# Patient Record
Sex: FEMALE | Race: WHITE | NOT HISPANIC OR LATINO | Employment: OTHER | URBAN - METROPOLITAN AREA
[De-identification: names, ages, dates, MRNs, and addresses within clinical notes are randomized per-mention and may not be internally consistent; named-entity substitution may affect disease eponyms.]

---

## 2018-03-20 ENCOUNTER — HOSPITAL ENCOUNTER (INPATIENT)
Facility: HOSPITAL | Age: 53
LOS: 6 days | Discharge: NURSING, REHAB OR LONG-TERM CARE | End: 2018-03-27
Attending: FAMILY MEDICINE | Admitting: INTERNAL MEDICINE

## 2018-03-20 DIAGNOSIS — M25.469 KNEE SWELLING: ICD-10-CM

## 2018-03-20 DIAGNOSIS — Z74.09 IMPAIRED FUNCTIONAL MOBILITY, BALANCE, GAIT, AND ENDURANCE: ICD-10-CM

## 2018-03-20 DIAGNOSIS — M25.469 KNEE SWELLING: Primary | ICD-10-CM

## 2018-03-20 DIAGNOSIS — Z74.09 IMPAIRED MOBILITY AND ADLS: ICD-10-CM

## 2018-03-20 DIAGNOSIS — Z78.9 IMPAIRED MOBILITY AND ADLS: ICD-10-CM

## 2018-03-20 PROBLEM — N39.0 URINARY TRACT INFECTION: Status: ACTIVE | Noted: 2018-03-20

## 2018-03-20 PROBLEM — D64.9 ANEMIA: Status: ACTIVE | Noted: 2018-03-20

## 2018-03-20 PROBLEM — I10 HYPERTENSION: Status: ACTIVE | Noted: 2018-03-20

## 2018-03-20 PROBLEM — F32.A DEPRESSION: Status: ACTIVE | Noted: 2018-03-20

## 2018-03-20 PROBLEM — R78.81 BACTEREMIA: Status: ACTIVE | Noted: 2018-03-20

## 2018-03-20 PROBLEM — IMO0001 ALCOHOLISM /ALCOHOL ABUSE: Status: ACTIVE | Noted: 2018-03-20

## 2018-03-20 RX ORDER — VENLAFAXINE HYDROCHLORIDE 75 MG/1
150 CAPSULE, EXTENDED RELEASE ORAL DAILY
Status: DISCONTINUED | OUTPATIENT
Start: 2018-03-21 | End: 2018-03-27 | Stop reason: HOSPADM

## 2018-03-20 RX ORDER — TRAZODONE HYDROCHLORIDE 50 MG/1
50 TABLET ORAL NIGHTLY
Status: DISCONTINUED | OUTPATIENT
Start: 2018-03-21 | End: 2018-03-27 | Stop reason: HOSPADM

## 2018-03-20 RX ORDER — VENLAFAXINE HYDROCHLORIDE 150 MG/1
150 CAPSULE, EXTENDED RELEASE ORAL DAILY
COMMUNITY

## 2018-03-20 RX ORDER — CARVEDILOL 3.12 MG/1
3.12 TABLET ORAL 2 TIMES DAILY WITH MEALS
Status: DISCONTINUED | OUTPATIENT
Start: 2018-03-21 | End: 2018-03-27 | Stop reason: HOSPADM

## 2018-03-20 RX ORDER — TRAZODONE HYDROCHLORIDE 50 MG/1
50 TABLET ORAL NIGHTLY
COMMUNITY

## 2018-03-20 RX ORDER — CARVEDILOL 3.12 MG/1
3.12 TABLET ORAL 2 TIMES DAILY WITH MEALS
COMMUNITY

## 2018-03-21 ENCOUNTER — APPOINTMENT (OUTPATIENT)
Dept: CARDIOLOGY | Facility: HOSPITAL | Age: 53
End: 2018-03-21

## 2018-03-21 ENCOUNTER — APPOINTMENT (OUTPATIENT)
Dept: GENERAL RADIOLOGY | Facility: HOSPITAL | Age: 53
End: 2018-03-21

## 2018-03-21 PROBLEM — Z96.659 INFECTED PROSTHETIC KNEE JOINT: Status: ACTIVE | Noted: 2018-03-21

## 2018-03-21 PROBLEM — K59.00 CONSTIPATION: Status: ACTIVE | Noted: 2018-03-21

## 2018-03-21 PROBLEM — T84.59XA INFECTED PROSTHETIC KNEE JOINT: Status: ACTIVE | Noted: 2018-03-21

## 2018-03-21 PROBLEM — A41.01 MSSA (METHICILLIN SUSCEPTIBLE STAPHYLOCOCCUS AUREUS) SEPTICEMIA (HCC): Status: ACTIVE | Noted: 2018-03-20

## 2018-03-21 PROBLEM — E11.9 DIABETES MELLITUS: Status: ACTIVE | Noted: 2018-03-21

## 2018-03-21 LAB
ALBUMIN SERPL-MCNC: 2.9 G/DL (ref 3.2–4.8)
ALBUMIN/GLOB SERPL: 0.7 G/DL (ref 1.5–2.5)
ALP SERPL-CCNC: 135 U/L (ref 25–100)
ALT SERPL W P-5'-P-CCNC: 18 U/L (ref 7–40)
ANION GAP SERPL CALCULATED.3IONS-SCNC: 10 MMOL/L (ref 3–11)
ANION GAP SERPL CALCULATED.3IONS-SCNC: 8 MMOL/L (ref 3–11)
APPEARANCE FLD: ABNORMAL
APPEARANCE FLD: ABNORMAL
AST SERPL-CCNC: 22 U/L (ref 0–33)
BH CV ECHO MEAS - AO ROOT AREA (BSA CORRECTED): 1.7
BH CV ECHO MEAS - AO ROOT AREA: 6.8 CM^2
BH CV ECHO MEAS - AO ROOT DIAM: 2.9 CM
BH CV ECHO MEAS - BSA(HAYCOCK): 1.8 M^2
BH CV ECHO MEAS - BSA: 1.7 M^2
BH CV ECHO MEAS - BZI_BMI: 28.9 KILOGRAMS/M^2
BH CV ECHO MEAS - BZI_METRIC_HEIGHT: 157.5 CM
BH CV ECHO MEAS - BZI_METRIC_WEIGHT: 71.7 KG
BH CV ECHO MEAS - CONTRAST EF (2CH): 63.6 ML/M^2
BH CV ECHO MEAS - CONTRAST EF 4CH: 61.7 ML/M^2
BH CV ECHO MEAS - EDV(CUBED): 71.2 ML
BH CV ECHO MEAS - EDV(MOD-SP2): 162 ML
BH CV ECHO MEAS - EDV(MOD-SP4): 154 ML
BH CV ECHO MEAS - EDV(TEICH): 76.2 ML
BH CV ECHO MEAS - EF(CUBED): 69.6 %
BH CV ECHO MEAS - EF(MOD-SP2): 63.6 %
BH CV ECHO MEAS - EF(MOD-SP4): 61.7 %
BH CV ECHO MEAS - EF(TEICH): 61.7 %
BH CV ECHO MEAS - ESV(CUBED): 21.6 ML
BH CV ECHO MEAS - ESV(MOD-SP2): 59 ML
BH CV ECHO MEAS - ESV(MOD-SP4): 59 ML
BH CV ECHO MEAS - ESV(TEICH): 29.2 ML
BH CV ECHO MEAS - FS: 32.8 %
BH CV ECHO MEAS - IVS/LVPW: 0.94
BH CV ECHO MEAS - IVSD: 1 CM
BH CV ECHO MEAS - LA DIMENSION: 4.1 CM
BH CV ECHO MEAS - LA/AO: 1.4
BH CV ECHO MEAS - LAT PEAK E' VEL: 14.8 CM/SEC
BH CV ECHO MEAS - LV DIASTOLIC VOL/BSA (35-75): 89.1 ML/M^2
BH CV ECHO MEAS - LV IVRT: 0.08 SEC
BH CV ECHO MEAS - LV MASS(C)D: 141.2 GRAMS
BH CV ECHO MEAS - LV MASS(C)DI: 81.7 GRAMS/M^2
BH CV ECHO MEAS - LV SYSTOLIC VOL/BSA (12-30): 34.1 ML/M^2
BH CV ECHO MEAS - LVIDD: 4.1 CM
BH CV ECHO MEAS - LVIDS: 2.8 CM
BH CV ECHO MEAS - LVLD AP2: 8.6 CM
BH CV ECHO MEAS - LVLD AP4: 9.1 CM
BH CV ECHO MEAS - LVLS AP2: 7.2 CM
BH CV ECHO MEAS - LVLS AP4: 7.6 CM
BH CV ECHO MEAS - LVOT AREA (M): 3.1 CM^2
BH CV ECHO MEAS - LVOT AREA: 3.1 CM^2
BH CV ECHO MEAS - LVOT DIAM: 2 CM
BH CV ECHO MEAS - LVPWD: 1.1 CM
BH CV ECHO MEAS - MED PEAK E' VEL: 8.99 CM/SEC
BH CV ECHO MEAS - MV A MAX VEL: 86.4 CM/SEC
BH CV ECHO MEAS - MV DEC TIME: 0.15 SEC
BH CV ECHO MEAS - MV E MAX VEL: 110.1 CM/SEC
BH CV ECHO MEAS - MV E/A: 1.3
BH CV ECHO MEAS - PA ACC SLOPE: 655.9 CM/SEC^2
BH CV ECHO MEAS - PA ACC TIME: 0.16 SEC
BH CV ECHO MEAS - PA PR(ACCEL): 7.7 MMHG
BH CV ECHO MEAS - PI END-D VEL: 90.1 CM/SEC
BH CV ECHO MEAS - PULM A REVS VEL: 29.4 CM/SEC
BH CV ECHO MEAS - PULM DIAS VEL: 53.3 CM/SEC
BH CV ECHO MEAS - PULM S/D: 1.3
BH CV ECHO MEAS - PULM SYS VEL: 66.7 CM/SEC
BH CV ECHO MEAS - RAP SYSTOLE: 5 MMHG
BH CV ECHO MEAS - RVSP: 33 MMHG
BH CV ECHO MEAS - SI(CUBED): 28.7 ML/M^2
BH CV ECHO MEAS - SI(MOD-SP2): 59.6 ML/M^2
BH CV ECHO MEAS - SI(MOD-SP4): 54.9 ML/M^2
BH CV ECHO MEAS - SI(TEICH): 27.2 ML/M^2
BH CV ECHO MEAS - SV(CUBED): 49.6 ML
BH CV ECHO MEAS - SV(MOD-SP2): 103 ML
BH CV ECHO MEAS - SV(MOD-SP4): 95 ML
BH CV ECHO MEAS - SV(TEICH): 47 ML
BH CV ECHO MEAS - TAPSE (>1.6): 2.2 CM2
BH CV ECHO MEAS - TR MAX V: 28 MMHG
BH CV ECHO MEAS - TR MAX VEL: 263 CM/SEC
BH CV VAS BP LEFT ARM: NORMAL MMHG
BH CV XLRA - RV BASE: 4.6 CM
BH CV XLRA - RV LENGTH: 7.7 CM
BH CV XLRA - RV MID: 4.1 CM
BH CV XLRA - TDI S': 15.4 CM/SEC
BILIRUB SERPL-MCNC: 0.4 MG/DL (ref 0.3–1.2)
BILIRUB UR QL STRIP: NEGATIVE
BUN BLD-MCNC: <5 MG/DL (ref 9–23)
BUN BLD-MCNC: <5 MG/DL (ref 9–23)
BUN/CREAT SERPL: ABNORMAL (ref 7–25)
BUN/CREAT SERPL: ABNORMAL (ref 7–25)
CALCIUM SPEC-SCNC: 8.2 MG/DL (ref 8.7–10.4)
CALCIUM SPEC-SCNC: 8.2 MG/DL (ref 8.7–10.4)
CHLORIDE SERPL-SCNC: 102 MMOL/L (ref 99–109)
CHLORIDE SERPL-SCNC: 104 MMOL/L (ref 99–109)
CLARITY UR: CLEAR
CO2 SERPL-SCNC: 23 MMOL/L (ref 20–31)
CO2 SERPL-SCNC: 26 MMOL/L (ref 20–31)
COLOR FLD: ABNORMAL
COLOR FLD: YELLOW
COLOR UR: YELLOW
CREAT BLD-MCNC: 0.4 MG/DL (ref 0.6–1.3)
CREAT BLD-MCNC: 0.4 MG/DL (ref 0.6–1.3)
CRP SERPL-MCNC: 15.65 MG/DL (ref 0–1)
CRYSTALS FLD MICRO: NORMAL
CRYSTALS FLD MICRO: NORMAL
DEPRECATED RDW RBC AUTO: 45 FL (ref 37–54)
DEPRECATED RDW RBC AUTO: 45.8 FL (ref 37–54)
E/E' RATIO: 9
ERYTHROCYTE [DISTWIDTH] IN BLOOD BY AUTOMATED COUNT: 13.4 % (ref 11.3–14.5)
ERYTHROCYTE [DISTWIDTH] IN BLOOD BY AUTOMATED COUNT: 13.7 % (ref 11.3–14.5)
ERYTHROCYTE [SEDIMENTATION RATE] IN BLOOD: 60 MM/HR (ref 0–30)
GFR SERPL CREATININE-BSD FRML MDRD: >150 ML/MIN/1.73
GFR SERPL CREATININE-BSD FRML MDRD: >150 ML/MIN/1.73
GLOBULIN UR ELPH-MCNC: 3.9 GM/DL
GLUCOSE BLD-MCNC: 113 MG/DL (ref 70–100)
GLUCOSE BLD-MCNC: 127 MG/DL (ref 70–100)
GLUCOSE BLDC GLUCOMTR-MCNC: 104 MG/DL (ref 70–130)
GLUCOSE BLDC GLUCOMTR-MCNC: 115 MG/DL (ref 70–130)
GLUCOSE BLDC GLUCOMTR-MCNC: 77 MG/DL (ref 70–130)
GLUCOSE BLDC GLUCOMTR-MCNC: 81 MG/DL (ref 70–130)
GLUCOSE UR STRIP-MCNC: NEGATIVE MG/DL
HCT VFR BLD AUTO: 27 % (ref 34.5–44)
HCT VFR BLD AUTO: 29 % (ref 34.5–44)
HGB BLD-MCNC: 8.6 G/DL (ref 11.5–15.5)
HGB BLD-MCNC: 9.1 G/DL (ref 11.5–15.5)
HGB UR QL STRIP.AUTO: NEGATIVE
KETONES UR QL STRIP: NEGATIVE
LEFT ATRIUM VOLUME INDEX: 49.1 ML/M2
LEUKOCYTE ESTERASE UR QL STRIP.AUTO: NEGATIVE
LV EF 2D ECHO EST: 63 %
LYMPHOCYTES NFR FLD MANUAL: 1 %
LYMPHOCYTES NFR FLD MANUAL: 2 %
MACROPHAGE FLUID: 1 %
MCH RBC QN AUTO: 29.1 PG (ref 27–31)
MCH RBC QN AUTO: 29.4 PG (ref 27–31)
MCHC RBC AUTO-ENTMCNC: 31.4 G/DL (ref 32–36)
MCHC RBC AUTO-ENTMCNC: 31.9 G/DL (ref 32–36)
MCV RBC AUTO: 92.2 FL (ref 80–99)
MCV RBC AUTO: 92.7 FL (ref 80–99)
NEUTROPHILS NFR FLD MANUAL: 98 %
NEUTROPHILS NFR FLD MANUAL: 98 %
NITRITE UR QL STRIP: NEGATIVE
PH UR STRIP.AUTO: 5.5 [PH] (ref 5–8)
PLATELET # BLD AUTO: 560 10*3/MM3 (ref 150–450)
PLATELET # BLD AUTO: 609 10*3/MM3 (ref 150–450)
PMV BLD AUTO: 7.8 FL (ref 6–12)
PMV BLD AUTO: 8.2 FL (ref 6–12)
POTASSIUM BLD-SCNC: 3.5 MMOL/L (ref 3.5–5.5)
POTASSIUM BLD-SCNC: 3.8 MMOL/L (ref 3.5–5.5)
PROT SERPL-MCNC: 6.8 G/DL (ref 5.7–8.2)
PROT UR QL STRIP: NEGATIVE
RBC # BLD AUTO: 2.93 10*6/MM3 (ref 3.89–5.14)
RBC # BLD AUTO: 3.13 10*6/MM3 (ref 3.89–5.14)
RBC # FLD AUTO: 8000 /MM3
RBC # FLD AUTO: ABNORMAL /MM3
SODIUM BLD-SCNC: 135 MMOL/L (ref 132–146)
SODIUM BLD-SCNC: 138 MMOL/L (ref 132–146)
SP GR UR STRIP: 1.02 (ref 1–1.03)
UROBILINOGEN UR QL STRIP: NORMAL
WBC # FLD: ABNORMAL /MM3
WBC # FLD: ABNORMAL /MM3
WBC NRBC COR # BLD: 10.88 10*3/MM3 (ref 3.5–10.8)
WBC NRBC COR # BLD: 13.75 10*3/MM3 (ref 3.5–10.8)

## 2018-03-21 PROCEDURE — 81003 URINALYSIS AUTO W/O SCOPE: CPT | Performed by: PHYSICIAN ASSISTANT

## 2018-03-21 PROCEDURE — 93306 TTE W/DOPPLER COMPLETE: CPT

## 2018-03-21 PROCEDURE — 87077 CULTURE AEROBIC IDENTIFY: CPT | Performed by: ORTHOPAEDIC SURGERY

## 2018-03-21 PROCEDURE — 87040 BLOOD CULTURE FOR BACTERIA: CPT | Performed by: ORTHOPAEDIC SURGERY

## 2018-03-21 PROCEDURE — 20610 DRAIN/INJ JOINT/BURSA W/O US: CPT | Performed by: ORTHOPAEDIC SURGERY

## 2018-03-21 PROCEDURE — 80053 COMPREHEN METABOLIC PANEL: CPT | Performed by: PHYSICIAN ASSISTANT

## 2018-03-21 PROCEDURE — 85027 COMPLETE CBC AUTOMATED: CPT | Performed by: PHYSICIAN ASSISTANT

## 2018-03-21 PROCEDURE — 25010000003 CEFAZOLIN 1 GM/50ML SOLUTION: Performed by: PHYSICIAN ASSISTANT

## 2018-03-21 PROCEDURE — 87070 CULTURE OTHR SPECIMN AEROBIC: CPT | Performed by: ORTHOPAEDIC SURGERY

## 2018-03-21 PROCEDURE — 99221 1ST HOSP IP/OBS SF/LOW 40: CPT | Performed by: ORTHOPAEDIC SURGERY

## 2018-03-21 PROCEDURE — 89051 BODY FLUID CELL COUNT: CPT | Performed by: ORTHOPAEDIC SURGERY

## 2018-03-21 PROCEDURE — 87015 SPECIMEN INFECT AGNT CONCNTJ: CPT | Performed by: ORTHOPAEDIC SURGERY

## 2018-03-21 PROCEDURE — 89060 EXAM SYNOVIAL FLUID CRYSTALS: CPT | Performed by: ORTHOPAEDIC SURGERY

## 2018-03-21 PROCEDURE — 73560 X-RAY EXAM OF KNEE 1 OR 2: CPT

## 2018-03-21 PROCEDURE — 63710000001 INSULIN LISPRO (HUMAN) PER 5 UNITS: Performed by: PHYSICIAN ASSISTANT

## 2018-03-21 PROCEDURE — 63710000001 INSULIN DETEMIR PER 5 UNITS

## 2018-03-21 PROCEDURE — 87186 SC STD MICRODIL/AGAR DIL: CPT | Performed by: ORTHOPAEDIC SURGERY

## 2018-03-21 PROCEDURE — 93306 TTE W/DOPPLER COMPLETE: CPT | Performed by: INTERNAL MEDICINE

## 2018-03-21 PROCEDURE — 82962 GLUCOSE BLOOD TEST: CPT

## 2018-03-21 PROCEDURE — 87147 CULTURE TYPE IMMUNOLOGIC: CPT | Performed by: ORTHOPAEDIC SURGERY

## 2018-03-21 PROCEDURE — 86140 C-REACTIVE PROTEIN: CPT | Performed by: PHYSICIAN ASSISTANT

## 2018-03-21 PROCEDURE — 87205 SMEAR GRAM STAIN: CPT | Performed by: ORTHOPAEDIC SURGERY

## 2018-03-21 PROCEDURE — 99223 1ST HOSP IP/OBS HIGH 75: CPT | Performed by: FAMILY MEDICINE

## 2018-03-21 PROCEDURE — 85652 RBC SED RATE AUTOMATED: CPT | Performed by: PHYSICIAN ASSISTANT

## 2018-03-21 RX ORDER — LORAZEPAM 2 MG/ML
2 INJECTION INTRAMUSCULAR
Status: DISCONTINUED | OUTPATIENT
Start: 2018-03-21 | End: 2018-03-27 | Stop reason: HOSPADM

## 2018-03-21 RX ORDER — SODIUM CHLORIDE 0.9 % (FLUSH) 0.9 %
1-10 SYRINGE (ML) INJECTION AS NEEDED
Status: DISCONTINUED | OUTPATIENT
Start: 2018-03-20 | End: 2018-03-27 | Stop reason: HOSPADM

## 2018-03-21 RX ORDER — LORAZEPAM 1 MG/1
2 TABLET ORAL
Status: DISCONTINUED | OUTPATIENT
Start: 2018-03-21 | End: 2018-03-27 | Stop reason: HOSPADM

## 2018-03-21 RX ORDER — ACETAMINOPHEN 325 MG/1
650 TABLET ORAL EVERY 4 HOURS PRN
Status: DISCONTINUED | OUTPATIENT
Start: 2018-03-21 | End: 2018-03-23 | Stop reason: SDUPTHER

## 2018-03-21 RX ORDER — ONDANSETRON 4 MG/1
4 TABLET, FILM COATED ORAL EVERY 6 HOURS PRN
Status: DISCONTINUED | OUTPATIENT
Start: 2018-03-21 | End: 2018-03-27 | Stop reason: HOSPADM

## 2018-03-21 RX ORDER — LORAZEPAM 1 MG/1
1 TABLET ORAL
Status: DISCONTINUED | OUTPATIENT
Start: 2018-03-21 | End: 2018-03-27 | Stop reason: HOSPADM

## 2018-03-21 RX ORDER — ONDANSETRON 2 MG/ML
4 INJECTION INTRAMUSCULAR; INTRAVENOUS EVERY 6 HOURS PRN
Status: DISCONTINUED | OUTPATIENT
Start: 2018-03-21 | End: 2018-03-27 | Stop reason: HOSPADM

## 2018-03-21 RX ORDER — OXYCODONE AND ACETAMINOPHEN 10; 325 MG/1; MG/1
1 TABLET ORAL EVERY 6 HOURS PRN
Status: DISCONTINUED | OUTPATIENT
Start: 2018-03-21 | End: 2018-03-27

## 2018-03-21 RX ORDER — LORAZEPAM 2 MG/ML
1 INJECTION INTRAMUSCULAR
Status: DISCONTINUED | OUTPATIENT
Start: 2018-03-21 | End: 2018-03-27 | Stop reason: HOSPADM

## 2018-03-21 RX ORDER — SENNA AND DOCUSATE SODIUM 50; 8.6 MG/1; MG/1
2 TABLET, FILM COATED ORAL 2 TIMES DAILY
Status: DISCONTINUED | OUTPATIENT
Start: 2018-03-21 | End: 2018-03-27 | Stop reason: HOSPADM

## 2018-03-21 RX ORDER — DEXTROSE MONOHYDRATE 25 G/50ML
25 INJECTION, SOLUTION INTRAVENOUS
Status: DISCONTINUED | OUTPATIENT
Start: 2018-03-20 | End: 2018-03-27 | Stop reason: HOSPADM

## 2018-03-21 RX ORDER — NICOTINE POLACRILEX 4 MG
15 LOZENGE BUCCAL
Status: DISCONTINUED | OUTPATIENT
Start: 2018-03-20 | End: 2018-03-27 | Stop reason: HOSPADM

## 2018-03-21 RX ORDER — BISACODYL 10 MG
10 SUPPOSITORY, RECTAL RECTAL 2 TIMES DAILY PRN
Status: DISCONTINUED | OUTPATIENT
Start: 2018-03-21 | End: 2018-03-27 | Stop reason: HOSPADM

## 2018-03-21 RX ORDER — SODIUM CHLORIDE 9 MG/ML
50 INJECTION, SOLUTION INTRAVENOUS CONTINUOUS
Status: DISCONTINUED | OUTPATIENT
Start: 2018-03-21 | End: 2018-03-23 | Stop reason: SDUPTHER

## 2018-03-21 RX ORDER — IPRATROPIUM BROMIDE AND ALBUTEROL SULFATE 2.5; .5 MG/3ML; MG/3ML
3 SOLUTION RESPIRATORY (INHALATION) EVERY 4 HOURS PRN
Status: DISCONTINUED | OUTPATIENT
Start: 2018-03-21 | End: 2018-03-27 | Stop reason: HOSPADM

## 2018-03-21 RX ADMIN — TRAZODONE HYDROCHLORIDE 50 MG: 50 TABLET ORAL at 00:18

## 2018-03-21 RX ADMIN — LORAZEPAM 2 MG: 1 TABLET ORAL at 08:32

## 2018-03-21 RX ADMIN — VENLAFAXINE HYDROCHLORIDE 150 MG: 75 CAPSULE, EXTENDED RELEASE ORAL at 08:25

## 2018-03-21 RX ADMIN — POLYETHYLENE GLYCOL (3350) 17 G: 17 POWDER, FOR SOLUTION ORAL at 21:03

## 2018-03-21 RX ADMIN — OXYCODONE HYDROCHLORIDE AND ACETAMINOPHEN 1 TABLET: 10; 325 TABLET ORAL at 11:07

## 2018-03-21 RX ADMIN — CEFAZOLIN SODIUM 1 G: 1 INJECTION, SOLUTION INTRAVENOUS at 01:02

## 2018-03-21 RX ADMIN — OXYCODONE HYDROCHLORIDE AND ACETAMINOPHEN 1 TABLET: 10; 325 TABLET ORAL at 05:14

## 2018-03-21 RX ADMIN — NAFCILLIN SODIUM 6 G: 2 INJECTION, POWDER, LYOPHILIZED, FOR SOLUTION INTRAMUSCULAR; INTRAVENOUS at 16:52

## 2018-03-21 RX ADMIN — LORAZEPAM 2 MG: 1 TABLET ORAL at 00:18

## 2018-03-21 RX ADMIN — INSULIN DETEMIR 15 UNITS: 100 INJECTION, SOLUTION SUBCUTANEOUS at 08:24

## 2018-03-21 RX ADMIN — CEFAZOLIN SODIUM 1 G: 1 INJECTION, SOLUTION INTRAVENOUS at 08:25

## 2018-03-21 RX ADMIN — SODIUM CHLORIDE 100 ML/HR: 9 INJECTION, SOLUTION INTRAVENOUS at 00:19

## 2018-03-21 RX ADMIN — LORAZEPAM 2 MG: 1 TABLET ORAL at 16:53

## 2018-03-21 RX ADMIN — OXYCODONE HYDROCHLORIDE AND ACETAMINOPHEN 1 TABLET: 10; 325 TABLET ORAL at 21:04

## 2018-03-21 RX ADMIN — ACETAMINOPHEN 650 MG: 325 TABLET ORAL at 00:18

## 2018-03-21 RX ADMIN — Medication 2 TABLET: at 21:04

## 2018-03-21 RX ADMIN — TRAZODONE HYDROCHLORIDE 50 MG: 50 TABLET ORAL at 21:04

## 2018-03-21 RX ADMIN — CARVEDILOL 3.12 MG: 3.12 TABLET, FILM COATED ORAL at 08:25

## 2018-03-21 RX ADMIN — LORAZEPAM 2 MG: 1 TABLET ORAL at 11:07

## 2018-03-21 RX ADMIN — CARVEDILOL 3.12 MG: 3.12 TABLET, FILM COATED ORAL at 16:51

## 2018-03-22 ENCOUNTER — APPOINTMENT (OUTPATIENT)
Dept: CARDIOLOGY | Facility: HOSPITAL | Age: 53
End: 2018-03-22
Attending: INTERNAL MEDICINE

## 2018-03-22 ENCOUNTER — ANESTHESIA (OUTPATIENT)
Dept: PERIOP | Facility: HOSPITAL | Age: 53
End: 2018-03-22

## 2018-03-22 ENCOUNTER — ANESTHESIA EVENT (OUTPATIENT)
Dept: PERIOP | Facility: HOSPITAL | Age: 53
End: 2018-03-22

## 2018-03-22 PROBLEM — D50.9 IRON DEFICIENCY ANEMIA: Status: ACTIVE | Noted: 2018-03-20

## 2018-03-22 LAB
ANION GAP SERPL CALCULATED.3IONS-SCNC: 10 MMOL/L (ref 3–11)
BH CV VAS BP LEFT ARM: NORMAL MMHG
BUN BLD-MCNC: 5 MG/DL (ref 9–23)
BUN/CREAT SERPL: 12.5 (ref 7–25)
CALCIUM SPEC-SCNC: 7.6 MG/DL (ref 8.7–10.4)
CHLORIDE SERPL-SCNC: 105 MMOL/L (ref 99–109)
CO2 SERPL-SCNC: 23 MMOL/L (ref 20–31)
CREAT BLD-MCNC: 0.4 MG/DL (ref 0.6–1.3)
DEPRECATED RDW RBC AUTO: 45.4 FL (ref 37–54)
ERYTHROCYTE [DISTWIDTH] IN BLOOD BY AUTOMATED COUNT: 13.4 % (ref 11.3–14.5)
FERRITIN SERPL-MCNC: 184 NG/ML (ref 10–291)
GFR SERPL CREATININE-BSD FRML MDRD: >150 ML/MIN/1.73
GLUCOSE BLD-MCNC: 96 MG/DL (ref 70–100)
GLUCOSE BLDC GLUCOMTR-MCNC: 104 MG/DL (ref 70–130)
GLUCOSE BLDC GLUCOMTR-MCNC: 139 MG/DL (ref 70–130)
GLUCOSE BLDC GLUCOMTR-MCNC: 86 MG/DL (ref 70–130)
GLUCOSE BLDC GLUCOMTR-MCNC: 91 MG/DL (ref 70–130)
GLUCOSE BLDC GLUCOMTR-MCNC: 97 MG/DL (ref 70–130)
HCT VFR BLD AUTO: 28 % (ref 34.5–44)
HGB BLD-MCNC: 8.9 G/DL (ref 11.5–15.5)
IRON 24H UR-MRATE: 17 MCG/DL (ref 50–175)
IRON SATN MFR SERPL: 7 % (ref 15–50)
LV EF 2D ECHO EST: 65 %
MAGNESIUM SERPL-MCNC: 1.8 MG/DL (ref 1.3–2.7)
MCH RBC QN AUTO: 29.4 PG (ref 27–31)
MCHC RBC AUTO-ENTMCNC: 31.8 G/DL (ref 32–36)
MCV RBC AUTO: 92.4 FL (ref 80–99)
PLATELET # BLD AUTO: 590 10*3/MM3 (ref 150–450)
PMV BLD AUTO: 7.7 FL (ref 6–12)
POTASSIUM BLD-SCNC: 4.1 MMOL/L (ref 3.5–5.5)
RBC # BLD AUTO: 3.03 10*6/MM3 (ref 3.89–5.14)
SODIUM BLD-SCNC: 138 MMOL/L (ref 132–146)
TIBC SERPL-MCNC: 251 MCG/DL (ref 250–450)
VIT B12 BLD-MCNC: 1932 PG/ML (ref 211–911)
WBC NRBC COR # BLD: 9.6 10*3/MM3 (ref 3.5–10.8)

## 2018-03-22 PROCEDURE — 94760 N-INVAS EAR/PLS OXIMETRY 1: CPT

## 2018-03-22 PROCEDURE — 87040 BLOOD CULTURE FOR BACTERIA: CPT | Performed by: PHYSICIAN ASSISTANT

## 2018-03-22 PROCEDURE — 93320 DOPPLER ECHO COMPLETE: CPT

## 2018-03-22 PROCEDURE — 93312 ECHO TRANSESOPHAGEAL: CPT

## 2018-03-22 PROCEDURE — 82607 VITAMIN B-12: CPT | Performed by: INTERNAL MEDICINE

## 2018-03-22 PROCEDURE — 83735 ASSAY OF MAGNESIUM: CPT | Performed by: INTERNAL MEDICINE

## 2018-03-22 PROCEDURE — 93320 DOPPLER ECHO COMPLETE: CPT | Performed by: INTERNAL MEDICINE

## 2018-03-22 PROCEDURE — 99232 SBSQ HOSP IP/OBS MODERATE 35: CPT | Performed by: INTERNAL MEDICINE

## 2018-03-22 PROCEDURE — 85027 COMPLETE CBC AUTOMATED: CPT | Performed by: INTERNAL MEDICINE

## 2018-03-22 PROCEDURE — 83540 ASSAY OF IRON: CPT | Performed by: INTERNAL MEDICINE

## 2018-03-22 PROCEDURE — 93321 DOPPLER ECHO F-UP/LMTD STD: CPT

## 2018-03-22 PROCEDURE — 94640 AIRWAY INHALATION TREATMENT: CPT

## 2018-03-22 PROCEDURE — 99232 SBSQ HOSP IP/OBS MODERATE 35: CPT | Performed by: ORTHOPAEDIC SURGERY

## 2018-03-22 PROCEDURE — 25010000002 NA FERRIC GLUC CPLX PER 12.5 MG: Performed by: INTERNAL MEDICINE

## 2018-03-22 PROCEDURE — 82962 GLUCOSE BLOOD TEST: CPT

## 2018-03-22 PROCEDURE — 80048 BASIC METABOLIC PNL TOTAL CA: CPT | Performed by: INTERNAL MEDICINE

## 2018-03-22 PROCEDURE — 93325 DOPPLER ECHO COLOR FLOW MAPG: CPT

## 2018-03-22 PROCEDURE — 93312 ECHO TRANSESOPHAGEAL: CPT | Performed by: INTERNAL MEDICINE

## 2018-03-22 PROCEDURE — 83550 IRON BINDING TEST: CPT | Performed by: INTERNAL MEDICINE

## 2018-03-22 PROCEDURE — 63710000001 INSULIN DETEMIR PER 5 UNITS: Performed by: INTERNAL MEDICINE

## 2018-03-22 PROCEDURE — 93325 DOPPLER ECHO COLOR FLOW MAPG: CPT | Performed by: INTERNAL MEDICINE

## 2018-03-22 PROCEDURE — 82728 ASSAY OF FERRITIN: CPT | Performed by: INTERNAL MEDICINE

## 2018-03-22 RX ORDER — ACETAMINOPHEN 650 MG
TABLET, EXTENDED RELEASE ORAL DAILY
Status: DISCONTINUED | OUTPATIENT
Start: 2018-03-22 | End: 2018-03-27 | Stop reason: HOSPADM

## 2018-03-22 RX ORDER — TRIAMCINOLONE ACETONIDE 1 MG/ML
LOTION TOPICAL EVERY 12 HOURS SCHEDULED
Status: DISCONTINUED | OUTPATIENT
Start: 2018-03-22 | End: 2018-03-27 | Stop reason: HOSPADM

## 2018-03-22 RX ORDER — LIDOCAINE HYDROCHLORIDE 10 MG/ML
0.5 INJECTION, SOLUTION EPIDURAL; INFILTRATION; INTRACAUDAL; PERINEURAL ONCE AS NEEDED
Status: CANCELLED | OUTPATIENT
Start: 2018-03-22

## 2018-03-22 RX ADMIN — SODIUM CHLORIDE 125 MG: 9 INJECTION, SOLUTION INTRAVENOUS at 19:52

## 2018-03-22 RX ADMIN — OXYCODONE HYDROCHLORIDE AND ACETAMINOPHEN 1 TABLET: 10; 325 TABLET ORAL at 12:08

## 2018-03-22 RX ADMIN — LORAZEPAM 2 MG: 1 TABLET ORAL at 23:01

## 2018-03-22 RX ADMIN — NAFCILLIN SODIUM 6 G: 2 INJECTION, POWDER, LYOPHILIZED, FOR SOLUTION INTRAMUSCULAR; INTRAVENOUS at 04:29

## 2018-03-22 RX ADMIN — CARVEDILOL 3.12 MG: 3.12 TABLET, FILM COATED ORAL at 18:40

## 2018-03-22 RX ADMIN — VENLAFAXINE HYDROCHLORIDE 150 MG: 75 CAPSULE, EXTENDED RELEASE ORAL at 08:59

## 2018-03-22 RX ADMIN — OXYCODONE HYDROCHLORIDE AND ACETAMINOPHEN 1 TABLET: 10; 325 TABLET ORAL at 18:44

## 2018-03-22 RX ADMIN — SODIUM CHLORIDE 50 ML/HR: 9 INJECTION, SOLUTION INTRAVENOUS at 00:48

## 2018-03-22 RX ADMIN — POLYETHYLENE GLYCOL (3350) 17 G: 17 POWDER, FOR SOLUTION ORAL at 08:59

## 2018-03-22 RX ADMIN — Medication 2 TABLET: at 19:52

## 2018-03-22 RX ADMIN — Medication 2 TABLET: at 08:58

## 2018-03-22 RX ADMIN — TRIAMCINOLONE ACETONIDE: 1 LOTION TOPICAL at 19:53

## 2018-03-22 RX ADMIN — ACETAMINOPHEN 650 MG: 325 TABLET ORAL at 09:53

## 2018-03-22 RX ADMIN — CARVEDILOL 3.12 MG: 3.12 TABLET, FILM COATED ORAL at 08:59

## 2018-03-22 RX ADMIN — TRAZODONE HYDROCHLORIDE 50 MG: 50 TABLET ORAL at 19:52

## 2018-03-22 RX ADMIN — IPRATROPIUM BROMIDE AND ALBUTEROL SULFATE 3 ML: .5; 3 SOLUTION RESPIRATORY (INHALATION) at 07:03

## 2018-03-22 RX ADMIN — NAFCILLIN SODIUM 6 G: 2 INJECTION, POWDER, LYOPHILIZED, FOR SOLUTION INTRAMUSCULAR; INTRAVENOUS at 16:36

## 2018-03-22 RX ADMIN — INSULIN DETEMIR 10 UNITS: 100 INJECTION, SOLUTION SUBCUTANEOUS at 08:59

## 2018-03-22 RX ADMIN — OXYCODONE HYDROCHLORIDE AND ACETAMINOPHEN 1 TABLET: 10; 325 TABLET ORAL at 06:36

## 2018-03-23 LAB
ANION GAP SERPL CALCULATED.3IONS-SCNC: 7 MMOL/L (ref 3–11)
B-HCG UR QL: NEGATIVE
BUN BLD-MCNC: <5 MG/DL (ref 9–23)
BUN/CREAT SERPL: ABNORMAL (ref 7–25)
CALCIUM SPEC-SCNC: 8 MG/DL (ref 8.7–10.4)
CHLORIDE SERPL-SCNC: 105 MMOL/L (ref 99–109)
CO2 SERPL-SCNC: 27 MMOL/L (ref 20–31)
CREAT BLD-MCNC: 0.4 MG/DL (ref 0.6–1.3)
DEPRECATED RDW RBC AUTO: 44.6 FL (ref 37–54)
ERYTHROCYTE [DISTWIDTH] IN BLOOD BY AUTOMATED COUNT: 13.4 % (ref 11.3–14.5)
GFR SERPL CREATININE-BSD FRML MDRD: >150 ML/MIN/1.73
GLUCOSE BLD-MCNC: 63 MG/DL (ref 70–100)
GLUCOSE BLDC GLUCOMTR-MCNC: 144 MG/DL (ref 70–130)
GLUCOSE BLDC GLUCOMTR-MCNC: 78 MG/DL (ref 70–130)
GLUCOSE BLDC GLUCOMTR-MCNC: 79 MG/DL (ref 70–130)
HCT VFR BLD AUTO: 29.6 % (ref 34.5–44)
HGB BLD-MCNC: 9.5 G/DL (ref 11.5–15.5)
INTERNAL NEGATIVE CONTROL: NORMAL
INTERNAL POSITIVE CONTROL: REACTIVE
Lab: NORMAL
MCH RBC QN AUTO: 29.3 PG (ref 27–31)
MCHC RBC AUTO-ENTMCNC: 32.1 G/DL (ref 32–36)
MCV RBC AUTO: 91.4 FL (ref 80–99)
PLATELET # BLD AUTO: 733 10*3/MM3 (ref 150–450)
PMV BLD AUTO: 7.9 FL (ref 6–12)
POTASSIUM BLD-SCNC: 3.2 MMOL/L (ref 3.5–5.5)
RBC # BLD AUTO: 3.24 10*6/MM3 (ref 3.89–5.14)
SODIUM BLD-SCNC: 139 MMOL/L (ref 132–146)
WBC NRBC COR # BLD: 10.47 10*3/MM3 (ref 3.5–10.8)

## 2018-03-23 PROCEDURE — 87176 TISSUE HOMOGENIZATION CULTR: CPT | Performed by: ORTHOPAEDIC SURGERY

## 2018-03-23 PROCEDURE — 25010000002 ROPIVACAINE PER 1 MG: Performed by: NURSE ANESTHETIST, CERTIFIED REGISTERED

## 2018-03-23 PROCEDURE — 0SHC08Z INSERTION OF SPACER INTO RIGHT KNEE JOINT, OPEN APPROACH: ICD-10-PCS | Performed by: ORTHOPAEDIC SURGERY

## 2018-03-23 PROCEDURE — 87186 SC STD MICRODIL/AGAR DIL: CPT | Performed by: INTERNAL MEDICINE

## 2018-03-23 PROCEDURE — 0SPD0JZ REMOVAL OF SYNTHETIC SUBSTITUTE FROM LEFT KNEE JOINT, OPEN APPROACH: ICD-10-PCS | Performed by: ORTHOPAEDIC SURGERY

## 2018-03-23 PROCEDURE — 25010000002 PROPOFOL 10 MG/ML EMULSION: Performed by: NURSE ANESTHETIST, CERTIFIED REGISTERED

## 2018-03-23 PROCEDURE — 25010000002 NEOSTIGMINE 10 MG/10ML SOLUTION: Performed by: NURSE ANESTHETIST, CERTIFIED REGISTERED

## 2018-03-23 PROCEDURE — 25010000002 NA FERRIC GLUC CPLX PER 12.5 MG: Performed by: INTERNAL MEDICINE

## 2018-03-23 PROCEDURE — 87147 CULTURE TYPE IMMUNOLOGIC: CPT | Performed by: ORTHOPAEDIC SURGERY

## 2018-03-23 PROCEDURE — 87116 MYCOBACTERIA CULTURE: CPT | Performed by: ORTHOPAEDIC SURGERY

## 2018-03-23 PROCEDURE — 87077 CULTURE AEROBIC IDENTIFY: CPT | Performed by: INTERNAL MEDICINE

## 2018-03-23 PROCEDURE — 82962 GLUCOSE BLOOD TEST: CPT

## 2018-03-23 PROCEDURE — 87186 SC STD MICRODIL/AGAR DIL: CPT | Performed by: ORTHOPAEDIC SURGERY

## 2018-03-23 PROCEDURE — 99233 SBSQ HOSP IP/OBS HIGH 50: CPT | Performed by: INTERNAL MEDICINE

## 2018-03-23 PROCEDURE — 25010000002 DEXAMETHASONE PER 1 MG: Performed by: NURSE ANESTHETIST, CERTIFIED REGISTERED

## 2018-03-23 PROCEDURE — 87102 FUNGUS ISOLATION CULTURE: CPT | Performed by: ORTHOPAEDIC SURGERY

## 2018-03-23 PROCEDURE — 25010000002 ONDANSETRON PER 1 MG: Performed by: FAMILY MEDICINE

## 2018-03-23 PROCEDURE — 85027 COMPLETE CBC AUTOMATED: CPT | Performed by: INTERNAL MEDICINE

## 2018-03-23 PROCEDURE — C1776 JOINT DEVICE (IMPLANTABLE): HCPCS | Performed by: ORTHOPAEDIC SURGERY

## 2018-03-23 PROCEDURE — 87070 CULTURE OTHR SPECIMN AEROBIC: CPT | Performed by: INTERNAL MEDICINE

## 2018-03-23 PROCEDURE — 25010000003 CEFAZOLIN PER 500 MG: Performed by: ORTHOPAEDIC SURGERY

## 2018-03-23 PROCEDURE — 25010000002 HYDROMORPHONE PER 4 MG: Performed by: NURSE PRACTITIONER

## 2018-03-23 PROCEDURE — 27486 REVISE/REPLACE KNEE JOINT: CPT | Performed by: ORTHOPAEDIC SURGERY

## 2018-03-23 PROCEDURE — C1713 ANCHOR/SCREW BN/BN,TIS/BN: HCPCS | Performed by: ORTHOPAEDIC SURGERY

## 2018-03-23 PROCEDURE — 25010000002 FENTANYL CITRATE (PF) 100 MCG/2ML SOLUTION: Performed by: ANESTHESIOLOGY

## 2018-03-23 PROCEDURE — 87147 CULTURE TYPE IMMUNOLOGIC: CPT | Performed by: INTERNAL MEDICINE

## 2018-03-23 PROCEDURE — 0SHD08Z INSERTION OF SPACER INTO LEFT KNEE JOINT, OPEN APPROACH: ICD-10-PCS | Performed by: ORTHOPAEDIC SURGERY

## 2018-03-23 PROCEDURE — 87070 CULTURE OTHR SPECIMN AEROBIC: CPT | Performed by: ORTHOPAEDIC SURGERY

## 2018-03-23 PROCEDURE — 87205 SMEAR GRAM STAIN: CPT | Performed by: INTERNAL MEDICINE

## 2018-03-23 PROCEDURE — 0SPC0JZ REMOVAL OF SYNTHETIC SUBSTITUTE FROM RIGHT KNEE JOINT, OPEN APPROACH: ICD-10-PCS | Performed by: ORTHOPAEDIC SURGERY

## 2018-03-23 PROCEDURE — 87205 SMEAR GRAM STAIN: CPT | Performed by: ORTHOPAEDIC SURGERY

## 2018-03-23 PROCEDURE — 87206 SMEAR FLUORESCENT/ACID STAI: CPT | Performed by: ORTHOPAEDIC SURGERY

## 2018-03-23 PROCEDURE — 93005 ELECTROCARDIOGRAM TRACING: CPT | Performed by: ANESTHESIOLOGY

## 2018-03-23 PROCEDURE — 25010000002 FENTANYL CITRATE (PF) 100 MCG/2ML SOLUTION: Performed by: NURSE ANESTHETIST, CERTIFIED REGISTERED

## 2018-03-23 PROCEDURE — 87077 CULTURE AEROBIC IDENTIFY: CPT | Performed by: ORTHOPAEDIC SURGERY

## 2018-03-23 PROCEDURE — 80048 BASIC METABOLIC PNL TOTAL CA: CPT | Performed by: INTERNAL MEDICINE

## 2018-03-23 PROCEDURE — 87075 CULTR BACTERIA EXCEPT BLOOD: CPT | Performed by: ORTHOPAEDIC SURGERY

## 2018-03-23 DEVICE — BAR TIB ASCENT/MAXIM PRI INTERLOK: Type: IMPLANTABLE DEVICE | Site: KNEE | Status: FUNCTIONAL

## 2018-03-23 DEVICE — IMPLANTABLE DEVICE: Type: IMPLANTABLE DEVICE | Site: KNEE | Status: FUNCTIONAL

## 2018-03-23 RX ORDER — DEXAMETHASONE SODIUM PHOSPHATE 4 MG/ML
INJECTION, SOLUTION INTRA-ARTICULAR; INTRALESIONAL; INTRAMUSCULAR; INTRAVENOUS; SOFT TISSUE AS NEEDED
Status: DISCONTINUED | OUTPATIENT
Start: 2018-03-23 | End: 2018-03-23 | Stop reason: SURG

## 2018-03-23 RX ORDER — GLYCOPYRROLATE 0.2 MG/ML
INJECTION INTRAMUSCULAR; INTRAVENOUS AS NEEDED
Status: DISCONTINUED | OUTPATIENT
Start: 2018-03-23 | End: 2018-03-23 | Stop reason: SURG

## 2018-03-23 RX ORDER — FAMOTIDINE 20 MG/1
20 TABLET, FILM COATED ORAL ONCE
Status: DISCONTINUED | OUTPATIENT
Start: 2018-03-23 | End: 2018-03-23 | Stop reason: HOSPADM

## 2018-03-23 RX ORDER — NEOSTIGMINE METHYLSULFATE 1 MG/ML
INJECTION, SOLUTION INTRAVENOUS AS NEEDED
Status: DISCONTINUED | OUTPATIENT
Start: 2018-03-23 | End: 2018-03-23 | Stop reason: SURG

## 2018-03-23 RX ORDER — SODIUM CHLORIDE 0.9 % (FLUSH) 0.9 %
1-10 SYRINGE (ML) INJECTION AS NEEDED
Status: DISCONTINUED | OUTPATIENT
Start: 2018-03-23 | End: 2018-03-23 | Stop reason: SDUPTHER

## 2018-03-23 RX ORDER — FENTANYL CITRATE 50 UG/ML
INJECTION, SOLUTION INTRAMUSCULAR; INTRAVENOUS AS NEEDED
Status: DISCONTINUED | OUTPATIENT
Start: 2018-03-23 | End: 2018-03-23 | Stop reason: SURG

## 2018-03-23 RX ORDER — ACETAMINOPHEN 325 MG/1
650 TABLET ORAL EVERY 6 HOURS PRN
Status: DISCONTINUED | OUTPATIENT
Start: 2018-03-23 | End: 2018-03-23 | Stop reason: SDUPTHER

## 2018-03-23 RX ORDER — DOCUSATE SODIUM 100 MG/1
100 CAPSULE, LIQUID FILLED ORAL 2 TIMES DAILY PRN
Status: DISCONTINUED | OUTPATIENT
Start: 2018-03-23 | End: 2018-03-27 | Stop reason: HOSPADM

## 2018-03-23 RX ORDER — LIDOCAINE HYDROCHLORIDE 10 MG/ML
0.5 INJECTION, SOLUTION EPIDURAL; INFILTRATION; INTRACAUDAL; PERINEURAL ONCE AS NEEDED
Status: DISCONTINUED | OUTPATIENT
Start: 2018-03-23 | End: 2018-03-23 | Stop reason: HOSPADM

## 2018-03-23 RX ORDER — SODIUM CHLORIDE AND POTASSIUM CHLORIDE 150; 900 MG/100ML; MG/100ML
50 INJECTION, SOLUTION INTRAVENOUS CONTINUOUS
Status: DISCONTINUED | OUTPATIENT
Start: 2018-03-23 | End: 2018-03-27 | Stop reason: HOSPADM

## 2018-03-23 RX ORDER — ACETAMINOPHEN 325 MG/1
650 TABLET ORAL EVERY 6 HOURS PRN
Status: DISCONTINUED | OUTPATIENT
Start: 2018-03-23 | End: 2018-03-27 | Stop reason: HOSPADM

## 2018-03-23 RX ORDER — PROPOFOL 10 MG/ML
VIAL (ML) INTRAVENOUS AS NEEDED
Status: DISCONTINUED | OUTPATIENT
Start: 2018-03-23 | End: 2018-03-23 | Stop reason: SURG

## 2018-03-23 RX ORDER — SODIUM CHLORIDE, SODIUM LACTATE, POTASSIUM CHLORIDE, CALCIUM CHLORIDE 600; 310; 30; 20 MG/100ML; MG/100ML; MG/100ML; MG/100ML
9 INJECTION, SOLUTION INTRAVENOUS CONTINUOUS
Status: DISCONTINUED | OUTPATIENT
Start: 2018-03-23 | End: 2018-03-23 | Stop reason: SDUPTHER

## 2018-03-23 RX ORDER — ACETAMINOPHEN 325 MG/1
650 TABLET ORAL 4 TIMES DAILY
Status: CANCELLED | OUTPATIENT
Start: 2018-03-23

## 2018-03-23 RX ORDER — MAGNESIUM HYDROXIDE 1200 MG/15ML
LIQUID ORAL AS NEEDED
Status: DISCONTINUED | OUTPATIENT
Start: 2018-03-23 | End: 2018-03-23 | Stop reason: HOSPADM

## 2018-03-23 RX ORDER — POTASSIUM CHLORIDE 1.5 G/1.77G
40 POWDER, FOR SOLUTION ORAL AS NEEDED
Status: DISCONTINUED | OUTPATIENT
Start: 2018-03-23 | End: 2018-03-27 | Stop reason: HOSPADM

## 2018-03-23 RX ORDER — ROPIVACAINE HYDROCHLORIDE 2 MG/ML
6 INJECTION, SOLUTION EPIDURAL; INFILTRATION CONTINUOUS
Status: DISCONTINUED | OUTPATIENT
Start: 2018-03-23 | End: 2018-03-23 | Stop reason: SDUPTHER

## 2018-03-23 RX ORDER — ACETAMINOPHEN 500 MG
1000 TABLET ORAL ONCE
Status: COMPLETED | OUTPATIENT
Start: 2018-03-23 | End: 2018-03-23

## 2018-03-23 RX ORDER — FENTANYL CITRATE 50 UG/ML
50 INJECTION, SOLUTION INTRAMUSCULAR; INTRAVENOUS
Status: DISCONTINUED | OUTPATIENT
Start: 2018-03-23 | End: 2018-03-23 | Stop reason: HOSPADM

## 2018-03-23 RX ORDER — ASPIRIN 325 MG
325 TABLET, DELAYED RELEASE (ENTERIC COATED) ORAL EVERY 12 HOURS SCHEDULED
Status: DISCONTINUED | OUTPATIENT
Start: 2018-03-24 | End: 2018-03-27 | Stop reason: HOSPADM

## 2018-03-23 RX ORDER — HYDROMORPHONE HYDROCHLORIDE 1 MG/ML
1 INJECTION, SOLUTION INTRAMUSCULAR; INTRAVENOUS; SUBCUTANEOUS ONCE
Status: COMPLETED | OUTPATIENT
Start: 2018-03-23 | End: 2018-03-23

## 2018-03-23 RX ORDER — SODIUM CHLORIDE 0.9 % (FLUSH) 0.9 %
1-10 SYRINGE (ML) INJECTION AS NEEDED
Status: DISCONTINUED | OUTPATIENT
Start: 2018-03-23 | End: 2018-03-23 | Stop reason: HOSPADM

## 2018-03-23 RX ORDER — ATRACURIUM BESYLATE 10 MG/ML
INJECTION, SOLUTION INTRAVENOUS AS NEEDED
Status: DISCONTINUED | OUTPATIENT
Start: 2018-03-23 | End: 2018-03-23 | Stop reason: SURG

## 2018-03-23 RX ORDER — BUPIVACAINE HYDROCHLORIDE 2.5 MG/ML
INJECTION, SOLUTION EPIDURAL; INFILTRATION; INTRACAUDAL AS NEEDED
Status: DISCONTINUED | OUTPATIENT
Start: 2018-03-23 | End: 2018-03-23 | Stop reason: SURG

## 2018-03-23 RX ORDER — ROPIVACAINE HYDROCHLORIDE 2 MG/ML
6 INJECTION, SOLUTION EPIDURAL; INFILTRATION CONTINUOUS
Status: DISCONTINUED | OUTPATIENT
Start: 2018-03-23 | End: 2018-03-27 | Stop reason: HOSPADM

## 2018-03-23 RX ORDER — POTASSIUM CHLORIDE 750 MG/1
40 CAPSULE, EXTENDED RELEASE ORAL AS NEEDED
Status: DISCONTINUED | OUTPATIENT
Start: 2018-03-23 | End: 2018-03-27 | Stop reason: HOSPADM

## 2018-03-23 RX ORDER — FAMOTIDINE 20 MG/1
20 TABLET, FILM COATED ORAL ONCE
Status: COMPLETED | OUTPATIENT
Start: 2018-03-23 | End: 2018-03-23

## 2018-03-23 RX ADMIN — FENTANYL CITRATE 50 MCG: 50 INJECTION, SOLUTION INTRAMUSCULAR; INTRAVENOUS at 13:29

## 2018-03-23 RX ADMIN — PROPOFOL 150 MG: 10 INJECTION, EMULSION INTRAVENOUS at 12:28

## 2018-03-23 RX ADMIN — TRIAMCINOLONE ACETONIDE: 1 LOTION TOPICAL at 21:31

## 2018-03-23 RX ADMIN — OXYCODONE HYDROCHLORIDE AND ACETAMINOPHEN 1 TABLET: 10; 325 TABLET ORAL at 00:03

## 2018-03-23 RX ADMIN — TRIAMCINOLONE ACETONIDE 1 APPLICATION: 1 LOTION TOPICAL at 08:35

## 2018-03-23 RX ADMIN — FENTANYL CITRATE 50 MCG: 50 INJECTION, SOLUTION INTRAMUSCULAR; INTRAVENOUS at 14:50

## 2018-03-23 RX ADMIN — HYDROMORPHONE HYDROCHLORIDE 1 MG: 10 INJECTION INTRAMUSCULAR; INTRAVENOUS; SUBCUTANEOUS at 20:16

## 2018-03-23 RX ADMIN — NAFCILLIN SODIUM 6 G: 2 INJECTION, POWDER, LYOPHILIZED, FOR SOLUTION INTRAMUSCULAR; INTRAVENOUS at 04:36

## 2018-03-23 RX ADMIN — FENTANYL CITRATE 50 MCG: 50 INJECTION, SOLUTION INTRAMUSCULAR; INTRAVENOUS at 15:35

## 2018-03-23 RX ADMIN — POTASSIUM CHLORIDE 40 MEQ: 750 CAPSULE, EXTENDED RELEASE ORAL at 09:19

## 2018-03-23 RX ADMIN — ONDANSETRON 4 MG: 2 INJECTION INTRAMUSCULAR; INTRAVENOUS at 14:00

## 2018-03-23 RX ADMIN — OXYCODONE HYDROCHLORIDE AND ACETAMINOPHEN 1 TABLET: 10; 325 TABLET ORAL at 23:54

## 2018-03-23 RX ADMIN — SODIUM CHLORIDE 125 MG: 9 INJECTION, SOLUTION INTRAVENOUS at 09:19

## 2018-03-23 RX ADMIN — CARVEDILOL 3.12 MG: 3.12 TABLET, FILM COATED ORAL at 09:18

## 2018-03-23 RX ADMIN — GLYCOPYRROLATE 0.4 MG: 0.2 INJECTION, SOLUTION INTRAMUSCULAR; INTRAVENOUS at 14:33

## 2018-03-23 RX ADMIN — SODIUM CHLORIDE, POTASSIUM CHLORIDE, SODIUM LACTATE AND CALCIUM CHLORIDE 9 ML/HR: 600; 310; 30; 20 INJECTION, SOLUTION INTRAVENOUS at 11:22

## 2018-03-23 RX ADMIN — BUPIVACAINE HYDROCHLORIDE 15 ML: 2.5 INJECTION, SOLUTION EPIDURAL; INFILTRATION; INTRACAUDAL; PERINEURAL at 14:35

## 2018-03-23 RX ADMIN — ROPIVACAINE HYDROCHLORIDE 6 ML/HR: 2 INJECTION, SOLUTION EPIDURAL; INFILTRATION at 15:31

## 2018-03-23 RX ADMIN — CEFAZOLIN SODIUM 2 G: 1 INJECTION, POWDER, FOR SOLUTION INTRAMUSCULAR; INTRAVENOUS at 20:16

## 2018-03-23 RX ADMIN — SODIUM CHLORIDE, POTASSIUM CHLORIDE, SODIUM LACTATE AND CALCIUM CHLORIDE: 600; 310; 30; 20 INJECTION, SOLUTION INTRAVENOUS at 14:35

## 2018-03-23 RX ADMIN — ACETAMINOPHEN 1000 MG: 500 TABLET ORAL at 12:13

## 2018-03-23 RX ADMIN — Medication 10 ML: at 11:22

## 2018-03-23 RX ADMIN — LORAZEPAM 1 MG: 1 TABLET ORAL at 21:30

## 2018-03-23 RX ADMIN — FENTANYL CITRATE 50 MCG: 50 INJECTION, SOLUTION INTRAMUSCULAR; INTRAVENOUS at 15:15

## 2018-03-23 RX ADMIN — BUPIVACAINE HYDROCHLORIDE 15 ML: 2.5 INJECTION, SOLUTION EPIDURAL; INFILTRATION; INTRACAUDAL; PERINEURAL at 14:29

## 2018-03-23 RX ADMIN — FENTANYL CITRATE 100 MCG: 50 INJECTION, SOLUTION INTRAMUSCULAR; INTRAVENOUS at 12:28

## 2018-03-23 RX ADMIN — NEOSTIGMINE METHYLSULFATE 3 MG: 1 INJECTION, SOLUTION INTRAVENOUS at 14:33

## 2018-03-23 RX ADMIN — TRAZODONE HYDROCHLORIDE 50 MG: 50 TABLET ORAL at 21:18

## 2018-03-23 RX ADMIN — OXYCODONE HYDROCHLORIDE AND ACETAMINOPHEN 1 TABLET: 10; 325 TABLET ORAL at 16:34

## 2018-03-23 RX ADMIN — ATRACURIUM BESYLATE 50 MG: 10 INJECTION, SOLUTION INTRAVENOUS at 12:28

## 2018-03-23 RX ADMIN — PROPOFOL 50 MG: 10 INJECTION, EMULSION INTRAVENOUS at 12:45

## 2018-03-23 RX ADMIN — DEXAMETHASONE SODIUM PHOSPHATE 4 MG: 4 INJECTION, SOLUTION INTRAMUSCULAR; INTRAVENOUS at 14:00

## 2018-03-23 RX ADMIN — SODIUM CHLORIDE: 9 INJECTION, SOLUTION INTRAVENOUS at 12:25

## 2018-03-23 RX ADMIN — FAMOTIDINE 20 MG: 20 TABLET, FILM COATED ORAL at 11:21

## 2018-03-23 RX ADMIN — POVIDONE-IODINE 1 APPLICATION: 10 SOLUTION TOPICAL at 10:29

## 2018-03-23 RX ADMIN — VENLAFAXINE HYDROCHLORIDE 150 MG: 75 CAPSULE, EXTENDED RELEASE ORAL at 09:19

## 2018-03-23 RX ADMIN — FENTANYL CITRATE 50 MCG: 50 INJECTION, SOLUTION INTRAMUSCULAR; INTRAVENOUS at 13:34

## 2018-03-23 RX ADMIN — ROPIVACAINE HYDROCHLORIDE 6 ML/HR: 2 INJECTION, SOLUTION EPIDURAL; INFILTRATION at 15:32

## 2018-03-23 NOTE — ANESTHESIA PREPROCEDURE EVALUATION
Anesthesia Evaluation     Patient summary reviewed and Nursing notes reviewed   NPO Solid Status: > 6 hours  NPO Liquid Status: > 8 hours           Airway   Mallampati: II  TM distance: >3 FB  Neck ROM: full  No difficulty expected  Dental      Pulmonary     breath sounds clear to auscultation  (+) asthma,   Cardiovascular     Rhythm: regular  Rate: normal    (+) hypertension,       Neuro/Psych  GI/Hepatic/Renal/Endo    (+)   diabetes mellitus,     Musculoskeletal     Abdominal    Substance History   (+) alcohol use,      OB/GYN          Other        ROS/Med Hx Other: TTE: EF 65 %, mild TR  Admitted with sepsis; blood cultures negative to date. + MSSA in bilat knees                Anesthesia Plan    ASA 3     general   (Refuses SAB  + add canal blocks)  intravenous induction   Anesthetic plan and risks discussed with patient.    Plan discussed with CRNA.

## 2018-03-23 NOTE — ANESTHESIA PROCEDURE NOTES
Airway  Urgency: elective    Airway not difficult    General Information and Staff    Patient location during procedure: OR  Anesthesiologist: LINO THOMPSON  CRNA: CHERRY SIMMONS    Indications and Patient Condition  Indications for airway management: airway protection    Preoxygenated: yes  MILS not maintained throughout  Mask difficulty assessment: 1 - vent by mask    Final Airway Details  Final airway type: endotracheal airway      Successful airway: ETT  Cuffed: yes   Successful intubation technique: direct laryngoscopy  Endotracheal tube insertion site: oral  Blade: Richard  Blade size: #3  ETT size: 7.0 mm  Cormack-Lehane Classification: grade I - full view of glottis  Placement verified by: chest auscultation and capnometry   Measured from: lips  ETT to lips (cm): 20  Number of attempts at approach: 1    Additional Comments  Negative epigastric sounds, Breath sound equal bilaterally with symmetric chest rise and fall

## 2018-03-23 NOTE — ANESTHESIA POSTPROCEDURE EVALUATION
Patient: Jyoti Luna    Procedure Summary     Date:  03/23/18 Room / Location:   [facility] [address] [place] [address] [facility]    Anesthesia Start:  1225 Anesthesia Stop:      Procedure:  KNEE POLY INSERT EXCHANGE, with irrigation and debridement (Bilateral Knee) Diagnosis:       Knee swelling      (Knee swelling [M25.469])    Surgeon:  Ti Moffett MD Provider:  Deejay Mckeon MD    Anesthesia Type:  general ASA Status:  3          Anesthesia Type: general  Last vitals  BP   108/73   Temp   97.7   Pulse   96   Resp   18    SpO2   94     Post Anesthesia Care and Evaluation    Patient location during evaluation: PACU  Patient participation: complete - patient participated  Level of consciousness: awake and alert  Pain score: 2  Pain management: adequate  Airway patency: patent  Anesthetic complications: No anesthetic complications  PONV Status: none  Cardiovascular status: hemodynamically stable and acceptable  Respiratory status: nonlabored ventilation, acceptable and nasal cannula  Hydration status: acceptable

## 2018-03-23 NOTE — ANESTHESIA PROCEDURE NOTES
Peripheral Block    Patient location during procedure: post-op  Reason for block: at surgeon's request and post-op pain management  Performed by  Anesthesiologist: LINO THOMPSON  CRNA: CHERRY SIMMONS  Preanesthetic Checklist  Completed: patient identified, site marked, surgical consent, pre-op evaluation, timeout performed, IV checked, risks and benefits discussed and monitors and equipment checked  Prep:  Sterile barriers:cap, gloves, mask and sterile barriers  Prep: ChloraPrep  Patient monitoring: blood pressure monitoring, continuous pulse oximetry and EKG  Procedure    Guidance:ultrasound guided  Images:still images obtained    Laterality:Bilateral  Block Type:adductor canal block  Injection Technique:catheter  Needle Type:Tuohy and echogenic  Needle Gauge:18 G    Catheter Size:20 G (20g)  Medications  Local Injected:bupivacaine 0.25% Local Amount Injected:20 (ml)mL  Post Assessment  Injection Assessment: negative aspiration for heme, incremental injection and no paresthesia on injection  Patient Tolerance:comfortable throughout block  Complications:no  Additional Notes  Procedure:             The pt was placed in the Supine position.  The Insertion site was  prepped and Draped in sterile fashion.  The pt was anesthetized with  IV Sedation( see meds).  Skin and cutaneous tissue was infiltrated and anesthetized with 1% Lidocaine 3 mls via a 25g needle.  A BBraun 4 inch 18g echogenic needle was then  inserted approximately midline, mid-thigh and advanced In-plane with Ultrasound guidance.  Normal Saline PSF was utilized for hydrodissection of tissue.  The Vastus medialis and Sartorius muscle where visualized and the needle tip was placed in the adductor canal,  lateral to the femoral artery.  LA injection spread was visualized, injection was incremental 1-5ml, injection pressure was normal or little, no intraneural injection, no vascular injection.  LA dose was injected thru the needle(see dose above).  A BBraun  20g wire stylet catheter was placed via the needle with ultrasound visualization and confirmation with NS fluid bolus. The labeled Catheter was then secured to skin at insertion site with skin afix and steristrips to curled catheter and CHG transparent dressing.  Thank you.

## 2018-03-24 LAB
ANION GAP SERPL CALCULATED.3IONS-SCNC: 8 MMOL/L (ref 3–11)
BASOPHILS # BLD AUTO: 0.03 10*3/MM3 (ref 0–0.2)
BASOPHILS NFR BLD AUTO: 0.3 % (ref 0–1)
BUN BLD-MCNC: <5 MG/DL (ref 9–23)
BUN/CREAT SERPL: ABNORMAL (ref 7–25)
CALCIUM SPEC-SCNC: 7.7 MG/DL (ref 8.7–10.4)
CHLORIDE SERPL-SCNC: 102 MMOL/L (ref 99–109)
CO2 SERPL-SCNC: 26 MMOL/L (ref 20–31)
CREAT BLD-MCNC: 0.4 MG/DL (ref 0.6–1.3)
DEPRECATED RDW RBC AUTO: 46.5 FL (ref 37–54)
EOSINOPHIL # BLD AUTO: 0.14 10*3/MM3 (ref 0–0.3)
EOSINOPHIL NFR BLD AUTO: 1.3 % (ref 0–3)
ERYTHROCYTE [DISTWIDTH] IN BLOOD BY AUTOMATED COUNT: 13.7 % (ref 11.3–14.5)
GFR SERPL CREATININE-BSD FRML MDRD: >150 ML/MIN/1.73
GLUCOSE BLD-MCNC: 125 MG/DL (ref 70–100)
GLUCOSE BLDC GLUCOMTR-MCNC: 120 MG/DL (ref 70–130)
GLUCOSE BLDC GLUCOMTR-MCNC: 133 MG/DL (ref 70–130)
GLUCOSE BLDC GLUCOMTR-MCNC: 140 MG/DL (ref 70–130)
GLUCOSE BLDC GLUCOMTR-MCNC: 90 MG/DL (ref 70–130)
HCT VFR BLD AUTO: 28.3 % (ref 34.5–44)
HGB BLD-MCNC: 8.9 G/DL (ref 11.5–15.5)
IMM GRANULOCYTES # BLD: 0.06 10*3/MM3 (ref 0–0.03)
IMM GRANULOCYTES NFR BLD: 0.6 % (ref 0–0.6)
LYMPHOCYTES # BLD AUTO: 1.88 10*3/MM3 (ref 0.6–4.8)
LYMPHOCYTES NFR BLD AUTO: 17.5 % (ref 24–44)
MCH RBC QN AUTO: 29.3 PG (ref 27–31)
MCHC RBC AUTO-ENTMCNC: 31.4 G/DL (ref 32–36)
MCV RBC AUTO: 93.1 FL (ref 80–99)
MONOCYTES # BLD AUTO: 1 10*3/MM3 (ref 0–1)
MONOCYTES NFR BLD AUTO: 9.3 % (ref 0–12)
NEUTROPHILS # BLD AUTO: 7.64 10*3/MM3 (ref 1.5–8.3)
NEUTROPHILS NFR BLD AUTO: 71 % (ref 41–71)
PLATELET # BLD AUTO: 717 10*3/MM3 (ref 150–450)
PMV BLD AUTO: 7.5 FL (ref 6–12)
POTASSIUM BLD-SCNC: 3.9 MMOL/L (ref 3.5–5.5)
RBC # BLD AUTO: 3.04 10*6/MM3 (ref 3.89–5.14)
SODIUM BLD-SCNC: 136 MMOL/L (ref 132–146)
WBC NRBC COR # BLD: 10.75 10*3/MM3 (ref 3.5–10.8)

## 2018-03-24 PROCEDURE — 97116 GAIT TRAINING THERAPY: CPT

## 2018-03-24 PROCEDURE — 99233 SBSQ HOSP IP/OBS HIGH 50: CPT | Performed by: INTERNAL MEDICINE

## 2018-03-24 PROCEDURE — 25010000002 NA FERRIC GLUC CPLX PER 12.5 MG: Performed by: INTERNAL MEDICINE

## 2018-03-24 PROCEDURE — 85025 COMPLETE CBC W/AUTO DIFF WBC: CPT | Performed by: ORTHOPAEDIC SURGERY

## 2018-03-24 PROCEDURE — 97530 THERAPEUTIC ACTIVITIES: CPT

## 2018-03-24 PROCEDURE — 99024 POSTOP FOLLOW-UP VISIT: CPT | Performed by: ORTHOPAEDIC SURGERY

## 2018-03-24 PROCEDURE — 97162 PT EVAL MOD COMPLEX 30 MIN: CPT

## 2018-03-24 PROCEDURE — 97166 OT EVAL MOD COMPLEX 45 MIN: CPT

## 2018-03-24 PROCEDURE — 82962 GLUCOSE BLOOD TEST: CPT

## 2018-03-24 PROCEDURE — 25010000002 ONDANSETRON PER 1 MG: Performed by: FAMILY MEDICINE

## 2018-03-24 PROCEDURE — 80048 BASIC METABOLIC PNL TOTAL CA: CPT | Performed by: INTERNAL MEDICINE

## 2018-03-24 PROCEDURE — 25010000003 CEFAZOLIN PER 500 MG: Performed by: ORTHOPAEDIC SURGERY

## 2018-03-24 PROCEDURE — 25010000002 ROPIVACAINE PER 1 MG: Performed by: NURSE ANESTHETIST, CERTIFIED REGISTERED

## 2018-03-24 PROCEDURE — 63710000001 INSULIN DETEMIR PER 5 UNITS: Performed by: INTERNAL MEDICINE

## 2018-03-24 PROCEDURE — 25010000002 HYDROMORPHONE PER 4 MG: Performed by: NURSE PRACTITIONER

## 2018-03-24 PROCEDURE — 25010000002 HEPARIN (PORCINE) PER 1000 UNITS: Performed by: INTERNAL MEDICINE

## 2018-03-24 RX ORDER — HEPARIN SODIUM 5000 [USP'U]/ML
5000 INJECTION, SOLUTION INTRAVENOUS; SUBCUTANEOUS EVERY 8 HOURS SCHEDULED
Status: DISCONTINUED | OUTPATIENT
Start: 2018-03-24 | End: 2018-03-27 | Stop reason: HOSPADM

## 2018-03-24 RX ORDER — HYDROMORPHONE HYDROCHLORIDE 1 MG/ML
0.5 INJECTION, SOLUTION INTRAMUSCULAR; INTRAVENOUS; SUBCUTANEOUS EVERY 4 HOURS PRN
Status: DISCONTINUED | OUTPATIENT
Start: 2018-03-24 | End: 2018-03-27

## 2018-03-24 RX ADMIN — SODIUM CHLORIDE 125 MG: 9 INJECTION, SOLUTION INTRAVENOUS at 08:54

## 2018-03-24 RX ADMIN — CEFAZOLIN SODIUM 2 G: 1 INJECTION, POWDER, FOR SOLUTION INTRAMUSCULAR; INTRAVENOUS at 03:05

## 2018-03-24 RX ADMIN — HYDROMORPHONE HYDROCHLORIDE 0.5 MG: 10 INJECTION INTRAMUSCULAR; INTRAVENOUS; SUBCUTANEOUS at 03:05

## 2018-03-24 RX ADMIN — HYDROMORPHONE HYDROCHLORIDE 0.5 MG: 10 INJECTION INTRAMUSCULAR; INTRAVENOUS; SUBCUTANEOUS at 15:11

## 2018-03-24 RX ADMIN — TRIAMCINOLONE ACETONIDE: 1 LOTION TOPICAL at 12:06

## 2018-03-24 RX ADMIN — VENLAFAXINE HYDROCHLORIDE 150 MG: 75 CAPSULE, EXTENDED RELEASE ORAL at 08:49

## 2018-03-24 RX ADMIN — TRIAMCINOLONE ACETONIDE: 1 LOTION TOPICAL at 21:01

## 2018-03-24 RX ADMIN — NAFCILLIN SODIUM 6 G: 2 INJECTION, POWDER, LYOPHILIZED, FOR SOLUTION INTRAMUSCULAR; INTRAVENOUS at 03:06

## 2018-03-24 RX ADMIN — INSULIN DETEMIR 10 UNITS: 100 INJECTION, SOLUTION SUBCUTANEOUS at 08:50

## 2018-03-24 RX ADMIN — HEPARIN SODIUM 5000 UNITS: 5000 INJECTION, SOLUTION INTRAVENOUS; SUBCUTANEOUS at 20:51

## 2018-03-24 RX ADMIN — HYDROMORPHONE HYDROCHLORIDE 0.5 MG: 10 INJECTION INTRAMUSCULAR; INTRAVENOUS; SUBCUTANEOUS at 08:57

## 2018-03-24 RX ADMIN — TRAZODONE HYDROCHLORIDE 50 MG: 50 TABLET ORAL at 20:51

## 2018-03-24 RX ADMIN — ASPIRIN 325 MG: 325 TABLET, DELAYED RELEASE ORAL at 20:51

## 2018-03-24 RX ADMIN — OXYCODONE HYDROCHLORIDE AND ACETAMINOPHEN 1 TABLET: 10; 325 TABLET ORAL at 06:01

## 2018-03-24 RX ADMIN — OXYCODONE HYDROCHLORIDE AND ACETAMINOPHEN 1 TABLET: 10; 325 TABLET ORAL at 18:37

## 2018-03-24 RX ADMIN — POVIDONE-IODINE: 10 SOLUTION TOPICAL at 10:22

## 2018-03-24 RX ADMIN — OXYCODONE HYDROCHLORIDE AND ACETAMINOPHEN 1 TABLET: 10; 325 TABLET ORAL at 12:54

## 2018-03-24 RX ADMIN — CARVEDILOL 3.12 MG: 3.12 TABLET, FILM COATED ORAL at 08:48

## 2018-03-24 RX ADMIN — Medication 10 ML: at 03:06

## 2018-03-24 RX ADMIN — HYDROMORPHONE HYDROCHLORIDE 0.5 MG: 10 INJECTION INTRAMUSCULAR; INTRAVENOUS; SUBCUTANEOUS at 21:02

## 2018-03-24 RX ADMIN — Medication 2 TABLET: at 08:48

## 2018-03-24 RX ADMIN — ONDANSETRON 4 MG: 2 INJECTION INTRAMUSCULAR; INTRAVENOUS at 12:54

## 2018-03-24 RX ADMIN — HEPARIN SODIUM 5000 UNITS: 5000 INJECTION, SOLUTION INTRAVENOUS; SUBCUTANEOUS at 13:37

## 2018-03-24 RX ADMIN — ASPIRIN 325 MG: 325 TABLET, DELAYED RELEASE ORAL at 08:48

## 2018-03-24 RX ADMIN — ROPIVACAINE HYDROCHLORIDE 6 ML/HR: 2 INJECTION, SOLUTION EPIDURAL; INFILTRATION at 18:37

## 2018-03-24 RX ADMIN — NAFCILLIN SODIUM 6 G: 2 INJECTION, POWDER, LYOPHILIZED, FOR SOLUTION INTRAMUSCULAR; INTRAVENOUS at 15:11

## 2018-03-24 RX ADMIN — CARVEDILOL 3.12 MG: 3.12 TABLET, FILM COATED ORAL at 17:23

## 2018-03-25 LAB
ANION GAP SERPL CALCULATED.3IONS-SCNC: 7 MMOL/L (ref 3–11)
BUN BLD-MCNC: <5 MG/DL (ref 9–23)
BUN/CREAT SERPL: ABNORMAL (ref 7–25)
CALCIUM SPEC-SCNC: 7.8 MG/DL (ref 8.7–10.4)
CHLORIDE SERPL-SCNC: 104 MMOL/L (ref 99–109)
CO2 SERPL-SCNC: 30 MMOL/L (ref 20–31)
CREAT BLD-MCNC: 0.4 MG/DL (ref 0.6–1.3)
DEPRECATED RDW RBC AUTO: 46.2 FL (ref 37–54)
ERYTHROCYTE [DISTWIDTH] IN BLOOD BY AUTOMATED COUNT: 14 % (ref 11.3–14.5)
GFR SERPL CREATININE-BSD FRML MDRD: >150 ML/MIN/1.73
GLUCOSE BLD-MCNC: 87 MG/DL (ref 70–100)
GLUCOSE BLDC GLUCOMTR-MCNC: 107 MG/DL (ref 70–130)
GLUCOSE BLDC GLUCOMTR-MCNC: 116 MG/DL (ref 70–130)
GLUCOSE BLDC GLUCOMTR-MCNC: 90 MG/DL (ref 70–130)
GLUCOSE BLDC GLUCOMTR-MCNC: 93 MG/DL (ref 70–130)
HCT VFR BLD AUTO: 26.4 % (ref 34.5–44)
HGB BLD-MCNC: 8.1 G/DL (ref 11.5–15.5)
MCH RBC QN AUTO: 28.4 PG (ref 27–31)
MCHC RBC AUTO-ENTMCNC: 30.7 G/DL (ref 32–36)
MCV RBC AUTO: 92.6 FL (ref 80–99)
PLATELET # BLD AUTO: 645 10*3/MM3 (ref 150–450)
PMV BLD AUTO: 7.5 FL (ref 6–12)
POTASSIUM BLD-SCNC: 3.7 MMOL/L (ref 3.5–5.5)
RBC # BLD AUTO: 2.85 10*6/MM3 (ref 3.89–5.14)
SODIUM BLD-SCNC: 141 MMOL/L (ref 132–146)
WBC NRBC COR # BLD: 7.8 10*3/MM3 (ref 3.5–10.8)

## 2018-03-25 PROCEDURE — 85027 COMPLETE CBC AUTOMATED: CPT | Performed by: INTERNAL MEDICINE

## 2018-03-25 PROCEDURE — 97110 THERAPEUTIC EXERCISES: CPT

## 2018-03-25 PROCEDURE — 99024 POSTOP FOLLOW-UP VISIT: CPT | Performed by: ORTHOPAEDIC SURGERY

## 2018-03-25 PROCEDURE — 97116 GAIT TRAINING THERAPY: CPT

## 2018-03-25 PROCEDURE — 82962 GLUCOSE BLOOD TEST: CPT

## 2018-03-25 PROCEDURE — 63710000001 INSULIN DETEMIR PER 5 UNITS: Performed by: INTERNAL MEDICINE

## 2018-03-25 PROCEDURE — 25010000002 HYDROMORPHONE PER 4 MG: Performed by: NURSE PRACTITIONER

## 2018-03-25 PROCEDURE — 99233 SBSQ HOSP IP/OBS HIGH 50: CPT | Performed by: INTERNAL MEDICINE

## 2018-03-25 PROCEDURE — 25010000002 ROPIVACAINE PER 1 MG: Performed by: NURSE ANESTHETIST, CERTIFIED REGISTERED

## 2018-03-25 PROCEDURE — 25010000002 HEPARIN (PORCINE) PER 1000 UNITS: Performed by: INTERNAL MEDICINE

## 2018-03-25 PROCEDURE — 80048 BASIC METABOLIC PNL TOTAL CA: CPT | Performed by: INTERNAL MEDICINE

## 2018-03-25 RX ADMIN — HYDROMORPHONE HYDROCHLORIDE 0.5 MG: 10 INJECTION INTRAMUSCULAR; INTRAVENOUS; SUBCUTANEOUS at 08:18

## 2018-03-25 RX ADMIN — CARVEDILOL 3.12 MG: 3.12 TABLET, FILM COATED ORAL at 17:48

## 2018-03-25 RX ADMIN — INSULIN DETEMIR 10 UNITS: 100 INJECTION, SOLUTION SUBCUTANEOUS at 08:19

## 2018-03-25 RX ADMIN — NAFCILLIN SODIUM 6 G: 2 INJECTION, POWDER, LYOPHILIZED, FOR SOLUTION INTRAMUSCULAR; INTRAVENOUS at 02:47

## 2018-03-25 RX ADMIN — ROPIVACAINE HYDROCHLORIDE 8 ML/HR: 2 INJECTION, SOLUTION EPIDURAL; INFILTRATION at 17:48

## 2018-03-25 RX ADMIN — POLYETHYLENE GLYCOL (3350) 17 G: 17 POWDER, FOR SOLUTION ORAL at 08:18

## 2018-03-25 RX ADMIN — ASPIRIN 325 MG: 325 TABLET, DELAYED RELEASE ORAL at 08:17

## 2018-03-25 RX ADMIN — ASPIRIN 325 MG: 325 TABLET, DELAYED RELEASE ORAL at 20:14

## 2018-03-25 RX ADMIN — HEPARIN SODIUM 5000 UNITS: 5000 INJECTION, SOLUTION INTRAVENOUS; SUBCUTANEOUS at 05:20

## 2018-03-25 RX ADMIN — ROPIVACAINE HYDROCHLORIDE 8 ML/HR: 2 INJECTION, SOLUTION EPIDURAL; INFILTRATION at 17:49

## 2018-03-25 RX ADMIN — HYDROMORPHONE HYDROCHLORIDE 0.5 MG: 10 INJECTION INTRAMUSCULAR; INTRAVENOUS; SUBCUTANEOUS at 16:42

## 2018-03-25 RX ADMIN — HEPARIN SODIUM 5000 UNITS: 5000 INJECTION, SOLUTION INTRAVENOUS; SUBCUTANEOUS at 20:14

## 2018-03-25 RX ADMIN — VENLAFAXINE HYDROCHLORIDE 150 MG: 75 CAPSULE, EXTENDED RELEASE ORAL at 08:17

## 2018-03-25 RX ADMIN — OXYCODONE HYDROCHLORIDE AND ACETAMINOPHEN 1 TABLET: 10; 325 TABLET ORAL at 14:45

## 2018-03-25 RX ADMIN — TRAZODONE HYDROCHLORIDE 50 MG: 50 TABLET ORAL at 20:14

## 2018-03-25 RX ADMIN — HYDROMORPHONE HYDROCHLORIDE 0.5 MG: 10 INJECTION INTRAMUSCULAR; INTRAVENOUS; SUBCUTANEOUS at 20:13

## 2018-03-25 RX ADMIN — POVIDONE-IODINE: 10 SOLUTION TOPICAL at 08:19

## 2018-03-25 RX ADMIN — CARVEDILOL 3.12 MG: 3.12 TABLET, FILM COATED ORAL at 08:18

## 2018-03-25 RX ADMIN — HYDROMORPHONE HYDROCHLORIDE 0.5 MG: 10 INJECTION INTRAMUSCULAR; INTRAVENOUS; SUBCUTANEOUS at 11:25

## 2018-03-25 RX ADMIN — OXYCODONE HYDROCHLORIDE AND ACETAMINOPHEN 1 TABLET: 10; 325 TABLET ORAL at 02:34

## 2018-03-25 RX ADMIN — TRIAMCINOLONE ACETONIDE: 1 LOTION TOPICAL at 08:19

## 2018-03-25 RX ADMIN — HEPARIN SODIUM 5000 UNITS: 5000 INJECTION, SOLUTION INTRAVENOUS; SUBCUTANEOUS at 14:45

## 2018-03-25 RX ADMIN — Medication 2 TABLET: at 08:17

## 2018-03-25 RX ADMIN — OXYCODONE HYDROCHLORIDE AND ACETAMINOPHEN 1 TABLET: 10; 325 TABLET ORAL at 20:14

## 2018-03-25 RX ADMIN — OXYCODONE HYDROCHLORIDE AND ACETAMINOPHEN 1 TABLET: 10; 325 TABLET ORAL at 08:17

## 2018-03-25 RX ADMIN — NAFCILLIN SODIUM 6 G: 2 INJECTION, POWDER, LYOPHILIZED, FOR SOLUTION INTRAMUSCULAR; INTRAVENOUS at 17:47

## 2018-03-25 RX ADMIN — HYDROMORPHONE HYDROCHLORIDE 0.5 MG: 10 INJECTION INTRAMUSCULAR; INTRAVENOUS; SUBCUTANEOUS at 18:46

## 2018-03-26 LAB
ANION GAP SERPL CALCULATED.3IONS-SCNC: 9 MMOL/L (ref 3–11)
BACTERIA SPEC AEROBE CULT: ABNORMAL
BACTERIA SPEC AEROBE CULT: NORMAL
BASOPHILS # BLD AUTO: 0.06 10*3/MM3 (ref 0–0.2)
BASOPHILS NFR BLD AUTO: 0.9 % (ref 0–1)
BUN BLD-MCNC: <5 MG/DL (ref 9–23)
BUN/CREAT SERPL: ABNORMAL (ref 7–25)
CALCIUM SPEC-SCNC: 8.3 MG/DL (ref 8.7–10.4)
CHLORIDE SERPL-SCNC: 101 MMOL/L (ref 99–109)
CO2 SERPL-SCNC: 29 MMOL/L (ref 20–31)
CREAT BLD-MCNC: 0.4 MG/DL (ref 0.6–1.3)
DEPRECATED RDW RBC AUTO: 47.2 FL (ref 37–54)
EOSINOPHIL # BLD AUTO: 0.22 10*3/MM3 (ref 0–0.3)
EOSINOPHIL NFR BLD AUTO: 3.2 % (ref 0–3)
ERYTHROCYTE [DISTWIDTH] IN BLOOD BY AUTOMATED COUNT: 13.9 % (ref 11.3–14.5)
GFR SERPL CREATININE-BSD FRML MDRD: >150 ML/MIN/1.73
GLUCOSE BLD-MCNC: 80 MG/DL (ref 70–100)
GLUCOSE BLDC GLUCOMTR-MCNC: 101 MG/DL (ref 70–130)
GLUCOSE BLDC GLUCOMTR-MCNC: 138 MG/DL (ref 70–130)
GLUCOSE BLDC GLUCOMTR-MCNC: 83 MG/DL (ref 70–130)
GLUCOSE BLDC GLUCOMTR-MCNC: 84 MG/DL (ref 70–130)
GRAM STN SPEC: ABNORMAL
HCT VFR BLD AUTO: 26.2 % (ref 34.5–44)
HGB BLD-MCNC: 8.1 G/DL (ref 11.5–15.5)
IMM GRANULOCYTES # BLD: 0.03 10*3/MM3 (ref 0–0.03)
IMM GRANULOCYTES NFR BLD: 0.4 % (ref 0–0.6)
LYMPHOCYTES # BLD AUTO: 2 10*3/MM3 (ref 0.6–4.8)
LYMPHOCYTES NFR BLD AUTO: 28.9 % (ref 24–44)
MCH RBC QN AUTO: 28.8 PG (ref 27–31)
MCHC RBC AUTO-ENTMCNC: 30.9 G/DL (ref 32–36)
MCV RBC AUTO: 93.2 FL (ref 80–99)
MONOCYTES # BLD AUTO: 0.5 10*3/MM3 (ref 0–1)
MONOCYTES NFR BLD AUTO: 7.2 % (ref 0–12)
NEUTROPHILS # BLD AUTO: 4.11 10*3/MM3 (ref 1.5–8.3)
NEUTROPHILS NFR BLD AUTO: 59.4 % (ref 41–71)
PLATELET # BLD AUTO: 562 10*3/MM3 (ref 150–450)
PMV BLD AUTO: 7.2 FL (ref 6–12)
POTASSIUM BLD-SCNC: 2.8 MMOL/L (ref 3.5–5.5)
RBC # BLD AUTO: 2.81 10*6/MM3 (ref 3.89–5.14)
SODIUM BLD-SCNC: 139 MMOL/L (ref 132–146)
WBC NRBC COR # BLD: 6.92 10*3/MM3 (ref 3.5–10.8)

## 2018-03-26 PROCEDURE — 63710000001 INSULIN DETEMIR PER 5 UNITS: Performed by: INTERNAL MEDICINE

## 2018-03-26 PROCEDURE — 85025 COMPLETE CBC W/AUTO DIFF WBC: CPT | Performed by: INTERNAL MEDICINE

## 2018-03-26 PROCEDURE — 99024 POSTOP FOLLOW-UP VISIT: CPT | Performed by: ORTHOPAEDIC SURGERY

## 2018-03-26 PROCEDURE — 25010000002 HEPARIN (PORCINE) PER 1000 UNITS: Performed by: INTERNAL MEDICINE

## 2018-03-26 PROCEDURE — 99233 SBSQ HOSP IP/OBS HIGH 50: CPT | Performed by: INTERNAL MEDICINE

## 2018-03-26 PROCEDURE — 97530 THERAPEUTIC ACTIVITIES: CPT | Performed by: OCCUPATIONAL THERAPIST

## 2018-03-26 PROCEDURE — 25010000002 HYDROMORPHONE PER 4 MG: Performed by: NURSE PRACTITIONER

## 2018-03-26 PROCEDURE — 97116 GAIT TRAINING THERAPY: CPT

## 2018-03-26 PROCEDURE — 82962 GLUCOSE BLOOD TEST: CPT

## 2018-03-26 PROCEDURE — 80048 BASIC METABOLIC PNL TOTAL CA: CPT | Performed by: INTERNAL MEDICINE

## 2018-03-26 RX ADMIN — POTASSIUM CHLORIDE 40 MEQ: 750 CAPSULE, EXTENDED RELEASE ORAL at 14:05

## 2018-03-26 RX ADMIN — CARVEDILOL 3.12 MG: 3.12 TABLET, FILM COATED ORAL at 09:06

## 2018-03-26 RX ADMIN — VENLAFAXINE HYDROCHLORIDE 150 MG: 75 CAPSULE, EXTENDED RELEASE ORAL at 09:05

## 2018-03-26 RX ADMIN — LORAZEPAM 1 MG: 1 TABLET ORAL at 20:20

## 2018-03-26 RX ADMIN — OXYCODONE HYDROCHLORIDE AND ACETAMINOPHEN 1 TABLET: 10; 325 TABLET ORAL at 22:21

## 2018-03-26 RX ADMIN — TRAZODONE HYDROCHLORIDE 50 MG: 50 TABLET ORAL at 20:20

## 2018-03-26 RX ADMIN — OXYCODONE HYDROCHLORIDE AND ACETAMINOPHEN 1 TABLET: 10; 325 TABLET ORAL at 04:09

## 2018-03-26 RX ADMIN — OXYCODONE HYDROCHLORIDE AND ACETAMINOPHEN 1 TABLET: 10; 325 TABLET ORAL at 14:14

## 2018-03-26 RX ADMIN — LORAZEPAM 1 MG: 1 TABLET ORAL at 13:04

## 2018-03-26 RX ADMIN — ASPIRIN 325 MG: 325 TABLET, DELAYED RELEASE ORAL at 09:06

## 2018-03-26 RX ADMIN — LORAZEPAM 1 MG: 1 TABLET ORAL at 00:08

## 2018-03-26 RX ADMIN — ASPIRIN 325 MG: 325 TABLET, DELAYED RELEASE ORAL at 20:22

## 2018-03-26 RX ADMIN — NAFCILLIN SODIUM 6 G: 2 INJECTION, POWDER, LYOPHILIZED, FOR SOLUTION INTRAMUSCULAR; INTRAVENOUS at 04:09

## 2018-03-26 RX ADMIN — HEPARIN SODIUM 5000 UNITS: 5000 INJECTION, SOLUTION INTRAVENOUS; SUBCUTANEOUS at 13:11

## 2018-03-26 RX ADMIN — HEPARIN SODIUM 5000 UNITS: 5000 INJECTION, SOLUTION INTRAVENOUS; SUBCUTANEOUS at 04:10

## 2018-03-26 RX ADMIN — HYDROMORPHONE HYDROCHLORIDE 0.5 MG: 10 INJECTION INTRAMUSCULAR; INTRAVENOUS; SUBCUTANEOUS at 20:20

## 2018-03-26 RX ADMIN — POTASSIUM CHLORIDE 40 MEQ: 750 CAPSULE, EXTENDED RELEASE ORAL at 17:13

## 2018-03-26 RX ADMIN — NAFCILLIN SODIUM 6 G: 2 INJECTION, POWDER, LYOPHILIZED, FOR SOLUTION INTRAMUSCULAR; INTRAVENOUS at 16:45

## 2018-03-26 RX ADMIN — TRIAMCINOLONE ACETONIDE: 1 LOTION TOPICAL at 09:10

## 2018-03-26 RX ADMIN — HYDROMORPHONE HYDROCHLORIDE 0.5 MG: 10 INJECTION INTRAMUSCULAR; INTRAVENOUS; SUBCUTANEOUS at 16:34

## 2018-03-26 RX ADMIN — HYDROMORPHONE HYDROCHLORIDE 0.5 MG: 10 INJECTION INTRAMUSCULAR; INTRAVENOUS; SUBCUTANEOUS at 00:07

## 2018-03-26 RX ADMIN — HEPARIN SODIUM 5000 UNITS: 5000 INJECTION, SOLUTION INTRAVENOUS; SUBCUTANEOUS at 20:21

## 2018-03-26 RX ADMIN — INSULIN DETEMIR 10 UNITS: 100 INJECTION, SOLUTION SUBCUTANEOUS at 09:04

## 2018-03-26 RX ADMIN — OXYCODONE HYDROCHLORIDE AND ACETAMINOPHEN 1 TABLET: 10; 325 TABLET ORAL at 09:05

## 2018-03-26 RX ADMIN — POVIDONE-IODINE: 10 SOLUTION TOPICAL at 09:15

## 2018-03-26 RX ADMIN — CARVEDILOL 3.12 MG: 3.12 TABLET, FILM COATED ORAL at 17:14

## 2018-03-27 ENCOUNTER — TELEPHONE (OUTPATIENT)
Dept: ORTHOPEDIC SURGERY | Facility: CLINIC | Age: 53
End: 2018-03-27

## 2018-03-27 VITALS
RESPIRATION RATE: 16 BRPM | WEIGHT: 144 LBS | HEIGHT: 62 IN | DIASTOLIC BLOOD PRESSURE: 92 MMHG | TEMPERATURE: 97.8 F | HEART RATE: 80 BPM | SYSTOLIC BLOOD PRESSURE: 150 MMHG | BODY MASS INDEX: 26.5 KG/M2 | OXYGEN SATURATION: 96 %

## 2018-03-27 LAB
ANION GAP SERPL CALCULATED.3IONS-SCNC: 8 MMOL/L (ref 3–11)
BACTERIA SPEC AEROBE CULT: ABNORMAL
BACTERIA SPEC AEROBE CULT: ABNORMAL
BACTERIA SPEC AEROBE CULT: NORMAL
BACTERIA SPEC AEROBE CULT: NORMAL
BUN BLD-MCNC: <5 MG/DL (ref 9–23)
BUN/CREAT SERPL: ABNORMAL (ref 7–25)
CALCIUM SPEC-SCNC: 8.4 MG/DL (ref 8.7–10.4)
CHLORIDE SERPL-SCNC: 103 MMOL/L (ref 99–109)
CO2 SERPL-SCNC: 28 MMOL/L (ref 20–31)
CREAT BLD-MCNC: 0.4 MG/DL (ref 0.6–1.3)
DEPRECATED RDW RBC AUTO: 48.2 FL (ref 37–54)
ERYTHROCYTE [DISTWIDTH] IN BLOOD BY AUTOMATED COUNT: 14.2 % (ref 11.3–14.5)
GFR SERPL CREATININE-BSD FRML MDRD: >150 ML/MIN/1.73
GLUCOSE BLD-MCNC: 74 MG/DL (ref 70–100)
GLUCOSE BLDC GLUCOMTR-MCNC: 113 MG/DL (ref 70–130)
GLUCOSE BLDC GLUCOMTR-MCNC: 79 MG/DL (ref 70–130)
GRAM STN SPEC: ABNORMAL
GRAM STN SPEC: ABNORMAL
HCT VFR BLD AUTO: 25.7 % (ref 34.5–44)
HGB BLD-MCNC: 8 G/DL (ref 11.5–15.5)
MCH RBC QN AUTO: 29.3 PG (ref 27–31)
MCHC RBC AUTO-ENTMCNC: 31.1 G/DL (ref 32–36)
MCV RBC AUTO: 94.1 FL (ref 80–99)
PLATELET # BLD AUTO: 534 10*3/MM3 (ref 150–450)
PMV BLD AUTO: 7.1 FL (ref 6–12)
POTASSIUM BLD-SCNC: 3.3 MMOL/L (ref 3.5–5.5)
RBC # BLD AUTO: 2.73 10*6/MM3 (ref 3.89–5.14)
SODIUM BLD-SCNC: 139 MMOL/L (ref 132–146)
WBC NRBC COR # BLD: 6.62 10*3/MM3 (ref 3.5–10.8)

## 2018-03-27 PROCEDURE — 85027 COMPLETE CBC AUTOMATED: CPT | Performed by: INTERNAL MEDICINE

## 2018-03-27 PROCEDURE — 80048 BASIC METABOLIC PNL TOTAL CA: CPT | Performed by: INTERNAL MEDICINE

## 2018-03-27 PROCEDURE — 99239 HOSP IP/OBS DSCHRG MGMT >30: CPT | Performed by: INTERNAL MEDICINE

## 2018-03-27 PROCEDURE — 97116 GAIT TRAINING THERAPY: CPT

## 2018-03-27 PROCEDURE — 63710000001 INSULIN DETEMIR PER 5 UNITS: Performed by: INTERNAL MEDICINE

## 2018-03-27 PROCEDURE — 82962 GLUCOSE BLOOD TEST: CPT

## 2018-03-27 PROCEDURE — 99024 POSTOP FOLLOW-UP VISIT: CPT | Performed by: ORTHOPAEDIC SURGERY

## 2018-03-27 PROCEDURE — 25010000002 HYDROMORPHONE PER 4 MG: Performed by: NURSE PRACTITIONER

## 2018-03-27 PROCEDURE — 25010000002 HEPARIN (PORCINE) PER 1000 UNITS: Performed by: INTERNAL MEDICINE

## 2018-03-27 RX ORDER — OXYCODONE AND ACETAMINOPHEN 10; 325 MG/1; MG/1
1 TABLET ORAL EVERY 4 HOURS PRN
Status: DISCONTINUED | OUTPATIENT
Start: 2018-03-27 | End: 2018-03-27 | Stop reason: HOSPADM

## 2018-03-27 RX ORDER — HEPARIN SODIUM 5000 [USP'U]/ML
5000 INJECTION, SOLUTION INTRAVENOUS; SUBCUTANEOUS EVERY 8 HOURS SCHEDULED
Start: 2018-03-27 | End: 2019-02-20 | Stop reason: HOSPADM

## 2018-03-27 RX ORDER — DIPHENHYDRAMINE HCL 25 MG
25 CAPSULE ORAL ONCE
Status: DISCONTINUED | OUTPATIENT
Start: 2018-03-27 | End: 2018-03-27

## 2018-03-27 RX ORDER — PSEUDOEPHEDRINE HCL 30 MG
100 TABLET ORAL 2 TIMES DAILY PRN
Start: 2018-03-27 | End: 2019-02-20 | Stop reason: HOSPADM

## 2018-03-27 RX ORDER — TRIAMCINOLONE ACETONIDE 1 MG/ML
LOTION TOPICAL EVERY 12 HOURS SCHEDULED
Start: 2018-03-27 | End: 2019-02-20 | Stop reason: HOSPADM

## 2018-03-27 RX ORDER — DIPHENHYDRAMINE HCL 12.5MG/5ML
12.5 LIQUID (ML) ORAL ONCE
Status: COMPLETED | OUTPATIENT
Start: 2018-03-27 | End: 2018-03-27

## 2018-03-27 RX ORDER — OXYCODONE AND ACETAMINOPHEN 10; 325 MG/1; MG/1
1 TABLET ORAL EVERY 4 HOURS PRN
Start: 2018-03-27 | End: 2018-08-06

## 2018-03-27 RX ORDER — ROPIVACAINE HYDROCHLORIDE 2 MG/ML
6 INJECTION, SOLUTION EPIDURAL; INFILTRATION CONTINUOUS
Start: 2018-03-27 | End: 2018-03-28

## 2018-03-27 RX ADMIN — POVIDONE-IODINE: 10 SOLUTION TOPICAL at 08:23

## 2018-03-27 RX ADMIN — HEPARIN SODIUM 5000 UNITS: 5000 INJECTION, SOLUTION INTRAVENOUS; SUBCUTANEOUS at 05:42

## 2018-03-27 RX ADMIN — OXYCODONE HYDROCHLORIDE AND ACETAMINOPHEN 1 TABLET: 10; 325 TABLET ORAL at 08:16

## 2018-03-27 RX ADMIN — OXYCODONE HYDROCHLORIDE AND ACETAMINOPHEN 1 TABLET: 10; 325 TABLET ORAL at 02:36

## 2018-03-27 RX ADMIN — HYDROMORPHONE HYDROCHLORIDE 0.5 MG: 10 INJECTION INTRAMUSCULAR; INTRAVENOUS; SUBCUTANEOUS at 04:52

## 2018-03-27 RX ADMIN — CARVEDILOL 3.12 MG: 3.12 TABLET, FILM COATED ORAL at 08:16

## 2018-03-27 RX ADMIN — NAFCILLIN SODIUM 6 G: 2 INJECTION, POWDER, LYOPHILIZED, FOR SOLUTION INTRAMUSCULAR; INTRAVENOUS at 04:32

## 2018-03-27 RX ADMIN — HYDROMORPHONE HYDROCHLORIDE 0.5 MG: 10 INJECTION INTRAMUSCULAR; INTRAVENOUS; SUBCUTANEOUS at 10:03

## 2018-03-27 RX ADMIN — OXYCODONE HYDROCHLORIDE AND ACETAMINOPHEN 1 TABLET: 10; 325 TABLET ORAL at 12:10

## 2018-03-27 RX ADMIN — POTASSIUM CHLORIDE 40 MEQ: 750 CAPSULE, EXTENDED RELEASE ORAL at 12:10

## 2018-03-27 RX ADMIN — TRIAMCINOLONE ACETONIDE: 1 LOTION TOPICAL at 08:18

## 2018-03-27 RX ADMIN — DIPHENHYDRAMINE HYDROCHLORIDE 12.5 MG: 25 SOLUTION ORAL at 03:06

## 2018-03-27 RX ADMIN — HYDROMORPHONE HYDROCHLORIDE 0.5 MG: 10 INJECTION INTRAMUSCULAR; INTRAVENOUS; SUBCUTANEOUS at 00:30

## 2018-03-27 RX ADMIN — INSULIN DETEMIR 10 UNITS: 100 INJECTION, SOLUTION SUBCUTANEOUS at 08:20

## 2018-03-27 RX ADMIN — ASPIRIN 325 MG: 325 TABLET, DELAYED RELEASE ORAL at 08:16

## 2018-03-27 RX ADMIN — VENLAFAXINE HYDROCHLORIDE 150 MG: 75 CAPSULE, EXTENDED RELEASE ORAL at 08:16

## 2018-03-27 NOTE — TELEPHONE ENCOUNTER
I called patients sister in law Vandana Holder and let her know that we could not release any patient information until we have a signed released which she stated was done at the hospital so Vandana is calling medical records to have it scanned into Epic.

## 2018-03-27 NOTE — TELEPHONE ENCOUNTER
PATIENTS SISTER IN LAW WOULD LIKE TO HAVE A CALL BACK IN REGARDS TO PATIENTS SURGERY. SHE HAS SOME QUESTIONS SHE WOULD LIKE TO ASK DR CERRATO. SHE CAN BE REACHED [phone].

## 2018-03-29 LAB
BACTERIA SPEC AEROBE CULT: ABNORMAL
BACTERIA SPEC AEROBE CULT: ABNORMAL
GRAM STN SPEC: ABNORMAL
GRAM STN SPEC: ABNORMAL

## 2018-03-30 NOTE — TELEPHONE ENCOUNTER
We got the release and Dr. Moffett spoke with the patient's sister-in-law on Wednesday 3/28/18. --aco

## 2018-04-04 LAB
BACTERIA FLD CULT: NORMAL
GRAM STN SPEC: NORMAL

## 2018-04-06 LAB
BACTERIA SPEC ANAEROBE CULT: NORMAL

## 2018-04-08 LAB
BACTERIA FLD CULT: ABNORMAL
BACTERIA FLD CULT: ABNORMAL
GRAM STN SPEC: ABNORMAL

## 2018-04-11 ENCOUNTER — TELEPHONE (OUTPATIENT)
Dept: ORTHOPEDIC SURGERY | Facility: CLINIC | Age: 53
End: 2018-04-11

## 2018-04-30 ENCOUNTER — TRANSCRIBE ORDERS (OUTPATIENT)
Dept: LAB | Facility: HOSPITAL | Age: 53
End: 2018-04-30

## 2018-04-30 ENCOUNTER — OFFICE VISIT (OUTPATIENT)
Dept: ORTHOPEDIC SURGERY | Facility: CLINIC | Age: 53
End: 2018-04-30

## 2018-04-30 ENCOUNTER — LAB (OUTPATIENT)
Dept: LAB | Facility: HOSPITAL | Age: 53
End: 2018-04-30

## 2018-04-30 DIAGNOSIS — D47.3 THROMBOCYTHEMIA, ESSENTIAL (HCC): ICD-10-CM

## 2018-04-30 DIAGNOSIS — D72.823 NEUTROPHILIC LEUKEMOID REACTION: ICD-10-CM

## 2018-04-30 DIAGNOSIS — T84.59XD INFECTION OF PROSTHETIC KNEE JOINT, SUBSEQUENT ENCOUNTER: ICD-10-CM

## 2018-04-30 DIAGNOSIS — D47.3 THROMBOCYTHEMIA, ESSENTIAL (HCC): Primary | ICD-10-CM

## 2018-04-30 DIAGNOSIS — Z09 STATUS POST ORTHOPEDIC SURGERY, FOLLOW-UP EXAM: Primary | ICD-10-CM

## 2018-04-30 DIAGNOSIS — A41.01 METHICILLIN SUSCEPTIBLE STAPHYLOCOCCUS AUREUS SEPTICEMIA (HCC): ICD-10-CM

## 2018-04-30 DIAGNOSIS — Z96.659 INFECTION OF PROSTHETIC KNEE JOINT, SUBSEQUENT ENCOUNTER: ICD-10-CM

## 2018-04-30 DIAGNOSIS — M00.061 STAPHYLOCOCCAL ARTHRITIS OF RIGHT KNEE (HCC): ICD-10-CM

## 2018-04-30 LAB
BASOPHILS # BLD AUTO: 0.03 10*3/MM3 (ref 0–0.2)
BASOPHILS NFR BLD AUTO: 0.5 % (ref 0–1)
CRP SERPL-MCNC: 8.23 MG/DL (ref 0–1)
DEPRECATED RDW RBC AUTO: 47.7 FL (ref 37–54)
EOSINOPHIL # BLD AUTO: 0.7 10*3/MM3 (ref 0–0.3)
EOSINOPHIL NFR BLD AUTO: 10.8 % (ref 0–3)
ERYTHROCYTE [DISTWIDTH] IN BLOOD BY AUTOMATED COUNT: 14.8 % (ref 11.3–14.5)
ERYTHROCYTE [SEDIMENTATION RATE] IN BLOOD: 82 MM/HR (ref 0–30)
HCT VFR BLD AUTO: 30.3 % (ref 34.5–44)
HGB BLD-MCNC: 9.8 G/DL (ref 11.5–15.5)
IMM GRANULOCYTES # BLD: 0.02 10*3/MM3 (ref 0–0.03)
IMM GRANULOCYTES NFR BLD: 0.3 % (ref 0–0.6)
LYMPHOCYTES # BLD AUTO: 1.91 10*3/MM3 (ref 0.6–4.8)
LYMPHOCYTES NFR BLD AUTO: 29.4 % (ref 24–44)
MCH RBC QN AUTO: 28.4 PG (ref 27–31)
MCHC RBC AUTO-ENTMCNC: 32.3 G/DL (ref 32–36)
MCV RBC AUTO: 87.8 FL (ref 80–99)
MONOCYTES # BLD AUTO: 0.58 10*3/MM3 (ref 0–1)
MONOCYTES NFR BLD AUTO: 8.9 % (ref 0–12)
NEUTROPHILS # BLD AUTO: 3.26 10*3/MM3 (ref 1.5–8.3)
NEUTROPHILS NFR BLD AUTO: 50.1 % (ref 41–71)
PLATELET # BLD AUTO: 295 10*3/MM3 (ref 150–450)
PMV BLD AUTO: 7 FL (ref 6–12)
RBC # BLD AUTO: 3.45 10*6/MM3 (ref 3.89–5.14)
WBC NRBC COR # BLD: 6.5 10*3/MM3 (ref 3.5–10.8)

## 2018-04-30 PROCEDURE — 85652 RBC SED RATE AUTOMATED: CPT

## 2018-04-30 PROCEDURE — 85025 COMPLETE CBC W/AUTO DIFF WBC: CPT

## 2018-04-30 PROCEDURE — 99024 POSTOP FOLLOW-UP VISIT: CPT | Performed by: ORTHOPAEDIC SURGERY

## 2018-04-30 PROCEDURE — 86140 C-REACTIVE PROTEIN: CPT

## 2018-04-30 PROCEDURE — 36415 COLL VENOUS BLD VENIPUNCTURE: CPT

## 2018-04-30 NOTE — PROGRESS NOTES
Chief Complaint   Patient presents with   • Post-op     5 weeks status post: KNEE POLY INSERT EXCHANGE WITH IRRIGATION AND DEBRIDMENT 3/23/18           HPI    She is status post bilateral poly-changes for infection.  She complains of knee pain right worse than left but she really exercise bike 30 minutes yesterday she takes Percocet tens.  She has no new complaints    There were no vitals filed for this visit.      Physical Exam:  She has a 2+ effusion in both knees right worse than left.  Her incisions look perfect.  She has no significant edema distally.  Both knees are warm but no erythema    Her sedimentation rate is 82 and with 60 on March 21  Her C-reactive protein is 8.23 and it was 15.6 on March 21      ICD-10-CM ICD-9-CM   1. Status post orthopedic surgery, follow-up exam Z09 V67.09       She will continue the IV antibiotics per infectious disease recommendations.  She had MSSA.  I will see her back in 6 weeks.  She is doing great considering.

## 2018-05-04 LAB
FUNGUS WND CULT: NORMAL
MYCOBACTERIUM SPEC CULT: NORMAL
NIGHT BLUE STAIN TISS: NORMAL

## 2018-05-18 ENCOUNTER — APPOINTMENT (OUTPATIENT)
Dept: GENERAL RADIOLOGY | Facility: HOSPITAL | Age: 53
End: 2018-05-18

## 2018-05-18 ENCOUNTER — HOSPITAL ENCOUNTER (EMERGENCY)
Facility: HOSPITAL | Age: 53
Discharge: HOME | End: 2018-05-18
Admitting: ORTHOPAEDIC SURGERY

## 2018-05-18 ENCOUNTER — APPOINTMENT (OUTPATIENT)
Dept: CT IMAGING | Facility: HOSPITAL | Age: 53
End: 2018-05-18

## 2018-05-18 VITALS
TEMPERATURE: 98.1 F | DIASTOLIC BLOOD PRESSURE: 80 MMHG | BODY MASS INDEX: 25.4 KG/M2 | HEIGHT: 62 IN | HEART RATE: 83 BPM | SYSTOLIC BLOOD PRESSURE: 140 MMHG | RESPIRATION RATE: 16 BRPM | OXYGEN SATURATION: 100 % | WEIGHT: 138 LBS

## 2018-05-18 PROCEDURE — 73706 CT ANGIO LWR EXTR W/O&W/DYE: CPT

## 2018-05-18 PROCEDURE — 99283 EMERGENCY DEPT VISIT LOW MDM: CPT | Performed by: ORTHOPAEDIC SURGERY

## 2018-05-18 PROCEDURE — 27552 TREAT KNEE DISLOCATION: CPT | Performed by: ORTHOPAEDIC SURGERY

## 2018-05-18 PROCEDURE — 25010000002 PROPOFOL 10 MG/ML EMULSION: Performed by: EMERGENCY MEDICINE

## 2018-05-18 PROCEDURE — 73560 X-RAY EXAM OF KNEE 1 OR 2: CPT

## 2018-05-18 PROCEDURE — 99284 EMERGENCY DEPT VISIT MOD MDM: CPT

## 2018-05-18 PROCEDURE — 0 IOPAMIDOL PER 1 ML: Performed by: ORTHOPAEDIC SURGERY

## 2018-05-18 PROCEDURE — 99156 MOD SED OTH PHYS/QHP 5/>YRS: CPT

## 2018-05-18 RX ORDER — PROPOFOL 10 MG/ML
40 VIAL (ML) INTRAVENOUS ONCE
Status: COMPLETED | OUTPATIENT
Start: 2018-05-18 | End: 2018-05-18

## 2018-05-18 RX ADMIN — IOPAMIDOL 95 ML: 755 INJECTION, SOLUTION INTRAVENOUS at 10:44

## 2018-05-18 RX ADMIN — PROPOFOL 100 MG: 10 INJECTION, EMULSION INTRAVENOUS at 09:56

## 2018-05-18 NOTE — CONSULTS
[facility] Note    Subjective     Chief Complaint   Patient presents with   • Post-op Problem   • Not For Ed Md To See        HPI      Jyoti Luna is a 53 y.o. female.  She presents with dislocated right total knee. Happened at 0130am rolling over in bed. No prior dislocations.Transferred from [place].        Past Medical History:   Diagnosis Date   • Acid reflux    • Alcoholism    • Anemia 3/20/2018   • Arthritis    • Asthma    • Depression 3/20/2018   • Diabetes mellitus     POSS R/T INFECTION PER PT   • Eczema    • History of transfusion    • Hypertension 3/20/2018      Past Surgical History:   Procedure Laterality Date   • JOINT REPLACEMENT      bilateral knees   • KNEE POLY INSERT EXCHANGE Bilateral 3/23/2018    Procedure: KNEE POLY INSERT EXCHANGE WITH IRRIGATION AND DEBRIDMENT;  Surgeon: Ti Moffett MD;  Location: [facility];  Service: Orthopedics   • LAPAROSCOPIC CHOLECYSTECTOMY        History reviewed. No pertinent family history.  Social History     Social History   • Marital status: Single     Spouse name: N/A   • Number of children: N/A   • Years of education: N/A     Occupational History   • Not on file.     Social History Main Topics   • Smoking status: Never Smoker   • Smokeless tobacco: Never Used   • Alcohol use Yes      Comment: PT STATES SHE HAS NOT DRANK IN APPROX 3 MONTHS   • Drug use: No   • Sexual activity: Defer     Other Topics Concern   • Not on file     Social History Narrative   • No narrative on file      No current facility-administered medications on file prior to encounter.      Current Outpatient Prescriptions on File Prior to Encounter   Medication Sig Dispense Refill   • carvedilol (COREG) 3.125 MG tablet Take 3.125 mg by mouth 2 (Two) Times a Day With Meals.     • docusate sodium 100 MG capsule Take 100 mg by mouth 2 (Two) Times a Day As Needed for Constipation.     • Heparin Sodium, Porcine, (HEPARIN, PORCINE,) 5000 UNIT/ML injection Inject 1 mL  "under the skin Every 8 (Eight) Hours.     • insulin detemir (LEVEMIR) 100 UNIT/ML injection Inject 30 Units under the skin 2 (Two) Times a Day.     • insulin regular (humuLIN R,novoLIN R) 100 UNIT/ML injection Inject  under the skin 3 (Three) Times a Day Before Meals.     • oxyCODONE-acetaminophen (PERCOCET)  MG per tablet Take 1 tablet by mouth Every 4 (Four) Hours As Needed for Moderate Pain .     • polyethylene glycol (MIRALAX) pack packet Take 17 g by mouth Daily.     • traZODone (DESYREL) 50 MG tablet Take 50 mg by mouth Every Night.     • triamcinolone (KENALOG) 0.1 % lotion Apply  topically Every 12 (Twelve) Hours.     • venlafaxine XR (EFFEXOR-XR) 150 MG 24 hr capsule Take 150 mg by mouth Daily.        No Known Allergies     The following portions of the patient's history were reviewed and updated as appropriate: allergies, current medications, past family history, past medical history, past social history, past surgical history and problem list.    Review of Systems   Constitutional: Negative.    HENT: Negative.    Eyes: Negative.    Respiratory: Negative.    Cardiovascular: Negative.    Gastrointestinal: Negative.    Endocrine: Negative.    Genitourinary: Negative.    Musculoskeletal: Positive for arthralgias, gait problem, joint swelling and myalgias.   Skin: Negative.    Allergic/Immunologic: Negative.    Hematological: Negative.    Psychiatric/Behavioral: Negative.         Objective      Physical Exam  /93 (BP Location: Left arm, Patient Position: Lying)   Pulse 80   Temp 98 °F (36.7 °C) (Oral)   Resp 16   Ht 157.5 cm (62\")   Wt 62.6 kg (138 lb)   SpO2 99%   BMI 25.24 kg/m²     Body mass index is 25.24 kg/m².        GENERAL APPEARANCE: awake, alert & oriented x 3, in no acute distress and well developed, well nourished  PSYCH: normal mood and affect  LUNGS:  breathing nonlabored, no wheezing  EYES: sclera anicteric, pupils equal  CARDIOVASCULAR: palpable pulses dorsalis pedis, " palpable posterior tibial bilaterally. Capillary refill less than 2 seconds  INTEGUMENTARY: skin intact, no clubbing, cyanosis  NEUROLOGIC:  Normal Sensation and reflexes             Ortho Exam  Right knee with deformity  Pulses intact  Sensation and motor in toes intact  Skin intact    Imaging/Studies  Imaging Results (last 7 days)     ** No results found for the last 168 hours. **      posterior dislocated TKA    Assessment/Plan      Dislocated right TKA    Orders Placed This Encounter   Procedures   • Verify informed consent   • Cardiac Monitoring   • Pulse Oximetry, Continuous   • Obtain Informed Consent        Plan reduction and immobilizer. Dr Escobedo to do sedation.Treatment options and alternatives were discussed with patient, nursing staff and consulting provider.  Surgical versus non surgical treatment options were discussed as well.    We reviewed the nature of the planned procedure including the risks, benefits and potential complications.  Understanding was expressed and the decision was made to proceed with surgery.      Medical Decision Making  Management Options : prescription/IM medicine, close treatment of fracture or dislocation and major surgery with risk factors  Data/Risk: radiology tests and independent visualization of imaging, lab tests, or EMG/NCV    Ti Moffett MD  05/18/18  9:43 AM         EMR Dragon/Transcription disclaimer:  Much of this encounter note is an electronic transcription of spoken language to printed text. Electronic transcription of spoken language may permit erroneous, or at times, nonsensical words or phrases to be inadvertently transcribed. Although I have reviewed the note for such errors, some may still exist.

## 2018-05-18 NOTE — DISCHARGE SUMMARY
"  Date of Discharge:  5/18/2018    Discharge Diagnosis: Right total knee prosthesis dislocation    Presenting Problem/History of Present Illness  Ms. Luna dislocated her right total knee prosthesis at about 1 AM.  She was transferred to [facility].  I was asked to see the patient in consultation.  She has a history of a right knee replacement placed several years ago.  She had an infection treated several months ago.  She's had no prior instability.       Hospital Course  Patient is a 53 y.o. female presented with right total knee arthroplasty posterior dislocation.  The patient was seen and evaluated in the emergency room.  She had an obvious posterior dislocation of the right knee.  Her foot is neurovascular intact.  Dr. Escobedo performed sedation and I reduce the total knee.  Post reduction radiographs and CT angiogram were normal.  She did have a large effusion.  And there were subtle chronic changes about the total knee on x-ray but no acute pathology.  She remained neurovascularly intact postreduction based upon exam and CT angiogram     Procedures Performed  Closed reduction of the right total knee         Pertinent Test Results: CT angiogram was normal    Condition on Discharge:  Good    Vital Signs  /80   Pulse 83   Temp 98.1 °F (36.7 °C) (Oral)   Resp 16   Ht 157.5 cm (62\")   Wt 62.6 kg (138 lb)   SpO2 100%   BMI 25.24 kg/m²     Physical Exam:   The right lower extremity had a 2+ knee effusion and was neurovascularly intact distally before and after the knee dislocation reduction    Discharge Disposition  Home or Self Care    Discharge Medications   Jyoti Luna   Home Medication Instructions NINI:[id number]    Printed on:05/18/18 2343   Medication Information                      carvedilol (COREG) 3.125 MG tablet  Take 3.125 mg by mouth 2 (Two) Times a Day With Meals.             docusate sodium 100 MG capsule  Take 100 mg by mouth 2 (Two) Times a Day As Needed for Constipation.         "     Heparin Sodium, Porcine, (HEPARIN, PORCINE,) 5000 UNIT/ML injection  Inject 1 mL under the skin Every 8 (Eight) Hours.             insulin detemir (LEVEMIR) 100 UNIT/ML injection  Inject 30 Units under the skin 2 (Two) Times a Day.             insulin regular (humuLIN R,novoLIN R) 100 UNIT/ML injection  Inject  under the skin 3 (Three) Times a Day Before Meals.             oxyCODONE-acetaminophen (PERCOCET)  MG per tablet  Take 1 tablet by mouth Every 4 (Four) Hours As Needed for Moderate Pain .             polyethylene glycol (MIRALAX) pack packet  Take 17 g by mouth Daily.             traZODone (DESYREL) 50 MG tablet  Take 50 mg by mouth Every Night.             triamcinolone (KENALOG) 0.1 % lotion  Apply  topically Every 12 (Twelve) Hours.             venlafaxine XR (EFFEXOR-XR) 150 MG 24 hr capsule  Take 150 mg by mouth Daily.                 Discharge Diet:  regular    Activity at Discharge:  ad tristen. with knee immobilizer on the right knee until follow-up    Follow-up Appointments  Future Appointments  Date Time Provider Department Center   6/11/2018 1:20 PM Ti Moffett MD [facility] None         Test Results Pending at Discharge: none       Ti Moffett MD  05/18/18  11:55 AM

## 2018-05-18 NOTE — OP NOTE
OPERATIVE REPORT     DATE OF PROCEDURE:  May 18, 2017    SURGEON: Ti Moffett M.D., M.MALLORY.     PREOPERATIVE DIAGNOSIS: Right total knee prosthesis dislocation  POSTOPERATIVE DIAGNOSIS: Right total knee prosthesis dislocation       Procedure:  Closed reduction of right total knee prosthesis under sedation    SURGICAL DETAILS:     APPROACH: Closed reduction    ANESTHESIA: Sedation per Dr. Escobedo in the emergency room      ESTIMATED BLOOD LOSS: None    TOURNIQUET TIME: None    COMPLICATIONS:None       INDICATIONS FOR PROCEDURE:  The risks, benefits, alternatives, and potential complications of the this procedure were discussed with the patient in detail to include but not limited to infection, bleeding, anesthesia risks, nerve injury, vessel injury, pain, blood clots, possible need for blood transfusion, possible need for further procedures, myocardial infarction, stroke, and death. . Specific details of the procedure, hospitalization, recovery, rehabilitation, and long-term precautions were also provided. Pre-operative teaching was provided.  Understanding of all topics was conveyed to me by the patient, and consent was given to proceed with closed reduction of right knee dislocation.     INTRAOPERATIVE FINDINGS: Posterior dislocation of the right total knee prosthesis    PROCEDURE: The patient was identified in the preoperative holding area. A huddle was performed with the patient and all vital surgical team members to confirm the correct operative site, procedure, anesthesia type, and operative plan with the patient. After anesthesia was performed, the patient was positioned in the supine position.     After the propofol was injected and adequate sedation was obtained, the knee was hyperflexed and an anterior drawer was performed.  This reduced the dislocation.  The knee felt stable afterwards.  The patient remained neurovascularly intact and had immediate pain relief.  She was placed in a knee  immobilizer.  Post reduction radiographs verified reduction.      No apparent complications occurred during the procedure     POST OPERATIVE PLAN:   Weight Bearing: As tolerated in knee immobilizer   A CT angiogram will be performed to verify no vascular damage  Follow up: 1 week

## 2018-05-18 NOTE — ED PROVIDER NOTES
Subjective   Patient was not directly seen by the ED physician. She was seen for a right knee dislocation and ED physician was asked by the attending orthopedic physician to perform a procedural sedation.             Review of Systems    Past Medical History:   Diagnosis Date   • Acid reflux    • Alcoholism    • Anemia 3/20/2018   • Arthritis    • Asthma    • Depression 3/20/2018   • Diabetes mellitus     POSS R/T INFECTION PER PT   • Eczema    • History of transfusion    • Hypertension 3/20/2018       No Known Allergies    Past Surgical History:   Procedure Laterality Date   • JOINT REPLACEMENT      bilateral knees   • KNEE POLY INSERT EXCHANGE Bilateral 3/23/2018    Procedure: KNEE POLY INSERT EXCHANGE WITH IRRIGATION AND DEBRIDMENT;  Surgeon: Ti Moffett MD;  Location: [facility];  Service: Orthopedics   • LAPAROSCOPIC CHOLECYSTECTOMY         History reviewed. No pertinent family history.    Social History     Social History   • Marital status: Single     Social History Main Topics   • Smoking status: Never Smoker   • Smokeless tobacco: Never Used   • Alcohol use Yes      Comment: PT STATES SHE HAS NOT DRANK IN APPROX 3 MONTHS   • Drug use: No   • Sexual activity: Defer     Other Topics Concern   • Not on file         Objective   Physical Exam    Procedural Sedation  Date/Time: 5/18/2018 10:14 AM  Performed by: TAM GONZALEZ  Authorized by: TAM GONZALEZ     Consent:     Consent obtained:  Written and verbal    Consent given by:  Patient    Risks discussed:  Prolonged hypoxia resulting in organ damage, prolonged sedation necessitating reversal, respiratory compromise necessitating ventilatory assistance and intubation and inadequate sedation  Indications:     Procedure performed:  Dislocation reduction    Procedure necessitating sedation performed by:  Different physician    Intended level of sedation:  Moderate (conscious sedation)  Pre-sedation assessment:     Time since last food or drink:  12 hours     ASA classification: class 2 - patient with mild systemic disease      Neck mobility: normal      Mallampati score:  I - soft palate, uvula, fauces, pillars visible    Pre-sedation assessments completed and reviewed: airway patency, cardiovascular function, hydration status, mental status, nausea/vomiting, pain level and respiratory function      Pre-sedation assessment completed:  5/18/2018 9:54 AM  Immediate pre-procedure details:     Reassessment: Patient reassessed immediately prior to procedure      Reviewed: vital signs and NPO status      Verified: bag valve mask available, emergency equipment available, intubation equipment available, IV patency confirmed, oxygen available and reversal medications available    Procedure details (see MAR for exact dosages):     Sedation start time:  5/18/2018 9:55 AM    Preoxygenation:  Nasal cannula    Sedation:  Propofol    Intra-procedure monitoring:  Blood pressure monitoring, continuous capnometry, frequent LOC assessments, frequent vital sign checks, continuous pulse oximetry and cardiac monitor    Intra-procedure events: none      Sedation end time:  5/18/2018 10:05 AM    Total sedation time (minutes):  10  Post-procedure details:     Post-sedation assessment completed:  5/18/2018 10:05 AM    Attendance: Constant attendance by certified staff until patient recovered      Recovery: Patient returned to pre-procedure baseline      Complications:  None    Post-sedation assessments completed and reviewed: airway patency, cardiovascular function, hydration status, mental status, nausea/vomiting, pain level and respiratory function      Specimens recovered:  None    Patient is stable for discharge or admission: yes      Patient tolerance:  Tolerated well, no immediate complications             ED Course  ED Course as of May 18 1202   Fri May 18, 2018   0937 Dr. Moffett and I reviewed Mrs. Luna's x-rays.  He has asked that I perform sedation so that he may perform closed  reduction.  I have examined Mrs. Luna and spoke with her about the risks and benefits of procedural sedation and she wishes to proceed, her last oral intake was 12 hours ago  [DT]      ED Course User Index  [DT] Jimmy Escobedo MD                     [facility]    Final diagnoses:   None       Documentation assistance provided by karine Cruz.  Information recorded by the scribe was done at my direction and has been verified and validated by me.     Jeanette Cruz  05/18/18 1016       Jimmy Escobedo MD  05/18/18 1204

## 2018-05-18 NOTE — ED NOTES
P  Sedation Plan    ASA 2     Mallampati class: I.    Risks, benefits, and alternatives discussed with patient.    Patient was sedated at 0955 with a total of 10ml (100mg) of Propofol. Vitals were monitored during the entire sedation and physician was at bedside until the patient returned to baseline mentation. Sedation ended at 1005. Patient tolerated the procedure well with no complications.     Jeanette Cruz  05/18/18 1012       Jimmy Escobedo MD  05/18/18 1201

## 2018-06-04 ENCOUNTER — TRANSCRIBE ORDERS (OUTPATIENT)
Dept: LAB | Facility: HOSPITAL | Age: 53
End: 2018-06-04

## 2018-06-04 ENCOUNTER — LAB (OUTPATIENT)
Dept: LAB | Facility: HOSPITAL | Age: 53
End: 2018-06-04

## 2018-06-04 DIAGNOSIS — L73.9 STAPHYLOCOCCUS AUREUS SUPERFICIAL FOLLICULITIS: ICD-10-CM

## 2018-06-04 DIAGNOSIS — B95.61 STAPHYLOCOCCUS AUREUS SUPERFICIAL FOLLICULITIS: ICD-10-CM

## 2018-06-04 DIAGNOSIS — A41.01 METHICILLIN SUSCEPTIBLE STAPHYLOCOCCUS AUREUS SEPTICEMIA (HCC): ICD-10-CM

## 2018-06-04 DIAGNOSIS — T84.53XD INFECTION OF TOTAL RIGHT KNEE REPLACEMENT, SUBSEQUENT ENCOUNTER: ICD-10-CM

## 2018-06-04 DIAGNOSIS — A41.01 METHICILLIN SUSCEPTIBLE STAPHYLOCOCCUS AUREUS SEPTICEMIA (HCC): Primary | ICD-10-CM

## 2018-06-04 LAB
ALBUMIN SERPL-MCNC: 4 G/DL (ref 3.2–4.8)
ALBUMIN/GLOB SERPL: 1 G/DL (ref 1.5–2.5)
ALP SERPL-CCNC: 103 U/L (ref 25–100)
ALT SERPL W P-5'-P-CCNC: 14 U/L (ref 7–40)
ANION GAP SERPL CALCULATED.3IONS-SCNC: 8 MMOL/L (ref 3–11)
AST SERPL-CCNC: 16 U/L (ref 0–33)
BASOPHILS # BLD AUTO: 0.04 10*3/MM3 (ref 0–0.2)
BASOPHILS NFR BLD AUTO: 0.5 % (ref 0–1)
BILIRUB SERPL-MCNC: 0.2 MG/DL (ref 0.3–1.2)
BUN BLD-MCNC: 15 MG/DL (ref 9–23)
BUN/CREAT SERPL: 25 (ref 7–25)
CALCIUM SPEC-SCNC: 8.7 MG/DL (ref 8.7–10.4)
CHLORIDE SERPL-SCNC: 106 MMOL/L (ref 99–109)
CO2 SERPL-SCNC: 28 MMOL/L (ref 20–31)
CREAT BLD-MCNC: 0.6 MG/DL (ref 0.6–1.3)
CRP SERPL-MCNC: 1.89 MG/DL (ref 0–1)
DEPRECATED RDW RBC AUTO: 44.9 FL (ref 37–54)
EOSINOPHIL # BLD AUTO: 0.79 10*3/MM3 (ref 0–0.3)
EOSINOPHIL NFR BLD AUTO: 10.7 % (ref 0–3)
ERYTHROCYTE [DISTWIDTH] IN BLOOD BY AUTOMATED COUNT: 14.4 % (ref 11.3–14.5)
ERYTHROCYTE [SEDIMENTATION RATE] IN BLOOD: 62 MM/HR (ref 0–30)
GFR SERPL CREATININE-BSD FRML MDRD: 104 ML/MIN/1.73
GLOBULIN UR ELPH-MCNC: 4.1 GM/DL
GLUCOSE BLD-MCNC: 122 MG/DL (ref 70–100)
HCT VFR BLD AUTO: 32.4 % (ref 34.5–44)
HGB BLD-MCNC: 10.3 G/DL (ref 11.5–15.5)
IMM GRANULOCYTES # BLD: 0.01 10*3/MM3 (ref 0–0.03)
IMM GRANULOCYTES NFR BLD: 0.1 % (ref 0–0.6)
LYMPHOCYTES # BLD AUTO: 2.41 10*3/MM3 (ref 0.6–4.8)
LYMPHOCYTES NFR BLD AUTO: 32.7 % (ref 24–44)
MCH RBC QN AUTO: 27.5 PG (ref 27–31)
MCHC RBC AUTO-ENTMCNC: 31.8 G/DL (ref 32–36)
MCV RBC AUTO: 86.4 FL (ref 80–99)
MONOCYTES # BLD AUTO: 0.46 10*3/MM3 (ref 0–1)
MONOCYTES NFR BLD AUTO: 6.3 % (ref 0–12)
NEUTROPHILS # BLD AUTO: 3.66 10*3/MM3 (ref 1.5–8.3)
NEUTROPHILS NFR BLD AUTO: 49.8 % (ref 41–71)
PLATELET # BLD AUTO: 321 10*3/MM3 (ref 150–450)
PMV BLD AUTO: 7.6 FL (ref 6–12)
POTASSIUM BLD-SCNC: 4 MMOL/L (ref 3.5–5.5)
PROT SERPL-MCNC: 8.1 G/DL (ref 5.7–8.2)
RBC # BLD AUTO: 3.75 10*6/MM3 (ref 3.89–5.14)
SODIUM BLD-SCNC: 142 MMOL/L (ref 132–146)
WBC NRBC COR # BLD: 7.36 10*3/MM3 (ref 3.5–10.8)

## 2018-06-04 PROCEDURE — 36415 COLL VENOUS BLD VENIPUNCTURE: CPT

## 2018-06-04 PROCEDURE — 86140 C-REACTIVE PROTEIN: CPT

## 2018-06-04 PROCEDURE — 85025 COMPLETE CBC W/AUTO DIFF WBC: CPT

## 2018-06-04 PROCEDURE — 80053 COMPREHEN METABOLIC PANEL: CPT

## 2018-06-04 PROCEDURE — 85652 RBC SED RATE AUTOMATED: CPT

## 2018-06-11 ENCOUNTER — OFFICE VISIT (OUTPATIENT)
Dept: ORTHOPEDIC SURGERY | Facility: CLINIC | Age: 53
End: 2018-06-11

## 2018-06-11 DIAGNOSIS — Z09 STATUS POST ORTHOPEDIC SURGERY, FOLLOW-UP EXAM: Primary | ICD-10-CM

## 2018-06-11 DIAGNOSIS — T84.59XD INFECTION OF PROSTHETIC KNEE JOINT, SUBSEQUENT ENCOUNTER: ICD-10-CM

## 2018-06-11 DIAGNOSIS — T84.029D: ICD-10-CM

## 2018-06-11 DIAGNOSIS — Z96.659 INFECTION OF PROSTHETIC KNEE JOINT, SUBSEQUENT ENCOUNTER: ICD-10-CM

## 2018-06-11 PROCEDURE — 99024 POSTOP FOLLOW-UP VISIT: CPT | Performed by: ORTHOPAEDIC SURGERY

## 2018-06-11 NOTE — PROGRESS NOTES
Chief Complaint   Patient presents with   • Post-op Follow-up     6 week f/u;  11 weeks s/p: Bilateral KNEE POLY INSERT EXCHANGE WITH IRRIGATION AND DEBRIDMENT 3/23/18           HPI  She is follow-up bilateral polyethylene exchanges and irrigation and debridement for bilateral septic total knee arthroplasties March 23, 2018.  About 3 weeks ago she had a dislocation of her right total knee prosthesis reduced in the emergency room.  She's been treated with a knee immobilizer.  She follows up with infectious disease.      There were no vitals filed for this visit.      Physical Exam:  Right knee range of motion 0-100° with minimal swelling and no erythema.  Left knee is 0-120° with minimal swelling            ICD-10-CM ICD-9-CM   1. Status post orthopedic surgery, follow-up exam Z09 V67.09   2. Infection of prosthetic knee joint, subsequent encounter T84.59XD V58.89    Z96.659    3. Dislocation of internal joint prosthesis, subsequent encounter T84.029D V54.89     996.42     She'll continue follow-up with infectious disease.  She is on antibiotics per the recommendations.  She had MSSA.  She is doing very well at this point

## 2018-06-22 ENCOUNTER — TELEPHONE (OUTPATIENT)
Dept: ORTHOPEDIC SURGERY | Facility: CLINIC | Age: 53
End: 2018-06-22

## 2018-06-22 NOTE — TELEPHONE ENCOUNTER
I called the patient back in regards to her right knee having some issues. She states that her knee cap has been dislocated for the second time. She told me that she has taken off the brace that she has been wearing and that is when her knee cap had become dislocated. She told me that she had gone to the ER yesterday in regards to it because she was in so much pain. They told her at [facility] that the only way to fix the problem is via surgery again. She states that she only wanted to let Dr. Moffett know everything that is going on since she already has an appointment on 07/02/18. I advised her that I would send a message to Dr. Moffett to see what he would like her to do and I may or may not hear back from him until later today or Monday. Patient understood.

## 2018-06-22 NOTE — TELEPHONE ENCOUNTER
I called the patient back. No answer and unable to leave a voicemail with the mailbox is full. Calling the patient back in a few minutes to see to get an answer.

## 2018-06-25 NOTE — TELEPHONE ENCOUNTER
I called the patient to let her know that Dr. Moffett is willing to see her before her July 2 appointment, but the patient stated that she cannot drive and already has someone bringing her in for that appointment so she will just keep her original appointment. She asked if she is to wear the brace at all times. I told her that since her knee is becoming dislocated every time she takes off the brace, she definitely needs to keep the brace on at all times. I let her know that if she takes it off to shower and that's the only time it is off, then that is okay but she has to keep the brace on. Patient understood.

## 2018-06-25 NOTE — TELEPHONE ENCOUNTER
I assume they put her kneecap back in place.  She needs to come see me.  I can see her before July 2

## 2018-07-02 ENCOUNTER — LAB (OUTPATIENT)
Dept: LAB | Facility: HOSPITAL | Age: 53
End: 2018-07-02

## 2018-07-02 ENCOUNTER — TRANSCRIBE ORDERS (OUTPATIENT)
Dept: LAB | Facility: HOSPITAL | Age: 53
End: 2018-07-02

## 2018-07-02 ENCOUNTER — OFFICE VISIT (OUTPATIENT)
Dept: ORTHOPEDIC SURGERY | Facility: CLINIC | Age: 53
End: 2018-07-02

## 2018-07-02 ENCOUNTER — TELEPHONE (OUTPATIENT)
Dept: ORTHOPEDIC SURGERY | Facility: CLINIC | Age: 53
End: 2018-07-02

## 2018-07-02 VITALS — WEIGHT: 148 LBS | OXYGEN SATURATION: 99 % | BODY MASS INDEX: 27.23 KG/M2 | HEART RATE: 113 BPM | HEIGHT: 62 IN

## 2018-07-02 DIAGNOSIS — F10.20 ACUTE ALCOHOLISM (HCC): ICD-10-CM

## 2018-07-02 DIAGNOSIS — T84.029D: ICD-10-CM

## 2018-07-02 DIAGNOSIS — F10.20 ACUTE ALCOHOLISM (HCC): Primary | ICD-10-CM

## 2018-07-02 DIAGNOSIS — Z96.659 INFECTION OF PROSTHETIC KNEE JOINT, SUBSEQUENT ENCOUNTER: ICD-10-CM

## 2018-07-02 DIAGNOSIS — A41.01 METHICILLIN SUSCEPTIBLE STAPHYLOCOCCUS AUREUS SEPTICEMIA (HCC): ICD-10-CM

## 2018-07-02 DIAGNOSIS — I10 ESSENTIAL HYPERTENSION, MALIGNANT: ICD-10-CM

## 2018-07-02 DIAGNOSIS — Z09 STATUS POST ORTHOPEDIC SURGERY, FOLLOW-UP EXAM: Primary | ICD-10-CM

## 2018-07-02 DIAGNOSIS — B95.61 STAPHYLOCOCCUS AUREUS SUPERFICIAL FOLLICULITIS: ICD-10-CM

## 2018-07-02 DIAGNOSIS — M00.061 STAPHYLOCOCCAL ARTHRITIS OF RIGHT KNEE (HCC): ICD-10-CM

## 2018-07-02 DIAGNOSIS — L73.9 STAPHYLOCOCCUS AUREUS SUPERFICIAL FOLLICULITIS: ICD-10-CM

## 2018-07-02 DIAGNOSIS — D72.823 NEUTROPHILIC LEUKEMOID REACTION: ICD-10-CM

## 2018-07-02 DIAGNOSIS — T84.59XD INFECTION OF PROSTHETIC KNEE JOINT, SUBSEQUENT ENCOUNTER: ICD-10-CM

## 2018-07-02 LAB
ANION GAP SERPL CALCULATED.3IONS-SCNC: 9 MMOL/L (ref 3–11)
BASOPHILS # BLD AUTO: 0.03 10*3/MM3 (ref 0–0.2)
BASOPHILS NFR BLD AUTO: 0.4 % (ref 0–1)
BUN BLD-MCNC: 16 MG/DL (ref 9–23)
BUN/CREAT SERPL: 25.8 (ref 7–25)
CALCIUM SPEC-SCNC: 8.9 MG/DL (ref 8.7–10.4)
CHLORIDE SERPL-SCNC: 105 MMOL/L (ref 99–109)
CO2 SERPL-SCNC: 27 MMOL/L (ref 20–31)
CREAT BLD-MCNC: 0.62 MG/DL (ref 0.6–1.3)
CRP SERPL-MCNC: 9.2 MG/DL (ref 0–1)
DEPRECATED RDW RBC AUTO: 44 FL (ref 37–54)
EOSINOPHIL # BLD AUTO: 0.58 10*3/MM3 (ref 0–0.3)
EOSINOPHIL NFR BLD AUTO: 8.6 % (ref 0–3)
ERYTHROCYTE [DISTWIDTH] IN BLOOD BY AUTOMATED COUNT: 14 % (ref 11.3–14.5)
ERYTHROCYTE [SEDIMENTATION RATE] IN BLOOD: 82 MM/HR (ref 0–30)
GFR SERPL CREATININE-BSD FRML MDRD: 100 ML/MIN/1.73
GLUCOSE BLD-MCNC: 137 MG/DL (ref 70–100)
HCT VFR BLD AUTO: 35.9 % (ref 34.5–44)
HGB BLD-MCNC: 11.2 G/DL (ref 11.5–15.5)
IMM GRANULOCYTES # BLD: 0.01 10*3/MM3 (ref 0–0.03)
IMM GRANULOCYTES NFR BLD: 0.1 % (ref 0–0.6)
LYMPHOCYTES # BLD AUTO: 1.9 10*3/MM3 (ref 0.6–4.8)
LYMPHOCYTES NFR BLD AUTO: 28.2 % (ref 24–44)
MCH RBC QN AUTO: 26.9 PG (ref 27–31)
MCHC RBC AUTO-ENTMCNC: 31.2 G/DL (ref 32–36)
MCV RBC AUTO: 86.3 FL (ref 80–99)
MONOCYTES # BLD AUTO: 0.65 10*3/MM3 (ref 0–1)
MONOCYTES NFR BLD AUTO: 9.6 % (ref 0–12)
NEUTROPHILS # BLD AUTO: 3.58 10*3/MM3 (ref 1.5–8.3)
NEUTROPHILS NFR BLD AUTO: 53.2 % (ref 41–71)
PLATELET # BLD AUTO: 248 10*3/MM3 (ref 150–450)
PMV BLD AUTO: 7.7 FL (ref 6–12)
POTASSIUM BLD-SCNC: 4.2 MMOL/L (ref 3.5–5.5)
RBC # BLD AUTO: 4.16 10*6/MM3 (ref 3.89–5.14)
SODIUM BLD-SCNC: 141 MMOL/L (ref 132–146)
WBC NRBC COR # BLD: 6.74 10*3/MM3 (ref 3.5–10.8)

## 2018-07-02 PROCEDURE — 80048 BASIC METABOLIC PNL TOTAL CA: CPT

## 2018-07-02 PROCEDURE — 86140 C-REACTIVE PROTEIN: CPT

## 2018-07-02 PROCEDURE — 36415 COLL VENOUS BLD VENIPUNCTURE: CPT

## 2018-07-02 PROCEDURE — 99024 POSTOP FOLLOW-UP VISIT: CPT | Performed by: ORTHOPAEDIC SURGERY

## 2018-07-02 PROCEDURE — 85025 COMPLETE CBC W/AUTO DIFF WBC: CPT

## 2018-07-02 PROCEDURE — 85652 RBC SED RATE AUTOMATED: CPT

## 2018-07-02 NOTE — TELEPHONE ENCOUNTER
Called the patient back to advise her that Dr. Moffett does not want her doing PT at all. He stated that he only wants her to stay in the knee immobilizer and do not move it. Patient understood.

## 2018-07-02 NOTE — TELEPHONE ENCOUNTER
PT WANTS TO KNOW IF SHE IS SUPPOSE TO CONTINUE PHYSICAL THERAPY. SHE IS ON HOLD BECAUSE HER KNEE IS POPPING OUT. HER NUMBER [phone].

## 2018-07-02 NOTE — PROGRESS NOTES
[facility] [place] [facility] Note    Subjective     Chief Complaint   Patient presents with   • Follow-up      week f/u;  14 weeks s/p: Bilateral KNEE POLY INSERT EXCHANGE WITH IRRIGATION AND DEBRIDMENT 3/23/18        HPI      Jyoti Luna is a 53 y.o. female.  She is follow-up bilateral total knees with MSSA infections.  Since I last saw her June 11, she has had 2 right patellar dislocations.  These were reduced to the [place] ER.  She was not wearing her knee brace at the time.  She goes to [facility] and is on doxycycline.  For some reason she is now off rifampin.  She plans to ask them if she is supposed to continue that.        Past Medical History:   Diagnosis Date   • Acid reflux    • Alcoholism    • Anemia 3/20/2018   • Arthritis    • Asthma    • Depression 3/20/2018   • Diabetes mellitus     POSS R/T INFECTION PER PT   • Eczema    • History of transfusion    • Hypertension 3/20/2018      Past Surgical History:   Procedure Laterality Date   • JOINT REPLACEMENT      bilateral knees   • KNEE POLY INSERT EXCHANGE Bilateral 3/23/2018    Procedure: KNEE POLY INSERT EXCHANGE WITH IRRIGATION AND DEBRIDMENT;  Surgeon: Ti Moffett MD;  Location: [facility];  Service: Orthopedics   • LAPAROSCOPIC CHOLECYSTECTOMY        History reviewed. No pertinent family history.  Social History     Social History   • Marital status: Single     Spouse name: N/A   • Number of children: N/A   • Years of education: N/A     Occupational History   • Not on file.     Social History Main Topics   • Smoking status: Never Smoker   • Smokeless tobacco: Never Used   • Alcohol use Yes      Comment: PT STATES SHE HAS NOT DRANK IN APPROX 3 MONTHS   • Drug use: No   • Sexual activity: Defer     Other Topics Concern   • Not on file     Social History Narrative   • No narrative on file      Current Outpatient Prescriptions on File Prior to Visit   Medication Sig Dispense Refill   • carvedilol (COREG) 3.125 MG tablet  "Take 3.125 mg by mouth 2 (Two) Times a Day With Meals.     • docusate sodium 100 MG capsule Take 100 mg by mouth 2 (Two) Times a Day As Needed for Constipation.     • Heparin Sodium, Porcine, (HEPARIN, PORCINE,) 5000 UNIT/ML injection Inject 1 mL under the skin Every 8 (Eight) Hours.     • insulin detemir (LEVEMIR) 100 UNIT/ML injection Inject 30 Units under the skin 2 (Two) Times a Day.     • insulin regular (humuLIN R,novoLIN R) 100 UNIT/ML injection Inject  under the skin 3 (Three) Times a Day Before Meals.     • oxyCODONE-acetaminophen (PERCOCET)  MG per tablet Take 1 tablet by mouth Every 4 (Four) Hours As Needed for Moderate Pain .     • polyethylene glycol (MIRALAX) pack packet Take 17 g by mouth Daily.     • traZODone (DESYREL) 50 MG tablet Take 50 mg by mouth Every Night.     • triamcinolone (KENALOG) 0.1 % lotion Apply  topically Every 12 (Twelve) Hours.     • venlafaxine XR (EFFEXOR-XR) 150 MG 24 hr capsule Take 150 mg by mouth Daily.       No current facility-administered medications on file prior to visit.       No Known Allergies     The following portions of the patient's history were reviewed and updated as appropriate: allergies, current medications, past family history, past medical history, past social history, past surgical history and problem list.    Review of Systems   Constitutional: Negative.    HENT: Negative.    Eyes: Negative.    Respiratory: Negative.    Cardiovascular: Negative.    Gastrointestinal: Negative.    Endocrine: Negative.    Genitourinary: Negative.    Musculoskeletal: Positive for arthralgias.   Skin: Negative.    Allergic/Immunologic: Negative.    Neurological: Negative.    Hematological: Negative.    Psychiatric/Behavioral: Negative.         Objective      Physical Exam  Pulse 113   Ht 157.5 cm (62.01\")   Wt 67.1 kg (148 lb)   SpO2 99%   BMI 27.06 kg/m²     Body mass index is 27.06 kg/m².        GENERAL APPEARANCE: awake, alert & oriented x 3, in no acute " distress and well developed, well nourished  PSYCH: normal mood and affect      Ortho Exam  Both knees have well-healed surgical incisions effusions and slight warmth.  There is no erythema.  Peripheral Vascular:    Upper Extremity:   Inspection:  Left--no cyanotic nail beds Right--no cyanotic nail beds   Bilateral:  Pink nail beds with brisk capillary refill   Palpation:  Bilateral radial pulse normal    Musculoskeletal:  Global Assessment:  Overall assessment of Lower Extremity Muscle Strength and Tone:  Left quadriceps--5/5   Left hamstrings--5/5       Left tibialis anterior--5/5  Left gastroc-soleus--5/5  Left EHL--5/5    Right quadriceps--5/5  Right hamstrings--5/5  Right tibialis anterior--5/5  Right gastroc soleus--5/5  Right EHL--5/5    Lower Extremity:  Knee/Patella:  No digital clubbing or cyanosis.    Examination of left and right knees reveals:  Normal deep tendon reflexes, coordination, strength, tone, sensation.  No known fractures or deformities.    Inspection and Palpation:    Left knee:  Tenderness:  none  Effusion:  1+  Crepitus:  none  Pulses:  2+  Ecchymosis:  None  Warmth:  None     Right knee:  Tenderness:  none  Effusion: 2+  Crepitus:  none  Pulses:  2+  Ecchymosis:  None  Warmth:  None     ROM:  Right:  Extension:0    Flexion:135  Left:  Extension:0     Flexion:135    Instability:    Left:  Lachman Test:  Negative, Varus stress test negative, Valgus stress test negative   Anterior Drawer Test:  Negative, Posterior Drawer Test:  Negative    Right:  Lachman Test:  Negative, Varus stress test negative, Valgus stress test negative   Anterior Drawer Test:  Negative, Posterior Drawer Test:  Negative    Deformities/Malalignments/Discrepancies:    Left:  none  Right:  none    Functional Testing:  Right:  Ke's test:  Negative  Patella grind test:  Negative  Q-angle:  Normal  Apprehension Sign:  Negative      Left:  Ke's test:  Negative  Patella grind test:  Negative  Q-angle:   Normal  Apprehension Sign:  Negative    Imaging/Studies  Imaging Results (last 7 days)     Procedure Component Value Units Date/Time    XR Knee 3 View Bilateral [[id number]] Resulted:  07/02/18 0825     Updated:  07/02/18 0826    Narrative:       TKA X-Ray  Indication: status-post TKA     AP, Lateral, and Sunrise views of Bilateral knee     Findings: Bilateral knee effusions  No signs of fracture  There is medial compartment tibial plateau lucency  No change compared to prior study  The right patella is slightly subluxed                  Assessment/Plan        ICD-10-CM ICD-9-CM   1. Status post orthopedic surgery, follow-up exam Z09 V67.09   2. Infection of prosthetic knee joint, subsequent encounter T84.59XD V58.89    Z96.659    3. Dislocation of internal joint prosthesis, subsequent encounter T84.029D V54.89     996.42       Orders Placed This Encounter   Procedures   • XR Knee 3 View Bilateral      She will continue the knee immobilizer on the right for additional 6 weeks.  Her motion is excellent.  She needs to allow her medial retinaculum to heal.  I attributed her increased pain to that specific injury.  She will continue the doxycycline permanently.  She had staph infections and her labs recently had trended much better.  She follows up with [facility].  We are trying to avoid any revision surgery.  The plan is long-term prophylaxis on the doxycycline.  She will follow-up in one month.  She will wear the knee immobilizer for 6 more weeks.  Medical Decision Making  Management Options : over-the-counter medicine and prescription/IM medicine  Data/Risk: radiology tests and independent visualization of imaging, lab tests, or EMG/NCV    Ti Moffett MD  07/02/18  8:33 AM         EMR Dragon/Transcription disclaimer:  Much of this encounter note is an electronic transcription of spoken language to printed text. Electronic transcription of spoken language may permit erroneous, or at times,  nonsensical words or phrases to be inadvertently transcribed. Although I have reviewed the note for such errors, some may still exist.

## 2018-07-23 ENCOUNTER — TRANSCRIBE ORDERS (OUTPATIENT)
Dept: LAB | Facility: HOSPITAL | Age: 53
End: 2018-07-23

## 2018-07-23 ENCOUNTER — LAB (OUTPATIENT)
Dept: LAB | Facility: HOSPITAL | Age: 53
End: 2018-07-23

## 2018-07-23 DIAGNOSIS — D72.823 NEUTROPHILIC LEUKEMOID REACTION: ICD-10-CM

## 2018-07-23 DIAGNOSIS — D47.3 THROMBOCYTHEMIA, ESSENTIAL (HCC): ICD-10-CM

## 2018-07-23 DIAGNOSIS — A41.01 METHICILLIN SUSCEPTIBLE STAPHYLOCOCCUS AUREUS SEPTICEMIA (HCC): ICD-10-CM

## 2018-07-23 DIAGNOSIS — B95.61 STAPHYLOCOCCUS AUREUS SUPERFICIAL FOLLICULITIS: ICD-10-CM

## 2018-07-23 DIAGNOSIS — M00.061 STAPHYLOCOCCAL ARTHRITIS OF RIGHT KNEE (HCC): ICD-10-CM

## 2018-07-23 DIAGNOSIS — F10.20 ACUTE ALCOHOLISM (HCC): ICD-10-CM

## 2018-07-23 DIAGNOSIS — T84.54XD INFECTION OF TOTAL LEFT KNEE REPLACEMENT, SUBSEQUENT ENCOUNTER: ICD-10-CM

## 2018-07-23 DIAGNOSIS — M00.062 STAPHYLOCOCCAL ARTHRITIS OF LEFT KNEE (HCC): ICD-10-CM

## 2018-07-23 DIAGNOSIS — T84.54XD INFECTION OF TOTAL LEFT KNEE REPLACEMENT, SUBSEQUENT ENCOUNTER: Primary | ICD-10-CM

## 2018-07-23 DIAGNOSIS — L73.9 STAPHYLOCOCCUS AUREUS SUPERFICIAL FOLLICULITIS: ICD-10-CM

## 2018-07-23 DIAGNOSIS — I10 ESSENTIAL HYPERTENSION, MALIGNANT: ICD-10-CM

## 2018-07-23 DIAGNOSIS — T84.53XD INFECTION OF TOTAL RIGHT KNEE REPLACEMENT, SUBSEQUENT ENCOUNTER: ICD-10-CM

## 2018-07-23 LAB
ALBUMIN SERPL-MCNC: 4.39 G/DL (ref 3.2–4.8)
ALBUMIN/GLOB SERPL: 1.2 G/DL (ref 1.5–2.5)
ALP SERPL-CCNC: 180 U/L (ref 25–100)
ALT SERPL W P-5'-P-CCNC: 57 U/L (ref 7–40)
ANION GAP SERPL CALCULATED.3IONS-SCNC: 9 MMOL/L (ref 3–11)
AST SERPL-CCNC: 42 U/L (ref 0–33)
BASOPHILS # BLD AUTO: 0.04 10*3/MM3 (ref 0–0.2)
BASOPHILS NFR BLD AUTO: 0.6 % (ref 0–1)
BILIRUB SERPL-MCNC: 0.2 MG/DL (ref 0.3–1.2)
BUN BLD-MCNC: 13 MG/DL (ref 9–23)
BUN/CREAT SERPL: 19.1 (ref 7–25)
CALCIUM SPEC-SCNC: 9.3 MG/DL (ref 8.7–10.4)
CHLORIDE SERPL-SCNC: 103 MMOL/L (ref 99–109)
CO2 SERPL-SCNC: 29 MMOL/L (ref 20–31)
CREAT BLD-MCNC: 0.68 MG/DL (ref 0.6–1.3)
CRP SERPL-MCNC: 11.58 MG/DL (ref 0–1)
DEPRECATED RDW RBC AUTO: 42.1 FL (ref 37–54)
EOSINOPHIL # BLD AUTO: 0.53 10*3/MM3 (ref 0–0.3)
EOSINOPHIL NFR BLD AUTO: 7.5 % (ref 0–3)
ERYTHROCYTE [DISTWIDTH] IN BLOOD BY AUTOMATED COUNT: 13.6 % (ref 11.3–14.5)
ERYTHROCYTE [SEDIMENTATION RATE] IN BLOOD: 56 MM/HR (ref 0–30)
GFR SERPL CREATININE-BSD FRML MDRD: 90 ML/MIN/1.73
GLOBULIN UR ELPH-MCNC: 3.7 GM/DL
GLUCOSE BLD-MCNC: 180 MG/DL (ref 70–100)
HCT VFR BLD AUTO: 37.9 % (ref 34.5–44)
HGB BLD-MCNC: 12 G/DL (ref 11.5–15.5)
IMM GRANULOCYTES # BLD: 0.01 10*3/MM3 (ref 0–0.03)
IMM GRANULOCYTES NFR BLD: 0.1 % (ref 0–0.6)
LYMPHOCYTES # BLD AUTO: 1.96 10*3/MM3 (ref 0.6–4.8)
LYMPHOCYTES NFR BLD AUTO: 27.6 % (ref 24–44)
MCH RBC QN AUTO: 26.9 PG (ref 27–31)
MCHC RBC AUTO-ENTMCNC: 31.7 G/DL (ref 32–36)
MCV RBC AUTO: 85 FL (ref 80–99)
MONOCYTES # BLD AUTO: 0.45 10*3/MM3 (ref 0–1)
MONOCYTES NFR BLD AUTO: 6.3 % (ref 0–12)
NEUTROPHILS # BLD AUTO: 4.12 10*3/MM3 (ref 1.5–8.3)
NEUTROPHILS NFR BLD AUTO: 57.9 % (ref 41–71)
PLATELET # BLD AUTO: 328 10*3/MM3 (ref 150–450)
PMV BLD AUTO: 7.3 FL (ref 6–12)
POTASSIUM BLD-SCNC: 4.1 MMOL/L (ref 3.5–5.5)
PROT SERPL-MCNC: 8.1 G/DL (ref 5.7–8.2)
RBC # BLD AUTO: 4.46 10*6/MM3 (ref 3.89–5.14)
SODIUM BLD-SCNC: 141 MMOL/L (ref 132–146)
WBC NRBC COR # BLD: 7.11 10*3/MM3 (ref 3.5–10.8)

## 2018-07-23 PROCEDURE — 85652 RBC SED RATE AUTOMATED: CPT

## 2018-07-23 PROCEDURE — 80053 COMPREHEN METABOLIC PANEL: CPT

## 2018-07-23 PROCEDURE — 36415 COLL VENOUS BLD VENIPUNCTURE: CPT

## 2018-07-23 PROCEDURE — 85025 COMPLETE CBC W/AUTO DIFF WBC: CPT

## 2018-07-23 PROCEDURE — 86140 C-REACTIVE PROTEIN: CPT

## 2018-08-06 ENCOUNTER — OFFICE VISIT (OUTPATIENT)
Dept: ORTHOPEDIC SURGERY | Facility: CLINIC | Age: 53
End: 2018-08-06

## 2018-08-06 VITALS — HEIGHT: 62 IN | WEIGHT: 156 LBS | HEART RATE: 108 BPM | BODY MASS INDEX: 28.71 KG/M2 | OXYGEN SATURATION: 99 %

## 2018-08-06 DIAGNOSIS — T84.029D: ICD-10-CM

## 2018-08-06 DIAGNOSIS — Z96.659 INFECTION OF PROSTHETIC KNEE JOINT, SUBSEQUENT ENCOUNTER: ICD-10-CM

## 2018-08-06 DIAGNOSIS — Z09 STATUS POST ORTHOPEDIC SURGERY, FOLLOW-UP EXAM: Primary | ICD-10-CM

## 2018-08-06 DIAGNOSIS — T84.59XD INFECTION OF PROSTHETIC KNEE JOINT, SUBSEQUENT ENCOUNTER: ICD-10-CM

## 2018-08-06 PROCEDURE — 99024 POSTOP FOLLOW-UP VISIT: CPT | Performed by: ORTHOPAEDIC SURGERY

## 2018-08-06 NOTE — PROGRESS NOTES
[facility] [place] [facility]    Subjective     Chief Complaint   Patient presents with   • Post-op Follow-up     5 week f/u;  4 months s/p: Bilateral KNEE POLY INSERT EXCHANGE WITH IRRIGATION AND DEBRIDMENT 3/23/18        HPI      Jyoti Luna is a 53 y.o. female.  She is follow-up right knee patella dislocation.  She has a history of bilateral total knees with MSSA infections.  She underwent I&D's with polyethylene exchange on June 23.  She is followed by infectious disease.  She is on Keflex for long-term prophylaxis.        Past Medical History:   Diagnosis Date   • Acid reflux    • Alcoholism (CMS/HCC)    • Anemia 3/20/2018   • Arthritis    • Asthma    • Depression 3/20/2018   • Diabetes mellitus (CMS/[facility])     POSS R/T INFECTION PER PT   • Eczema    • History of transfusion    • Hypertension 3/20/2018      Past Surgical History:   Procedure Laterality Date   • JOINT REPLACEMENT      bilateral knees   • KNEE POLY INSERT EXCHANGE Bilateral 3/23/2018    Procedure: KNEE POLY INSERT EXCHANGE WITH IRRIGATION AND DEBRIDMENT;  Surgeon: Ti Moffett MD;  Location: [facility];  Service: Orthopedics   • LAPAROSCOPIC CHOLECYSTECTOMY        History reviewed. No pertinent family history.  Social History     Social History   • Marital status: Single     Spouse name: N/A   • Number of children: N/A   • Years of education: N/A     Occupational History   • Not on file.     Social History Main Topics   • Smoking status: Never Smoker   • Smokeless tobacco: Never Used   • Alcohol use Yes      Comment: PT STATES SHE HAS NOT DRANK IN APPROX 3 MONTHS   • Drug use: No   • Sexual activity: Defer     Other Topics Concern   • Not on file     Social History Narrative   • No narrative on file      Current Outpatient Prescriptions on File Prior to Visit   Medication Sig Dispense Refill   • carvedilol (COREG) 3.125 MG tablet Take 3.125 mg by mouth 2 (Two) Times a Day With Meals.     • docusate sodium 100 MG capsule Take 100 mg  "by mouth 2 (Two) Times a Day As Needed for Constipation.     • Heparin Sodium, Porcine, (HEPARIN, PORCINE,) 5000 UNIT/ML injection Inject 1 mL under the skin Every 8 (Eight) Hours.     • insulin detemir (LEVEMIR) 100 UNIT/ML injection Inject 30 Units under the skin 2 (Two) Times a Day.     • insulin regular (humuLIN R,novoLIN R) 100 UNIT/ML injection Inject  under the skin 3 (Three) Times a Day Before Meals.     • polyethylene glycol (MIRALAX) pack packet Take 17 g by mouth Daily.     • traZODone (DESYREL) 50 MG tablet Take 50 mg by mouth Every Night.     • triamcinolone (KENALOG) 0.1 % lotion Apply  topically Every 12 (Twelve) Hours.     • venlafaxine XR (EFFEXOR-XR) 150 MG 24 hr capsule Take 150 mg by mouth Daily.     • [DISCONTINUED] oxyCODONE-acetaminophen (PERCOCET)  MG per tablet Take 1 tablet by mouth Every 4 (Four) Hours As Needed for Moderate Pain .       No current facility-administered medications on file prior to visit.       No Known Allergies     The following portions of the patient's history were reviewed and updated as appropriate: allergies, current medications, past family history, past medical history, past social history, past surgical history and problem list.    Review of Systems   Constitutional: Negative.    HENT: Negative.    Eyes: Negative.    Respiratory: Negative.    Cardiovascular: Negative.    Gastrointestinal: Negative.    Endocrine: Negative.    Genitourinary: Negative.    Musculoskeletal: Positive for arthralgias (knee pain).   Skin: Negative.    Allergic/Immunologic: Negative.    Neurological: Negative.    Hematological: Negative.    Psychiatric/Behavioral: Negative.         Objective      Physical Exam  Pulse 108   Ht 157.5 cm (62.01\")   Wt 70.8 kg (156 lb)   SpO2 99%   BMI 28.53 kg/m²     Body mass index is 28.53 kg/m².        GENERAL APPEARANCE: awake, alert & oriented x 3, in no acute distress and well developed, well nourished  PSYCH: normal mood and " affect      Ortho Exam  She has a sunburn rash on both legs.  She was also not wearing her brace.  Her incision looks good.  She has a trace effusion.  Range of motion 0 120°.  She walks with a very minimal limp.  She is neurovascular intact.  She has a negative Homans sign.  There is no tenderness.    Imaging/Studies  Imaging Results (last 7 days)     ** No results found for the last 168 hours. **          Assessment/Plan        ICD-10-CM ICD-9-CM   1. Status post orthopedic surgery, follow-up exam Z09 V67.09   2. Infection of prosthetic knee joint, subsequent encounter T84.59XD V58.89    Z96.659    3. Dislocation of internal joint prosthesis, subsequent encounter T84.029D V54.89     996.42   She has obviously been noncompliant with her brace wear.  I encouraged her to wear her knee immobilizer to allow her knee soft tissue to heal.  I anticipate her noncompliance and therefore want a more restrictive brace.  She's not a good surgical candidate based upon her past medical history and previous infections.  The goal is for long-term antibiotic suppression and prophylaxis.  She is followed by [facility].  She will follow-up with me in 6 weeks or as needed  Medical Decision Making  Management Options : over-the-counter medicine      Ti Moffett MD  08/06/18  10:06 AM         EMR Dragon/Transcription disclaimer:  Much of this encounter note is an electronic transcription of spoken language to printed text. Electronic transcription of spoken language may permit erroneous, or at times, nonsensical words or phrases to be inadvertently transcribed. Although I have reviewed the note for such errors, some may still exist.

## 2018-08-22 ENCOUNTER — TRANSCRIBE ORDERS (OUTPATIENT)
Dept: LAB | Facility: HOSPITAL | Age: 53
End: 2018-08-22

## 2018-08-22 ENCOUNTER — LAB (OUTPATIENT)
Dept: LAB | Facility: HOSPITAL | Age: 53
End: 2018-08-22

## 2018-08-22 DIAGNOSIS — B95.61 STAPHYLOCOCCUS AUREUS SUPERFICIAL FOLLICULITIS: ICD-10-CM

## 2018-08-22 DIAGNOSIS — M00.062 STAPHYLOCOCCAL ARTHRITIS OF LEFT KNEE (HCC): ICD-10-CM

## 2018-08-22 DIAGNOSIS — L20.9 ATOPIC DERMATITIS, UNSPECIFIED TYPE: ICD-10-CM

## 2018-08-22 DIAGNOSIS — M00.061 STAPHYLOCOCCAL ARTHRITIS OF RIGHT KNEE (HCC): ICD-10-CM

## 2018-08-22 DIAGNOSIS — T84.53XD INFLAMMATORY REACTION DUE TO INTERNAL PROSTHESIS OF RIGHT KNEE, SUBSEQUENT ENCOUNTER: ICD-10-CM

## 2018-08-22 DIAGNOSIS — D72.823 NEUTROPHILIC LEUKEMOID REACTION: ICD-10-CM

## 2018-08-22 DIAGNOSIS — L20.9 ATOPIC DERMATITIS, UNSPECIFIED TYPE: Primary | ICD-10-CM

## 2018-08-22 DIAGNOSIS — L73.9 STAPHYLOCOCCUS AUREUS SUPERFICIAL FOLLICULITIS: ICD-10-CM

## 2018-08-22 DIAGNOSIS — T84.54XD INFECTION OF TOTAL LEFT KNEE REPLACEMENT, SUBSEQUENT ENCOUNTER: ICD-10-CM

## 2018-08-22 LAB
ALBUMIN SERPL-MCNC: 4.23 G/DL (ref 3.2–4.8)
ALBUMIN/GLOB SERPL: 1.1 G/DL (ref 1.5–2.5)
ALP SERPL-CCNC: 162 U/L (ref 25–100)
ALT SERPL W P-5'-P-CCNC: 16 U/L (ref 7–40)
ANION GAP SERPL CALCULATED.3IONS-SCNC: 8 MMOL/L (ref 3–11)
AST SERPL-CCNC: 18 U/L (ref 0–33)
BASOPHILS # BLD AUTO: 0.06 10*3/MM3 (ref 0–0.2)
BASOPHILS NFR BLD AUTO: 0.6 % (ref 0–1)
BILIRUB SERPL-MCNC: 0.3 MG/DL (ref 0.3–1.2)
BUN BLD-MCNC: 13 MG/DL (ref 9–23)
BUN/CREAT SERPL: 21.7 (ref 7–25)
CALCIUM SPEC-SCNC: 9.4 MG/DL (ref 8.7–10.4)
CHLORIDE SERPL-SCNC: 102 MMOL/L (ref 99–109)
CO2 SERPL-SCNC: 27 MMOL/L (ref 20–31)
CREAT BLD-MCNC: 0.6 MG/DL (ref 0.6–1.3)
CRP SERPL-MCNC: 6.04 MG/DL (ref 0–1)
DEPRECATED RDW RBC AUTO: 42.3 FL (ref 37–54)
EOSINOPHIL # BLD AUTO: 1.07 10*3/MM3 (ref 0–0.3)
EOSINOPHIL NFR BLD AUTO: 11.3 % (ref 0–3)
ERYTHROCYTE [DISTWIDTH] IN BLOOD BY AUTOMATED COUNT: 14.1 % (ref 11.3–14.5)
ERYTHROCYTE [SEDIMENTATION RATE] IN BLOOD: 84 MM/HR (ref 0–30)
GFR SERPL CREATININE-BSD FRML MDRD: 104 ML/MIN/1.73
GLOBULIN UR ELPH-MCNC: 3.8 GM/DL
GLUCOSE BLD-MCNC: 108 MG/DL (ref 70–100)
HCT VFR BLD AUTO: 36.8 % (ref 34.5–44)
HGB BLD-MCNC: 11.5 G/DL (ref 11.5–15.5)
IMM GRANULOCYTES # BLD: 0.04 10*3/MM3 (ref 0–0.03)
IMM GRANULOCYTES NFR BLD: 0.4 % (ref 0–0.6)
LYMPHOCYTES # BLD AUTO: 3.14 10*3/MM3 (ref 0.6–4.8)
LYMPHOCYTES NFR BLD AUTO: 33.3 % (ref 24–44)
MCH RBC QN AUTO: 25.5 PG (ref 27–31)
MCHC RBC AUTO-ENTMCNC: 31.3 G/DL (ref 32–36)
MCV RBC AUTO: 81.6 FL (ref 80–99)
MONOCYTES # BLD AUTO: 0.89 10*3/MM3 (ref 0–1)
MONOCYTES NFR BLD AUTO: 9.4 % (ref 0–12)
NEUTROPHILS # BLD AUTO: 4.24 10*3/MM3 (ref 1.5–8.3)
NEUTROPHILS NFR BLD AUTO: 45 % (ref 41–71)
PLATELET # BLD AUTO: 326 10*3/MM3 (ref 150–450)
PMV BLD AUTO: 7.2 FL (ref 6–12)
POTASSIUM BLD-SCNC: 4.4 MMOL/L (ref 3.5–5.5)
PROT SERPL-MCNC: 8 G/DL (ref 5.7–8.2)
RBC # BLD AUTO: 4.51 10*6/MM3 (ref 3.89–5.14)
SODIUM BLD-SCNC: 137 MMOL/L (ref 132–146)
WBC NRBC COR # BLD: 9.44 10*3/MM3 (ref 3.5–10.8)

## 2018-08-22 PROCEDURE — 36415 COLL VENOUS BLD VENIPUNCTURE: CPT

## 2018-08-22 PROCEDURE — 80053 COMPREHEN METABOLIC PANEL: CPT

## 2018-08-22 PROCEDURE — 85652 RBC SED RATE AUTOMATED: CPT

## 2018-08-22 PROCEDURE — 86140 C-REACTIVE PROTEIN: CPT

## 2018-08-22 PROCEDURE — 85025 COMPLETE CBC W/AUTO DIFF WBC: CPT

## 2018-09-05 ENCOUNTER — LAB (OUTPATIENT)
Dept: LAB | Facility: HOSPITAL | Age: 53
End: 2018-09-05

## 2018-09-05 ENCOUNTER — TRANSCRIBE ORDERS (OUTPATIENT)
Dept: LAB | Facility: HOSPITAL | Age: 53
End: 2018-09-05

## 2018-09-05 DIAGNOSIS — M00.061 STAPHYLOCOCCAL ARTHRITIS OF RIGHT KNEE (HCC): ICD-10-CM

## 2018-09-05 DIAGNOSIS — M00.062 STAPHYLOCOCCAL ARTHRITIS OF LEFT KNEE (HCC): ICD-10-CM

## 2018-09-05 DIAGNOSIS — B95.61 STAPHYLOCOCCUS AUREUS SUPERFICIAL FOLLICULITIS: ICD-10-CM

## 2018-09-05 DIAGNOSIS — L20.9 ATOPIC DERMATITIS, UNSPECIFIED TYPE: Primary | ICD-10-CM

## 2018-09-05 DIAGNOSIS — L20.9 ATOPIC DERMATITIS, UNSPECIFIED TYPE: ICD-10-CM

## 2018-09-05 DIAGNOSIS — L73.9 STAPHYLOCOCCUS AUREUS SUPERFICIAL FOLLICULITIS: ICD-10-CM

## 2018-09-05 DIAGNOSIS — D72.823 NEUTROPHILIC LEUKEMOID REACTION: ICD-10-CM

## 2018-09-05 DIAGNOSIS — T84.53XD INFECTION OF TOTAL RIGHT KNEE REPLACEMENT, SUBSEQUENT ENCOUNTER: ICD-10-CM

## 2018-09-05 DIAGNOSIS — T84.54XD INFECTION OF TOTAL LEFT KNEE REPLACEMENT, SUBSEQUENT ENCOUNTER: ICD-10-CM

## 2018-09-05 DIAGNOSIS — A41.01 METHICILLIN SUSCEPTIBLE STAPHYLOCOCCUS AUREUS SEPTICEMIA (HCC): ICD-10-CM

## 2018-09-05 LAB
ALBUMIN SERPL-MCNC: 4.12 G/DL (ref 3.2–4.8)
ALBUMIN/GLOB SERPL: 1.2 G/DL (ref 1.5–2.5)
ALP SERPL-CCNC: 146 U/L (ref 25–100)
ALT SERPL W P-5'-P-CCNC: 16 U/L (ref 7–40)
ANION GAP SERPL CALCULATED.3IONS-SCNC: 7 MMOL/L (ref 3–11)
AST SERPL-CCNC: 18 U/L (ref 0–33)
BASOPHILS # BLD AUTO: 0.04 10*3/MM3 (ref 0–0.2)
BASOPHILS NFR BLD AUTO: 0.5 % (ref 0–1)
BILIRUB SERPL-MCNC: 0.2 MG/DL (ref 0.3–1.2)
BUN BLD-MCNC: 13 MG/DL (ref 9–23)
BUN/CREAT SERPL: 16.7 (ref 7–25)
CALCIUM SPEC-SCNC: 9 MG/DL (ref 8.7–10.4)
CHLORIDE SERPL-SCNC: 105 MMOL/L (ref 99–109)
CO2 SERPL-SCNC: 27 MMOL/L (ref 20–31)
CREAT BLD-MCNC: 0.78 MG/DL (ref 0.6–1.3)
CRP SERPL-MCNC: 1.57 MG/DL (ref 0–1)
DEPRECATED RDW RBC AUTO: 45.3 FL (ref 37–54)
EOSINOPHIL # BLD AUTO: 0.52 10*3/MM3 (ref 0–0.3)
EOSINOPHIL NFR BLD AUTO: 6.6 % (ref 0–3)
ERYTHROCYTE [DISTWIDTH] IN BLOOD BY AUTOMATED COUNT: 15.4 % (ref 11.3–14.5)
ERYTHROCYTE [SEDIMENTATION RATE] IN BLOOD: 56 MM/HR (ref 0–30)
GFR SERPL CREATININE-BSD FRML MDRD: 77 ML/MIN/1.73
GLOBULIN UR ELPH-MCNC: 3.4 GM/DL
GLUCOSE BLD-MCNC: 126 MG/DL (ref 70–100)
HCT VFR BLD AUTO: 36.8 % (ref 34.5–44)
HGB BLD-MCNC: 11.7 G/DL (ref 11.5–15.5)
IMM GRANULOCYTES # BLD: 0.04 10*3/MM3 (ref 0–0.03)
IMM GRANULOCYTES NFR BLD: 0.5 % (ref 0–0.6)
LYMPHOCYTES # BLD AUTO: 3.14 10*3/MM3 (ref 0.6–4.8)
LYMPHOCYTES NFR BLD AUTO: 39.8 % (ref 24–44)
MCH RBC QN AUTO: 25.5 PG (ref 27–31)
MCHC RBC AUTO-ENTMCNC: 31.8 G/DL (ref 32–36)
MCV RBC AUTO: 80.2 FL (ref 80–99)
MONOCYTES # BLD AUTO: 0.49 10*3/MM3 (ref 0–1)
MONOCYTES NFR BLD AUTO: 6.2 % (ref 0–12)
NEUTROPHILS # BLD AUTO: 3.7 10*3/MM3 (ref 1.5–8.3)
NEUTROPHILS NFR BLD AUTO: 46.9 % (ref 41–71)
PLATELET # BLD AUTO: 327 10*3/MM3 (ref 150–450)
PMV BLD AUTO: 7.5 FL (ref 6–12)
POTASSIUM BLD-SCNC: 4.2 MMOL/L (ref 3.5–5.5)
PROT SERPL-MCNC: 7.5 G/DL (ref 5.7–8.2)
RBC # BLD AUTO: 4.59 10*6/MM3 (ref 3.89–5.14)
SODIUM BLD-SCNC: 139 MMOL/L (ref 132–146)
WBC NRBC COR # BLD: 7.89 10*3/MM3 (ref 3.5–10.8)

## 2018-09-05 PROCEDURE — 85025 COMPLETE CBC W/AUTO DIFF WBC: CPT

## 2018-09-05 PROCEDURE — 36415 COLL VENOUS BLD VENIPUNCTURE: CPT

## 2018-09-05 PROCEDURE — 86140 C-REACTIVE PROTEIN: CPT

## 2018-09-05 PROCEDURE — 80053 COMPREHEN METABOLIC PANEL: CPT

## 2018-09-05 PROCEDURE — 85652 RBC SED RATE AUTOMATED: CPT

## 2018-10-01 ENCOUNTER — OFFICE VISIT (OUTPATIENT)
Dept: ORTHOPEDIC SURGERY | Facility: CLINIC | Age: 53
End: 2018-10-01

## 2018-10-01 VITALS — BODY MASS INDEX: 28.72 KG/M2 | HEART RATE: 90 BPM | OXYGEN SATURATION: 97 % | HEIGHT: 62 IN | WEIGHT: 156.09 LBS

## 2018-10-01 DIAGNOSIS — Z09 STATUS POST ORTHOPEDIC SURGERY, FOLLOW-UP EXAM: Primary | ICD-10-CM

## 2018-10-01 DIAGNOSIS — T84.029D: ICD-10-CM

## 2018-10-01 DIAGNOSIS — Z96.659 INFECTION OF PROSTHETIC KNEE JOINT, SUBSEQUENT ENCOUNTER: ICD-10-CM

## 2018-10-01 DIAGNOSIS — T84.59XD INFECTION OF PROSTHETIC KNEE JOINT, SUBSEQUENT ENCOUNTER: ICD-10-CM

## 2018-10-01 PROCEDURE — 99212 OFFICE O/P EST SF 10 MIN: CPT | Performed by: ORTHOPAEDIC SURGERY

## 2018-10-01 RX ORDER — OXYCODONE HYDROCHLORIDE AND ACETAMINOPHEN 5; 325 MG/1; MG/1
1 TABLET ORAL EVERY 6 HOURS PRN
COMMUNITY
End: 2019-02-20 | Stop reason: HOSPADM

## 2018-10-01 NOTE — PROGRESS NOTES
[facility] [place] [facility] Clinic Note    Subjective     Chief Complaint   Patient presents with   • Follow-up     8 week f/u;  6 months s/p: Bilateral KNEE POLY INSERT EXCHANGE WITH IRRIGATION AND DEBRIDMENT 3/23/18        HPI      Jyoti Luna is a 53 y.o. female.  Follow-up right knee patellar dislocation with a history of bilateral total knees with MSSA infections.  She was recently put on doxycycline instead of Keflex.  She is on long-term prophylaxis.  Her knee is doing much better with no swelling and no pain.  She is having skin reactions most likely related to the antibiotic and she is gaining weight from being on steroids for her skin reactions.        Past Medical History:   Diagnosis Date   • Acid reflux    • Alcoholism (CMS/[facility])    • Anemia 3/20/2018   • Arthritis    • Asthma    • Depression 3/20/2018   • Diabetes mellitus (CMS/[facility])     POSS R/T INFECTION PER PT   • Eczema    • History of transfusion    • Hypertension 3/20/2018      Past Surgical History:   Procedure Laterality Date   • JOINT REPLACEMENT      bilateral knees   • KNEE POLY INSERT EXCHANGE Bilateral 3/23/2018    Procedure: KNEE POLY INSERT EXCHANGE WITH IRRIGATION AND DEBRIDMENT;  Surgeon: Ti Moffett MD;  Location: [facility];  Service: Orthopedics   • LAPAROSCOPIC CHOLECYSTECTOMY        History reviewed. No pertinent family history.  Social History     Social History   • Marital status: Single     Spouse name: N/A   • Number of children: N/A   • Years of education: N/A     Occupational History   • Not on file.     Social History Main Topics   • Smoking status: Never Smoker   • Smokeless tobacco: Never Used   • Alcohol use Yes      Comment: PT STATES SHE HAS NOT DRANK IN APPROX 3 MONTHS   • Drug use: No   • Sexual activity: Defer     Other Topics Concern   • Not on file     Social History Narrative   • No narrative on file      Current Outpatient Prescriptions on File Prior to Visit   Medication Sig Dispense Refill   • carvedilol  "(COREG) 3.125 MG tablet Take 3.125 mg by mouth 2 (Two) Times a Day With Meals.     • docusate sodium 100 MG capsule Take 100 mg by mouth 2 (Two) Times a Day As Needed for Constipation.     • Heparin Sodium, Porcine, (HEPARIN, PORCINE,) 5000 UNIT/ML injection Inject 1 mL under the skin Every 8 (Eight) Hours.     • insulin detemir (LEVEMIR) 100 UNIT/ML injection Inject 30 Units under the skin 2 (Two) Times a Day.     • insulin regular (humuLIN R,novoLIN R) 100 UNIT/ML injection Inject  under the skin 3 (Three) Times a Day Before Meals.     • polyethylene glycol (MIRALAX) pack packet Take 17 g by mouth Daily.     • traZODone (DESYREL) 50 MG tablet Take 50 mg by mouth Every Night.     • triamcinolone (KENALOG) 0.1 % lotion Apply  topically Every 12 (Twelve) Hours.     • venlafaxine XR (EFFEXOR-XR) 150 MG 24 hr capsule Take 150 mg by mouth Daily.       No current facility-administered medications on file prior to visit.       No Known Allergies     The following portions of the patient's history were reviewed and updated as appropriate: allergies, current medications, past family history, past medical history, past social history, past surgical history and problem list.    Review of Systems   Constitutional: Negative.    HENT: Negative.    Eyes: Negative.    Respiratory: Negative.    Cardiovascular: Negative.    Gastrointestinal: Negative.    Endocrine: Negative.    Genitourinary: Negative.    Musculoskeletal: Positive for arthralgias.   Skin: Negative.    Allergic/Immunologic: Negative.    Neurological: Negative.    Hematological: Negative.    Psychiatric/Behavioral: Negative.         Objective      Physical Exam  Pulse 90   Ht 157.5 cm (62.01\")   Wt 70.8 kg (156 lb 1.4 oz)   SpO2 97%   BMI 28.54 kg/m²     Body mass index is 28.54 kg/m².        GENERAL APPEARANCE: awake, alert & oriented x 3, in no acute distress and well developed, well nourished  PSYCH: normal mood and affect      Ortho Exam  Peripheral Vascular:  "   Upper Extremity: Well-healed anterior knee incision  Inspection:  Left--no cyanotic nail beds Right--no cyanotic nail beds   Bilateral:  Pink nail beds with brisk capillary refill   Palpation:  Bilateral radial pulse normal  Musculoskeletal:  Global Assessment:  Overall assessment of Lower Extremity Muscle Strength and Tone:  Right quadriceps--5/5  Right hamstrings--5/5  Right tibialis anterior--5/5  Right gastroc soleus--5/5  Right EHL--5/5  Lower Extremity:  Knee/Patella:  No digital clubbing or cyanosis.    Examination of right knee reveals:  Normal deep tendon reflexes, coordination, strength, tone, sensation.  No known fractures or deformities.  Inspection and Palpation:    Right knee:  Tenderness:  none  Effusion:  none  Crepitus:  none  Pulses:  2+  Ecchymosis:  None  Warmth:  None   ROM:  Right:  Extension:0    Flexion:135  Left:  Extension:0     Flexion:135  Instability:  Right:  Lachman Test:  Negative, Varus stress test negative,   Valgus stress test negative, Posterior Drawer Test:  Negative  Deformities/Malalignments/Discrepancies:    Left:  none  Right:  none  Functional Testing:  Right:  Ke's test:  Negative  Patella grind test:  Negative  Q-angle:  Normal  Apprehension Sign:  Negative        Imaging/Studies  Imaging Results (last 7 days)     ** No results found for the last 168 hours. **          Assessment/Plan        ICD-10-CM ICD-9-CM   1. Status post orthopedic surgery, follow-up exam Z09 V67.09   2. Infection of prosthetic knee joint, subsequent encounter T84.59XD V58.89    Z96.659    3. Dislocation of internal joint prosthesis, subsequent encounter T84.029D V54.89     996.42     She may discontinue the knee immobilizer.  She has not been using it anyway.  She will follow-up annually for her knee checkup.  She is doing great considering.  Medical Decision Making  Management Options : over-the-counter medicine      Ti Moffett MD  10/01/18  9:07 AM         EMR Dragon/Transcription  disclaimer:  Much of this encounter note is an electronic transcription of spoken language to printed text. Electronic transcription of spoken language may permit erroneous, or at times, nonsensical words or phrases to be inadvertently transcribed. Although I have reviewed the note for such errors, some may still exist.

## 2018-10-29 ENCOUNTER — LAB (OUTPATIENT)
Dept: LAB | Facility: HOSPITAL | Age: 53
End: 2018-10-29

## 2018-10-29 ENCOUNTER — TRANSCRIBE ORDERS (OUTPATIENT)
Dept: LAB | Facility: HOSPITAL | Age: 53
End: 2018-10-29

## 2018-10-29 DIAGNOSIS — T84.53XD INFECTION OF TOTAL RIGHT KNEE REPLACEMENT, SUBSEQUENT ENCOUNTER: ICD-10-CM

## 2018-10-29 DIAGNOSIS — L73.9 STAPHYLOCOCCUS AUREUS SUPERFICIAL FOLLICULITIS: ICD-10-CM

## 2018-10-29 DIAGNOSIS — T84.54XD INFECTION OF TOTAL LEFT KNEE REPLACEMENT, SUBSEQUENT ENCOUNTER: ICD-10-CM

## 2018-10-29 DIAGNOSIS — M00.061 STAPHYLOCOCCAL ARTHRITIS OF RIGHT KNEE (HCC): ICD-10-CM

## 2018-10-29 DIAGNOSIS — L20.9 ATOPIC DERMATITIS, UNSPECIFIED TYPE: ICD-10-CM

## 2018-10-29 DIAGNOSIS — B95.61 STAPHYLOCOCCUS AUREUS SUPERFICIAL FOLLICULITIS: ICD-10-CM

## 2018-10-29 DIAGNOSIS — M00.062 STAPHYLOCOCCAL ARTHRITIS OF LEFT KNEE (HCC): ICD-10-CM

## 2018-10-29 DIAGNOSIS — L20.9 ATOPIC DERMATITIS, UNSPECIFIED TYPE: Primary | ICD-10-CM

## 2018-10-29 LAB
ALBUMIN SERPL-MCNC: 4.39 G/DL (ref 3.2–4.8)
ALBUMIN/GLOB SERPL: 1.4 G/DL (ref 1.5–2.5)
ALP SERPL-CCNC: 141 U/L (ref 25–100)
ALT SERPL W P-5'-P-CCNC: 22 U/L (ref 7–40)
ANION GAP SERPL CALCULATED.3IONS-SCNC: 6 MMOL/L (ref 3–11)
AST SERPL-CCNC: 24 U/L (ref 0–33)
BASOPHILS # BLD AUTO: 0.03 10*3/MM3 (ref 0–0.2)
BASOPHILS NFR BLD AUTO: 0.4 % (ref 0–1)
BILIRUB SERPL-MCNC: 0.2 MG/DL (ref 0.3–1.2)
BUN BLD-MCNC: 13 MG/DL (ref 9–23)
BUN/CREAT SERPL: 18.1 (ref 7–25)
CALCIUM SPEC-SCNC: 9.4 MG/DL (ref 8.7–10.4)
CHLORIDE SERPL-SCNC: 101 MMOL/L (ref 99–109)
CO2 SERPL-SCNC: 28 MMOL/L (ref 20–31)
CREAT BLD-MCNC: 0.72 MG/DL (ref 0.6–1.3)
CRP SERPL-MCNC: 2.46 MG/DL (ref 0–1)
DEPRECATED RDW RBC AUTO: 51.8 FL (ref 37–54)
EOSINOPHIL # BLD AUTO: 0.86 10*3/MM3 (ref 0–0.3)
EOSINOPHIL NFR BLD AUTO: 10.6 % (ref 0–3)
ERYTHROCYTE [DISTWIDTH] IN BLOOD BY AUTOMATED COUNT: 16.3 % (ref 11.3–14.5)
ERYTHROCYTE [SEDIMENTATION RATE] IN BLOOD: 51 MM/HR (ref 0–30)
GFR SERPL CREATININE-BSD FRML MDRD: 84 ML/MIN/1.73
GLOBULIN UR ELPH-MCNC: 3.1 GM/DL
GLUCOSE BLD-MCNC: 129 MG/DL (ref 70–100)
HCT VFR BLD AUTO: 40.3 % (ref 34.5–44)
HGB BLD-MCNC: 12.6 G/DL (ref 11.5–15.5)
IMM GRANULOCYTES # BLD: 0.02 10*3/MM3 (ref 0–0.03)
IMM GRANULOCYTES NFR BLD: 0.2 % (ref 0–0.6)
LYMPHOCYTES # BLD AUTO: 2.29 10*3/MM3 (ref 0.6–4.8)
LYMPHOCYTES NFR BLD AUTO: 28.2 % (ref 24–44)
MCH RBC QN AUTO: 26.8 PG (ref 27–31)
MCHC RBC AUTO-ENTMCNC: 31.3 G/DL (ref 32–36)
MCV RBC AUTO: 85.7 FL (ref 80–99)
MONOCYTES # BLD AUTO: 0.68 10*3/MM3 (ref 0–1)
MONOCYTES NFR BLD AUTO: 8.4 % (ref 0–12)
NEUTROPHILS # BLD AUTO: 4.25 10*3/MM3 (ref 1.5–8.3)
NEUTROPHILS NFR BLD AUTO: 52.2 % (ref 41–71)
PLATELET # BLD AUTO: 267 10*3/MM3 (ref 150–450)
PMV BLD AUTO: 7.6 FL (ref 6–12)
POTASSIUM BLD-SCNC: 4.4 MMOL/L (ref 3.5–5.5)
PROT SERPL-MCNC: 7.5 G/DL (ref 5.7–8.2)
RBC # BLD AUTO: 4.7 10*6/MM3 (ref 3.89–5.14)
SODIUM BLD-SCNC: 135 MMOL/L (ref 132–146)
WBC NRBC COR # BLD: 8.13 10*3/MM3 (ref 3.5–10.8)

## 2018-10-29 PROCEDURE — 36415 COLL VENOUS BLD VENIPUNCTURE: CPT

## 2018-10-29 PROCEDURE — 85025 COMPLETE CBC W/AUTO DIFF WBC: CPT

## 2018-10-29 PROCEDURE — 85652 RBC SED RATE AUTOMATED: CPT

## 2018-10-29 PROCEDURE — 86140 C-REACTIVE PROTEIN: CPT

## 2018-10-29 PROCEDURE — 80053 COMPREHEN METABOLIC PANEL: CPT

## 2018-11-23 ENCOUNTER — LAB (OUTPATIENT)
Dept: LAB | Facility: HOSPITAL | Age: 53
End: 2018-11-23

## 2018-11-23 ENCOUNTER — TRANSCRIBE ORDERS (OUTPATIENT)
Dept: LAB | Facility: HOSPITAL | Age: 53
End: 2018-11-23

## 2018-11-23 DIAGNOSIS — M00.062 STAPHYLOCOCCAL ARTHRITIS OF LEFT KNEE (HCC): ICD-10-CM

## 2018-11-23 DIAGNOSIS — M00.061 STAPHYLOCOCCAL ARTHRITIS OF RIGHT KNEE (HCC): ICD-10-CM

## 2018-11-23 DIAGNOSIS — T84.54XD INFECTION OF TOTAL LEFT KNEE REPLACEMENT, SUBSEQUENT ENCOUNTER: ICD-10-CM

## 2018-11-23 DIAGNOSIS — L20.9 ATOPIC DERMATITIS, UNSPECIFIED TYPE: ICD-10-CM

## 2018-11-23 DIAGNOSIS — L20.9 ATOPIC DERMATITIS, UNSPECIFIED TYPE: Primary | ICD-10-CM

## 2018-11-23 LAB
ALBUMIN SERPL-MCNC: 4.7 G/DL (ref 3.2–4.8)
ALBUMIN/GLOB SERPL: 1.3 G/DL (ref 1.5–2.5)
ALP SERPL-CCNC: 181 U/L (ref 25–100)
ALT SERPL W P-5'-P-CCNC: 27 U/L (ref 7–40)
ANION GAP SERPL CALCULATED.3IONS-SCNC: 10 MMOL/L (ref 3–11)
AST SERPL-CCNC: 20 U/L (ref 0–33)
BASOPHILS # BLD AUTO: 0.04 10*3/MM3 (ref 0–0.2)
BASOPHILS NFR BLD AUTO: 0.5 % (ref 0–1)
BILIRUB SERPL-MCNC: 0.3 MG/DL (ref 0.3–1.2)
BUN BLD-MCNC: 12 MG/DL (ref 9–23)
BUN/CREAT SERPL: 16.4 (ref 7–25)
CALCIUM SPEC-SCNC: 9.5 MG/DL (ref 8.7–10.4)
CHLORIDE SERPL-SCNC: 101 MMOL/L (ref 99–109)
CO2 SERPL-SCNC: 25 MMOL/L (ref 20–31)
CREAT BLD-MCNC: 0.73 MG/DL (ref 0.6–1.3)
CRP SERPL-MCNC: 2.16 MG/DL (ref 0–1)
DEPRECATED RDW RBC AUTO: 43 FL (ref 37–54)
EOSINOPHIL # BLD AUTO: 0.55 10*3/MM3 (ref 0–0.3)
EOSINOPHIL NFR BLD AUTO: 6.8 % (ref 0–3)
ERYTHROCYTE [DISTWIDTH] IN BLOOD BY AUTOMATED COUNT: 14.1 % (ref 11.3–14.5)
ERYTHROCYTE [SEDIMENTATION RATE] IN BLOOD: 82 MM/HR (ref 0–30)
GFR SERPL CREATININE-BSD FRML MDRD: 83 ML/MIN/1.73
GLOBULIN UR ELPH-MCNC: 3.6 GM/DL
GLUCOSE BLD-MCNC: 157 MG/DL (ref 70–100)
HCT VFR BLD AUTO: 40.4 % (ref 34.5–44)
HGB BLD-MCNC: 13.1 G/DL (ref 11.5–15.5)
IMM GRANULOCYTES # BLD: 0.02 10*3/MM3 (ref 0–0.03)
IMM GRANULOCYTES NFR BLD: 0.2 % (ref 0–0.6)
LYMPHOCYTES # BLD AUTO: 2.52 10*3/MM3 (ref 0.6–4.8)
LYMPHOCYTES NFR BLD AUTO: 31 % (ref 24–44)
MCH RBC QN AUTO: 27.1 PG (ref 27–31)
MCHC RBC AUTO-ENTMCNC: 32.4 G/DL (ref 32–36)
MCV RBC AUTO: 83.6 FL (ref 80–99)
MONOCYTES # BLD AUTO: 0.53 10*3/MM3 (ref 0–1)
MONOCYTES NFR BLD AUTO: 6.5 % (ref 0–12)
NEUTROPHILS # BLD AUTO: 4.5 10*3/MM3 (ref 1.5–8.3)
NEUTROPHILS NFR BLD AUTO: 55.2 % (ref 41–71)
PLATELET # BLD AUTO: 342 10*3/MM3 (ref 150–450)
PMV BLD AUTO: 7.5 FL (ref 6–12)
POTASSIUM BLD-SCNC: 4.3 MMOL/L (ref 3.5–5.5)
PROT SERPL-MCNC: 8.3 G/DL (ref 5.7–8.2)
RBC # BLD AUTO: 4.83 10*6/MM3 (ref 3.89–5.14)
SODIUM BLD-SCNC: 136 MMOL/L (ref 132–146)
WBC NRBC COR # BLD: 8.14 10*3/MM3 (ref 3.5–10.8)

## 2018-11-23 PROCEDURE — 85652 RBC SED RATE AUTOMATED: CPT

## 2018-11-23 PROCEDURE — 86140 C-REACTIVE PROTEIN: CPT

## 2018-11-23 PROCEDURE — 80053 COMPREHEN METABOLIC PANEL: CPT

## 2018-11-23 PROCEDURE — 85025 COMPLETE CBC W/AUTO DIFF WBC: CPT

## 2018-11-23 PROCEDURE — 36415 COLL VENOUS BLD VENIPUNCTURE: CPT

## 2019-01-07 ENCOUNTER — TRANSCRIBE ORDERS (OUTPATIENT)
Dept: LAB | Facility: HOSPITAL | Age: 54
End: 2019-01-07

## 2019-01-07 ENCOUNTER — LAB (OUTPATIENT)
Dept: LAB | Facility: HOSPITAL | Age: 54
End: 2019-01-07

## 2019-01-07 DIAGNOSIS — M00.061 STAPHYLOCOCCAL ARTHRITIS OF RIGHT KNEE (HCC): ICD-10-CM

## 2019-01-07 DIAGNOSIS — L20.9 ATOPIC DERMATITIS, UNSPECIFIED TYPE: Primary | ICD-10-CM

## 2019-01-07 DIAGNOSIS — T84.54XD INFECTION OF TOTAL LEFT KNEE REPLACEMENT, SUBSEQUENT ENCOUNTER: ICD-10-CM

## 2019-01-07 DIAGNOSIS — I10 ESSENTIAL HYPERTENSION, MALIGNANT: ICD-10-CM

## 2019-01-07 DIAGNOSIS — B95.61 STAPHYLOCOCCUS AUREUS SUPERFICIAL FOLLICULITIS: ICD-10-CM

## 2019-01-07 DIAGNOSIS — D72.823 NEUTROPHILIC LEUKEMOID REACTION: ICD-10-CM

## 2019-01-07 DIAGNOSIS — M00.062 STAPHYLOCOCCAL ARTHRITIS OF LEFT KNEE (HCC): ICD-10-CM

## 2019-01-07 DIAGNOSIS — A41.01 METHICILLIN SUSCEPTIBLE STAPHYLOCOCCUS AUREUS SEPTICEMIA (HCC): ICD-10-CM

## 2019-01-07 DIAGNOSIS — F10.20 ACUTE ALCOHOLISM (HCC): ICD-10-CM

## 2019-01-07 DIAGNOSIS — Z76.89 REFERRAL OF PATIENT WITHOUT EXAMINATION OR TREATMENT: ICD-10-CM

## 2019-01-07 DIAGNOSIS — L20.9 ATOPIC DERMATITIS, UNSPECIFIED TYPE: ICD-10-CM

## 2019-01-07 DIAGNOSIS — L73.9 STAPHYLOCOCCUS AUREUS SUPERFICIAL FOLLICULITIS: ICD-10-CM

## 2019-01-07 DIAGNOSIS — D47.3 THROMBOCYTHEMIA, ESSENTIAL (HCC): ICD-10-CM

## 2019-01-07 LAB
ALBUMIN SERPL-MCNC: 4.58 G/DL (ref 3.2–4.8)
ALBUMIN/GLOB SERPL: 1.4 G/DL (ref 1.5–2.5)
ALP SERPL-CCNC: 139 U/L (ref 25–100)
ALT SERPL W P-5'-P-CCNC: 41 U/L (ref 7–40)
ANION GAP SERPL CALCULATED.3IONS-SCNC: 7 MMOL/L (ref 3–11)
AST SERPL-CCNC: 29 U/L (ref 0–33)
BASOPHILS # BLD AUTO: 0.03 10*3/MM3 (ref 0–0.2)
BASOPHILS NFR BLD AUTO: 0.4 % (ref 0–1)
BILIRUB SERPL-MCNC: 0.6 MG/DL (ref 0.3–1.2)
BUN BLD-MCNC: 13 MG/DL (ref 9–23)
BUN/CREAT SERPL: 17.3 (ref 7–25)
CALCIUM SPEC-SCNC: 9.4 MG/DL (ref 8.7–10.4)
CHLORIDE SERPL-SCNC: 102 MMOL/L (ref 99–109)
CO2 SERPL-SCNC: 26 MMOL/L (ref 20–31)
CREAT BLD-MCNC: 0.75 MG/DL (ref 0.6–1.3)
CRP SERPL-MCNC: 15 MG/DL (ref 0–1)
DEPRECATED RDW RBC AUTO: 47.7 FL (ref 37–54)
EOSINOPHIL # BLD AUTO: 0.25 10*3/MM3 (ref 0–0.3)
EOSINOPHIL NFR BLD AUTO: 3.6 % (ref 0–3)
ERYTHROCYTE [DISTWIDTH] IN BLOOD BY AUTOMATED COUNT: 15.2 % (ref 11.3–14.5)
ERYTHROCYTE [SEDIMENTATION RATE] IN BLOOD: 38 MM/HR (ref 0–30)
GFR SERPL CREATININE-BSD FRML MDRD: 81 ML/MIN/1.73
GLOBULIN UR ELPH-MCNC: 3.2 GM/DL
GLUCOSE BLD-MCNC: 178 MG/DL (ref 70–100)
HCT VFR BLD AUTO: 38.6 % (ref 34.5–44)
HGB BLD-MCNC: 12.1 G/DL (ref 11.5–15.5)
IMM GRANULOCYTES # BLD AUTO: 0.01 10*3/MM3 (ref 0–0.03)
IMM GRANULOCYTES NFR BLD AUTO: 0.1 % (ref 0–0.6)
LYMPHOCYTES # BLD AUTO: 1.46 10*3/MM3 (ref 0.6–4.8)
LYMPHOCYTES NFR BLD AUTO: 20.9 % (ref 24–44)
MCH RBC QN AUTO: 27 PG (ref 27–31)
MCHC RBC AUTO-ENTMCNC: 31.3 G/DL (ref 32–36)
MCV RBC AUTO: 86.2 FL (ref 80–99)
MONOCYTES # BLD AUTO: 0.74 10*3/MM3 (ref 0–1)
MONOCYTES NFR BLD AUTO: 10.6 % (ref 0–12)
NEUTROPHILS # BLD AUTO: 4.51 10*3/MM3 (ref 1.5–8.3)
NEUTROPHILS NFR BLD AUTO: 64.4 % (ref 41–71)
PLATELET # BLD AUTO: 262 10*3/MM3 (ref 150–450)
PMV BLD AUTO: 7.9 FL (ref 6–12)
POTASSIUM BLD-SCNC: 4.2 MMOL/L (ref 3.5–5.5)
PROT SERPL-MCNC: 7.8 G/DL (ref 5.7–8.2)
RBC # BLD AUTO: 4.48 10*6/MM3 (ref 3.89–5.14)
SODIUM BLD-SCNC: 135 MMOL/L (ref 132–146)
WBC NRBC COR # BLD: 7 10*3/MM3 (ref 3.5–10.8)

## 2019-01-07 PROCEDURE — 80053 COMPREHEN METABOLIC PANEL: CPT

## 2019-01-07 PROCEDURE — 85652 RBC SED RATE AUTOMATED: CPT

## 2019-01-07 PROCEDURE — 85025 COMPLETE CBC W/AUTO DIFF WBC: CPT

## 2019-01-07 PROCEDURE — 36415 COLL VENOUS BLD VENIPUNCTURE: CPT

## 2019-01-07 PROCEDURE — 86140 C-REACTIVE PROTEIN: CPT

## 2019-02-10 ENCOUNTER — HOSPITAL ENCOUNTER (INPATIENT)
Facility: HOSPITAL | Age: 54
LOS: 10 days | Discharge: NURSING, REHAB OR LONG-TERM CARE | End: 2019-02-20
Attending: INTERNAL MEDICINE | Admitting: INTERNAL MEDICINE

## 2019-02-10 ENCOUNTER — APPOINTMENT (OUTPATIENT)
Dept: GENERAL RADIOLOGY | Facility: HOSPITAL | Age: 54
End: 2019-02-10

## 2019-02-10 DIAGNOSIS — Z96.659 INFECTION OF PROSTHETIC KNEE JOINT, SUBSEQUENT ENCOUNTER: Primary | ICD-10-CM

## 2019-02-10 DIAGNOSIS — Z74.09 IMPAIRED MOBILITY AND ADLS: ICD-10-CM

## 2019-02-10 DIAGNOSIS — Z78.9 IMPAIRED MOBILITY AND ADLS: ICD-10-CM

## 2019-02-10 DIAGNOSIS — Z74.09 IMPAIRED FUNCTIONAL MOBILITY, BALANCE, GAIT, AND ENDURANCE: ICD-10-CM

## 2019-02-10 DIAGNOSIS — T84.59XD INFECTION OF PROSTHETIC KNEE JOINT, SUBSEQUENT ENCOUNTER: Primary | ICD-10-CM

## 2019-02-10 PROBLEM — R19.7 DIARRHEA: Status: ACTIVE | Noted: 2019-02-10

## 2019-02-10 LAB
ALBUMIN SERPL-MCNC: 4.25 G/DL (ref 3.2–4.8)
ALBUMIN/GLOB SERPL: 1.2 G/DL (ref 1.5–2.5)
ALP SERPL-CCNC: 162 U/L (ref 25–100)
ALT SERPL W P-5'-P-CCNC: 40 U/L (ref 7–40)
ANION GAP SERPL CALCULATED.3IONS-SCNC: 10 MMOL/L (ref 3–11)
APPEARANCE FLD: ABNORMAL
AST SERPL-CCNC: 29 U/L (ref 0–33)
BILIRUB SERPL-MCNC: 1 MG/DL (ref 0.3–1.2)
BUN BLD-MCNC: 9 MG/DL (ref 9–23)
BUN/CREAT SERPL: 12.3 (ref 7–25)
CALCIUM SPEC-SCNC: 9.1 MG/DL (ref 8.7–10.4)
CHLORIDE SERPL-SCNC: 95 MMOL/L (ref 99–109)
CO2 SERPL-SCNC: 31 MMOL/L (ref 20–31)
COLOR FLD: YELLOW
CREAT BLD-MCNC: 0.73 MG/DL (ref 0.6–1.3)
CRYSTALS FLD MICRO: NORMAL
CRYSTALS FLD MICRO: NORMAL
D-LACTATE SERPL-SCNC: 1 MMOL/L (ref 0.5–2)
DEPRECATED RDW RBC AUTO: 43.5 FL (ref 37–54)
ERYTHROCYTE [DISTWIDTH] IN BLOOD BY AUTOMATED COUNT: 14.8 % (ref 11.3–14.5)
GFR SERPL CREATININE-BSD FRML MDRD: 83 ML/MIN/1.73
GLOBULIN UR ELPH-MCNC: 3.5 GM/DL
GLUCOSE BLD-MCNC: 132 MG/DL (ref 70–100)
GLUCOSE BLDC GLUCOMTR-MCNC: 173 MG/DL (ref 70–130)
HBA1C MFR BLD: 6.5 % (ref 4.8–5.6)
HCT VFR BLD AUTO: 35.7 % (ref 34.5–44)
HGB BLD-MCNC: 11.5 G/DL (ref 11.5–15.5)
LYMPHOCYTES NFR FLD MANUAL: 3 %
MCH RBC QN AUTO: 25.8 PG (ref 27–31)
MCHC RBC AUTO-ENTMCNC: 32.2 G/DL (ref 32–36)
MCV RBC AUTO: 80 FL (ref 80–99)
MONOCYTES NFR FLD: 3 %
NEUTROPHILS NFR FLD MANUAL: 94 %
PLATELET # BLD AUTO: 278 10*3/MM3 (ref 150–450)
PMV BLD AUTO: 7.6 FL (ref 6–12)
POTASSIUM BLD-SCNC: 3.7 MMOL/L (ref 3.5–5.5)
PROCALCITONIN SERPL-MCNC: 0.16 NG/ML
PROT SERPL-MCNC: 7.7 G/DL (ref 5.7–8.2)
RBC # BLD AUTO: 4.46 10*6/MM3 (ref 3.89–5.14)
RBC # FLD AUTO: ABNORMAL /MM3
SODIUM BLD-SCNC: 136 MMOL/L (ref 132–146)
WBC # FLD AUTO: ABNORMAL /MM3
WBC NRBC COR # BLD: 10.76 10*3/MM3 (ref 3.5–10.8)

## 2019-02-10 PROCEDURE — 87075 CULTR BACTERIA EXCEPT BLOOD: CPT | Performed by: ORTHOPAEDIC SURGERY

## 2019-02-10 PROCEDURE — 82962 GLUCOSE BLOOD TEST: CPT

## 2019-02-10 PROCEDURE — 87147 CULTURE TYPE IMMUNOLOGIC: CPT | Performed by: ORTHOPAEDIC SURGERY

## 2019-02-10 PROCEDURE — 63710000001 CARVEDILOL 3.125 MG TABLET: Performed by: NURSE PRACTITIONER

## 2019-02-10 PROCEDURE — 63710000001 INSULIN LISPRO (HUMAN) PER 5 UNITS: Performed by: NURSE PRACTITIONER

## 2019-02-10 PROCEDURE — 0S9C3ZZ DRAINAGE OF RIGHT KNEE JOINT, PERCUTANEOUS APPROACH: ICD-10-PCS | Performed by: ORTHOPAEDIC SURGERY

## 2019-02-10 PROCEDURE — 89051 BODY FLUID CELL COUNT: CPT | Performed by: INTERNAL MEDICINE

## 2019-02-10 PROCEDURE — A9270 NON-COVERED ITEM OR SERVICE: HCPCS | Performed by: NURSE PRACTITIONER

## 2019-02-10 PROCEDURE — 87015 SPECIMEN INFECT AGNT CONCNTJ: CPT | Performed by: ORTHOPAEDIC SURGERY

## 2019-02-10 PROCEDURE — 87070 CULTURE OTHR SPECIMN AEROBIC: CPT | Performed by: ORTHOPAEDIC SURGERY

## 2019-02-10 PROCEDURE — 20610 DRAIN/INJ JOINT/BURSA W/O US: CPT | Performed by: ORTHOPAEDIC SURGERY

## 2019-02-10 PROCEDURE — 87205 SMEAR GRAM STAIN: CPT | Performed by: ORTHOPAEDIC SURGERY

## 2019-02-10 PROCEDURE — 0S9D3ZZ DRAINAGE OF LEFT KNEE JOINT, PERCUTANEOUS APPROACH: ICD-10-PCS | Performed by: ORTHOPAEDIC SURGERY

## 2019-02-10 PROCEDURE — 84145 PROCALCITONIN (PCT): CPT | Performed by: NURSE PRACTITIONER

## 2019-02-10 PROCEDURE — 89060 EXAM SYNOVIAL FLUID CRYSTALS: CPT | Performed by: ORTHOPAEDIC SURGERY

## 2019-02-10 PROCEDURE — 25010000002 MORPHINE SULFATE (PF) 2 MG/ML SOLUTION: Performed by: ORTHOPAEDIC SURGERY

## 2019-02-10 PROCEDURE — 87077 CULTURE AEROBIC IDENTIFY: CPT | Performed by: ORTHOPAEDIC SURGERY

## 2019-02-10 PROCEDURE — 99222 1ST HOSP IP/OBS MODERATE 55: CPT | Performed by: ORTHOPAEDIC SURGERY

## 2019-02-10 PROCEDURE — 85027 COMPLETE CBC AUTOMATED: CPT | Performed by: NURSE PRACTITIONER

## 2019-02-10 PROCEDURE — 87040 BLOOD CULTURE FOR BACTERIA: CPT | Performed by: NURSE PRACTITIONER

## 2019-02-10 PROCEDURE — 73560 X-RAY EXAM OF KNEE 1 OR 2: CPT

## 2019-02-10 PROCEDURE — 80053 COMPREHEN METABOLIC PANEL: CPT | Performed by: NURSE PRACTITIONER

## 2019-02-10 PROCEDURE — 87186 SC STD MICRODIL/AGAR DIL: CPT | Performed by: ORTHOPAEDIC SURGERY

## 2019-02-10 PROCEDURE — 83036 HEMOGLOBIN GLYCOSYLATED A1C: CPT | Performed by: NURSE PRACTITIONER

## 2019-02-10 PROCEDURE — 94799 UNLISTED PULMONARY SVC/PX: CPT

## 2019-02-10 PROCEDURE — 89051 BODY FLUID CELL COUNT: CPT | Performed by: ORTHOPAEDIC SURGERY

## 2019-02-10 PROCEDURE — 83605 ASSAY OF LACTIC ACID: CPT | Performed by: NURSE PRACTITIONER

## 2019-02-10 RX ORDER — VENLAFAXINE 75 MG/1
300 TABLET ORAL
Status: DISCONTINUED | OUTPATIENT
Start: 2019-02-11 | End: 2019-02-10 | Stop reason: DRUGHIGH

## 2019-02-10 RX ORDER — NICOTINE POLACRILEX 4 MG
15 LOZENGE BUCCAL
Status: DISCONTINUED | OUTPATIENT
Start: 2019-02-10 | End: 2019-02-20 | Stop reason: HOSPADM

## 2019-02-10 RX ORDER — LABETALOL HYDROCHLORIDE 5 MG/ML
10 INJECTION, SOLUTION INTRAVENOUS EVERY 4 HOURS PRN
Status: DISCONTINUED | OUTPATIENT
Start: 2019-02-10 | End: 2019-02-20 | Stop reason: HOSPADM

## 2019-02-10 RX ORDER — LORAZEPAM 2 MG/ML
1 INJECTION INTRAMUSCULAR
Status: DISCONTINUED | OUTPATIENT
Start: 2019-02-10 | End: 2019-02-20 | Stop reason: HOSPADM

## 2019-02-10 RX ORDER — DEXTROSE MONOHYDRATE 25 G/50ML
25 INJECTION, SOLUTION INTRAVENOUS
Status: DISCONTINUED | OUTPATIENT
Start: 2019-02-10 | End: 2019-02-20 | Stop reason: HOSPADM

## 2019-02-10 RX ORDER — L.ACID,PARA/B.BIFIDUM/S.THERM 8B CELL
1 CAPSULE ORAL DAILY
Status: DISCONTINUED | OUTPATIENT
Start: 2019-02-10 | End: 2019-02-20 | Stop reason: HOSPADM

## 2019-02-10 RX ORDER — DIPHENHYDRAMINE HCL 50 MG
50 CAPSULE ORAL EVERY 6 HOURS PRN
Status: DISCONTINUED | OUTPATIENT
Start: 2019-02-10 | End: 2019-02-17 | Stop reason: SDUPTHER

## 2019-02-10 RX ORDER — FOLIC ACID 1 MG/1
1 TABLET ORAL DAILY
Status: DISCONTINUED | OUTPATIENT
Start: 2019-02-11 | End: 2019-02-20 | Stop reason: HOSPADM

## 2019-02-10 RX ORDER — TRAZODONE HYDROCHLORIDE 50 MG/1
50 TABLET ORAL NIGHTLY
Status: DISCONTINUED | OUTPATIENT
Start: 2019-02-10 | End: 2019-02-20 | Stop reason: HOSPADM

## 2019-02-10 RX ORDER — VENLAFAXINE HYDROCHLORIDE 75 MG/1
300 CAPSULE, EXTENDED RELEASE ORAL
Status: DISCONTINUED | OUTPATIENT
Start: 2019-02-11 | End: 2019-02-20 | Stop reason: HOSPADM

## 2019-02-10 RX ORDER — MORPHINE SULFATE 2 MG/ML
2 INJECTION, SOLUTION INTRAMUSCULAR; INTRAVENOUS ONCE
Status: COMPLETED | OUTPATIENT
Start: 2019-02-10 | End: 2019-02-10

## 2019-02-10 RX ORDER — SODIUM CHLORIDE 0.9 % (FLUSH) 0.9 %
3 SYRINGE (ML) INJECTION EVERY 12 HOURS SCHEDULED
Status: DISCONTINUED | OUTPATIENT
Start: 2019-02-10 | End: 2019-02-20 | Stop reason: HOSPADM

## 2019-02-10 RX ORDER — VENLAFAXINE HYDROCHLORIDE 75 MG/1
225 CAPSULE, EXTENDED RELEASE ORAL DAILY
Status: DISCONTINUED | OUTPATIENT
Start: 2019-02-11 | End: 2019-02-10 | Stop reason: DRUGHIGH

## 2019-02-10 RX ORDER — ONDANSETRON 2 MG/ML
4 INJECTION INTRAMUSCULAR; INTRAVENOUS EVERY 6 HOURS PRN
Status: DISCONTINUED | OUTPATIENT
Start: 2019-02-10 | End: 2019-02-12 | Stop reason: SDUPTHER

## 2019-02-10 RX ORDER — CARVEDILOL 3.12 MG/1
3.12 TABLET ORAL 2 TIMES DAILY WITH MEALS
Status: DISCONTINUED | OUTPATIENT
Start: 2019-02-10 | End: 2019-02-20 | Stop reason: HOSPADM

## 2019-02-10 RX ORDER — ONDANSETRON 2 MG/ML
4 INJECTION INTRAMUSCULAR; INTRAVENOUS EVERY 6 HOURS PRN
Status: DISCONTINUED | OUTPATIENT
Start: 2019-02-10 | End: 2019-02-20 | Stop reason: HOSPADM

## 2019-02-10 RX ORDER — ACETAMINOPHEN 325 MG/1
650 TABLET ORAL EVERY 4 HOURS PRN
Status: DISCONTINUED | OUTPATIENT
Start: 2019-02-10 | End: 2019-02-12 | Stop reason: SDUPTHER

## 2019-02-10 RX ORDER — CEPHALEXIN 250 MG/1
500 CAPSULE ORAL EVERY 8 HOURS SCHEDULED
Status: DISCONTINUED | OUTPATIENT
Start: 2019-02-10 | End: 2019-02-11

## 2019-02-10 RX ORDER — LORAZEPAM 2 MG/ML
2 INJECTION INTRAMUSCULAR
Status: DISCONTINUED | OUTPATIENT
Start: 2019-02-10 | End: 2019-02-20 | Stop reason: HOSPADM

## 2019-02-10 RX ORDER — SODIUM CHLORIDE 0.9 % (FLUSH) 0.9 %
3-10 SYRINGE (ML) INJECTION AS NEEDED
Status: DISCONTINUED | OUTPATIENT
Start: 2019-02-10 | End: 2019-02-12 | Stop reason: SDUPTHER

## 2019-02-10 RX ORDER — LORAZEPAM 1 MG/1
1 TABLET ORAL
Status: DISCONTINUED | OUTPATIENT
Start: 2019-02-10 | End: 2019-02-20 | Stop reason: HOSPADM

## 2019-02-10 RX ORDER — PANTOPRAZOLE SODIUM 40 MG/1
40 TABLET, DELAYED RELEASE ORAL
Status: DISCONTINUED | OUTPATIENT
Start: 2019-02-11 | End: 2019-02-12

## 2019-02-10 RX ORDER — DIPHENOXYLATE HYDROCHLORIDE AND ATROPINE SULFATE 2.5; .025 MG/1; MG/1
1 TABLET ORAL DAILY
Status: DISCONTINUED | OUTPATIENT
Start: 2019-02-11 | End: 2019-02-20 | Stop reason: HOSPADM

## 2019-02-10 RX ORDER — OXYCODONE HYDROCHLORIDE AND ACETAMINOPHEN 5; 325 MG/1; MG/1
1 TABLET ORAL EVERY 4 HOURS PRN
Status: DISCONTINUED | OUTPATIENT
Start: 2019-02-10 | End: 2019-02-20 | Stop reason: HOSPADM

## 2019-02-10 RX ORDER — LORAZEPAM 1 MG/1
2 TABLET ORAL
Status: DISCONTINUED | OUTPATIENT
Start: 2019-02-10 | End: 2019-02-20 | Stop reason: HOSPADM

## 2019-02-10 RX ORDER — THIAMINE MONONITRATE (VIT B1) 100 MG
100 TABLET ORAL DAILY
Status: DISCONTINUED | OUTPATIENT
Start: 2019-02-11 | End: 2019-02-20 | Stop reason: HOSPADM

## 2019-02-10 RX ORDER — ONDANSETRON 4 MG/1
4 TABLET, FILM COATED ORAL EVERY 6 HOURS PRN
Status: DISCONTINUED | OUTPATIENT
Start: 2019-02-10 | End: 2019-02-20 | Stop reason: HOSPADM

## 2019-02-10 RX ORDER — VENLAFAXINE HYDROCHLORIDE 75 MG/1
150 CAPSULE, EXTENDED RELEASE ORAL DAILY
Status: DISCONTINUED | OUTPATIENT
Start: 2019-02-10 | End: 2019-02-10

## 2019-02-10 RX ADMIN — CEPHALEXIN 500 MG: 250 CAPSULE ORAL at 22:06

## 2019-02-10 RX ADMIN — INSULIN LISPRO 2 UNITS: 100 INJECTION, SOLUTION INTRAVENOUS; SUBCUTANEOUS at 22:06

## 2019-02-10 RX ADMIN — OXYCODONE AND ACETAMINOPHEN 1 TABLET: 5; 325 TABLET ORAL at 22:34

## 2019-02-10 RX ADMIN — TRAZODONE HYDROCHLORIDE 50 MG: 50 TABLET ORAL at 22:05

## 2019-02-10 RX ADMIN — CARVEDILOL 3.12 MG: 3.12 TABLET, FILM COATED ORAL at 20:25

## 2019-02-10 RX ADMIN — MORPHINE SULFATE 2 MG: 2 INJECTION, SOLUTION INTRAMUSCULAR; INTRAVENOUS at 20:06

## 2019-02-10 RX ADMIN — Medication 1 CAPSULE: at 22:04

## 2019-02-10 NOTE — PLAN OF CARE
Problem: Pain, Acute (Adult)  Goal: Identify Related Risk Factors and Signs and Symptoms  Outcome: Ongoing (interventions implemented as appropriate)   02/10/19 7262   Pain, Acute (Adult)   Related Risk Factors (Acute Pain) surgery   Signs and Symptoms (Acute Pain) alteration in muscle tone

## 2019-02-10 NOTE — PLAN OF CARE
Problem: Patient Care Overview  Goal: Plan of Care Review  Outcome: Ongoing (interventions implemented as appropriate)   02/10/19 2700   Coping/Psychosocial   Plan of Care Reviewed With patient   Coping/Psychosocial   Patient Agreement with Plan of Care agrees   OTHER   Outcome Summary pt arrived on floor at 1800 has pain swelling at both knees came from [place] er via ambulance lives home alone hx of bilat PICC pulled june 2018 post multiple abx for johanne knee surgeries hx pain not controlled but able to watch tv waiting to be able to eat new iv right fa SL skin CDI

## 2019-02-10 NOTE — PROGRESS NOTES
NEW PATIENT    Patient: Jyoti Luna  : 1965    Primary Care Provider: Provider, No Known    Requesting Provider: As above    No chief complaint on file.      History    Chief Complaint: bilateral knee pain, inability to walk    History of Present Illness: this is a 53 year old woman with bilateral TKR.  She is Dr Moffett's patient.   She is on chronic antibiotic suppression.  In 2018 she was admitted with sepsis (MSSA) and underwent bilateral knee I&D with poly exchange 3/23/18.  Dr Brenden Francis from ID has followed her.  The original surgical procedures were 3 years (right knee) and 2 years (left knee) prior to infection.  She reports a 2 day history of increasing bilateral knee pain and inability to walk.  She reports increased swelling in both knees, left more than right.  She reports she could not get up off the couch and called an ambulance to take her to the ER. She was seen at [facility] and the ER doctor called me to request transfer.  No recent injury, no fever, no chills.  She reports she has been taking the Keflex 500mg Q 8 hours.  She reports skin color changes: on the right leg just inferior and lateral to the knee incision and on the left leg over the inferior incision.  No drainage.  Temp was 37 in [place] ER, WBC 11.7, ESR 74, CRP 19.9    She reports she has a history of diabetes, but is off meds now.  Reports her most recent HGA1c was 6.9.  No smoking.  She has a history of alcohol abuse. She reports she is drinking and has symptoms when stops.     No current facility-administered medications on file prior to encounter.      Current Outpatient Medications on File Prior to Encounter   Medication Sig Dispense Refill   • carvedilol (COREG) 3.125 MG tablet Take 3.125 mg by mouth 2 (Two) Times a Day With Meals.     • oxyCODONE-acetaminophen (PERCOCET) 5-325 MG per tablet Take 1 tablet by mouth Every 6 (Six) Hours As Needed.     • traZODone (DESYREL) 50 MG tablet Take 50  mg by mouth Every Night.     • triamcinolone (KENALOG) 0.1 % lotion Apply  topically Every 12 (Twelve) Hours.     • venlafaxine XR (EFFEXOR-XR) 150 MG 24 hr capsule Take 150 mg by mouth Daily.     • docusate sodium 100 MG capsule Take 100 mg by mouth 2 (Two) Times a Day As Needed for Constipation.     •       •       •       • polyethylene glycol (MIRALAX) pack packet Take 17 g by mouth Daily.        No Known Allergies   Past Medical History:   Diagnosis Date   • Acid reflux    • Alcoholism (CMS/HCC)    • Anemia 3/20/2018   • Arthritis    • Asthma    • Depression 3/20/2018   • Diabetes mellitus (CMS/HCC)     POSS R/T INFECTION PER PT   • Eczema    • History of transfusion    • Hypertension 3/20/2018     Past Surgical History:   Procedure Laterality Date   • JOINT REPLACEMENT      bilateral knees   • KNEE POLY INSERT EXCHANGE Bilateral 3/23/2018    Procedure: KNEE POLY INSERT EXCHANGE WITH IRRIGATION AND DEBRIDMENT;  Surgeon: Ti Moffett MD;  Location: [facility];  Service: Orthopedics   • LAPAROSCOPIC CHOLECYSTECTOMY       No family history on file.   Social History     Socioeconomic History   • Marital status: Single     Spouse name: Not on file   • Number of children: Not on file   • Years of education: Not on file   • Highest education level: Not on file   Social Needs   • Financial resource strain: Not on file   • Food insecurity - worry: Not on file   • Food insecurity - inability: Not on file   • Transportation needs - medical: Not on file   • Transportation needs - non-medical: Not on file   Occupational History   • Not on file   Tobacco Use   • Smoking status: Never Smoker   • Smokeless tobacco: Never Used   Substance and Sexual Activity   • Alcohol use: Yes        • Drug use: No   • Sexual activity: Defer   Other Topics Concern   • Not on file   Social History Narrative   • Not on file        Review of Systems   Constitutional: Positive for activity change.   HENT: Negative.    Eyes: Negative.     Respiratory: Negative.    Cardiovascular: Positive for leg swelling.   Gastrointestinal: Positive for diarrhea.   Endocrine: Negative.    Genitourinary: Negative.    Musculoskeletal: Positive for arthralgias, gait problem and joint swelling.   Skin: Positive for rash.   Allergic/Immunologic: Negative.    Hematological: Negative.    Psychiatric/Behavioral: Negative.        The following portions of the patient's history were reviewed and updated as appropriate: allergies, current medications, past family history, past medical history, past social history, past surgical history and problem list.    Physical Exam:   /92 (BP Location: Left arm, Patient Position: Lying)   Pulse 95   Temp 98.2 °F (36.8 °C) (Oral)   Resp 18   SpO2 96%   GENERAL: Body habitus: trunkal obesity    Lower extremity edema: Right: 1+ pitting; Leftt: 1+ pitting    Varicose veins:  Right: none; Left: none    Gait: in hospital bed, reports too much pain to walk     Mental Status:  awake and alert; oriented to person, place, and time    Voice:  clear  SKIN:  Normal and skin dry over all.  on right leg there is a pinkish scaly rash distal and lateral to the knee incision (over lateral proximal tibia) on left there is erythema over distal incision    Hair Growth:  Right:diminished; Left:  diminished  NAILS: Toenails: thick  HEENT: Head: Normocephalic, atraumatic,  without obvious abnormality.  eye: normal external eye, no icterus  ears: normal external ears  nose: normal external nose  few teeth  PULM:  Repiratory effort normal  CV:  Dorsalis Pedis:  Right: 2+; Left:2+    Posterior Tibial: Right:2+; Left:2+    Capillary Refill:  Brisk  MSK:        KNEE:  Right:  tender diffusely but no significant swelling, the extensor mechanism is intact, no effusion that i can detect.  the incision is not red, there is a pinkish rash distal and lateral; Left:  2+ effusion and pre-patellar swelling, distal incision moderatly red but no drainage, no open  areas.  she is able to do a straight leg raise with pain        NEURO:     Lower extremity sensation: intact           Calf Atrophy:none    Motor Function: bilateral ankle and foot motors intact, the quads fire bilat and extensor mech intact but painful on left         Medical Decision Making    Data Review:   ordered and reviewed x-rays today, reviewed prior lab results, reviewed outside records and phone conversation with physician    Assessment and Plan/ Diagnosis/Treatment options:   1. Probable infection left total knee, less likely right.  Will obtain X-rays, blood cultures, repeat labs.  Patient is stable and I do not think she needs urgent I&D.  She will likely need hardware removal and 2 stage procedure which requires planning.  Dr Moffett will take over care tomorrow.  Dr Francis will see tomorrow.      2. Diabetes- reports good control, check HGA1c    3. History of alcohol abuse, still drinking, reports symptoms when not drinking, Ms Callejas ordering CIWA, vitamins

## 2019-02-10 NOTE — NURSING NOTE
ACC REVIEW REPORT: [facility]        PATIENT NAME: Jyoti Luna    PATIENT ID: [id number]    BED:  3G/H  s385    BED TYPE:  TELEMETRY    BED GIVEN TO:   DEWEY GUERIN RN    TIME BED GIVEN:   15:45    YOB: 1965    AGE: 53    GENDER: FEMALE    PREVIOUS ADMIT TO [facility]: YES    PREVIOUS ADMISSION DATE: 03/23/2018    PATIENT CLASS:  OUTPATIENT    TODAY'S DATE: 2/10/2019    TRANSFER DATE: 02/10/2019    ETA:   17:00 - 17:30    TRANSFERRING FACILITY:  [facility]    TRANSFERRING FACILITY PHONE # :  [phone]    TRANSFERRING MD: DR WIN DEMARCO    DATE/TIME REQUEST RECEIVED:  02/10/2019 @ 14:10    [facility] RN:  FRANKI BERNABE RN    REPORT FROM: DEWEY GUERIN RN    TIME REPORT TAKEN:  15:00    DIAGNOSIS:  KNEE INFECTION    REASON FOR TRANSFER TO [facility]:  THE PATIENT HAS HAD HER KNEE SURGERIES AT [facility]    TRANSPORTATION:  LOCAL EMS     CLINICAL REASON FOR TRANSFER TO [facility]:   THE PATIENT HAS HAD ALL HER ORTHOPEDIC WORK AT [facility]. TODAY SHE PRESENTS TO [facility] WITH C/O UNBEARABLE PAIN IN BOTH KNEES.  ABOUT 18 MONTHS AGO SHE UNDERWENT BILATERAL KNEE REPLACEMENTS AND HAS BEEN TREATED FOR WOUND INFECTIONS OFF AND ON SINCE SURGERY.  SHE HAS A HISTORY OF BEING  MSSA POSITIVE.       CLINICAL INFORMATION    HEIGHT:  1.57 meters    WEIGHT:   68.30 kgs    ALLERGIES:  EGGS AND SHELLFISH    OLIVERA:  NO    INFECTIOUS DISEASE:  HISTORY OF MSSA IN KNEES    ISOLATION:   NONE    1ST VITAL SIGNS:   TIME:  13:00  TEMP:  98.6  PULSE:  102  B/P:  135/78  RESP:  20 with O2 sat of 97% on room air.    LAB INFORMATION:  WBC 11.7,  H/H 11.5 / 35.5,  C REACTIVE PROTEIN 19.9,  GLUCOSE 203    CULTURE INFORMATION:  NONE DRAWN    MEDS/IV FLUIDS:  IV ACCESS PER A # 20 GA ANGIO IN THE RIGHT A/C TO SALINE LOCK.      CARDIAC SYSTEM:    RHYTHM:   SINUS TACHYCARDIA    Is patient taking or has patient been given any drugs that could increase bleeding?    UNKNOWN  (Plavix, Brilinta, Effient, Eliquis,  Xarelto, Warfarin, Integrilin, Angiomax)    CARDIAC NOTES:   MS MATSON HAS A HISTORY OF HTN, DIABETES, IRON DEFICIENCY ANEMIA, GERD, AND DEPRESSION.       RESPIRATORY SYSTEM:    LUNG SOUNDS:    CLEAR:  YES    OXYGEN:  NO    O2 SAT:  97%    ADMINISTRATION ROUTE:  ROOM AIR:      RESPIRATORY STATUS:  MS MATSON HAS HAD NO RESPIRATORY DIFFICULTY SINCE ADMISSION TO THE ED.  SHE HAS MAINTAINED HER O2 SATS ON ROOM AIR.       CNS/MUSCULOSKELETAL    ALERT AND ORIENTED:    PERSON:  YES  PLACE:  YES  TIME:  YES    INJURY:    NO  WHERE:   MS MATSON'S DIFFICULTY COMES FROM HER BILATERAL KNEE SURGERIES THAT HAVE BEEN INFECTED INTERMITTENTLY SINCE OR.     ОЛЬГА COMA SCALE:    E:  YES  M:  YES  V:  YES      EXTREMITY WEAKNESS:    LEFT ARM:  NO  RIGHT ARM: NO   LEFT LEG:  YES  RIGHT LEG:  YES    CNS/MUSCULOSKELETAL NOTES:  MS MATSON ADMITS SHE LIVES ALONE AND HAS TO GO UP AT LEAST ONE FLIGHT OF STAIRS IN HER HOME.  SHE ALSO HAS A DOG SHE TAKES CARE OF AND IS CURRENTLY WORRIED ABOUT WHO WILL CARE FOR THE DOG.  SHE STATES SHE DOES HAVE A NEIGHBOR THAT CAN CARE FOR THE DOG.  SHE ALSO STATES SHE IS SO TIRED OF THE HOSPITALIZATIONS AND THE PAIN.       GI//GY      ABDOMINAL PAIN:  NO    VOMITING:  NO    DIARRHEA:   YES AND SHE IS ON ANTIBIOTICS.  THE TRANSFERRING NURSE DID NOT KNOW IF OR WHEN MS MATSON WAS TESTED FOR C-DIFF.     NAUSEA:  NO    BOWEL SOUNDS:  ACTIVE    OCCULT STOOL:  N/A    VAGINAL BLEEDING:  NO    HEMATURIA:   NO    GI//GY NOTES:  MS MATSON IS A DIABETIC, SHE IS ON A DIABETIC DIET, SPECIFICS ARE UNKNOWN.  SHE HAS BEEN NPO EXCEPT FOR SIPS FOR MEDICATIONS.     PAST MEDICAL HISTORY:  BI LATERAL KNEE REPLACEMENTS, HTN, HIGH CHOLESTEROL, GERD, DEPRESSION, IRON DEFICIENCY ANEMIA, DIABETES, AND CONSTIPATION.             Zeny Palacios RN  2/10/2019  3:32 PM

## 2019-02-10 NOTE — H&P
Patient Name: Jyoti Luna  MRN: [id number]  : 1965  DOS: 2/10/2019    Attending: Lorri Siddiqui MD    Primary Care Provider: Dr. Betty Block      Chief complaint:  Prosthetic knee infection    Subjective   Patient is a 53 y.o. female presented for direct admission from [place] [facility] with complaints of bilateral knee pain, swelling and redness. She reports she was unable to stand or walk today due to pain and called an ambulance for help. She has been using a walker recently for ambulation, but has 2 flights of stairs to get out of her house and was unable to navigate stairs. She denies injury. She thinks she may have had a fever last night. She denies chills or night sweats.    She underwent bilateral knee I&D with poly exchange 3/23/18.    Per Dr. Cody's note: (( this is a 53 year old woman with bilateral TKR.  She is Dr Moffett's patient.   She is on chronic antibiotic suppression.  In 2018 she was admitted with sepsis (MSSA) and underwent bilateral knee I&D with poly exchange 3/23/18.  Dr Brenden Francis from ID has followed her.  The original surgical procedures were 3 years (right knee) and 2 years (left knee) prior to infection.  She reports a 2 day history of increasing bilateral knee pain and inability to walk.  She reports increased swelling in both knees, left more than right.  She reports she could not get up off the couch and called an ambulance to take her to the ER. She was seen at [facility] ER and the ER doctor called me to request transfer.  No recent injury, no fever, no chills.  She reports she has been taking the Keflex 500mg Q 8 hours.  She reports skin color changes: on the right leg just inferior and lateral to the knee incision and on the left leg over the inferior incision.  No drainage.  Temp was 37 in [place] ER, WBC 11.7, ESR 74, CRP 19.9))    When seen she complains of moderate bilateral knee pain. No fever today. She does reports diarrhea for 1  month. She is concerned and anxious about treatment course, her inability to ambulate, and extended course of illness. She complaints of pain, weakness, malaise and subjective fevers.    She is DM, she does not check BG. She reports in rehab following a previous hospitalization that all DM medications were discontinued. Her most recent A1C with PCP about 3 weeks ago was 6.9.      Allergies:  No Known Allergies    Meds:  Medications Prior to Admission   Medication Sig Dispense Refill Last Dose   • carvedilol (COREG) 3.125 MG tablet Take 3.125 mg by mouth 2 (Two) Times a Day With Meals.   2/10/2019 at Unknown time   • oxyCODONE-acetaminophen (PERCOCET) 5-325 MG per tablet Take 1 tablet by mouth Every 6 (Six) Hours As Needed.   2/10/2019 at Unknown time   • traZODone (DESYREL) 50 MG tablet Take 50 mg by mouth Every Night.   2/9/2019 at Unknown time   • triamcinolone (KENALOG) 0.1 % lotion Apply  topically Every 12 (Twelve) Hours.   2/10/2019 at Unknown time   • venlafaxine XR (EFFEXOR-XR) 150 MG 24 hr capsule Take 300 mg by mouth Daily.   2/10/2019 at Unknown time   Keflex 500mg po TID  Protonix 40mg po daily    History:   Past Medical History:   Diagnosis Date   • Acid reflux    • Alcoholism (CMS/HCC)    • Anemia 3/20/2018   • Arthritis    • Asthma    • Depression 3/20/2018   • Diabetes mellitus (CMS/HCC)     POSS R/T INFECTION PER PT   • Eczema    • History of transfusion    • Hypertension 3/20/2018     Past Surgical History:   Procedure Laterality Date   • JOINT REPLACEMENT      bilateral knees   • KNEE POLY INSERT EXCHANGE Bilateral 3/23/2018    Procedure: KNEE POLY INSERT EXCHANGE WITH IRRIGATION AND DEBRIDMENT;  Surgeon: Ti Moffett MD;  Location: [facility];  Service: Orthopedics   • LAPAROSCOPIC CHOLECYSTECTOMY       No family history on file.  Social History     Tobacco Use   • Smoking status: Never Smoker   • Smokeless tobacco: Never Used   Substance Use Topics   • Alcohol use: Yes     Comment: PT STATES  SHE HAS NOT DRANK IN APPROX 3 MONTHS   • Drug use: No   She lives alone. She has 2 children. She is disabled.     Review of Systems  All systems were reviewed and negative except for:  Respiratory: positive for  shortness of air  Cardiovascular: positive for  chest pressure / pain, at rest  Gastrointestinal: positive for  diarrhea    Vital Signs  Visit Vitals  /98   Pulse 100   Temp 97.9 °F (36.6 °C) (Oral)   Resp 16   SpO2 96%       Physical Exam:    General Appearance:    Alert, cooperative, in no acute distress   Head:    Normocephalic, without obvious abnormality, atraumatic   Eyes:            Lids and lashes normal, conjunctivae and sclerae normal, no   icterus, no pallor, corneas clear   Ears:    Ears appear intact with no abnormalities noted   Neck:   No adenopathy, supple, trachea midline, no thyromegaly   Lungs:     Clear to auscultation,respirations regular, even and                   unlabored    Heart:    Regular rhythm and normal rate, normal S1 and S2, no            murmur, no gallop   Abdomen:     Normal bowel sounds, no masses, no organomegaly, soft        non-tender, non-distended, no guarding, no rebound                 tenderness   Genitalia:    Deferred   Extremities:   Left knee with swelling and erythema. Distal right knee with mild erythema (appears more like dry irritated skin)   Pulses:   Pulses palpable and equal bilaterally   Skin:   No bleeding, bruising or rash   Neurologic:   Cranial nerves 2 - 12 grossly intact, sensation intact      I reviewed the patient's new clinical results.       Results from last 7 days   Lab Units 02/10/19  1928   WBC 10*3/mm3 10.76   HEMOGLOBIN g/dL 11.5   HEMATOCRIT % 35.7   PLATELETS 10*3/mm3 278     Results from last 7 days   Lab Units 02/10/19  1928   SODIUM mmol/L 136   POTASSIUM mmol/L 3.7   CHLORIDE mmol/L 95*   CO2 mmol/L 31.0   BUN mg/dL 9   CREATININE mg/dL 0.73   CALCIUM mg/dL 9.1   BILIRUBIN mg/dL 1.0   ALK PHOS U/L 162*   ALT (SGPT) U/L 40    AST (SGOT) U/L 29   GLUCOSE mg/dL 132*     Lab Results   Component Value Date    HGBA1C 6.50 (H) 02/10/2019       Assessment and Plan:     Infected prosthetic knee joint (CMS/[facility])    HO MSSA (methicillin susceptible Staphylococcus aureus) septicemia (CMS/[facility])    Alcoholism /alcohol abuse (CMS/[facility])    Diabetes mellitus (CMS/[facility])    Diarrhea      Plan  Admit for further management of possible prosthetic infection. Increased inflammatory markers. Consult for [facility] pending.    Likely to require surgical intervention of repeat washout vs 2 stage revision due to lengthy duration of infectious issues.    Ortho rec per Dr. Cody: ((Probable infection left total knee, less likely right.  Will obtain X-rays, blood cultures, repeat labs.  Patient is stable and I do not think she needs urgent I&D.  She will likely need hardware removal and 2 stage procedure which requires planning.  Dr Moffett will take over care tomorrow.  Dr Francis will see tomorrow.))    1. PT consulted  2. Pain control-prns   3. IS-encourage  [facility]  5. Cdiff test, BC  6. Resume home medications as appropriate  7. Monitor labs    Continue oral abx for now. If fever, IV abx orders pending. [facility] tomorrow.    DM  - hgb A1c on 2/10/19 6.5  - Accuchecks ACHS with low dose SSI    ETOH  - CIWA protocol  - vitamin replacement    Diarrhea  - check for Cdiff      Kimberlee Callejas, BASILIO  02/10/19  9:30 PM

## 2019-02-11 ENCOUNTER — ANESTHESIA EVENT (OUTPATIENT)
Dept: PERIOP | Facility: HOSPITAL | Age: 54
End: 2019-02-11

## 2019-02-11 LAB
ANION GAP SERPL CALCULATED.3IONS-SCNC: 9 MMOL/L (ref 3–11)
APPEARANCE FLD: ABNORMAL
BUN BLD-MCNC: 10 MG/DL (ref 9–23)
BUN/CREAT SERPL: 13.3 (ref 7–25)
C DIFF TOX GENS STL QL NAA+PROBE: DETECTED
CALCIUM SPEC-SCNC: 9.5 MG/DL (ref 8.7–10.4)
CHLORIDE SERPL-SCNC: 94 MMOL/L (ref 99–109)
CK SERPL-CCNC: 17 U/L (ref 26–174)
CO2 SERPL-SCNC: 31 MMOL/L (ref 20–31)
COLOR FLD: ABNORMAL
CREAT BLD-MCNC: 0.75 MG/DL (ref 0.6–1.3)
CRP SERPL-MCNC: 27.72 MG/DL (ref 0–1)
DEPRECATED RDW RBC AUTO: 45.4 FL (ref 37–54)
ERYTHROCYTE [DISTWIDTH] IN BLOOD BY AUTOMATED COUNT: 15 % (ref 11.3–14.5)
ERYTHROCYTE [SEDIMENTATION RATE] IN BLOOD: 100 MM/HR (ref 0–30)
GFR SERPL CREATININE-BSD FRML MDRD: 81 ML/MIN/1.73
GLUCOSE BLD-MCNC: 165 MG/DL (ref 70–100)
GLUCOSE BLDC GLUCOMTR-MCNC: 188 MG/DL (ref 70–130)
GLUCOSE BLDC GLUCOMTR-MCNC: 198 MG/DL (ref 70–130)
GLUCOSE BLDC GLUCOMTR-MCNC: 217 MG/DL (ref 70–130)
GLUCOSE BLDC GLUCOMTR-MCNC: 228 MG/DL (ref 70–130)
HCT VFR BLD AUTO: 36.2 % (ref 34.5–44)
HGB BLD-MCNC: 11.3 G/DL (ref 11.5–15.5)
LYMPHOCYTES NFR FLD MANUAL: 5 %
MCH RBC QN AUTO: 25.7 PG (ref 27–31)
MCHC RBC AUTO-ENTMCNC: 31.2 G/DL (ref 32–36)
MCV RBC AUTO: 82.3 FL (ref 80–99)
MONOCYTES NFR FLD: 1 %
NEUTROPHILS NFR FLD MANUAL: 94 %
PLATELET # BLD AUTO: 262 10*3/MM3 (ref 150–450)
PMV BLD AUTO: 8.1 FL (ref 6–12)
POTASSIUM BLD-SCNC: 3.9 MMOL/L (ref 3.5–5.5)
RBC # BLD AUTO: 4.4 10*6/MM3 (ref 3.89–5.14)
RBC # FLD AUTO: ABNORMAL /MM3
SODIUM BLD-SCNC: 134 MMOL/L (ref 132–146)
WBC # FLD AUTO: ABNORMAL /MM3
WBC NRBC COR # BLD: 12.03 10*3/MM3 (ref 3.5–10.8)

## 2019-02-11 PROCEDURE — 93005 ELECTROCARDIOGRAM TRACING: CPT | Performed by: ANESTHESIOLOGY

## 2019-02-11 PROCEDURE — 82550 ASSAY OF CK (CPK): CPT | Performed by: INTERNAL MEDICINE

## 2019-02-11 PROCEDURE — 80048 BASIC METABOLIC PNL TOTAL CA: CPT | Performed by: NURSE PRACTITIONER

## 2019-02-11 PROCEDURE — 25010000003 CEFAZOLIN IN DEXTROSE 2-4 GM/100ML-% SOLUTION: Performed by: INTERNAL MEDICINE

## 2019-02-11 PROCEDURE — 86140 C-REACTIVE PROTEIN: CPT | Performed by: NURSE PRACTITIONER

## 2019-02-11 PROCEDURE — 63710000001 INSULIN LISPRO (HUMAN) PER 5 UNITS: Performed by: NURSE PRACTITIONER

## 2019-02-11 PROCEDURE — 99232 SBSQ HOSP IP/OBS MODERATE 35: CPT | Performed by: ORTHOPAEDIC SURGERY

## 2019-02-11 PROCEDURE — 85652 RBC SED RATE AUTOMATED: CPT | Performed by: ORTHOPAEDIC SURGERY

## 2019-02-11 PROCEDURE — 82962 GLUCOSE BLOOD TEST: CPT

## 2019-02-11 PROCEDURE — 85027 COMPLETE CBC AUTOMATED: CPT | Performed by: NURSE PRACTITIONER

## 2019-02-11 PROCEDURE — 25010000002 DAPTOMYCIN PER 1 MG: Performed by: NURSE PRACTITIONER

## 2019-02-11 PROCEDURE — 87493 C DIFF AMPLIFIED PROBE: CPT | Performed by: NURSE PRACTITIONER

## 2019-02-11 RX ORDER — FLUCONAZOLE 200 MG/1
200 TABLET ORAL EVERY 24 HOURS
Status: COMPLETED | OUTPATIENT
Start: 2019-02-11 | End: 2019-02-11

## 2019-02-11 RX ORDER — SODIUM CHLORIDE 0.9 % (FLUSH) 0.9 %
3-10 SYRINGE (ML) INJECTION AS NEEDED
Status: CANCELLED | OUTPATIENT
Start: 2019-02-11

## 2019-02-11 RX ORDER — CEFAZOLIN SODIUM 2 G/100ML
2 INJECTION, SOLUTION INTRAVENOUS EVERY 8 HOURS
Status: DISCONTINUED | OUTPATIENT
Start: 2019-02-11 | End: 2019-02-20 | Stop reason: HOSPADM

## 2019-02-11 RX ORDER — SODIUM CHLORIDE 0.9 % (FLUSH) 0.9 %
3 SYRINGE (ML) INJECTION EVERY 12 HOURS SCHEDULED
Status: CANCELLED | OUTPATIENT
Start: 2019-02-11

## 2019-02-11 RX ADMIN — CARVEDILOL 3.12 MG: 3.12 TABLET, FILM COATED ORAL at 08:41

## 2019-02-11 RX ADMIN — VANCOMYCIN HYDROCHLORIDE 250 MG: KIT at 17:44

## 2019-02-11 RX ADMIN — FOLIC ACID 1 MG: 1 TABLET ORAL at 08:41

## 2019-02-11 RX ADMIN — CEPHALEXIN 500 MG: 250 CAPSULE ORAL at 05:36

## 2019-02-11 RX ADMIN — CEFAZOLIN SODIUM 2 G: 2 INJECTION, SOLUTION INTRAVENOUS at 17:38

## 2019-02-11 RX ADMIN — SODIUM CHLORIDE, PRESERVATIVE FREE 3 ML: 5 INJECTION INTRAVENOUS at 08:44

## 2019-02-11 RX ADMIN — FLUCONAZOLE 200 MG: 200 TABLET ORAL at 15:19

## 2019-02-11 RX ADMIN — CARVEDILOL 3.12 MG: 3.12 TABLET, FILM COATED ORAL at 17:38

## 2019-02-11 RX ADMIN — PANTOPRAZOLE SODIUM 40 MG: 40 TABLET, DELAYED RELEASE ORAL at 05:35

## 2019-02-11 RX ADMIN — VENLAFAXINE HYDROCHLORIDE 300 MG: 75 CAPSULE, EXTENDED RELEASE ORAL at 08:41

## 2019-02-11 RX ADMIN — CEFAZOLIN SODIUM 2 G: 2 INJECTION, SOLUTION INTRAVENOUS at 12:05

## 2019-02-11 RX ADMIN — Medication 100 MG: at 08:41

## 2019-02-11 RX ADMIN — LORAZEPAM 1 MG: 1 TABLET ORAL at 16:39

## 2019-02-11 RX ADMIN — Medication 1 TABLET: at 08:41

## 2019-02-11 RX ADMIN — INSULIN LISPRO 2 UNITS: 100 INJECTION, SOLUTION INTRAVENOUS; SUBCUTANEOUS at 12:17

## 2019-02-11 RX ADMIN — Medication 1 CAPSULE: at 08:41

## 2019-02-11 RX ADMIN — INSULIN LISPRO 3 UNITS: 100 INJECTION, SOLUTION INTRAVENOUS; SUBCUTANEOUS at 21:42

## 2019-02-11 RX ADMIN — OXYCODONE AND ACETAMINOPHEN 1 TABLET: 5; 325 TABLET ORAL at 05:41

## 2019-02-11 RX ADMIN — DAPTOMYCIN 400 MG: 500 INJECTION, POWDER, LYOPHILIZED, FOR SOLUTION INTRAVENOUS at 12:24

## 2019-02-11 RX ADMIN — ACETAMINOPHEN 650 MG: 325 TABLET ORAL at 16:39

## 2019-02-11 RX ADMIN — INSULIN LISPRO 2 UNITS: 100 INJECTION, SOLUTION INTRAVENOUS; SUBCUTANEOUS at 08:41

## 2019-02-11 RX ADMIN — TRAZODONE HYDROCHLORIDE 50 MG: 50 TABLET ORAL at 21:04

## 2019-02-11 RX ADMIN — LORAZEPAM 1 MG: 1 TABLET ORAL at 12:06

## 2019-02-11 RX ADMIN — INSULIN LISPRO 3 UNITS: 100 INJECTION, SOLUTION INTRAVENOUS; SUBCUTANEOUS at 16:38

## 2019-02-11 NOTE — PROGRESS NOTES
Continued Stay Note  [facility]     Patient Name: Jyoti Luna  MRN: [id number]  Today's Date: 2/11/2019    Admit Date: 2/10/2019    Discharge Plan     Row Name 02/11/19 1544       Plan    Plan Comments  SW spoke with pt at bedside as pt as charted history of alcoholism. Pt reports that she has been able to cut down on her alcohol and not abuse it anymore. Pt reports that she is agreeable to accept the alcohol cessation resources although she does not feel as if she needs any at this time. SW provided alcohol cessation resources for pt. Pt had no further ss questions or concerns at this time.         Discharge Codes    No documentation.       Expected Discharge Date and Time     Expected Discharge Date Expected Discharge Time    Feb 14, 2019             JEF Arriaga

## 2019-02-11 NOTE — PLAN OF CARE
Problem: Skin Injury Risk (Adult)  Goal: Identify Related Risk Factors and Signs and Symptoms  Outcome: Ongoing (interventions implemented as appropriate)    Goal: Skin Health and Integrity  Outcome: Ongoing (interventions implemented as appropriate)      Problem: Fall Risk (Adult)  Goal: Identify Related Risk Factors and Signs and Symptoms  Outcome: Ongoing (interventions implemented as appropriate)    Goal: Absence of Fall  Outcome: Ongoing (interventions implemented as appropriate)      Problem: Pain, Acute (Adult)  Goal: Identify Related Risk Factors and Signs and Symptoms  Outcome: Ongoing (interventions implemented as appropriate)    Goal: Acceptable Pain Control/Comfort Level  Outcome: Ongoing (interventions implemented as appropriate)      Problem: Knee Arthroplasty (Total, Partial) (Adult)  Goal: Signs and Symptoms of Listed Potential Problems Will be Absent, Minimized or Managed (Knee Arthroplasty)  Outcome: Ongoing (interventions implemented as appropriate)      Problem: Patient Care Overview  Goal: Plan of Care Review  Outcome: Ongoing (interventions implemented as appropriate)      Problem: Patient Care Overview  Goal: Plan of Care Review  Outcome: Ongoing (interventions implemented as appropriate)   02/11/19 0604   Coping/Psychosocial   Plan of Care Reviewed With patient   OTHER   Outcome Summary Patient was able to get up with assistance to the bedside commode. Patient last CIWA score was a 4. Patient consistently complained of pain throughout the night. Pain was managed with ice and medication.   Plan of Care Review   Progress improving      02/11/19 0604   Coping/Psychosocial   Plan of Care Reviewed With patient   OTHER   Outcome Summary Patient was able to get up with assistance to the bedside commode. Patient last CIWA score was a 4. Patient consistently complained of pain throughout the night. Pain was managed with ice and PO pain medication.   Plan of Care Review   Progress improving     Goal:  Individualization and Mutuality  Outcome: Ongoing (interventions implemented as appropriate)

## 2019-02-11 NOTE — PROGRESS NOTES
" progress note      Jyoti Luna  [id number]  1965     LOS: 1 day     Attending: Lorri Siddiqui MD    Primary Care Provider: Provider, No Known      Chief Complaint/Reason for visit:  No chief complaint on file.      Subjective   Feels a bit better this am.   Still with pain bilateral knees. Left more than right. Had bilateral arthrocentesis last night.     Review of Systems  General: No Fever/chills  Respiratory: :Patient denies chest pain, or dyspnea . Mild cough.  Cardiovascular: Patient denies chest pain, palpitations, dyspnea, orthopnea or swelling   Skin:No Rash or pruritis,   GI: No Nausea,vomiting. + diarrhea.  : No Dysuria, hematuria    Objective     Vital Signs  Visit Vitals  /73 (BP Location: Left arm, Patient Position: Lying)   Pulse 97   Temp 97.9 °F (36.6 °C) (Oral)   Resp 16   Ht 157.5 cm (62\")   SpO2 94%   BMI 28.55 kg/m²     Temp (24hrs), Av.3 °F (36.8 °C), Min:97.9 °F (36.6 °C), Max:99.5 °F (37.5 °C)      Intake/Output:    Intake/Output Summary (Last 24 hours) at 2019 0928  Last data filed at 2019 0505  Gross per 24 hour   Intake --   Output 200 ml   Net -200 ml          Physical Exam:     General Appearance:    Alert, cooperative, in no acute distress   Head:    Normocephalic, without obvious abnormality, atraumatic    Lungs:     Normal effort, symmetric chest rise, no crepitus, clear to      auscultation bilaterally               Heart:    Regular rhythm and normal rate, normal S1 and S2    Abdomen:     Normal bowel sounds, no masses, no organomegaly, soft        non-tender, non-distended, no guarding, no rebound                tenderness   Extremities:   Swelling and tenderness bilateral knees. Mild erythema left. Limited ROM due to pain.   Pulses:   Pulses palpable and equal bilaterally   Skin:   No bleeding, bruising or rash          Results Review:     I reviewed the patient's new clinical results.   Results from last 7 days   Lab Units 19  0504 " 02/10/19  1928   WBC 10*3/mm3 12.03* 10.76   HEMOGLOBIN g/dL 11.3* 11.5   HEMATOCRIT % 36.2 35.7   PLATELETS 10*3/mm3 262 278     Results from last 7 days   Lab Units 02/11/19  0504 02/10/19  1928   SODIUM mmol/L 134 136   POTASSIUM mmol/L 3.9 3.7   CHLORIDE mmol/L 94* 95*   CO2 mmol/L 31.0 31.0   BUN mg/dL 10 9   CREATININE mg/dL 0.75 0.73   CALCIUM mg/dL 9.5 9.1   BILIRUBIN mg/dL  --  1.0   ALK PHOS U/L  --  162*   ALT (SGPT) U/L  --  40   AST (SGOT) U/L  --  29   GLUCOSE mg/dL 165* 132*     Results for NABIL MATSON (MRN [id number]) as of 2/11/2019 09:31   Ref. Range 2/11/2019 05:05   C-Reactive Protein Latest Ref Range: 0.00 - 1.00 mg/dL 27.72 (H)       Right :  Results for NABIL MATSON (MRN [id number]) as of 2/11/2019 09:31   Ref. Range 2/10/2019 20:27   Color, Fluid Unknown Red   Appearance, Fluid Latest Ref Range: Clear  Bloody (A)   WBC, Fluid Latest Units: /mm3 52,860   RBC, Fluid Latest Units: /mm3 575,000   Crystals, Fluid Unknown No crystals seen       Left:   Results for NABIL MATSON (MRN [id number]) as of 2/11/2019 09:31   Ref. Range 2/10/2019 20:27   Color, Fluid Unknown Yellow   Appearance, Fluid Latest Ref Range: Clear  Turbid (A)   WBC, Fluid Latest Units: /mm3 232,950   RBC, Fluid Latest Units: /mm3 21,000   Neutrophils, Fluid Latest Units: % 94   Lymphocytes, Fluid Latest Units: % 3   Monocytes, Fluid Latest Units: % 3   Crystals, Fluid Unknown No crystals seen       Results for NABIL MATSON (MRN [id number]) as of 2/11/2019 09:31   Ref. Range 2/11/2019 05:04   Sed Rate Latest Ref Range: 0 - 30 mm/hr 100 (H)     I reviewed the patient's new imaging including images and reports.    All medications reviewed.     carvedilol 3.125 mg Oral BID With Meals   cephalexin 500 mg Oral Q8H   folic acid 1 mg Oral Daily   insulin lispro 0-7 Units Subcutaneous 4x Daily With Meals & Nightly   lactobacillus acidophilus 1 capsule Oral Daily   multivitamin 1 tablet Oral Daily   pantoprazole 40 mg Oral Q AM    sodium chloride 3 mL Intravenous Q12H   thiamine 100 mg Oral Daily   traZODone 50 mg Oral Nightly   venlafaxine  mg Oral Daily With Breakfast       acetaminophen 650 mg Q4H PRN   dextrose 25 g Q15 Min PRN   dextrose 15 g Q15 Min PRN   diphenhydrAMINE 50 mg Q6H PRN   glucagon (human recombinant) 1 mg PRN   labetalol 10 mg Q4H PRN   LORazepam 1 mg Q2H PRN   Or     LORazepam 1 mg Q2H PRN   Or     LORazepam 2 mg Q1H PRN   Or     LORazepam 2 mg Q1H PRN   Or     LORazepam 2 mg Q15 Min PRN   Or     LORazepam 2 mg Q15 Min PRN   ondansetron 4 mg Q6H PRN   Or     ondansetron 4 mg Q6H PRN   ondansetron 4 mg Q6H PRN   oxyCODONE-acetaminophen 1 tablet Q4H PRN   sodium chloride 500 mL TID PRN   sodium chloride 3-10 mL PRN       Assessment/Plan       Infected prosthetic knee joint (CMS/HCC)    HO MSSA (methicillin susceptible Staphylococcus aureus) septicemia (CMS/[facility])    Alcoholism /alcohol abuse (CMS/HCC)    Diabetes mellitus (CMS/HCC)    Diarrhea      Plan  Admitted.   Follow fluid studies, cultures from knee arthrocentesis.  Follow blood cultures.     ID to see.   Start antibiotics.   Decision about surgery and type after fluid studies and ID eval.   Watch for ETOH WD.   IS/ defer DVT proph till after surgery decision, except mech.    Discussed with pt.     Lorri Siddiqui MD  02/11/19  9:28 AM

## 2019-02-11 NOTE — CONSULTS
Jyoti Luna  1965  [id number]    Date of Consult: 2/11/2019    Date of Admission: 2/10/2019      Requesting Provider: Lorri Siddiqui MD  Evaluating Physician: Brenden Francis MD    CC: Bilateral knee pain    Reason for Consultation: History of bilateral knee methicillin sensitive staph aureus prosthetic joint infections    History of present illness:  Patient is a 53 y.o.  Yr old female with history of alcoholism and depression chronic eczema who had severe infection over a year ago with methicillin sensitive staph aureus bacteremia with bilateral prosthetic knees involve underwent poly-exchange in hopes of keeping patient mobile.  We did long-term IV antibiotics for several months with some improvement then long-term treatment oral dose for the past year.  We have been able to control the knee pain and swelling but does seem to be worsening over the past several visits ongoing effusion of both knees with concern for chronic prostate joint infection which is now acutely worsened requiring admission with arthrocentesis consistent with acute on chronic infection.  No fevers chills but increased bilateral knee pain plans for two-stage revision of both knees.  Infectious these consultation obtained for further evaluation.    Past Medical History:   Diagnosis Date   • Acid reflux    • Alcoholism (CMS/HCC)    • Anemia 3/20/2018   • Arthritis    • Asthma    • Depression 3/20/2018   • Diabetes mellitus (CMS/HCC)     POSS R/T INFECTION PER PT   • Eczema    • History of transfusion    • Hypertension 3/20/2018       Past Surgical History:   Procedure Laterality Date   • JOINT REPLACEMENT      bilateral knees   • KNEE POLY INSERT EXCHANGE Bilateral 3/23/2018    Procedure: KNEE POLY INSERT EXCHANGE WITH IRRIGATION AND DEBRIDMENT;  Surgeon: Ti Moffett MD;  Location: [facility];  Service: Orthopedics   • LAPAROSCOPIC CHOLECYSTECTOMY         Pediatric History   Patient Guardian Status   • Not on file     Other Topics  Concern   • Not on file   Social History Narrative   • Not on file   No tobacco use chronic alcohol use no illicit drug use    Family history: Hypertension and coronary disease in parents  Allergies:    No Known Allergies    Medication:  Current Facility-Administered Medications   Medication Dose Route Frequency Provider Last Rate Last Dose   • acetaminophen (TYLENOL) tablet 650 mg  650 mg Oral Q4H PRN Kimberlee Callejas APRN       • carvedilol (COREG) tablet 3.125 mg  3.125 mg Oral BID With Meals Kimberlee Callejas APRN   3.125 mg at 02/11/19 0841   • ceFAZolin in dextrose (ANCEF) IVPB solution 2 g  2 g Intravenous Q8H Brenden Francis MD   2 g at 02/11/19 1205   • DAPTOmycin (CUBICIN) 400 mg in sodium chloride 0.9 % 50 mL IVPB  6 mg/kg (Adjusted) Intravenous Q24H Kimberlee Callejas APRN 100 mL/hr at 02/11/19 1224 400 mg at 02/11/19 1224   • dextrose (D50W) 25 g/ 50mL Intravenous Solution 25 g  25 g Intravenous Q15 Min PRN Kimberlee Callejas APRN       • dextrose (GLUTOSE) oral gel 15 g  15 g Oral Q15 Min PRN Kimberlee Callejas APRN       • diphenhydrAMINE (BENADRYL) capsule 50 mg  50 mg Oral Q6H PRN Kimberlee Callejas APRN       • fluconazole (DIFLUCAN) tablet 200 mg  200 mg Oral Q24H Kimberlee Callejas APRN       • folic acid (FOLVITE) tablet 1 mg  1 mg Oral Daily Kimberlee Callejas APRN   1 mg at 02/11/19 0841   • glucagon (human recombinant) (GLUCAGEN DIAGNOSTIC) injection 1 mg  1 mg Subcutaneous PRN Kimberlee Callejas APRN       • insulin lispro (humaLOG) injection 0-7 Units  0-7 Units Subcutaneous 4x Daily With Meals & Nightly Kimberlee Callejas APRN   2 Units at 02/11/19 1217   • labetalol (NORMODYNE,TRANDATE) injection 10 mg  10 mg Intravenous Q4H PRN Kimberlee Callejas, BASILIO       • lactobacillus acidophilus (RISAQUAD) capsule 1 capsule  1 capsule Oral Daily Kimberlee Callejas APRN   1 capsule at 02/11/19 0841   • LORazepam (ATIVAN) tablet 1 mg  1 mg Oral Q2H PRN Kimberlee Callejas APRN   1 mg at 02/11/19 1206    Or   •  LORazepam (ATIVAN) injection 1 mg  1 mg Intravenous Q2H PRN Kimberlee Callejas APRN        Or   • LORazepam (ATIVAN) tablet 2 mg  2 mg Oral Q1H PRN Kimberlee Callejas APRN        Or   • LORazepam (ATIVAN) injection 2 mg  2 mg Intravenous Q1H PRN Kimberlee Callejas APRN        Or   • LORazepam (ATIVAN) injection 2 mg  2 mg Intravenous Q15 Min PRN Kimberlee Callejas APRN        Or   • LORazepam (ATIVAN) injection 2 mg  2 mg Intramuscular Q15 Min PRN Kimberlee Callejas APRN       • multivitamin (THERAGRAN) tablet 1 tablet  1 tablet Oral Daily Kimberlee Callejas APRN   1 tablet at 02/11/19 0841   • ondansetron (ZOFRAN) tablet 4 mg  4 mg Oral Q6H PRN Kimberlee Callejas APRN        Or   • ondansetron (ZOFRAN) injection 4 mg  4 mg Intravenous Q6H PRN Kimberlee Callejas APRN       • ondansetron (ZOFRAN) injection 4 mg  4 mg Intravenous Q6H PRN Kimberlee Callejas APRN       • oxyCODONE-acetaminophen (PERCOCET) 5-325 MG per tablet 1 tablet  1 tablet Oral Q4H PRN Lorri Siddiqui MD   1 tablet at 02/11/19 0541   • pantoprazole (PROTONIX) EC tablet 40 mg  40 mg Oral Q AM Kimberlee Callejas APRN   40 mg at 02/11/19 0535   • sodium chloride 0.9 % bolus 500 mL  500 mL Intravenous TID PRN Kimberlee Callejas APRN       • sodium chloride 0.9 % flush 3 mL  3 mL Intravenous Q12H Kimberlee Callejas APRN   3 mL at 02/11/19 0844   • sodium chloride 0.9 % flush 3-10 mL  3-10 mL Intravenous PRN Kimberlee Callejas APRN       • thiamine (VITAMIN B-1) tablet 100 mg  100 mg Oral Daily Kimberlee Callejas APRN   100 mg at 02/11/19 0841   • traZODone (DESYREL) tablet 50 mg  50 mg Oral Nightly Kimberlee Callejas APRN   50 mg at 02/10/19 2205   • venlafaxine XR (EFFEXOR-XR) 24 hr capsule 300 mg  300 mg Oral Daily With Breakfast Kimberlee Callejas APRN   300 mg at 02/11/19 0829       Antibiotics:  Keflex at home now cefazolin      Review of Systems  Full 12 point review of systems reviewed and negative except for fatigue malaise chronic rash and dry skin,  "bilateral knee pain swelling erythema with effusions low-grade fevers decreased appetite    Physical Exam:   Vital Signs   /80 (BP Location: Left arm, Patient Position: Lying)   Pulse 97   Temp 99.7 °F (37.6 °C) (Oral)   Resp 16   Ht 157.5 cm (62\")   Wt 82.6 kg (182 lb)   SpO2 94%   BMI 33.29 kg/m²   Temp (24hrs), Av.5 °F (36.9 °C), Min:97.9 °F (36.6 °C), Max:99.7 °F (37.6 °C)      GENERAL: Awake and alert, in no acute distress.   HEENT: Normocephalic, atraumatic.  PERRL. EOMI. No conjunctival injection. No icterus. Oropharynx clear without evidence of thrush or exudate. No evidence of peridontal disease.    NECK: Supple without nuchal rigidity  LYMPH: No cervical, axillary or inguinal lymphadenopathy. No neck masses  HEART: RRR; No murmur, rubs, gallops.   LUNGS: Clear to auscultation bilaterally without wheezing, rales, rhonchi. Normal respiratory effort.  ABDOMEN: Soft, nontender, nondistended. Positive bowel sounds. No rebound or guarding.   EXT:  Trace lower extremity edema : Normal appearing genitalia without Taylor catheter.  MSK: Bilateral knee warmth with effusions decreased range of motion  SKIN: Warm and dry without cutaneous eruptions.    NEURO: Oriented to PPT. No focal deficits.   PSYCHIATRIC: Anxious mood. Cooperative with PE    Laboratory Data    Results from last 7 days   Lab Units 19  0504 02/10/19  1928   WBC 10*3/mm3 12.03* 10.76   HEMOGLOBIN g/dL 11.3* 11.5   HEMATOCRIT % 36.2 35.7   PLATELETS 10*3/mm3 262 278     Results from last 7 days   Lab Units 19  0504   SODIUM mmol/L 134   POTASSIUM mmol/L 3.9   CHLORIDE mmol/L 94*   CO2 mmol/L 31.0   BUN mg/dL 10   CREATININE mg/dL 0.75   GLUCOSE mg/dL 165*   CALCIUM mg/dL 9.5     Results from last 7 days   Lab Units 02/10/19  1928   ALK PHOS U/L 162*   BILIRUBIN mg/dL 1.0   ALT (SGPT) U/L 40   AST (SGOT) U/L 29     Results from last 7 days   Lab Units 19  0504   SED RATE mm/hr 100*     Results from last 7 days   Lab " Units 02/11/19  0505   CRP mg/dL 27.72*       Estimated Creatinine Clearance: 85.4 mL/min (by C-G formula based on SCr of 0.75 mg/dL).      Microbiology:  Fluid and blood cultures pending      Radiology:  Imaging Results (last 24 hours)     Procedure Component Value Units Date/Time    XR Knee 1 or 2 View Bilateral [[id number]] Collected:  02/11/19 0953     Updated:  02/11/19 0953    Narrative:       EXAMINATION: XR KNEE 1 OR 2 VW BILATERAL-      INDICATION: Infected knee replacements.      COMPARISON: None.     FINDINGS: Two views of the right knee and two views of the left knee  reveal extensive soft tissue swelling seen of the knees bilaterally with  large bilateral joint effusions. The alignment of the prosthesis is  satisfactory. Findings are concerning for infection in the joints and  hardware. There is no regularity identified of the patella on the left.  Osteomyelitis of the patella on the left cannot be excluded. The right  patella remains intact.       Impression:       Soft tissue swelling with large joint effusions bilaterally  demonstrating infection of the joint space with irregularity seen of the  patella on the left concerning for osteomyelitis. No significant lucency  seen surrounding the hardware.     D:  02/11/2019  E:  02/11/2019               PROBLEM LIST:   Bilateral prosthetic joint infections  Prior history of methicillin sensitive staph aureus bilateral knee infections and bacteremia  leukocytosis  Chronically elevated sedimentation rate CRP  Alcoholism  Severe eczema  Bilateral knee pain and difficulty with ambulation  Low-grade fevers  C diff colitis with acute diarrhea      ASSESSMENT:  53-year-old female with severe eczema, history of alcoholism, with previous MSSA bacteremia with bilateral knee prostate joint infection attempted to be treated with single exchange long-term IV followed by oral antibiotics.  Unfortunately even with long-term treatment oral antibiotics she's had worsening of  her bilateral knee pain swelling erythema and effusions appears to be consistent with acute on chronic infection and suspect ongoing MSSA infection agree with aggressive treatment with two-stage revision with bilateral knee X plans with spacers in place long-term PICC line and IV antibiotics to be guided by cultures for now we'll use cefazolin 2 g IV every 8 hrs.  C diff + today with active diarrhea.    PLAN:  Cefazolin 2 g IV every 8 hrs  Follow-up blood cultures and knee fluid culture  Follow-up surgical plans  Will need PICC line and long-term IV antibiotics and will need placement as mobility will be impaired and needs long-term antibiotics  Po rishi Francis MD  2/11/2019

## 2019-02-11 NOTE — PROGRESS NOTES
"      [facility] Orthopaedic Surgery Progress Note    Subjective      LOS: 1 day   Patient Care Team:  Provider, No Known as PCP - General    Chief complaint is bilateral knee pain       Interval History:   Both knees hurt.  Left worse than right.  Both knees aspirated last night.    Objective      Vital Signs  Temp (24hrs), Av.5 °F (36.9 °C), Min:97.9 °F (36.6 °C), Max:99.7 °F (37.6 °C)      /80 (BP Location: Left arm, Patient Position: Lying)   Pulse 97   Temp 99.7 °F (37.6 °C) (Oral)   Resp 16   Ht 157.5 cm (62\")   Wt 82.6 kg (182 lb)   SpO2 94%   BMI 33.29 kg/m²     Examination:   Knee exams have unchanged.  Left knee with erythema and increased swelling compared to the right.  Both knees are tender with range of motion.    Labs:  Results from last 7 days   Lab Units 19  0504   WBC 10*3/mm3 12.03*   RBC 10*6/mm3 4.40   HEMOGLOBIN g/dL 11.3*   HEMATOCRIT % 36.2   PLATELETS 10*3/mm3 262       Radiology:  Imaging Results (last 24 hours)     Procedure Component Value Units Date/Time    XR Knee 1 or 2 View Bilateral [[id number]] Collected:  19     Updated:  19    Narrative:       EXAMINATION: XR KNEE 1 OR 2 VW BILATERAL-      INDICATION: Infected knee replacements.      COMPARISON: None.     FINDINGS: Two views of the right knee and two views of the left knee  reveal extensive soft tissue swelling seen of the knees bilaterally with  large bilateral joint effusions. The alignment of the prosthesis is  satisfactory. Findings are concerning for infection in the joints and  hardware. There is no regularity identified of the patella on the left.  Osteomyelitis of the patella on the left cannot be excluded. The right  patella remains intact.       Impression:       Soft tissue swelling with large joint effusions bilaterally  demonstrating infection of the joint space with irregularity seen of the  patella on the left concerning for osteomyelitis. No significant lucency  seen " surrounding the hardware.     D:  02/11/2019  E:  02/11/2019             The left knee aspiration had white blood cells of 232,000.    The right knee aspiration had white blood cell count of 52,000       Results Review:     I reviewed the patient's new clinical results.    Assessment and Plan     Assessment      Infected prosthetic knee joint (CMS/HCC)    HO MSSA (methicillin susceptible Staphylococcus aureus) septicemia (CMS/[facility])    Alcoholism /alcohol abuse (CMS/[facility])    Diabetes mellitus (CMS/[facility])    Diarrhea      Plan for disposition: Based upon knee aspiration knees appear to be infected.  The plan will be prosthesis removal of bilateral knee components tomorrow with placement of antibiotic cement and articulating spacers.Treatment options and alternatives were discussed with patient.  Surgical versus non surgical treatment options were discussed as well.    We reviewed the nature of the planned procedure including the risks, benefits and potential complications.  Understanding was expressed and the decision was made to proceed with surgery.      Ti Moffett MD  02/11/19  2:18 PM

## 2019-02-11 NOTE — CONSULTS
Orthopaedic Consult Note    Patient Care Team:  Provider, No Known as PCP - General    Chief complaint  Bilateral knee pain    Subjective .     History of present illness:   53 year old woman with bilateral TKR.  She is well known to me.   She is on chronic antibiotic suppression.  In March of 2018 she was admitted with sepsis (MSSA) and underwent bilateral knee I&D with poly exchange 3/23/18.  Dr Brenden Francis from ID has followed her.  The original surgical procedures were 3 years (right knee) and 2 years (left knee) prior to infection.  She reports a 2 day history of increasing bilateral knee pain and inability to walk.  She reports increased swelling in both knees, left more than right.  She reports she could not get up off the couch and called an ambulance to take her to the ER. She was seen at [facility]. No recent injury, no fever, no chills.  She reports she has been taking the Keflex 500mg Q 8 hours.   she was recently changed to Keflex from doxycycline about a month ago.  She reports skin color changes: on the right leg just inferior and lateral to the knee incision and on the left leg over the inferior incision.  No drainage.  Temp was 37 in [place] ER, WBC 11.7, ESR 74, CRP 19.9   She claims her right knee has been hurting for 2 months with no significant increase in pain.  The left knee had acute two-day history of severe pain causing the inability to walk.  She reports she has a history of diabetes, but is off meds now.  Reports her most recent HGA1c was 6.9.  No smoking.  She has a history of alcohol abuse. She reports she is drinking and has symptoms when stops.         Review of Systems  Pertinent items are noted in HPI all other systems are reviewed and are negative    History  No family history on file.  Social History     Socioeconomic History   • Marital status: Single     Spouse name: Not on file   • Number of children: Not on file   • Years of education: Not on file   • Highest  education level: Not on file   Social Needs   • Financial resource strain: Not on file   • Food insecurity - worry: Not on file   • Food insecurity - inability: Not on file   • Transportation needs - medical: Not on file   • Transportation needs - non-medical: Not on file   Occupational History   • Not on file   Tobacco Use   • Smoking status: Never Smoker   • Smokeless tobacco: Never Used   Substance and Sexual Activity   • Alcohol use: Yes     Comment: PT STATES SHE HAS NOT DRANK IN APPROX 3 MONTHS   • Drug use: No   • Sexual activity: Defer   Other Topics Concern   • Not on file   Social History Narrative   • Not on file     Past Surgical History:   Procedure Laterality Date   • JOINT REPLACEMENT      bilateral knees   • KNEE POLY INSERT EXCHANGE Bilateral 3/23/2018    Procedure: KNEE POLY INSERT EXCHANGE WITH IRRIGATION AND DEBRIDMENT;  Surgeon: Ti Moffett MD;  Location: [facility];  Service: Orthopedics   • LAPAROSCOPIC CHOLECYSTECTOMY       Past Medical History:   Diagnosis Date   • Acid reflux    • Alcoholism (CMS/HCC)    • Anemia 3/20/2018   • Arthritis    • Asthma    • Depression 3/20/2018   • Diabetes mellitus (CMS/HCC)     POSS R/T INFECTION PER PT   • Eczema    • History of transfusion    • Hypertension 3/20/2018     No Known Allergies    Current Facility-Administered Medications:   •  acetaminophen (TYLENOL) tablet 650 mg, 650 mg, Oral, Q4H PRN, Kimberlee Callejas, APRN  •  carvedilol (COREG) tablet 3.125 mg, 3.125 mg, Oral, BID With Meals, Kimberlee Callejas, APRN  •  cephalexin (KEFLEX) capsule 500 mg, 500 mg, Oral, Q8H, Kimberlee Callejas, APRN  •  dextrose (D50W) 25 g/ 50mL Intravenous Solution 25 g, 25 g, Intravenous, Q15 Min PRN, Kimberlee Callejas, APRN  •  dextrose (GLUTOSE) oral gel 15 g, 15 g, Oral, Q15 Min PRN, Kimberlee Callejas, APRN  •  diphenhydrAMINE (BENADRYL) capsule 50 mg, 50 mg, Oral, Q6H PRN, Kimberlee Callejas, APRN  •  [START ON 2/11/2019] folic acid (FOLVITE) tablet 1 mg, 1 mg, Oral,  Daily, Kimberlee Callejas APRN  •  glucagon (human recombinant) (GLUCAGEN DIAGNOSTIC) injection 1 mg, 1 mg, Subcutaneous, PRN, Kimberlee Callejas APRN  •  insulin lispro (humaLOG) injection 0-7 Units, 0-7 Units, Subcutaneous, 4x Daily With Meals & Nightly, Kimberlee Callejas APRN  •  labetalol (NORMODYNE,TRANDATE) injection 10 mg, 10 mg, Intravenous, Q4H PRN, Kimberlee Callejas APRN  •  lactobacillus acidophilus (RISAQUAD) capsule 1 capsule, 1 capsule, Oral, Daily, Kimberlee Callejas APRN  •  [START ON 2/11/2019] multivitamin (THERAGRAN) tablet 1 tablet, 1 tablet, Oral, Daily, Kimberlee Callejas APRN  •  ondansetron (ZOFRAN) tablet 4 mg, 4 mg, Oral, Q6H PRN **OR** ondansetron (ZOFRAN) injection 4 mg, 4 mg, Intravenous, Q6H PRN, Kimberlee Callejas APRN  •  ondansetron (ZOFRAN) injection 4 mg, 4 mg, Intravenous, Q6H PRN, Kimberlee Callejas APRN  •  oxyCODONE-acetaminophen (PERCOCET) 5-325 MG per tablet 1 tablet, 1 tablet, Oral, Q4H PRN, Lorri Siddiqui MD  •  [START ON 2/11/2019] pantoprazole (PROTONIX) EC tablet 40 mg, 40 mg, Oral, Q AM, Kimberlee Callejas APRN  •  sodium chloride 0.9 % bolus 500 mL, 500 mL, Intravenous, TID PRN, Kimberlee Callejas APRN  •  sodium chloride 0.9 % flush 3 mL, 3 mL, Intravenous, Q12H, Kimberlee Callejas APRN  •  sodium chloride 0.9 % flush 3-10 mL, 3-10 mL, Intravenous, PRN, Kimberlee Callejas APRN  •  [START ON 2/11/2019] thiamine (VITAMIN B-1) tablet 100 mg, 100 mg, Oral, Daily, Kimberlee Callejas APRN  •  traZODone (DESYREL) tablet 50 mg, 50 mg, Oral, Nightly, Kimberlee Callejas APRN  •  [START ON 2/11/2019] venlafaxine XR (EFFEXOR-XR) 24 hr capsule 300 mg, 300 mg, Oral, Daily With Breakfast, Kimberlee Callejas APRN    Objective     Vital Signs   Temp:  [97.9 °F (36.6 °C)-98.2 °F (36.8 °C)] 97.9 °F (36.6 °C)  Heart Rate:  [94-95] 94  Resp:  [16-18] 16  BP: (118-128)/(84-92) 118/84      Physical Exam:     GENERAL APPEARANCE: awake, alert & oriented x 3, in no acute distress and well developed,  well nourished  Vitals:    02/10/19 1712 02/10/19 1910   BP: 128/92 118/84   BP Location: Left arm Left arm   Patient Position: Lying Lying   Pulse: 95 94   Resp: 18 16   Temp: 98.2 °F (36.8 °C) 97.9 °F (36.6 °C)   TempSrc: Oral Oral   SpO2: 96% 96%     PSYCH: normal affect  HEAD: Normocephalic, without obvious abnormality, atraumatic  EYES: No icterus, corneas clear, PERRLA  CARDIOVASCULAR: pulses palpable and equal bilaterally. Capillary refill less than 2 seconds  LUNGS:  breathing nonlabored  ABDOMEN: Soft, nontender, nondistended  BACK: No C-T- L spine tenderness  EXTREMITIES: no clubbing, cyanosis  MUSCULOSKELETAL: Left knee effusion with erythema and tenderness.  Some pain with range of motion left knee.  Distally pulses and sensation intact.  No effusion to the right knee with minimal tenderness and some pain with range of motion.    Labs:  Lab Results (last 24 hours)     Procedure Component Value Units Date/Time    Lactic Acid, Plasma [[id number]] Collected:  02/10/19 1928    Specimen:  Blood Updated:  02/10/19 2022    Comprehensive Metabolic Panel [[id number]] Collected:  02/10/19 1928    Specimen:  Blood Updated:  02/10/19 2022    Procalcitonin [[id number]] Collected:  02/10/19 1928    Specimen:  Blood Updated:  02/10/19 2022    Hemoglobin A1c [[id number]] Collected:  02/10/19 1928    Specimen:  Blood Updated:  02/10/19 2022    CBC (No Diff) [[id number]] Collected:  02/10/19 1928    Specimen:  Blood Updated:  02/10/19 2021          Imaging:  I viewed the images obtained at bedside today.  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  I used sterile technique.  10 cc of purulent material was obtained from the left knee aspiration.  5 cc of blood without purulence was obtained from the right  knee.  I'm mainly concerned that the left knee has recurrence of her infection.  If this is the case she will most likely need surgical treatment.  Surgery will tentatively be planned for Tuesday pending aspiration results from today.    I discussed the patients findings and my recommendations with patient and nursing staff    Ti Moffett MD  02/10/19  8:23 PM

## 2019-02-11 NOTE — PROGRESS NOTES
Discharge Planning Assessment  [facility]     Patient Name: Jyoti Luna  MRN: [id number]  Today's Date: 2/11/2019    Admit Date: 2/10/2019    Discharge Needs Assessment     Row Name 02/11/19 1215       Living Environment    Lives With  alone    Current Living Arrangements  home/apartment/condo    Primary Care Provided by  self    Provides Primary Care For  no one, unable/limited ability to care for self    Family Caregiver if Needed  friend(s)    Quality of Family Relationships  unable to assess    Able to Return to Prior Arrangements  -- to be determined       Resource/Environmental Concerns    Resource/Environmental Concerns  home accessibility    Home Accessibility Concerns  stairs to enter home 2 flights of stairs to apt    Transportation Concerns  car, none       Transition Planning    Patient/Family Anticipates Transition to  home with help/services    Patient/Family Anticipated Services at Transition  home health care    Transportation Anticipated  family or friend will provide       Discharge Needs Assessment    Readmission Within the Last 30 Days  no previous admission in last 30 days    Concerns to be Addressed  basic needs;discharge planning;home safety;adjustment to diagnosis/illness    Equipment Currently Used at Home  walker, rolling    Anticipated Changes Related to Illness  inability to care for self    Equipment Needed After Discharge  none    Discharge Facility/Level of Care Needs  -- to be determined    Current Discharge Risk  dependent with mobility/activities of daily living;lives alone;physical impairment;chronically ill        Discharge Plan     Row Name 02/11/19 1218       Plan    Plan  Home vs SNF vs LTAC    Patient/Family in Agreement with Plan  yes    Plan Comments  I met with Ms Luna to initate d/c plan. She lives alone in a 2nd floor apt in [place], she has been on disability for approx 2 years due to her knee problems. Normally she is independent with ADL's, but prior to this  admission was having difficulty with any ambulation. We discussed d/c options. She really wants to return home( she is concerned about 19yrs old dog) and last year she spent time in LTAC and then [facility] after hospitalization 3/18 , states she can get neighbor to assist with dog.. We discussed the difficulty in managing at home on IV antibiotics.  She would have great difficutly in leaving apt for MD visits due to stairs.  Case mgt will follow progress and assist with  d/c plan.        Destination      No service coordination in this encounter.      Durable Medical Equipment      No service coordination in this encounter.      Dialysis/Infusion      No service coordination in this encounter.      Home Medical Care      No service coordination in this encounter.      Community Resources      No service coordination in this encounter.        Expected Discharge Date and Time     Expected Discharge Date Expected Discharge Time    Feb 14, 2019         Demographic Summary     Row Name 02/11/19 1211       General Information    Admission Type  inpatient    Arrived From  hospital [facility]    Referral Source  physician    Reason for Consult  discharge planning    Preferred Language  English    General Information Comments  PCP-Betty RICHMOND       Contact Information    Permission Granted to Share Info With      Contact Information Comments  Lachelle Urenauer ( friend) [phone]        Functional Status     Row Name 02/11/19 1214       Functional Status    Usual Activity Tolerance  moderate    Current Activity Tolerance  poor       Functional Status, IADL    Medications  independent    Meal Preparation  independent    Housekeeping  independent    Laundry  independent    Shopping  assistive person       Mental Status    General Appearance WDL  WDL       Mental Status Summary    Recent Changes in Mental Status/Cognitive Functioning  no changes       Employment/     Employment Status  disabled    Employment/ Comments  insurance- Medicare & Medicaid        Psychosocial    No documentation.       Abuse/Neglect    No documentation.       Legal    No documentation.       Substance Abuse    No documentation.       Patient Forms    No documentation.           Sonja C Kellerman, RN

## 2019-02-12 ENCOUNTER — APPOINTMENT (OUTPATIENT)
Dept: GENERAL RADIOLOGY | Facility: HOSPITAL | Age: 54
End: 2019-02-12

## 2019-02-12 ENCOUNTER — ANESTHESIA (OUTPATIENT)
Dept: PERIOP | Facility: HOSPITAL | Age: 54
End: 2019-02-12

## 2019-02-12 PROBLEM — D72.829 LEUKOCYTOSIS: Status: ACTIVE | Noted: 2019-02-12

## 2019-02-12 PROBLEM — A04.72 C. DIFFICILE DIARRHEA: Status: ACTIVE | Noted: 2019-02-12

## 2019-02-12 PROBLEM — D64.9 ANEMIA: Status: ACTIVE | Noted: 2019-02-12

## 2019-02-12 LAB
B-HCG UR QL: NEGATIVE
GLUCOSE BLDC GLUCOMTR-MCNC: 141 MG/DL (ref 70–130)
GLUCOSE BLDC GLUCOMTR-MCNC: 147 MG/DL (ref 70–130)
GLUCOSE BLDC GLUCOMTR-MCNC: 211 MG/DL (ref 70–130)
GLUCOSE BLDC GLUCOMTR-MCNC: 393 MG/DL (ref 70–130)
INTERNAL NEGATIVE CONTROL: NEGATIVE
INTERNAL POSITIVE CONTROL: POSITIVE
Lab: NORMAL

## 2019-02-12 PROCEDURE — 25010000002 PROPOFOL 10 MG/ML EMULSION: Performed by: NURSE ANESTHETIST, CERTIFIED REGISTERED

## 2019-02-12 PROCEDURE — C1776 JOINT DEVICE (IMPLANTABLE): HCPCS | Performed by: ORTHOPAEDIC SURGERY

## 2019-02-12 PROCEDURE — 25010000002 DEXAMETHASONE PER 1 MG: Performed by: NURSE ANESTHETIST, CERTIFIED REGISTERED

## 2019-02-12 PROCEDURE — 0SRD0EZ REPLACEMENT OF LEFT KNEE JOINT WITH ARTICULATING SPACER, OPEN APPROACH: ICD-10-PCS | Performed by: ORTHOPAEDIC SURGERY

## 2019-02-12 PROCEDURE — 0SRC0EZ REPLACEMENT OF RIGHT KNEE JOINT WITH ARTICULATING SPACER, OPEN APPROACH: ICD-10-PCS | Performed by: ORTHOPAEDIC SURGERY

## 2019-02-12 PROCEDURE — C1713 ANCHOR/SCREW BN/BN,TIS/BN: HCPCS | Performed by: ORTHOPAEDIC SURGERY

## 2019-02-12 PROCEDURE — 11981 INSERTION DRUG DLVR IMPLANT: CPT | Performed by: ORTHOPAEDIC SURGERY

## 2019-02-12 PROCEDURE — 87116 MYCOBACTERIA CULTURE: CPT | Performed by: ORTHOPAEDIC SURGERY

## 2019-02-12 PROCEDURE — 25010000002 ROPIVACINE HCL-NACL: Performed by: NURSE ANESTHETIST, CERTIFIED REGISTERED

## 2019-02-12 PROCEDURE — 25010000003 CEFAZOLIN IN DEXTROSE 2-4 GM/100ML-% SOLUTION: Performed by: INTERNAL MEDICINE

## 2019-02-12 PROCEDURE — L1830 KO IMMOB CANVAS LONG PRE OTS: HCPCS | Performed by: ORTHOPAEDIC SURGERY

## 2019-02-12 PROCEDURE — 25010000002 ONDANSETRON PER 1 MG: Performed by: NURSE ANESTHETIST, CERTIFIED REGISTERED

## 2019-02-12 PROCEDURE — 87015 SPECIMEN INFECT AGNT CONCNTJ: CPT | Performed by: ORTHOPAEDIC SURGERY

## 2019-02-12 PROCEDURE — 87070 CULTURE OTHR SPECIMN AEROBIC: CPT | Performed by: ORTHOPAEDIC SURGERY

## 2019-02-12 PROCEDURE — 0SPC0JZ REMOVAL OF SYNTHETIC SUBSTITUTE FROM RIGHT KNEE JOINT, OPEN APPROACH: ICD-10-PCS | Performed by: ORTHOPAEDIC SURGERY

## 2019-02-12 PROCEDURE — 73560 X-RAY EXAM OF KNEE 1 OR 2: CPT

## 2019-02-12 PROCEDURE — 87075 CULTR BACTERIA EXCEPT BLOOD: CPT | Performed by: ORTHOPAEDIC SURGERY

## 2019-02-12 PROCEDURE — 25010000002 DAPTOMYCIN PER 1 MG: Performed by: NURSE PRACTITIONER

## 2019-02-12 PROCEDURE — 87205 SMEAR GRAM STAIN: CPT | Performed by: ORTHOPAEDIC SURGERY

## 2019-02-12 PROCEDURE — 87176 TISSUE HOMOGENIZATION CULTR: CPT | Performed by: ORTHOPAEDIC SURGERY

## 2019-02-12 PROCEDURE — 25010000002 VANCOMYCIN 1 G RECONSTITUTED SOLUTION: Performed by: ORTHOPAEDIC SURGERY

## 2019-02-12 PROCEDURE — 87102 FUNGUS ISOLATION CULTURE: CPT | Performed by: ORTHOPAEDIC SURGERY

## 2019-02-12 PROCEDURE — 27487 REVISE/REPLACE KNEE JOINT: CPT | Performed by: ORTHOPAEDIC SURGERY

## 2019-02-12 PROCEDURE — 25010000002 NEOSTIGMINE 10 MG/10ML SOLUTION: Performed by: NURSE ANESTHETIST, CERTIFIED REGISTERED

## 2019-02-12 PROCEDURE — 25010000002 BUPRENORPHINE PER 0.1 MG: Performed by: ANESTHESIOLOGY

## 2019-02-12 PROCEDURE — 25010000002 FENTANYL CITRATE (PF) 100 MCG/2ML SOLUTION: Performed by: NURSE ANESTHETIST, CERTIFIED REGISTERED

## 2019-02-12 PROCEDURE — 87206 SMEAR FLUORESCENT/ACID STAI: CPT | Performed by: ORTHOPAEDIC SURGERY

## 2019-02-12 PROCEDURE — 0SPD0JZ REMOVAL OF SYNTHETIC SUBSTITUTE FROM LEFT KNEE JOINT, OPEN APPROACH: ICD-10-PCS | Performed by: ORTHOPAEDIC SURGERY

## 2019-02-12 PROCEDURE — 25010000002 DEXAMETHASONE SODIUM PHOSPHATE 10 MG/ML SOLUTION: Performed by: ANESTHESIOLOGY

## 2019-02-12 PROCEDURE — 25010000002 MORPHINE SULFATE (PF) 2 MG/ML SOLUTION: Performed by: ORTHOPAEDIC SURGERY

## 2019-02-12 PROCEDURE — 25010000002 ROPIVACINE HCL-NACL: Performed by: ORTHOPAEDIC SURGERY

## 2019-02-12 PROCEDURE — 81025 URINE PREGNANCY TEST: CPT | Performed by: ANESTHESIOLOGY

## 2019-02-12 PROCEDURE — 82962 GLUCOSE BLOOD TEST: CPT

## 2019-02-12 DEVICE — CMT BONE PALACOS R HI/VISC 1X40: Type: IMPLANTABLE DEVICE | Site: KNEE | Status: FUNCTIONAL

## 2019-02-12 DEVICE — CMT BONE PALACOS 120001: Type: IMPLANTABLE DEVICE | Site: KNEE | Status: FUNCTIONAL

## 2019-02-12 DEVICE — IMPLANTABLE DEVICE: Type: IMPLANTABLE DEVICE | Site: KNEE | Status: FUNCTIONAL

## 2019-02-12 RX ORDER — FAMOTIDINE 20 MG/1
20 TABLET, FILM COATED ORAL ONCE
Status: COMPLETED | OUTPATIENT
Start: 2019-02-12 | End: 2019-02-12

## 2019-02-12 RX ORDER — ROPIVACAINE IN 0.9% SOD CHL/PF 0.2% 545ML
8 ELASTOMERIC PUMP, HI VARIABLE RATE INJECTION CONTINUOUS
Status: DISCONTINUED | OUTPATIENT
Start: 2019-02-12 | End: 2019-02-12

## 2019-02-12 RX ORDER — BUPIVACAINE HYDROCHLORIDE 2.5 MG/ML
INJECTION, SOLUTION EPIDURAL; INFILTRATION; INTRACAUDAL
Status: COMPLETED | OUTPATIENT
Start: 2019-02-12 | End: 2019-02-12

## 2019-02-12 RX ORDER — DEXAMETHASONE SODIUM PHOSPHATE 10 MG/ML
INJECTION INTRAMUSCULAR; INTRAVENOUS AS NEEDED
Status: DISCONTINUED | OUTPATIENT
Start: 2019-02-12 | End: 2019-02-12 | Stop reason: SURG

## 2019-02-12 RX ORDER — SODIUM CHLORIDE, SODIUM LACTATE, POTASSIUM CHLORIDE, CALCIUM CHLORIDE 600; 310; 30; 20 MG/100ML; MG/100ML; MG/100ML; MG/100ML
9 INJECTION, SOLUTION INTRAVENOUS CONTINUOUS
Status: DISCONTINUED | OUTPATIENT
Start: 2019-02-12 | End: 2019-02-12 | Stop reason: SDUPTHER

## 2019-02-12 RX ORDER — FENTANYL CITRATE 50 UG/ML
INJECTION, SOLUTION INTRAMUSCULAR; INTRAVENOUS AS NEEDED
Status: DISCONTINUED | OUTPATIENT
Start: 2019-02-12 | End: 2019-02-12 | Stop reason: SURG

## 2019-02-12 RX ORDER — SODIUM CHLORIDE 9 MG/ML
120 INJECTION, SOLUTION INTRAVENOUS CONTINUOUS
Status: DISCONTINUED | OUTPATIENT
Start: 2019-02-12 | End: 2019-02-15

## 2019-02-12 RX ORDER — PROMETHAZINE HYDROCHLORIDE 25 MG/ML
6.25 INJECTION, SOLUTION INTRAMUSCULAR; INTRAVENOUS ONCE AS NEEDED
Status: DISCONTINUED | OUTPATIENT
Start: 2019-02-12 | End: 2019-02-12 | Stop reason: HOSPADM

## 2019-02-12 RX ORDER — TOBRAMYCIN 1.2 G/30ML
INJECTION, POWDER, LYOPHILIZED, FOR SOLUTION INTRAVENOUS AS NEEDED
Status: DISCONTINUED | OUTPATIENT
Start: 2019-02-12 | End: 2019-02-12 | Stop reason: HOSPADM

## 2019-02-12 RX ORDER — ACETAMINOPHEN 650 MG/1
650 SUPPOSITORY RECTAL EVERY 4 HOURS PRN
Status: DISCONTINUED | OUTPATIENT
Start: 2019-02-12 | End: 2019-02-20 | Stop reason: HOSPADM

## 2019-02-12 RX ORDER — MAGNESIUM HYDROXIDE 1200 MG/15ML
LIQUID ORAL AS NEEDED
Status: DISCONTINUED | OUTPATIENT
Start: 2019-02-12 | End: 2019-02-12 | Stop reason: HOSPADM

## 2019-02-12 RX ORDER — BISACODYL 10 MG
10 SUPPOSITORY, RECTAL RECTAL DAILY PRN
Status: DISCONTINUED | OUTPATIENT
Start: 2019-02-12 | End: 2019-02-20 | Stop reason: HOSPADM

## 2019-02-12 RX ORDER — IPRATROPIUM BROMIDE AND ALBUTEROL SULFATE 2.5; .5 MG/3ML; MG/3ML
3 SOLUTION RESPIRATORY (INHALATION) ONCE AS NEEDED
Status: DISCONTINUED | OUTPATIENT
Start: 2019-02-12 | End: 2019-02-12 | Stop reason: HOSPADM

## 2019-02-12 RX ORDER — LIDOCAINE HYDROCHLORIDE 10 MG/ML
0.5 INJECTION, SOLUTION EPIDURAL; INFILTRATION; INTRACAUDAL; PERINEURAL ONCE AS NEEDED
Status: COMPLETED | OUTPATIENT
Start: 2019-02-12 | End: 2019-02-12

## 2019-02-12 RX ORDER — SODIUM CHLORIDE 0.9 % (FLUSH) 0.9 %
3 SYRINGE (ML) INJECTION EVERY 12 HOURS SCHEDULED
Status: DISCONTINUED | OUTPATIENT
Start: 2019-02-12 | End: 2019-02-19

## 2019-02-12 RX ORDER — FENTANYL CITRATE 50 UG/ML
50 INJECTION, SOLUTION INTRAMUSCULAR; INTRAVENOUS
Status: DISCONTINUED | OUTPATIENT
Start: 2019-02-12 | End: 2019-02-12 | Stop reason: HOSPADM

## 2019-02-12 RX ORDER — OXYCODONE AND ACETAMINOPHEN 7.5; 325 MG/1; MG/1
1 TABLET ORAL EVERY 4 HOURS PRN
Status: DISCONTINUED | OUTPATIENT
Start: 2019-02-12 | End: 2019-02-16

## 2019-02-12 RX ORDER — PROPOFOL 10 MG/ML
VIAL (ML) INTRAVENOUS CONTINUOUS PRN
Status: DISCONTINUED | OUTPATIENT
Start: 2019-02-12 | End: 2019-02-12 | Stop reason: SURG

## 2019-02-12 RX ORDER — MEPERIDINE HYDROCHLORIDE 25 MG/ML
12.5 INJECTION INTRAMUSCULAR; INTRAVENOUS; SUBCUTANEOUS
Status: DISCONTINUED | OUTPATIENT
Start: 2019-02-12 | End: 2019-02-12 | Stop reason: HOSPADM

## 2019-02-12 RX ORDER — SODIUM CHLORIDE 9 MG/ML
100 INJECTION, SOLUTION INTRAVENOUS CONTINUOUS
Status: DISCONTINUED | OUTPATIENT
Start: 2019-02-12 | End: 2019-02-12 | Stop reason: SDUPTHER

## 2019-02-12 RX ORDER — HYDROCODONE BITARTRATE AND ACETAMINOPHEN 5; 325 MG/1; MG/1
1 TABLET ORAL EVERY 4 HOURS PRN
Status: DISCONTINUED | OUTPATIENT
Start: 2019-02-12 | End: 2019-02-20 | Stop reason: HOSPADM

## 2019-02-12 RX ORDER — DEXAMETHASONE SODIUM PHOSPHATE 10 MG/ML
INJECTION, SOLUTION INTRAMUSCULAR; INTRAVENOUS
Status: COMPLETED | OUTPATIENT
Start: 2019-02-12 | End: 2019-02-12

## 2019-02-12 RX ORDER — NALOXONE HCL 0.4 MG/ML
0.4 VIAL (ML) INJECTION
Status: DISCONTINUED | OUTPATIENT
Start: 2019-02-12 | End: 2019-02-20 | Stop reason: HOSPADM

## 2019-02-12 RX ORDER — ACETAMINOPHEN 160 MG/5ML
650 SOLUTION ORAL EVERY 4 HOURS PRN
Status: DISCONTINUED | OUTPATIENT
Start: 2019-02-12 | End: 2019-02-20 | Stop reason: HOSPADM

## 2019-02-12 RX ORDER — HYDROMORPHONE HYDROCHLORIDE 1 MG/ML
0.5 INJECTION, SOLUTION INTRAMUSCULAR; INTRAVENOUS; SUBCUTANEOUS
Status: DISCONTINUED | OUTPATIENT
Start: 2019-02-12 | End: 2019-02-20 | Stop reason: HOSPADM

## 2019-02-12 RX ORDER — GLYCOPYRROLATE 0.2 MG/ML
INJECTION INTRAMUSCULAR; INTRAVENOUS AS NEEDED
Status: DISCONTINUED | OUTPATIENT
Start: 2019-02-12 | End: 2019-02-12 | Stop reason: SURG

## 2019-02-12 RX ORDER — PROMETHAZINE HYDROCHLORIDE 25 MG/1
25 SUPPOSITORY RECTAL ONCE AS NEEDED
Status: DISCONTINUED | OUTPATIENT
Start: 2019-02-12 | End: 2019-02-12 | Stop reason: HOSPADM

## 2019-02-12 RX ORDER — VANCOMYCIN HYDROCHLORIDE 1 G/20ML
INJECTION, POWDER, LYOPHILIZED, FOR SOLUTION INTRAVENOUS AS NEEDED
Status: DISCONTINUED | OUTPATIENT
Start: 2019-02-12 | End: 2019-02-12 | Stop reason: HOSPADM

## 2019-02-12 RX ORDER — NALOXONE HCL 0.4 MG/ML
0.1 VIAL (ML) INJECTION
Status: DISCONTINUED | OUTPATIENT
Start: 2019-02-12 | End: 2019-02-20 | Stop reason: HOSPADM

## 2019-02-12 RX ORDER — ATRACURIUM BESYLATE 10 MG/ML
INJECTION, SOLUTION INTRAVENOUS AS NEEDED
Status: DISCONTINUED | OUTPATIENT
Start: 2019-02-12 | End: 2019-02-12 | Stop reason: SURG

## 2019-02-12 RX ORDER — ONDANSETRON 2 MG/ML
INJECTION INTRAMUSCULAR; INTRAVENOUS AS NEEDED
Status: DISCONTINUED | OUTPATIENT
Start: 2019-02-12 | End: 2019-02-12 | Stop reason: SURG

## 2019-02-12 RX ORDER — HYDROMORPHONE HYDROCHLORIDE 1 MG/ML
0.5 INJECTION, SOLUTION INTRAMUSCULAR; INTRAVENOUS; SUBCUTANEOUS
Status: DISCONTINUED | OUTPATIENT
Start: 2019-02-12 | End: 2019-02-12 | Stop reason: HOSPADM

## 2019-02-12 RX ORDER — BUPRENORPHINE HYDROCHLORIDE 0.32 MG/ML
INJECTION INTRAMUSCULAR; INTRAVENOUS
Status: COMPLETED | OUTPATIENT
Start: 2019-02-12 | End: 2019-02-12

## 2019-02-12 RX ORDER — PROPOFOL 10 MG/ML
VIAL (ML) INTRAVENOUS AS NEEDED
Status: DISCONTINUED | OUTPATIENT
Start: 2019-02-12 | End: 2019-02-12 | Stop reason: SURG

## 2019-02-12 RX ORDER — NEOSTIGMINE METHYLSULFATE 1 MG/ML
INJECTION, SOLUTION INTRAVENOUS AS NEEDED
Status: DISCONTINUED | OUTPATIENT
Start: 2019-02-12 | End: 2019-02-12 | Stop reason: SURG

## 2019-02-12 RX ORDER — PROMETHAZINE HYDROCHLORIDE 25 MG/1
25 TABLET ORAL ONCE AS NEEDED
Status: DISCONTINUED | OUTPATIENT
Start: 2019-02-12 | End: 2019-02-12 | Stop reason: HOSPADM

## 2019-02-12 RX ORDER — BISACODYL 5 MG/1
10 TABLET, DELAYED RELEASE ORAL DAILY PRN
Status: DISCONTINUED | OUTPATIENT
Start: 2019-02-12 | End: 2019-02-20 | Stop reason: HOSPADM

## 2019-02-12 RX ORDER — MELOXICAM 7.5 MG/1
15 TABLET ORAL DAILY
Status: DISCONTINUED | OUTPATIENT
Start: 2019-02-12 | End: 2019-02-20 | Stop reason: HOSPADM

## 2019-02-12 RX ORDER — ACETAMINOPHEN 325 MG/1
650 TABLET ORAL EVERY 4 HOURS PRN
Status: DISCONTINUED | OUTPATIENT
Start: 2019-02-12 | End: 2019-02-20 | Stop reason: HOSPADM

## 2019-02-12 RX ORDER — MORPHINE SULFATE 2 MG/ML
4 INJECTION, SOLUTION INTRAMUSCULAR; INTRAVENOUS
Status: DISCONTINUED | OUTPATIENT
Start: 2019-02-12 | End: 2019-02-20 | Stop reason: HOSPADM

## 2019-02-12 RX ORDER — DOCUSATE SODIUM 100 MG/1
100 CAPSULE, LIQUID FILLED ORAL 2 TIMES DAILY PRN
Status: DISCONTINUED | OUTPATIENT
Start: 2019-02-12 | End: 2019-02-20 | Stop reason: HOSPADM

## 2019-02-12 RX ORDER — SODIUM CHLORIDE 0.9 % (FLUSH) 0.9 %
1-10 SYRINGE (ML) INJECTION AS NEEDED
Status: DISCONTINUED | OUTPATIENT
Start: 2019-02-12 | End: 2019-02-20 | Stop reason: HOSPADM

## 2019-02-12 RX ORDER — LIDOCAINE HYDROCHLORIDE 10 MG/ML
INJECTION, SOLUTION EPIDURAL; INFILTRATION; INTRACAUDAL; PERINEURAL AS NEEDED
Status: DISCONTINUED | OUTPATIENT
Start: 2019-02-12 | End: 2019-02-12 | Stop reason: SURG

## 2019-02-12 RX ADMIN — ATRACURIUM BESYLATE 50 MG: 10 INJECTION, SOLUTION INTRAVENOUS at 12:08

## 2019-02-12 RX ADMIN — OXYCODONE AND ACETAMINOPHEN 1 TABLET: 5; 325 TABLET ORAL at 17:17

## 2019-02-12 RX ADMIN — DAPTOMYCIN 400 MG: 500 INJECTION, POWDER, LYOPHILIZED, FOR SOLUTION INTRAVENOUS at 11:59

## 2019-02-12 RX ADMIN — MORPHINE SULFATE 4 MG: 2 INJECTION, SOLUTION INTRAMUSCULAR; INTRAVENOUS at 17:17

## 2019-02-12 RX ADMIN — OXYCODONE HYDROCHLORIDE AND ACETAMINOPHEN 1 TABLET: 7.5; 325 TABLET ORAL at 21:22

## 2019-02-12 RX ADMIN — OXYCODONE AND ACETAMINOPHEN 1 TABLET: 5; 325 TABLET ORAL at 02:29

## 2019-02-12 RX ADMIN — LIDOCAINE HYDROCHLORIDE 0.5 ML: 10 INJECTION, SOLUTION EPIDURAL; INFILTRATION; INTRACAUDAL; PERINEURAL at 11:41

## 2019-02-12 RX ADMIN — GLYCOPYRROLATE 0.4 MG: 0.2 INJECTION, SOLUTION INTRAMUSCULAR; INTRAVENOUS at 14:30

## 2019-02-12 RX ADMIN — FENTANYL CITRATE 50 MCG: 50 INJECTION, SOLUTION INTRAMUSCULAR; INTRAVENOUS at 12:31

## 2019-02-12 RX ADMIN — TRAZODONE HYDROCHLORIDE 50 MG: 50 TABLET ORAL at 21:22

## 2019-02-12 RX ADMIN — SODIUM CHLORIDE 120 ML/HR: 9 INJECTION, SOLUTION INTRAVENOUS at 17:19

## 2019-02-12 RX ADMIN — PROPOFOL 200 MG: 10 INJECTION, EMULSION INTRAVENOUS at 12:08

## 2019-02-12 RX ADMIN — ROPIVACAINE HYDROCHLORIDE 8 ML/HR: 5 INJECTION, SOLUTION EPIDURAL; INFILTRATION; PERINEURAL at 15:15

## 2019-02-12 RX ADMIN — VANCOMYCIN HYDROCHLORIDE 250 MG: KIT at 00:06

## 2019-02-12 RX ADMIN — FAMOTIDINE 20 MG: 20 TABLET, FILM COATED ORAL at 11:41

## 2019-02-12 RX ADMIN — CEFAZOLIN SODIUM 2 G: 2 INJECTION, SOLUTION INTRAVENOUS at 02:12

## 2019-02-12 RX ADMIN — SODIUM CHLORIDE, POTASSIUM CHLORIDE, SODIUM LACTATE AND CALCIUM CHLORIDE: 600; 310; 30; 20 INJECTION, SOLUTION INTRAVENOUS at 12:05

## 2019-02-12 RX ADMIN — SODIUM CHLORIDE, POTASSIUM CHLORIDE, SODIUM LACTATE AND CALCIUM CHLORIDE 9 ML/HR: 600; 310; 30; 20 INJECTION, SOLUTION INTRAVENOUS at 11:42

## 2019-02-12 RX ADMIN — VANCOMYCIN HYDROCHLORIDE 250 MG: KIT at 17:17

## 2019-02-12 RX ADMIN — OXYCODONE AND ACETAMINOPHEN 1 TABLET: 5; 325 TABLET ORAL at 08:22

## 2019-02-12 RX ADMIN — ROPIVACAINE HYDROCHLORIDE 8 ML/HR: 5 INJECTION, SOLUTION EPIDURAL; INFILTRATION; PERINEURAL at 15:18

## 2019-02-12 RX ADMIN — INSULIN LISPRO 3 UNITS: 100 INJECTION, SOLUTION INTRAVENOUS; SUBCUTANEOUS at 17:16

## 2019-02-12 RX ADMIN — BUPRENORPHINE HYDROCHLORIDE 0.3 MG: 0.32 INJECTION INTRAMUSCULAR; INTRAVENOUS at 14:46

## 2019-02-12 RX ADMIN — NEOSTIGMINE METHYLSULFATE 3 MG: 1 INJECTION, SOLUTION INTRAVENOUS at 14:30

## 2019-02-12 RX ADMIN — FENTANYL CITRATE 100 MCG: 50 INJECTION, SOLUTION INTRAMUSCULAR; INTRAVENOUS at 13:42

## 2019-02-12 RX ADMIN — BUPIVACAINE HYDROCHLORIDE 20 ML: 2.5 INJECTION, SOLUTION EPIDURAL; INFILTRATION; INTRACAUDAL; PERINEURAL at 14:46

## 2019-02-12 RX ADMIN — CEFAZOLIN SODIUM 2 G: 2 INJECTION, SOLUTION INTRAVENOUS at 18:01

## 2019-02-12 RX ADMIN — DEXAMETHASONE SODIUM PHOSPHATE 4 MG: 10 INJECTION, SOLUTION INTRAMUSCULAR; INTRAVENOUS at 14:46

## 2019-02-12 RX ADMIN — BUPIVACAINE HYDROCHLORIDE 20 ML: 2.5 INJECTION, SOLUTION EPIDURAL; INFILTRATION; INTRACAUDAL; PERINEURAL at 14:59

## 2019-02-12 RX ADMIN — MELOXICAM 15 MG: 7.5 TABLET ORAL at 17:17

## 2019-02-12 RX ADMIN — CARVEDILOL 3.12 MG: 3.12 TABLET, FILM COATED ORAL at 17:17

## 2019-02-12 RX ADMIN — VANCOMYCIN HYDROCHLORIDE 250 MG: KIT at 11:59

## 2019-02-12 RX ADMIN — CEFAZOLIN SODIUM 2 G: 2 INJECTION, SOLUTION INTRAVENOUS at 12:05

## 2019-02-12 RX ADMIN — LIDOCAINE HYDROCHLORIDE 50 MG: 10 INJECTION, SOLUTION EPIDURAL; INFILTRATION; INTRACAUDAL; PERINEURAL at 12:08

## 2019-02-12 RX ADMIN — FENTANYL CITRATE 50 MCG: 50 INJECTION, SOLUTION INTRAMUSCULAR; INTRAVENOUS at 12:40

## 2019-02-12 RX ADMIN — INSULIN LISPRO 6 UNITS: 100 INJECTION, SOLUTION INTRAVENOUS; SUBCUTANEOUS at 21:23

## 2019-02-12 RX ADMIN — DEXAMETHASONE SODIUM PHOSPHATE 4 MG: 10 INJECTION INTRAMUSCULAR; INTRAVENOUS at 13:30

## 2019-02-12 RX ADMIN — PROPOFOL 25 MCG/KG/MIN: 10 INJECTION, EMULSION INTRAVENOUS at 12:08

## 2019-02-12 RX ADMIN — FENTANYL CITRATE 50 MCG: 50 INJECTION, SOLUTION INTRAMUSCULAR; INTRAVENOUS at 12:05

## 2019-02-12 RX ADMIN — CARVEDILOL 3.12 MG: 3.12 TABLET, FILM COATED ORAL at 08:22

## 2019-02-12 RX ADMIN — ONDANSETRON 4 MG: 2 INJECTION INTRAMUSCULAR; INTRAVENOUS at 14:30

## 2019-02-12 NOTE — PROGRESS NOTES
"IM progress note      Jyoti Luna  [id number]  1965     LOS: 2 days     Attending: Lorri Siddiqui MD    Primary Care Provider: Provider, No Known      Chief Complaint/Reason for visit:  No chief complaint on file.      Subjective   Doing ok. Still moderate bilateral knee pain. Frequent diarrhea. Denies f/c/n/v/sob/cp.    Objective     Vital Signs  Visit Vitals  /80 (BP Location: Left arm, Patient Position: Lying)   Pulse 91   Temp 98.6 °F (37 °C) (Oral)   Resp 16   Ht 157.5 cm (62\")   Wt 82.6 kg (182 lb)   SpO2 93%   BMI 33.29 kg/m²     Temp (24hrs), Av °F (37.2 °C), Min:98.3 °F (36.8 °C), Max:100.3 °F (37.9 °C)    Physical Exam:     General Appearance:    Alert, cooperative, in no acute distress   Head:    Normocephalic, without obvious abnormality, atraumatic    Lungs:     Normal effort, symmetric chest rise, no crepitus, clear to      auscultation bilaterally               Heart:    Regular rhythm and normal rate, normal S1 and S2    Abdomen:     Normal bowel sounds, no masses, no organomegaly, soft        non-tender, non-distended, no guarding, no rebound                tenderness   Extremities:   Swelling and tenderness bilateral knees. Mild erythema left. Limited ROM due to pain.   Pulses:   Pulses palpable and equal bilaterally   Skin:   No bleeding, bruising or rash          Results Review:     I reviewed the patient's new clinical results.   Results from last 7 days   Lab Units 19  0504 02/10/19  1928   WBC 10*3/mm3 12.03* 10.76   HEMOGLOBIN g/dL 11.3* 11.5   HEMATOCRIT % 36.2 35.7   PLATELETS 10*3/mm3 262 278     Results from last 7 days   Lab Units 19  0504 02/10/19  1928   SODIUM mmol/L 134 136   POTASSIUM mmol/L 3.9 3.7   CHLORIDE mmol/L 94* 95*   CO2 mmol/L 31.0 31.0   BUN mg/dL 10 9   CREATININE mg/dL 0.75 0.73   CALCIUM mg/dL 9.5 9.1   BILIRUBIN mg/dL  --  1.0   ALK PHOS U/L  --  162*   ALT (SGPT) U/L  --  40   AST (SGOT) U/L  --  29   GLUCOSE mg/dL 165* 132* "     Results for NABIL MATSON (MRN [id number]) as of 2/12/2019 10:58   Ref. Range 2/11/2019 12:13 2/11/2019 16:34 2/11/2019 20:50 2/12/2019 05:59 2/12/2019 07:49   Glucose Latest Ref Range: 70 - 130 mg/dL 188 (H) 228 (H) 217 (H)  141 (H)     ECG 12 Lead   Order: [id number]   Status:  Preliminary result   Visible to patient:  No (Not Released) Next appt:  None      Narrative     Test Reason : Surgery  Blood Pressure : **/** mmHG  Vent. Rate : 099 BPM     Atrial Rate : 099 BPM     P-R Int : 128 ms          QRS Dur : 096 ms      QT Int : 364 ms       P-R-T Axes : 046 034 047 degrees     QTc Int : 467 ms    Normal sinus rhythm  Normal ECG  When compared with ECG of 23-MAR-2018 11:01,  No significant change was found    Referred By:  LINDA           Confirmed By:            Microbiology Results (last 21 days)     Collected  Updated  Procedure  Result Status      02/11/2019 0941  02/11/2019 1136  Clostridium Difficile Toxin - Stool, Per Rectum [[id number]]    Stool from Per Rectum     Final result  No result data             02/11/2019 0941  02/11/2019 1136  Clostridium Difficile Toxin, PCR - Stool, Per Rectum [[id number]]    (Abnormal)   Stool from Per Rectum     Final result  Clostridium difficile (toxin A/B) Detected Abnormal              02/10/2019 2028  02/12/2019 0734  Body Fluid Culture - Synovial Fluid, Knee, Right [[id number]]   Synovial Fluid from Knee, Right     Preliminary result  BF Culture No growth at 2 days   Gram Stain Many (4+) WBCs seen    No organisms seen             02/10/2019 2028  02/11/2019 1338  Anaerobic Culture - Synovial Fluid, Knee, Right [[id number]]   Synovial Fluid from Knee, Right     Preliminary result  Culture No anaerobes isolated             02/10/2019 2027  02/12/2019 0734  Body Fluid Culture - Synovial Fluid, Knee, Left [[id number]]   Synovial Fluid from Knee, Left     Preliminary result  BF Culture No growth at 2 days   Gram Stain Many (4+) WBCs seen    No organisms seen              02/10/2019 2027  02/11/2019 1338  Anaerobic Culture - Synovial Fluid, Knee, Left [[id number]]   Synovial Fluid from Knee, Left     Preliminary result  Culture No anaerobes isolated             02/10/2019 1937  02/11/2019 2030  Blood Culture - Blood, Arm, Left [[id number]]   Blood from Arm, Left     Preliminary result  Blood Culture No growth at 24 hours             02/10/2019 1913  02/11/2019 2030  Blood Culture - Blood, Arm, Right [[id number]]   Blood from Arm, Right     Preliminary result  Blood Culture No growth at 24 hours                 Results for NABIL MATSON (MRN [id number]) as of 2/11/2019 09:31   Ref. Range 2/11/2019 05:05   C-Reactive Protein Latest Ref Range: 0.00 - 1.00 mg/dL 27.72 (H)       Right :  Results for NABIL MATSON (MRN [id number]) as of 2/11/2019 09:31   Ref. Range 2/10/2019 20:27   Color, Fluid Unknown Red   Appearance, Fluid Latest Ref Range: Clear  Bloody (A)   WBC, Fluid Latest Units: /mm3 52,860   RBC, Fluid Latest Units: /mm3 575,000   Crystals, Fluid Unknown No crystals seen       Left:   Results for NABIL MATSON (MRN [id number]) as of 2/11/2019 09:31   Ref. Range 2/10/2019 20:27   Color, Fluid Unknown Yellow   Appearance, Fluid Latest Ref Range: Clear  Turbid (A)   WBC, Fluid Latest Units: /mm3 232,950   RBC, Fluid Latest Units: /mm3 21,000   Neutrophils, Fluid Latest Units: % 94   Lymphocytes, Fluid Latest Units: % 3   Monocytes, Fluid Latest Units: % 3   Crystals, Fluid Unknown No crystals seen       Results for NABIL MATSON (MRN [id number]) as of 2/11/2019 09:31   Ref. Range 2/11/2019 05:04   Sed Rate Latest Ref Range: 0 - 30 mm/hr 100 (H)     I reviewed the patient's new imaging including images and reports.    All medications reviewed.     carvedilol 3.125 mg Oral BID With Meals   ceFAZolin 2 g Intravenous Q8H   DAPTOmycin 6 mg/kg (Adjusted) Intravenous Q24H   folic acid 1 mg Oral Daily   insulin lispro 0-7 Units Subcutaneous 4x Daily With Meals & Nightly   lactobacillus  acidophilus 1 capsule Oral Daily   multivitamin 1 tablet Oral Daily   sodium chloride 3 mL Intravenous Q12H   thiamine 100 mg Oral Daily   traZODone 50 mg Oral Nightly   vancomycin 250 mg Oral Q6H   venlafaxine  mg Oral Daily With Breakfast       acetaminophen 650 mg Q4H PRN   dextrose 25 g Q15 Min PRN   dextrose 15 g Q15 Min PRN   diphenhydrAMINE 50 mg Q6H PRN   glucagon (human recombinant) 1 mg PRN   labetalol 10 mg Q4H PRN   LORazepam 1 mg Q2H PRN   Or     LORazepam 1 mg Q2H PRN   Or     LORazepam 2 mg Q1H PRN   Or     LORazepam 2 mg Q1H PRN   Or     LORazepam 2 mg Q15 Min PRN   Or     LORazepam 2 mg Q15 Min PRN   ondansetron 4 mg Q6H PRN   Or     ondansetron 4 mg Q6H PRN   ondansetron 4 mg Q6H PRN   oxyCODONE-acetaminophen 1 tablet Q4H PRN   sodium chloride 500 mL TID PRN   sodium chloride 3-10 mL PRN       Assessment/Plan       Infected prosthetic knee joint , bilateral  HO MSSA (methicillin susceptible Staphylococcus aureus) septicemia (CMS/HCC)    Alcoholism /alcohol abuse (CMS/HCC)    Diabetes mellitus (CMS/HCC)    Diarrhea    Leukocytosis    Anemia, likely dilutional    C. difficile diarrhea      Plan  Admitted. OR today for bilateral Stage 1 of 2 revision  Follow fluid studies, cultures from knee arthrocentesis.  Follow blood cultures.    ID following, Dr. Francis. Likely PICC and long-term IV abx. Cefazolin 2 g IV every 8 hrs  Cdiff- po vanc  Watch for ETOH WD. CIWA q shift  IS/ defer DVT proph till after surgery decision, except mech.    IVF while NPO      Kimberlee Callejas, APRN  02/12/19  10:59 AM     Above is noted, agree.  She underwent bilateral removal of knee hardware under general anesthesia, tolerated surgery well.  Seen in her room afterwards, she complains of pain in bilateral knees that is not controlled currently.  No nausea vomiting or shortness breath.  Lungs are clear no wheezes  Heart is regular S1-S2 no gallops  Abdomen is soft and nontender.  Extremities: Bilateral knees with knee  brace on, Hemovacs present one on each side.  Dorsiflex both feet but incomplete dorsiflexion due to pain on the right.  Distal pulses and cap refill are normal.    Medication adjustment was done for better  pain control.  Adductor canal blocks per anesthesia.  DVT prophylaxis with Lovenox.  Antibiotics to continue under the direction of infectious disease consultants, Dr. Francis following.

## 2019-02-12 NOTE — ANESTHESIA PROCEDURE NOTES
Bilat popliteal Plexus block      Patient reassessed immediately prior to procedure    Patient location during procedure: OR  Performed by  Anesthesiologist: Braulio Vega MD  Assisted by: Nicolasa Abdi CRNA  Preanesthetic Checklist  Completed: patient identified, site marked, surgical consent, pre-op evaluation, timeout performed, IV checked, risks and benefits discussed and monitors and equipment checked  Prep:  Pt Position: supine  Sterile barriers:cap, gloves and sterile barriers  Prep: ChloraPrep  Patient monitoring: blood pressure monitoring, continuous pulse oximetry and EKG  Procedure  Sedation:no  Performed under: general  Guidance:ultrasound guided  Images:still images obtained    Laterality:Bilateral  Anesthesia block type: popliteal Plexus.  Injection Technique:single-shot  Needle Type:echogenic and short-bevel  Needle Gauge:20 G    Medications Used: bupivacaine PF (MARCAINE) 0.25 % injection, 20 mL  dexamethasone sodium phosphate injection, 4 mg  buprenorphine (BUPRENEX) injection, 0.3 mg  Med admintered at 2/12/2019 2:46 PM  Medications  Comment:Divided 10 ml per side.    Post Assessment  Patient Tolerance:comfortable throughout block  Complications:no

## 2019-02-12 NOTE — PROGRESS NOTES
"[facility] Progress Note    Date of Admission: 2/10/2019      Antibiotics:  Cefazolin, dapto x 2 dose,  Po vanco    CC: No chief complaint on file.      S: Patient with bilateral knee pain and low-grade fevers going for surgery today nothing by mouth after midnight some diarrhea, hemodynamically stable.  O:  /83 (BP Location: Right arm, Patient Position: Lying)   Pulse 98   Temp 98.7 °F (37.1 °C) (Tympanic)   Resp 18   Ht 157.5 cm (62\")   Wt 82.6 kg (182 lb)   SpO2 91%   BMI 33.29 kg/m²   Temp (24hrs), Av °F (37.2 °C), Min:98.3 °F (36.8 °C), Max:100.3 °F (37.9 °C)      PE:     GENERAL: Awake and alert, in no acute distress.   HEENT: Normocephalic, atraumatic.  PERRL. EOMI. No conjunctival injection. No icterus. Oropharynx clear without evidence of thrush or exudate. No evidence of peridontal disease.    NECK: Supple without nuchal rigidity  LYMPH: No cervical, axillary or inguinal lymphadenopathy. No neck masses  HEART: RRR; No murmur, rubs, gallops.   LUNGS: Clear to auscultation bilaterally without wheezing, rales, rhonchi. Normal respiratory effort.  ABDOMEN: Soft, mild distention with increased bowel sounds. No rebound or guarding.   EXT:  Trace lower extremity edema : Normal appearing genitalia without Taylor catheter.  MSK: Bilateral knee warmth with effusions decreased range of motion  SKIN: Warm and dry without cutaneous eruptions.    NEURO: Oriented to PPT. No focal deficits.   PSYCHIATRIC: Anxious mood. Cooperative with PE      Laboratory Data    Results from last 7 days   Lab Units 19  0504 02/10/19  1928   WBC 10*3/mm3 12.03* 10.76   HEMOGLOBIN g/dL 11.3* 11.5   HEMATOCRIT % 36.2 35.7   PLATELETS 10*3/mm3 262 278     Results from last 7 days   Lab Units 19  0504   SODIUM mmol/L 134   POTASSIUM mmol/L 3.9   CHLORIDE mmol/L 94*   CO2 mmol/L 31.0   BUN mg/dL 10   CREATININE mg/dL 0.75   GLUCOSE mg/dL 165*   CALCIUM mg/dL 9.5     Results from last 7 days   Lab Units 02/10/19  192 "   ALK PHOS U/L 162*   BILIRUBIN mg/dL 1.0   ALT (SGPT) U/L 40   AST (SGOT) U/L 29     Results from last 7 days   Lab Units 02/11/19  0504   SED RATE mm/hr 100*     Results from last 7 days   Lab Units 02/11/19  0505   CRP mg/dL 27.72*       Estimated Creatinine Clearance: 85.4 mL/min (by C-G formula based on SCr of 0.75 mg/dL).      Microbiology:      Radiology:  Imaging Results (last 24 hours)     Procedure Component Value Units Date/Time    XR Knee 1 or 2 View Bilateral [[id number]] Collected:  02/11/19 0953     Updated:  02/11/19 1543    Narrative:       EXAMINATION: XR KNEE 1 OR 2 VW BILATERAL-      INDICATION: Infected knee replacements.      COMPARISON: None.     FINDINGS: Two views of the right knee and two views of the left knee  reveal extensive soft tissue swelling seen of the knees bilaterally with  large bilateral joint effusions. The alignment of the prosthesis is  satisfactory. Findings are concerning for infection in the joints and  hardware. There is no regularity identified of the patella on the left.  Osteomyelitis of the patella on the left cannot be excluded. The right  patella remains intact.       Impression:       Soft tissue swelling with large joint effusions bilaterally  demonstrating infection of the joint space with irregularity seen of the  patella on the left concerning for osteomyelitis. No significant lucency  seen surrounding the hardware.     D:  02/11/2019  E:  02/11/2019     This report was finalized on 2/11/2019 3:40 PM by Dr. Tiff Calvillo MD.             PROBLEM LIST:   Bilateral prosthetic joint infections  Prior history of methicillin sensitive staph aureus bilateral knee infections and bacteremia  leukocytosis  Chronically elevated sedimentation rate CRP  Alcoholism  Severe eczema  Bilateral knee pain and difficulty with ambulation  Low-grade fevers  C diff colitis with acute diarrhea        ASSESSMENT:  53-year-old female with severe eczema, history of alcoholism, with  previous MSSA bacteremia with bilateral knee prostate joint infection attempted to be treated with single exchange long-term IV followed by oral antibiotics.  Unfortunately even with long-term treatment oral antibiotics she's had worsening of her bilateral knee pain swelling erythema and effusions appears to be consistent with acute on chronic infection and suspect ongoing MSSA infection agree with aggressive treatment with two-stage revision with bilateral knee X plans with spacers in place long-term PICC line and IV antibiotics to be guided by cultures for now we'll use cefazolin 2 g IV every 8 hrs.  C diff + today with active diarrhea on po vanco     PLAN:  Cefazolin 2 g IV every 8 hrs  Ok with dapto 1 more dose today cover in case chance of conversion of MSSA to MRSA but cx neg so far  Follow-up blood cultures and knee fluid culture  Follow-up surgical plans  Will need PICC line and long-term IV antibiotics and will need placement as mobility will be impaired and needs long-term antibiotics  Po vanco  F/u after surgery tomorrow.       Brenden Francis MD  2/12/2019

## 2019-02-12 NOTE — ANESTHESIA POSTPROCEDURE EVALUATION
Patient: Jyoti Luna    Procedure Summary     Date:  02/12/19 Room / [facility]:   [facility]    Anesthesia Start:  1205 Anesthesia Stop:  1526    Procedure:  TOTAL KNEE ARTHROPLASTY REVISION WITH REMOVAL and placement of  ANTIBIOTIC SPACERS BILATERAL (Bilateral Knee) Diagnosis:       Infection of prosthetic knee joint, subsequent encounter      (Infection of prosthetic knee joint, subsequent encounter [T84.59XD, Z96.659])    Surgeon:  Ti Moffett MD Provider:  Shu Hassan MD    Anesthesia Type:  general ASA Status:  3          Anesthesia Type: general  Last vitals  BP   142/89 (02/12/19 1515)   Temp   98 °F (36.7 °C) (02/12/19 1515)   Pulse   94 (02/12/19 1515)   Resp   16 (02/12/19 1515)     SpO2   93 % (02/12/19 1515)     Post Anesthesia Care and Evaluation    Patient location during evaluation: PACU  Patient participation: complete - patient participated  Level of consciousness: awake and alert  Pain score: 0  Pain management: adequate  Airway patency: patent  Anesthetic complications: No anesthetic complications  PONV Status: none  Cardiovascular status: hemodynamically stable and acceptable  Respiratory status: nonlabored ventilation, acceptable and nasal cannula  Hydration status: acceptable

## 2019-02-12 NOTE — ANESTHESIA PROCEDURE NOTES
Bilat Adductor Canal      Patient location during procedure: OR  Reason for block: at surgeon's request and post-op pain management  Performed by  Anesthesiologist: Braulio Vega MD  Assisted by: Nicolasa Abdi CRNA  Preanesthetic Checklist  Completed: patient identified, site marked, surgical consent, pre-op evaluation, timeout performed, IV checked, risks and benefits discussed and monitors and equipment checked  Prep:  Pt Position: supine  Sterile barriers:cap, gloves, mask and sterile barriers  Prep: ChloraPrep  Patient monitoring: blood pressure monitoring, continuous pulse oximetry and EKG  Procedure  Performed under: general  Guidance:ultrasound guided  Images:still images obtained    Block Type:adductor canal block  Injection Technique:catheter  Needle Type:Tuohy and echogenic  Needle Gauge:18 G    Catheter Size:20 G (20g)  Cath Depth at skin: 10 cm  Medications Used: bupivacaine PF (MARCAINE) 0.25 % injection, 20 mL  Med admintered at 2/12/2019 2:59 PM  Post Assessment  Injection Assessment: negative aspiration for heme, incremental injection and no paresthesia on injection  Patient Tolerance:comfortable throughout block  Complications:no  Additional Notes  Procedure:             The pt was placed in the Supine position.  The Insertion site was  prepped and Draped in sterile fashion.  The pt was anesthetized with  IV Sedation( see meds).  Skin and cutaneous tissue was infiltrated and anesthetized with 1% Lidocaine 3 mls via a 25g needle.  A BBraun 4 inch 18g echogenic needle was then  inserted approximately midline, mid-thigh and advanced In-plane with Ultrasound guidance.  Normal Saline PSF was utilized for hydrodissection of tissue.  The Vastus medialis and Sartorius muscle where visualized and the needle tip was placed in the adductor canal,  lateral to the femoral artery.  LA injection spread was visualized, injection was incremental 1-5ml, injection pressure was normal or little, no intraneural  injection, no vascular injection.  LA dose was injected thru the needle(see dose above).  A BBraun 20g wire stylet catheter was placed via the needle with ultrasound visualization and confirmation with NS fluid bolus. The labeled Catheter was then secured to skin at insertion site with skin afix and steristrips to curled catheter and CHG transparent dressing.  Thank you.

## 2019-02-12 NOTE — ANESTHESIA PREPROCEDURE EVALUATION
Anesthesia Evaluation     Patient summary reviewed and Nursing notes reviewed   no history of anesthetic complications:  NPO Solid Status: > 8 hours  NPO Liquid Status: > 8 hours           Airway   Mallampati: II  TM distance: >3 FB  Neck ROM: full  No difficulty expected  Dental    (+) partials    Pulmonary - negative pulmonary ROS    breath sounds clear to auscultation  Cardiovascular     ECG reviewed  Rhythm: regular  Rate: normal    (+) hypertension,   (-) valvular problems/murmurs, dysrhythmias, angina, PENA, hyperlipidemia    ROS comment: Negative stress test 2 years ago per pt    Neuro/Psych  (+) psychiatric history Depression,     GI/Hepatic/Renal/Endo    (+)  GERD well controlled,  diabetes mellitus type 2,   (-) hepatitis, liver disease, no renal disease, hypothyroidism    ROS Comment: C diff    Musculoskeletal     Abdominal    Substance History   (+) alcohol use (EtOH abuse),      OB/GYN          Other   (+) arthritis       Other Comment: FE deficiency anemia  ROS/Med Hx Other: TTE: EF 65 %, mild TR                  Anesthesia Plan    ASA 3     general   (Bilateral ACB post op)  intravenous induction   Anesthetic plan, all risks, benefits, and alternatives have been provided, discussed and informed consent has been obtained with: patient.    Plan discussed with CRNA.

## 2019-02-12 NOTE — PLAN OF CARE
Problem: Skin Injury Risk (Adult)  Goal: Skin Health and Integrity  Outcome: Ongoing (interventions implemented as appropriate)      Problem: Fall Risk (Adult)  Goal: Identify Related Risk Factors and Signs and Symptoms  Outcome: Ongoing (interventions implemented as appropriate)    Goal: Absence of Fall  Outcome: Ongoing (interventions implemented as appropriate)      Problem: Pain, Acute (Adult)  Goal: Identify Related Risk Factors and Signs and Symptoms  Outcome: Ongoing (interventions implemented as appropriate)    Goal: Acceptable Pain Control/Comfort Level  Outcome: Ongoing (interventions implemented as appropriate)      Problem: Knee Arthroplasty (Total, Partial) (Adult)  Goal: Signs and Symptoms of Listed Potential Problems Will be Absent, Minimized or Managed (Knee Arthroplasty)  Outcome: Ongoing (interventions implemented as appropriate)      Problem: Patient Care Overview  Goal: Plan of Care Review  Outcome: Ongoing (interventions implemented as appropriate)   02/12/19 9455   Coping/Psychosocial   Plan of Care Reviewed With patient   Coping/Psychosocial   Patient Agreement with Plan of Care agrees   OTHER   Outcome Summary We are still completing CIWA assessments q2h. Pt has been complaining of pain in her knees. Pain is being managed with PO pain medications adequately.    Plan of Care Review   Progress improving

## 2019-02-12 NOTE — OP NOTE
OPERATIVE REPORT     DATE OF PROCEDURE: 2/10/2019 - 2/12/2019     SURGEON: Ti Moffett M.D.     STAFF: Circulator: Darwin Bee, RN; Kishan Merida RN  Scrub Person: Stacey Knott; Dewey Ward; Richard Julian; Callie Toribio  Vendor Representative: Daniel Oh  Nursing Assistant: Julianna Torres; Ciera Suarez  Assistant: La Woodard PA-C     PREOPERATIVE DIAGNOSIS: Infection of prosthetic knee joint, subsequent encounter [T84.59XD, Z96.659] Bilateral knees    POSTOPERATIVE DIAGNOSIS: *Same       Post-Op Diagnosis Codes:     * Infection of prosthetic knee joint, subsequent encounter [T84.59XD, Z96.659]    Procedure(s):  BILATERAL TOTAL KNEE ARTHROPLASTY REVISION WITH REMOVAL and placement of Revision Total Knee components and antibiotic cement  Surgeon(s):  Ti Moffett MD    SURGICAL DETAILS:     APPROACH: Medial parapatellar    ANESTHESIA: General    PREOPERATIVE ANTIBIOTICS: She was on preop antibiotics    TRANEXAMIC ACID: IV    TOURNIQUET TIME: 68 min @300 mmHg on the left knee, 48 min @ 300 on right knee  ESTIMATED BLOOD LOSS: 20 mL    SPECIMENS:   Order Name Source Comment Collection Info Order Time   FUNGUS CULTURE Knee, Left  Collected By: Ti Moffett MD 2/12/2019 12:38 PM   GRAM STAIN Knee, Left  Collected By: Ti Moffett MD 2/12/2019 12:38 PM   BODY FLUID CULTURE Knee, Left  Collected By: Ti Moffett MD 2/12/2019 12:38 PM   AFB CULTURE Knee, Left  Collected By: Ti Moffett MD 2/12/2019 12:38 PM   ANAEROBIC CULTURE Knee, Right  Collected By: Ti Moffett MD 2/12/2019  1:43 PM   FUNGUS CULTURE Knee, Right  Collected By: Ti Moffett MD 2/12/2019  1:43 PM   TISSUE / BONE CULTURE Knee, Right  Collected By: Ti Moffett MD 2/12/2019  1:43 PM   AFB CULTURE Knee, Right  Collected By: Ti Moffett MD 2/12/2019  1:43 PM        IMPLANTS:   Implants     Implant    Bar [facility] - [id number] -  Implanted   (Right) Knee    Inventory item: Bar Tib [facility] Huma [employer]ok Model/Cat number: [id number]    : [employer] Lot number: [id number]    As of 3/23/2018     Status: Implanted                  Bear Tib Vangrd Ps Dcm 10x71/76 - [id number] - Implanted   (Right) Knee    Inventory item: Bear Tib Vangrd Ps Dcm 10x71/76 Model/Cat number: [id number]    : [employer] Lot number: [id number]    As of 3/23/2018     Status: Implanted                  Bear Tib Vangrd Ps Dcm 10x71/76 - [id number] - Implanted   (Left) Knee    Inventory item: Bear Tib Vangrd Ps Dcm 10x71/76 Model/Cat number: [id number]    : [employer] Lot number: [id number]    As of 3/23/2018     Status: Implanted                  Bar Tib [facility] Huma Interlok - [id number] - Implanted   (Left) Knee    Inventory item: Bar Tib [facility] Huma [employer] Model/Cat number: [id number]    : [employer] Lot number: [id number]    As of 3/23/2018     Status: Implanted                  Cmt Bone Palacos 192862 - [id number] - Implanted   (Left) Knee    Inventory item: Cmt Bone Palacos [id number] Model/Cat number: [id number]    : [employer] Lot number: [id number]    As of 2/12/2019     Status: Implanted                  Cmt Bone Palacos 776161 - [id number] - Implanted   (Left) Knee    Inventory item: Cmt Bone Palacos 143136 Model/Cat number: [id number]    : [employer] Lot number: [id number]    As of 2/12/2019     Status: Implanted                  Bear Tib Vangrd Ps Dcm 14x71/76 - [id number] - Implanted   (Left) Knee    Inventory item: Bear Tib Vangrd Ps Dcm 14x71/76 Model/Cat number: [id number]    : [employer] Lot number: [id number]    As of 2/12/2019     Status: Implanted                  Fem Vangrd Intlk Ps Opn 65 Lt - [id number] - Implanted   (Left) Knee    Inventory item: Fem Vangrd Intlk Ps Opn 65 Lt Model/Cat number: [id number]    : [employer] Lot number: [id number]    As of 2/12/2019      Status: Implanted                  Cmt Bone Palacos R Hi/Visc 1x40 - [id number] - Implanted   (Right) Knee    Inventory item: Cmt Bone Palacos R Hi/Visc 1x40 Model/Cat number: [id number]    : [employer] Lot number: [id number]    As of 2/12/2019     Status: Implanted                  Cmt Bone Palacos R Hi/Visc 1x40 - [id number] - Implanted   (Right) Knee    Inventory item: Cmt Bone Palacos R Hi/Visc 1x40 Model/Cat number: [id number]    : [employer] Lot number: [id number]    As of 2/12/2019     Status: Implanted                  Fem Vangrd Intlk Ps Opn 65mm Rt - [id number] - Implanted   (Right) Knee    Inventory item: Fem Vangrd Intlk Ps Opn 65mm Rt Model/Cat number: [id number]    : [employer] Lot number: [id number]    As of 2/12/2019     Status: Implanted                  Bear Tib Vangrd Ps Dcm 14x71/76 - [id number] - Implanted   (Right) Knee    Inventory item: Bear Tib Vangrd Ps Dcm 14x71/76 Model/Cat number: [id number]    : [employer] Lot number: [id number]    As of 2/12/2019     Status: Implanted                      : [employer]     Femoral component size: 65 bilateral   Tibial polyethylene insert: 14 bilateral      DRAINS:   Closed/Suction Drain Left Knee Accordion (Active)       Closed/Suction Drain Right Knee Accordion (Active)        LOCAL INJECTION: none    MODIFIER(S): 51 bilateral    COMPLICATIONS: None apparent    INDICATIONS FOR PROCEDURE: The patient has a history of previous bilateral total knee infections.  Revision total knee arthroplasty with removal of prosthesis and placement of articulating spacer revision components with antibiotic cement was discussed and agreed to. The risks, benefits, alternatives, and potential complications of the arthroplasty surgery were discussed with the patient in detail to include but not be limited to infection, bleeding, anesthesia risks, damage to neurovascular structures, osteolysis, aseptic loosening, instability,  anterior knee pain, continued pain, iatrogenic fracture, dislocation, need for future surgery including the potential for amputation, blood clots, myocardial infarction, stroke, and death. Specific details of the surgical procedure, hospitalization, recovery, rehabilitation, and long-term precautions were also presented. Pre-operative teaching was provided. Implant/prosthesis selection was outlined, and the many options available were explained; the final choice will be made at the time of the procedure to match the anatomy and condition of the bone, ligaments, tendons, and muscles.     INTRAOPERATIVE FINDINGS: Significant purulent material in the left knee.  Minimal peripheral chief material in the right knee     PROCEDURE: The patient was identified in the preoperative holding area. The operative site was confirmed and marked. SANTHOSH hose and a sequential compression device were placed on the nonoperative leg. The risks, benefits, and alternatives to surgery were again confirmed with the patient and the patient wished to proceed. The patient was brought to the operating room and placed on the operating room table in the supine position. All bony prominences were padded. A huddle was performed with the patient and all vital surgical team members to confirm the correct operative site, procedure, anesthesia type, and operative plan with the patient. After anesthesia was performed, a tourniquet was applied to the upper thigh of the operative leg. A full knee exam was performed once anesthesia was in full effect.  The patient was on preoperative intravenous antibiotics     The operative legs were prepped and draped in the usual sterile fashion. A surgical time out was performed immediately preceding the incision with all personnel in the operating room to confirm patient identity, the correct operative site and extremity, correct radiographic studies, availability of appropriate surgical equipment and agreement on the  planned procedure.  The left knee was operated on first. The operative knee was elevated and the tourniquet was inflated. The knee was exposed using the previous anterior-midline skin incision. Dissection was carried down through skin and subcutaneous tissue to the extensor mechanism with a scalpel. A medial parapatellar arthrotomy was made to enter the knee space sharply. A large amount of purulent appearing joint fluid was encountered and suctioned. The synovium was thickened, hypertrophic, and inflamed.  Cultures were sent A partial synovectomy was performed for exposure, and the medial and lateral gutters were cleared of scar and synovial reflections. The superficial medial collateral ligament was carefully elevated off osteophytes which were then removed with a rongeur and osteotome.  Osteotomes were used to loosen the components of the total knee arthroplasty.   flexion and a large curved osteotome, rongeur, and curettes were utilized to clear posterior osteophytes, loose bodies and meniscal remnants.  Multiple liters of normal saline were used as an irrigant     A femoral trial implant was placed; excellent fit was confirmed. The medial-lateral and anterior-posterior dimensions were checked; anatomic fit and coverage were achieved.The proximal tibia baseplate trial was placed with its mid-point at the junction of medial one-third and lateral two-thirds of the tibial tubercle and pinned to this fixed position. A trial reduction was then performed. Trial reduction demonstrated the knee achieved full extension with excellent stability and range of motion, and no tendency toward instability with varus-valgus stress at full extension, mid-flexion, or 90 degrees of flexion.    All trials were removed.     The wound was copiously lavaged with a pulse irrigation/suction system. The posterior recess of the knee and areas of known bleeding were treated with the electrocautery to reduce post-operative bleeding.A double  batch of Polymethylmethacrylate (PMMA) bone cement was mixed, with 2 g of vancomycin and 2 g of tobramycin and the final implants were cemented into place.  The cement was compressed using a non-constrained poly trial which was removed so the excess cement could be curetted free. The final polyethylene was impacted into place, and the knee was held in extension until the cement cured.    The wound was again irrigated with dilute betadine solution followed by saline. The extensor mechanism and capsule was then anatomically closed with interrupted #1 Stratafix suture and a running #2 Stratafix stitch. Knee stability and range of motion with the capsule closed was excellent, and range of motion was 0 to 135 without excessive stress on the repair. Instrument and sponge count was completed and confirmed correct. A running 3-0 Stratafix subcuticular stitch was used to re-approximate the skin edges followed by skin glue adhesive to seal the wound.   Attention was turned to the right knee.    The right knee was elevated and the tourniquet was inflated. The knee was exposed using the previous anterior-midline skin incision. Dissection was carried down through skin and subcutaneous tissue to the extensor mechanism with a scalpel. A medial parapatellar arthrotomy was made to enter the knee space sharply. A large amount of purulent appearing joint fluid was encountered and suctioned. The synovium was thickened, hypertrophic, and inflamed.  Cultures were sent A partial synovectomy was performed for exposure, and the medial and lateral gutters were cleared of scar and synovial reflections. The superficial medial collateral ligament was carefully elevated off osteophytes which were then removed with a rongeur and osteotome.  Osteotomes were used to loosen the components of the total knee arthroplasty.   flexion and a large curved osteotome, rongeur, and curettes were utilized to clear posterior osteophytes, loose bodies and  meniscal remnants.  Multiple liters of normal saline were used as an irrigant     A femoral trial implant was placed; excellent fit was confirmed. The medial-lateral and anterior-posterior dimensions were checked; anatomic fit and coverage were achieved.The proximal tibia baseplate trial was placed with its mid-point at the junction of medial one-third and lateral two-thirds of the tibial tubercle and pinned to this fixed position. A trial reduction was then performed. Trial reduction demonstrated the knee achieved full extension with excellent stability and range of motion, and no tendency toward instability with varus-valgus stress at full extension, mid-flexion, or 90 degrees of flexion.    All trials were removed.     The wound was copiously lavaged with a pulse irrigation/suction system. The posterior recess of the knee and areas of known bleeding were treated with the electrocautery to reduce post-operative bleeding.A double batch of Polymethylmethacrylate (PMMA) bone cement was mixed, with 2 g of vancomycin and 2 g of tobramycin and the final implants were cemented into place.  The cement was compressed using a non-constrained poly trial which was removed so the excess cement could be curetted free. The final polyethylene was impacted into place, and the knee was held in extension until the cement cured.    The wound was again irrigated with dilute betadine solution followed by saline. The extensor mechanism and capsule was then anatomically closed with interrupted #1 Stratafix suture and a running #2 Stratafix stitch. Knee stability and range of motion with the capsule closed was excellent, and range of motion was 0 to 135 without excessive stress on the repair. Instrument and sponge count was completed and confirmed correct. A running 3-0 Stratafix subcuticular stitch was used to re-approximate the skin edges followed by skin glue adhesive to seal the wound. The patient was sufficiently recovered from  anesthesia, transferred to a hospital bed and taken to the PACU in stable condition.     Large Hemovac drains were placed intra-articular in each knee.      No apparent complications occurred during the procedure. Instrument, sponge and needle counts were correct x 2.     The patient underwent risk stratification preoperatively and Lovenox was chosen for DVT prophylaxis.     POST OPERATIVE PLAN:   Weight bearing as tolerated with bilateral knee immobilizers extension with no range of motion left knee   Pain control with PO/IV meds   Adductor canal catheter placement by Anesthesia Pain Management Team.   Antibiotics per  infectious disease recommendations  PT/OT for mobilization and medical equipment needs     Social work for discharge planning needs   Follow up in 1 weeks for post operative wound check with XR AP and lateral of operative knee.

## 2019-02-13 PROBLEM — D62 ACUTE BLOOD LOSS ANEMIA: Status: ACTIVE | Noted: 2019-02-13

## 2019-02-13 PROBLEM — Z96.653 STATUS POST TOTAL BILATERAL KNEE REPLACEMENT: Status: ACTIVE | Noted: 2019-02-13

## 2019-02-13 PROBLEM — E87.1 HYPONATREMIA: Status: ACTIVE | Noted: 2019-02-13

## 2019-02-13 PROBLEM — G89.18 ACUTE POSTOPERATIVE PAIN: Status: ACTIVE | Noted: 2019-02-13

## 2019-02-13 LAB
ANION GAP SERPL CALCULATED.3IONS-SCNC: 6 MMOL/L (ref 3–11)
BUN BLD-MCNC: 9 MG/DL (ref 9–23)
BUN/CREAT SERPL: 11 (ref 7–25)
CALCIUM SPEC-SCNC: 8.8 MG/DL (ref 8.7–10.4)
CHLORIDE SERPL-SCNC: 100 MMOL/L (ref 99–109)
CO2 SERPL-SCNC: 25 MMOL/L (ref 20–31)
CREAT BLD-MCNC: 0.82 MG/DL (ref 0.6–1.3)
DEPRECATED RDW RBC AUTO: 44.1 FL (ref 37–54)
ERYTHROCYTE [DISTWIDTH] IN BLOOD BY AUTOMATED COUNT: 14.6 % (ref 11.3–14.5)
GFR SERPL CREATININE-BSD FRML MDRD: 73 ML/MIN/1.73
GLUCOSE BLD-MCNC: 171 MG/DL (ref 70–100)
GLUCOSE BLDC GLUCOMTR-MCNC: 151 MG/DL (ref 70–130)
GLUCOSE BLDC GLUCOMTR-MCNC: 170 MG/DL (ref 70–130)
GLUCOSE BLDC GLUCOMTR-MCNC: 202 MG/DL (ref 70–130)
GLUCOSE BLDC GLUCOMTR-MCNC: 237 MG/DL (ref 70–130)
HCT VFR BLD AUTO: 29.1 % (ref 34.5–44)
HGB BLD-MCNC: 8.9 G/DL (ref 11.5–15.5)
MCH RBC QN AUTO: 25.1 PG (ref 27–31)
MCHC RBC AUTO-ENTMCNC: 30.6 G/DL (ref 32–36)
MCV RBC AUTO: 82.2 FL (ref 80–99)
PLATELET # BLD AUTO: 272 10*3/MM3 (ref 150–450)
PMV BLD AUTO: 8 FL (ref 6–12)
POTASSIUM BLD-SCNC: 3.7 MMOL/L (ref 3.5–5.5)
RBC # BLD AUTO: 3.54 10*6/MM3 (ref 3.89–5.14)
SODIUM BLD-SCNC: 131 MMOL/L (ref 132–146)
WBC NRBC COR # BLD: 10.45 10*3/MM3 (ref 3.5–10.8)

## 2019-02-13 PROCEDURE — 63710000001 TRAZODONE 50 MG TABLET: Performed by: NURSE PRACTITIONER

## 2019-02-13 PROCEDURE — A9270 NON-COVERED ITEM OR SERVICE: HCPCS | Performed by: INTERNAL MEDICINE

## 2019-02-13 PROCEDURE — C1894 INTRO/SHEATH, NON-LASER: HCPCS

## 2019-02-13 PROCEDURE — 25010000002 MORPHINE SULFATE (PF) 2 MG/ML SOLUTION: Performed by: ORTHOPAEDIC SURGERY

## 2019-02-13 PROCEDURE — 85027 COMPLETE CBC AUTOMATED: CPT | Performed by: ORTHOPAEDIC SURGERY

## 2019-02-13 PROCEDURE — 63710000001 VANCOMYCIN 50 MG/ML RECONSTITUTED SOLUTION: Performed by: INTERNAL MEDICINE

## 2019-02-13 PROCEDURE — C1751 CATH, INF, PER/CENT/MIDLINE: HCPCS

## 2019-02-13 PROCEDURE — 99024 POSTOP FOLLOW-UP VISIT: CPT | Performed by: ORTHOPAEDIC SURGERY

## 2019-02-13 PROCEDURE — 25010000002 ENOXAPARIN PER 10 MG: Performed by: ORTHOPAEDIC SURGERY

## 2019-02-13 PROCEDURE — 97166 OT EVAL MOD COMPLEX 45 MIN: CPT

## 2019-02-13 PROCEDURE — A9270 NON-COVERED ITEM OR SERVICE: HCPCS | Performed by: NURSE PRACTITIONER

## 2019-02-13 PROCEDURE — 97162 PT EVAL MOD COMPLEX 30 MIN: CPT

## 2019-02-13 PROCEDURE — 02HV33Z INSERTION OF INFUSION DEVICE INTO SUPERIOR VENA CAVA, PERCUTANEOUS APPROACH: ICD-10-PCS | Performed by: ORTHOPAEDIC SURGERY

## 2019-02-13 PROCEDURE — 82962 GLUCOSE BLOOD TEST: CPT

## 2019-02-13 PROCEDURE — 25010000003 CEFAZOLIN IN DEXTROSE 2-4 GM/100ML-% SOLUTION: Performed by: INTERNAL MEDICINE

## 2019-02-13 PROCEDURE — 25010000002 VANCOMYCIN 10 G RECONSTITUTED SOLUTION: Performed by: ORTHOPAEDIC SURGERY

## 2019-02-13 PROCEDURE — 80048 BASIC METABOLIC PNL TOTAL CA: CPT | Performed by: NURSE PRACTITIONER

## 2019-02-13 PROCEDURE — 63710000001 CARVEDILOL 3.125 MG TABLET: Performed by: NURSE PRACTITIONER

## 2019-02-13 PROCEDURE — 25010000002 LORAZEPAM PER 2 MG: Performed by: NURSE PRACTITIONER

## 2019-02-13 RX ORDER — MAGNESIUM SULFATE HEPTAHYDRATE 40 MG/ML
2 INJECTION, SOLUTION INTRAVENOUS AS NEEDED
Status: DISCONTINUED | OUTPATIENT
Start: 2019-02-13 | End: 2019-02-20 | Stop reason: HOSPADM

## 2019-02-13 RX ORDER — SODIUM CHLORIDE 0.9 % (FLUSH) 0.9 %
10 SYRINGE (ML) INJECTION AS NEEDED
Status: DISCONTINUED | OUTPATIENT
Start: 2019-02-13 | End: 2019-02-20 | Stop reason: HOSPADM

## 2019-02-13 RX ORDER — MAGNESIUM SULFATE HEPTAHYDRATE 40 MG/ML
4 INJECTION, SOLUTION INTRAVENOUS AS NEEDED
Status: DISCONTINUED | OUTPATIENT
Start: 2019-02-13 | End: 2019-02-20 | Stop reason: HOSPADM

## 2019-02-13 RX ORDER — SODIUM CHLORIDE 0.9 % (FLUSH) 0.9 %
10 SYRINGE (ML) INJECTION EVERY 12 HOURS SCHEDULED
Status: DISCONTINUED | OUTPATIENT
Start: 2019-02-13 | End: 2019-02-20 | Stop reason: HOSPADM

## 2019-02-13 RX ORDER — POTASSIUM CHLORIDE 750 MG/1
40 CAPSULE, EXTENDED RELEASE ORAL AS NEEDED
Status: DISCONTINUED | OUTPATIENT
Start: 2019-02-13 | End: 2019-02-20 | Stop reason: HOSPADM

## 2019-02-13 RX ORDER — POTASSIUM CHLORIDE 1.5 G/1.77G
40 POWDER, FOR SOLUTION ORAL AS NEEDED
Status: DISCONTINUED | OUTPATIENT
Start: 2019-02-13 | End: 2019-02-20 | Stop reason: HOSPADM

## 2019-02-13 RX ORDER — POTASSIUM CHLORIDE 7.45 MG/ML
10 INJECTION INTRAVENOUS
Status: DISCONTINUED | OUTPATIENT
Start: 2019-02-13 | End: 2019-02-20 | Stop reason: HOSPADM

## 2019-02-13 RX ADMIN — INSULIN LISPRO 3 UNITS: 100 INJECTION, SOLUTION INTRAVENOUS; SUBCUTANEOUS at 12:07

## 2019-02-13 RX ADMIN — LORAZEPAM 2 MG: 2 INJECTION INTRAMUSCULAR; INTRAVENOUS at 18:22

## 2019-02-13 RX ADMIN — MORPHINE SULFATE 4 MG: 2 INJECTION, SOLUTION INTRAMUSCULAR; INTRAVENOUS at 00:02

## 2019-02-13 RX ADMIN — OXYCODONE HYDROCHLORIDE AND ACETAMINOPHEN 1 TABLET: 7.5; 325 TABLET ORAL at 08:18

## 2019-02-13 RX ADMIN — ENOXAPARIN SODIUM 40 MG: 40 INJECTION SUBCUTANEOUS at 08:18

## 2019-02-13 RX ADMIN — LORAZEPAM 2 MG: 2 INJECTION INTRAMUSCULAR; INTRAVENOUS at 14:25

## 2019-02-13 RX ADMIN — VANCOMYCIN HYDROCHLORIDE 125 MG: KIT at 05:41

## 2019-02-13 RX ADMIN — CARVEDILOL 3.12 MG: 3.12 TABLET, FILM COATED ORAL at 08:21

## 2019-02-13 RX ADMIN — SODIUM CHLORIDE 120 ML/HR: 9 INJECTION, SOLUTION INTRAVENOUS at 02:25

## 2019-02-13 RX ADMIN — CEFAZOLIN SODIUM 2 G: 2 INJECTION, SOLUTION INTRAVENOUS at 12:05

## 2019-02-13 RX ADMIN — SODIUM CHLORIDE, PRESERVATIVE FREE 10 ML: 5 INJECTION INTRAVENOUS at 09:46

## 2019-02-13 RX ADMIN — SODIUM CHLORIDE, PRESERVATIVE FREE 3 ML: 5 INJECTION INTRAVENOUS at 20:18

## 2019-02-13 RX ADMIN — LORAZEPAM 1 MG: 1 TABLET ORAL at 12:06

## 2019-02-13 RX ADMIN — INSULIN LISPRO 3 UNITS: 100 INJECTION, SOLUTION INTRAVENOUS; SUBCUTANEOUS at 20:18

## 2019-02-13 RX ADMIN — INSULIN LISPRO 2 UNITS: 100 INJECTION, SOLUTION INTRAVENOUS; SUBCUTANEOUS at 18:22

## 2019-02-13 RX ADMIN — OXYCODONE HYDROCHLORIDE AND ACETAMINOPHEN 1 TABLET: 7.5; 325 TABLET ORAL at 12:06

## 2019-02-13 RX ADMIN — CEFAZOLIN SODIUM 2 G: 2 INJECTION, SOLUTION INTRAVENOUS at 21:34

## 2019-02-13 RX ADMIN — LORAZEPAM 2 MG: 2 INJECTION INTRAMUSCULAR; INTRAVENOUS at 21:12

## 2019-02-13 RX ADMIN — Medication 1 CAPSULE: at 08:18

## 2019-02-13 RX ADMIN — CEFAZOLIN SODIUM 2 G: 2 INJECTION, SOLUTION INTRAVENOUS at 04:33

## 2019-02-13 RX ADMIN — CARVEDILOL 3.12 MG: 3.12 TABLET, FILM COATED ORAL at 18:22

## 2019-02-13 RX ADMIN — INSULIN LISPRO 2 UNITS: 100 INJECTION, SOLUTION INTRAVENOUS; SUBCUTANEOUS at 08:19

## 2019-02-13 RX ADMIN — Medication 100 MG: at 08:18

## 2019-02-13 RX ADMIN — VANCOMYCIN HYDROCHLORIDE 125 MG: KIT at 00:52

## 2019-02-13 RX ADMIN — LORAZEPAM 1 MG: 1 TABLET ORAL at 05:42

## 2019-02-13 RX ADMIN — FOLIC ACID 1 MG: 1 TABLET ORAL at 08:18

## 2019-02-13 RX ADMIN — VANCOMYCIN HYDROCHLORIDE 1250 MG: 10 INJECTION, POWDER, LYOPHILIZED, FOR SOLUTION INTRAVENOUS at 02:23

## 2019-02-13 RX ADMIN — VENLAFAXINE HYDROCHLORIDE 300 MG: 75 CAPSULE, EXTENDED RELEASE ORAL at 08:18

## 2019-02-13 RX ADMIN — OXYCODONE HYDROCHLORIDE AND ACETAMINOPHEN 1 TABLET: 7.5; 325 TABLET ORAL at 02:42

## 2019-02-13 RX ADMIN — VANCOMYCIN HYDROCHLORIDE 125 MG: KIT at 20:19

## 2019-02-13 RX ADMIN — VANCOMYCIN HYDROCHLORIDE 125 MG: KIT at 12:06

## 2019-02-13 RX ADMIN — TRAZODONE HYDROCHLORIDE 50 MG: 50 TABLET ORAL at 20:23

## 2019-02-13 RX ADMIN — Medication 1 TABLET: at 08:18

## 2019-02-13 RX ADMIN — LORAZEPAM 1 MG: 1 TABLET ORAL at 09:45

## 2019-02-13 RX ADMIN — MELOXICAM 15 MG: 7.5 TABLET ORAL at 08:18

## 2019-02-13 NOTE — PLAN OF CARE
Problem: Patient Care Overview  Goal: Plan of Care Review  Outcome: Ongoing (interventions implemented as appropriate)   02/13/19 1151   Coping/Psychosocial   Plan of Care Reviewed With patient   Coping/Psychosocial   Patient Agreement with Plan of Care agrees   OTHER   Outcome Summary OT eval complete. Pt Ox4 but limited by confusion and decreased attention. OT issued AE and educated pt on use for completion of all LBD and LBB. Pt able to dof/don socks with modA and use of sock aid and reacher. Recommend d/c to IPR when medically ready.    Plan of Care Review   Progress improving

## 2019-02-13 NOTE — PROGRESS NOTES
"Orthopaedic Surgery Progress Note     LOS: 3 days   Patient Care Team:  Provider, No Known as PCP - General    POD 1    Subjective     Interval History:   Patient Reports: less knee pain, Patient confused about where she is but recognized me    Objective     Vital Signs:  Temp (24hrs), Av °F (36.7 °C), Min:97.6 °F (36.4 °C), Max:98.7 °F (37.1 °C)    /98 (BP Location: Left arm, Patient Position: Lying)   Pulse 106   Temp 97.9 °F (36.6 °C) (Oral)   Resp 16   Ht 157.5 cm (62\")   Wt 82.6 kg (182 lb)   SpO2 95%   BMI 33.29 kg/m²     Labs:  Lab Results (last 24 hours)     Procedure Component Value Units Date/Time    CBC (No Diff) [[id number]]  (Abnormal) Collected:  19    Specimen:  Blood Updated:  19     WBC 10.45 10*3/mm3      RBC 3.54 10*6/mm3      Hemoglobin 8.9 g/dL      Hematocrit 29.1 %      MCV 82.2 fL      MCH 25.1 pg      MCHC 30.6 g/dL      RDW 14.6 %      RDW-SD 44.1 fl      MPV 8.0 fL      Platelets 272 10*3/mm3     Basic Metabolic Panel [[id number]]  (Abnormal) Collected:  19    Specimen:  Blood Updated:  19     Glucose 171 mg/dL      BUN 9 mg/dL      Creatinine 0.82 mg/dL      Sodium 131 mmol/L      Potassium 3.7 mmol/L      Chloride 100 mmol/L      CO2 25.0 mmol/L      Calcium 8.8 mg/dL      eGFR Non African Amer 73 mL/min/1.73      BUN/Creatinine Ratio 11.0     Anion Gap 6.0 mmol/L     Narrative:       [employer] Guidelines    Stage     Description        GFR  1         Normal or High     90+  2         Mild decrease      60-89  3         Moderate decrease  30-59  4         Severe decrease    15-29  5         Kidney failure     <15    The MDRD GFR formula is only valid for adults with stable renal function between ages 18 and 70.    Body Fluid Culture - Synovial Fluid, Knee, Left [[id number]] Collected:  02/10/19 2027    Specimen:  Synovial Fluid from Knee, Left Updated:  19     BF Culture No growth at 3 days     Gram " Stain Many (4+) WBCs seen      No organisms seen    Body Fluid Culture - Synovial Fluid, Knee, Right [[id number]] Collected:  02/10/19 2028    Specimen:  Synovial Fluid from Knee, Right Updated:  02/13/19 0714     BF Culture No growth at 3 days     Gram Stain Many (4+) WBCs seen      No organisms seen    POC Glucose Once [[id number]]  (Abnormal) Collected:  02/13/19 0712    Specimen:  Blood Updated:  02/13/19 0713     Glucose 151 mg/dL     Tissue / Bone Culture - Tissue, Knee, Right [[id number]] Collected:  02/12/19 1342    Specimen:  Tissue from Knee, Right Updated:  02/13/19 0705     Tissue Culture No growth at less than 24 hours     Gram Stain Moderate (3+) WBCs seen      No organisms seen    Body Fluid Culture - Synovial Fluid, Knee, Left [[id number]] Collected:  02/12/19 1236    Specimen:  Synovial Fluid from Knee, Left Updated:  02/13/19 0704     BF Culture No growth at less than 24 hours     Gram Stain Many (4+) WBCs seen      No organisms seen    POC Glucose Once [[id number]]  (Abnormal) Collected:  02/12/19 2118    Specimen:  Blood Updated:  02/12/19 2124     Glucose 393 mg/dL     Blood Culture - Blood, Arm, Right [[id number]] Collected:  02/10/19 1913    Specimen:  Blood from Arm, Right Updated:  02/12/19 2031     Blood Culture No growth at 2 days    Blood Culture - Blood, Arm, Left [[id number]] Collected:  02/10/19 1937    Specimen:  Blood from Arm, Left Updated:  02/12/19 2031     Blood Culture No growth at 2 days    POC Glucose Once [[id number]]  (Abnormal) Collected:  02/12/19 1623    Specimen:  Blood Updated:  02/12/19 1625     Glucose 211 mg/dL     Fungus Culture - Synovial Fluid, Knee, Left [[id number]] Collected:  02/12/19 1236    Specimen:  Synovial Fluid from Knee, Left Updated:  02/12/19 1438    AFB Culture - Synovial Fluid, Knee, Left [[id number]] Collected:  02/12/19 1236    Specimen:  Synovial Fluid from Knee, Left Updated:  02/12/19 1438    Anaerobic Culture - Tissue, Knee, Right [[id number]]  Collected:  02/12/19 1342    Specimen:  Tissue from Knee, Right Updated:  02/12/19 1438    Fungus Culture - Tissue, Knee, Right [[id number]] Collected:  02/12/19 1342    Specimen:  Tissue from Knee, Right Updated:  02/12/19 1438    AFB Culture - Tissue, Knee, Right [[id number]] Collected:  02/12/19 1342    Specimen:  Tissue from Knee, Right Updated:  02/12/19 1438    POC Glucose Once [[id number]]  (Abnormal) Collected:  02/12/19 1131    Specimen:  Blood Updated:  02/12/19 1151     Glucose 147 mg/dL     Anaerobic Culture - Synovial Fluid, Knee, Right [[id number]] Collected:  02/10/19 2028    Specimen:  Synovial Fluid from Knee, Right Updated:  02/12/19 1143     Culture No anaerobes isolated    POC Urine Pregnancy Test [[id number]]  (Normal) Collected:  02/12/19 1142    Specimen:  Urine Updated:  02/12/19 1142     HCG, Urine, QL Negative     Comment: 05/20        Lot Number [id number]     Internal Positive Control Positive     Internal Negative Control Negative          Physical Exam:  Dressings CDI, distal toes up and down    Assessment/Plan   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

## 2019-02-13 NOTE — THERAPY EVALUATION
Acute Care - Physical Therapy Initial Evaluation  [facility]     Patient Name: Jyoti Luna  : 1965  MRN: [id number]  Today's Date: 2019   Onset of Illness/Injury or Date of Surgery: 02/10/19  Date of Referral to PT: 19  Referring Physician: MD Zuhair       Admit Date: 2/10/2019    Visit Dx:     ICD-10-CM ICD-9-CM   1. Infection of prosthetic knee joint, subsequent encounter T84.59XD V58.89    Z96.659    2. Impaired mobility and ADLs Z74.09 799.89   3. Impaired functional mobility, balance, gait, and endurance Z74.09 V49.89     Patient Active Problem List   Diagnosis   • HO MSSA (methicillin susceptible Staphylococcus aureus) septicemia (CMS/[facility])   • Knee swelling; bilateral   • Alcoholism /alcohol abuse (CMS/HCC)   • Iron deficiency anemia   • Hypertension   • Depression   • Diabetes mellitus (CMS/HCC)   • Infected prosthetic knee joint (CMS/[facility])   • Constipation   • Diarrhea   • Leukocytosis   • Anemia, likely dilutional   • C. 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   • Acute blood loss anemia, asymptomatic   • Acute postoperative pain   • Hyponatremia     Past Medical History:   Diagnosis Date   • Acid reflux    • Alcoholism (CMS/[facility])    • Anemia 3/20/2018   • Arthritis    • Asthma    • Depression 3/20/2018   • Diabetes mellitus (CMS/HCC)     POSS R/T INFECTION PER PT   • Eczema    • History of transfusion    • Hypertension 3/20/2018     Past Surgical History:   Procedure Laterality Date   • JOINT REPLACEMENT      bilateral knees   • KNEE POLY INSERT EXCHANGE Bilateral 3/23/2018    Procedure: KNEE POLY INSERT EXCHANGE WITH IRRIGATION AND DEBRIDMENT;  Surgeon: Ti Moffett MD;  Location: [facility];  Service: Orthopedics   • LAPAROSCOPIC CHOLECYSTECTOMY     • TOTAL KNEE ARTHROPLASTY REVISION Bilateral 2019    Procedure: TOTAL KNEE ARTHROPLASTY REVISION WITH REMOVAL AND PLACE OF  ANTIBIOTIC SPACERS BILATERAL;  Surgeon: Zuhair  Ti CHACON MD;  Location: [facility];  Service: Orthopedics        PT ASSESSMENT (last 12 hours)      Physical Therapy Evaluation     Row Name 02/13/19 0831          PT Evaluation Time/Intention    Subjective Information  complains of;pain  -LM     Document Type  evaluation  -LM     Mode of Treatment  physical therapy;individual therapy  -LM     Patient Effort  good  -LM     Symptoms Noted During/After Treatment  increased pain  -LM     Comment  Bilateral KI on at all times - WBAT; no ROM L knee. Pt impulsive, confused.  (Significant)   -LM     Row Name 02/13/19 0831          General Information    Patient Profile Reviewed?  yes  -LM     Onset of Illness/Injury or Date of Surgery  02/10/19  -LM     Referring Physician  MD Zuhair  -LM     Patient Observations  alert;cooperative;agree to therapy  -LM     Patient/Family Observations  No family at bedside.  -LM     General Observations of Patient  Pt received semi-douglas in bed, bilateral knee immobilizers donned, right adductor canal nerve catheter.  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 In 3/2018, admitted with sepsis and underwent bilateral knee I&D poly exchange. During this admit, patient presented to ED with complaint of bilateral knee pain, swelling, and redness. Unable to satnd or walk.   -LM     Existing Precautions/Restrictions  fall;brace worn when out of bed;other (see comments)  (Significant)  BLE WBAT with bilat KI AAT; no ROM L knee, impulsive  -LM     Risks Reviewed  patient:;LOB;nausea/vomiting;dizziness;increased discomfort;change in vital signs  -LM     Benefits Reviewed  patient:;improve function;increase independence;increase strength  -LM     Barriers to  Rehab  medically complex;cognitive status;previous functional deficit  -LM     Row Name 02/13/19 0831          Relationship/Environment    Primary Source of Support/Comfort  pet  -LM     Lives With  alone  -LM     Concerns About Impact on Relationships  Pt reports no available assist at home.  -LM     Row Name 02/13/19 0831          Resource/Environmental Concerns    Current Living Arrangements  home/apartment/condo  -LM     Resource/Environmental Concerns  home accessibility  -LM     Home Accessibility Concerns  stairs to enter home;not wheelchair accessible  -LM     Row Name 02/13/19 0831          Home Main Entrance    Number of Stairs, Main Entrance  other (see comments) 24 (2 flights of stairs to apartment)  -LM     Stair Railings, Main Entrance  railings on both sides of stairs  -LM     Row Name 02/13/19 0831          Stairs Within Home, Primary    Stairs Comment, Within Home, Primary  1 step to laundry  -LM     Row Name 02/13/19 0831          Cognitive Assessment/Interventions    Additional Documentation  Cognitive Assessment/Intervention (Group)  -LM     Row Name 02/13/19 0831          Cognitive Assessment/Intervention- PT/OT    Affect/Mental Status (Cognitive)  confused  -LM     Behavioral Issues (Cognitive)  difficulty managing stress;overwhelmed easily  -LM     Orientation Status (Cognition)  oriented x 4  -LM     Follows Commands (Cognition)  follows one step commands;75-90% accuracy;repetition of directions required;verbal cues/prompting required  -LM     Cognitive Function (Cognitive)  safety deficit  -LM     Safety Deficit (Cognitive)  moderate deficit;ability to follow commands;at risk behavior observed;awareness of need for assistance;impulsivity;insight into deficits/self awareness;judgment;problem solving;safety precautions awareness;safety precautions follow-through/compliance  -     Personal Safety Interventions  fall prevention program maintained;gait belt;nonskid shoes/slippers when out of  bed;supervised activity  -     Row Name 02/13/19 0831          Safety Issues, Functional Mobility    Safety Issues Affecting Function (Mobility)  ability to follow commands;at risk behavior observed;awareness of need for assistance;impulsivity;insight into deficits/self awareness;judgment;positioning of assistive device;problem solving;safety precaution awareness;safety precautions follow-through/compliance;sequencing abilities  -     Row Name 02/13/19 0831          Mobility Assessment/Treatment    Extremity Weight-bearing Status  left lower extremity;right lower extremity  -     Left Lower Extremity (Weight-bearing Status)  weight-bearing as tolerated (WBAT) in knee immobilizer  -     Right Lower Extremity (Weight-bearing Status)  weight-bearing as tolerated (WBAT) in knee immobilizer  -     Row Name 02/13/19 0831          Bed Mobility Assessment/Treatment    Bed Mobility Assessment/Treatment  supine-sit  -     Supine-Sit [place] (Bed Mobility)  minimum assist (75% patient effort);verbal cues  -     Bed Mobility, Safety Issues  decreased use of arms for pushing/pulling;decreased use of legs for bridging/pushing  -     Assistive Device (Bed Mobility)  head of bed elevated;bed rails  -     Comment (Bed Mobility)  Verbal cues for sequencing, range of motion limitations. Pt able to transition to edge of bed with minimal assist. Pt denied dizziness sitting EOB.  -     Row Name 02/13/19 0831          Transfer Assessment/Treatment    Transfer Assessment/Treatment  sit-stand transfer;stand-sit transfer  -     Comment (Transfers)  Verbal cues to push from bed with UEs when standing, reach for chair armrests when lowering to sit.  -LM     Sit-Stand [place] (Transfers)  minimum assist (75% patient effort);2 person assist;verbal cues  -LM     Stand-Sit [place] (Transfers)  minimum assist (75% patient effort);2 person assist;verbal cues  -     Row Name 02/13/19 0831          Sit-Stand  Transfer    Assistive Device (Sit-Stand Transfers)  walker, front-wheeled  -LM     Row Name 02/13/19 0831          Stand-Sit Transfer    Assistive Device (Stand-Sit Transfers)  walker, front-wheeled  -LM     Row Name 02/13/19 0831          Gait/Stairs Assessment/Training    [place] Level (Gait)  contact guard;2 person assist;verbal cues  -LM     Assistive Device (Gait)  walker, front-wheeled  -LM     Distance in Feet (Gait)  2  -LM     Pattern (Gait)  step-to  -LM     Deviations/Abnormal Patterns (Gait)  bilateral deviations;zaid decreased;stride length decreased  -LM     Bilateral Gait Deviations  weight shift ability decreased  -LM     Comment (Gait/Stairs)  Pt able to take small steps to bedside chair with knee immobilizers donned. Pt reported increased left knee pain during transfer.   -     Row Name 02/13/19 0831          General ROM    GENERAL ROM COMMENTS  Bilateral hip ROM WFL, bilateral knee not assessed due to current MD orders (no ROM L knee), bilateral ankles WFL  -     Row Name 02/13/19 0831          MMT (Manual Muscle Testing)    General MMT Comments  Not formally assessed; BLE grossly 3+/5 due to ability to transfer.  -LM     Row Name 02/13/19 0831          Balance    Balance  static sitting balance;static standing balance  -LM     Row Name 02/13/19 0831          Static Sitting Balance    Level of [place] (Unsupported Sitting, Static Balance)  contact guard assist  -LM     Sitting Position (Unsupported Sitting, Static Balance)  sitting on edge of bed  -LM     Time Able to Maintain Position (Unsupported Sitting, Static Balance)  2 to 3 minutes  -     Row Name 02/13/19 0831          Static Standing Balance    Level of [place] (Supported Standing, Static Balance)  minimal assist, 75% patient effort  -LM     Time Able to Maintain Position (Supported Standing, Static Balance)  1 to 2 minutes  -LM     Assistive Device Utilized (Supported Standing, Static Balance)  walker, rolling   "-     Row Name 02/13/19 0831          Sensory Assessment/Intervention    Sensory General Assessment  no sensation deficits identified;other (see comments) BLEs. Pt reports having \"sensitive\" feet  -LM     Row Name 02/13/19 0831          Pain Scale: Numbers Pre/Post-Treatment    Pain Scale: Numbers, Pretreatment  8/10  -LM     Pain Scale: Numbers, Post-Treatment  8/10  -LM     Pain Location - Side  Bilateral  -LM     Pain Location - Orientation  lower  -LM     Pain Location  extremity  -LM     Pain Intervention(s)  Repositioned;Ambulation/increased activity  -     Row Name             Wound 02/12/19 1418 Bilateral knee incision    Wound - Properties Group Date first assessed: 02/12/19  -RV Time first assessed: 1418  -RV Side: Bilateral  -RV Location: knee  -RV Type: incision  -RV    Row Name 02/13/19 0831          Coping    Observed Emotional State  anxious  -     Row Name 02/13/19 0831          Plan of Care Review    Plan of Care Reviewed With  patient  -     Row Name 02/13/19 0831          Physical Therapy Clinical Impression    Date of Referral to PT  02/12/19  -LM     PT Diagnosis (PT Clinical Impression)  s/p TKA revision with removal and placement of antibiotic spacers bilaterally  -LM     Prognosis (PT Clinical Impression)  good  -LM     Criteria for Skilled Interventions Met (PT Clinical Impression)  yes;treatment indicated  -LM     Rehab Potential (PT Clinical Summary)  good, to achieve stated therapy goals  -LM     Care Plan Review (PT)  evaluation/treatment results reviewed;patient/other agree to care plan  -     Row Name 02/13/19 0831          Vital Signs    Pre Patient Position  Supine  -LM     Intra Patient Position  Standing  -LM     Post Patient Position  Sitting  -     Row Name 02/13/19 0831          Physical Therapy Goals    Bed Mobility Goal Selection (PT)  bed mobility, PT goal 1  -LM     Transfer Goal Selection (PT)  transfer, PT goal 1  -LM     Gait Training Goal Selection (PT)  gait " training, PT goal 1  -LM     Row Name 02/13/19 0831          Bed Mobility Goal 1 (PT)    Activity/Assistive Device (Bed Mobility Goal 1, PT)  sit to supine/supine to sit  -LM     [place] Level/Cues Needed (Bed Mobility Goal 1, PT)  conditional independence  -LM     Time Frame (Bed Mobility Goal 1, PT)  long term goal (LTG);5 days  -LM     Row Name 02/13/19 0831          Transfer Goal 1 (PT)    Activity/Assistive Device (Transfer Goal 1, PT)  sit-to-stand/stand-to-sit;walker, rolling  -LM     [place] Level/Cues Needed (Transfer Goal 1, PT)  conditional independence  -LM     Time Frame (Transfer Goal 1, PT)  long term goal (LTG);5 days  -LM     Row Name 02/13/19 0831          Gait Training Goal 1 (PT)    Activity/Assistive Device (Gait Training Goal 1, PT)  gait (walking locomotion);assistive device use;walker, rolling  -LM     [place] Level (Gait Training Goal 1, PT)  conditional independence  -LM     Distance (Gait Goal 1, PT)  50 feet  -LM     Time Frame (Gait Training Goal 1, PT)  long term goal (LTG);5 days  -LM     Row Name 02/13/19 0831          Positioning and Restraints    Pre-Treatment Position  in bed  -LM     Post Treatment Position  chair  -LM     In Chair  notified nsg;reclined;sitting;call light within reach;encouraged to call for assist;exit alarm on;on mechanical lift sling;legs elevated;with brace  -LM     Row Name 02/13/19 0831          Living Environment    Home Accessibility  stairs to enter home;not wheelchair accessible;tub/shower is not walk in  -LM       User Key  (r) = Recorded By, (t) = Taken By, (c) = Cosigned By    Initials Name Provider Type    Kishan Cm, RN Registered Nurse    Sujata Sanchez, PT Physical Therapist        Physical Therapy Education     Title: PT OT SLP Therapies (In Progress)     Topic: Physical Therapy (Done)     Point: Mobility training (Done)     Learning Progress Summary           Patient Acceptance, E,D, VU,NR by TERESE at 2/13/2019   8:31 AM    Comment:  Reviewed benefits of activity, WBAT orders, ROM expectations (no ROM L knee), progression of POC, safety with mobility.                   Point: Home exercise program (Done)     Learning Progress Summary           Patient Acceptance, E,D, VU,NR by LM at 2/13/2019  8:31 AM    Comment:  Reviewed benefits of activity, WBAT orders, ROM expectations (no ROM L knee), progression of POC, safety with mobility.                   Point: Body mechanics (Done)     Learning Progress Summary           Patient Acceptance, E,D, VU,NR by LM at 2/13/2019  8:31 AM    Comment:  Reviewed benefits of activity, WBAT orders, ROM expectations (no ROM L knee), progression of POC, safety with mobility.                   Point: Precautions (Done)     Learning Progress Summary           Patient Acceptance, E,D, VU,NR by TERESE at 2/13/2019  8:31 AM    Comment:  Reviewed benefits of activity, WBAT orders, ROM expectations (no ROM L knee), progression of POC, safety with mobility.                               User Key     Initials Effective Dates Name Provider Type Discipline    TERESE 04/03/18 -  Sujata Sinha, PT Physical Therapist PT              PT Recommendation and Plan  Anticipated Discharge Disposition (PT): inpatient rehabilitation facility  Planned Therapy Interventions (PT Eval): balance training, bed mobility training, gait training, home exercise program, patient/family education, strengthening, transfer training  Therapy Frequency (PT Clinical Impression): daily  Outcome Summary/Treatment Plan (PT)  Anticipated Equipment Needs at Discharge (PT): other (see comments)(defer to rehab)  Anticipated Discharge Disposition (PT): inpatient rehabilitation facility  Plan of Care Reviewed With: patient  Outcome Summary: PT eval complete. Pt was alert and oriented; however, presented with impulsivity and confusion. Pt allowed for WBAT with bilateral knee immobilizers during mobility. Able to ambulate 2 feet to bedside chair  with CGAx2 and RW. Pt required minAx2 for bed mobility and transfers. Also demonstrated poor safety awareness throughout evaluation. Recommend d/c to IRF to assist with return to PLOF.   Outcome Measures     Row Name 02/13/19 1000 02/13/19 0831          How much help from another person do you currently need...    Turning from your back to your side while in flat bed without using bedrails?  --  3  -LM     Moving from lying on back to sitting on the side of a flat bed without bedrails?  --  2  -LM     Moving to and from a bed to a chair (including a wheelchair)?  --  3  -LM     Standing up from a chair using your arms (e.g., wheelchair, bedside chair)?  --  3  -LM     Climbing 3-5 steps with a railing?  --  1  -LM     To walk in hospital room?  --  2  -LM     AM-PAC 6 Clicks Score  --  14  -LM        How much help from another is currently needed...    Putting on and taking off regular lower body clothing?  2  -HK  --     Bathing (including washing, rinsing, and drying)  2  -HK  --     Toileting (which includes using toilet bed pan or urinal)  2  -HK  --     Putting on and taking off regular upper body clothing  3  -HK  --     Taking care of personal grooming (such as brushing teeth)  2  -HK  --     Eating meals  3  -HK  --     Score  14  -HK  --        Functional Assessment    Outcome Measure Options  AM-PAC 6 Clicks Daily Activity (OT)  -HK  AM-PAC 6 Clicks Basic Mobility (PT)  -       User Key  (r) = Recorded By, (t) = Taken By, (c) = Cosigned By    Initials Name Provider Type    Sujata Sanchez, PT Physical Therapist    HK Chery Zarate, OT Occupational Therapist         Time Calculation:   PT Charges     Row Name 02/13/19 0831             Time Calculation    Start Time  0831  -LM      PT Received On  02/13/19  -      PT Goal Re-Cert Due Date  02/23/19  -         Time Calculation- PT    Total Timed Code Minutes- PT  0 minute(s)  -        User Key  (r) = Recorded By, (t) = Taken By, (c) = Cosigned By     Initials Name Provider Type     Sujata Sinha, PT Physical Therapist        Therapy Suggested Charges     Code   Minutes Charges    None           Therapy Charges for Today     Code Description Service Date Service Provider Modifiers Qty    39709030467 HC PT EVAL MOD COMPLEXITY 4 2/13/2019 Sujata Sinha, PT GP 1    36487881684 HC PT THER SUPP EA 15 MIN 2/13/2019 Sujata Sinha, PT GP 2          PT G-Codes  Outcome Measure Options: AM-PAC 6 Clicks Daily Activity (OT)  AM-PAC 6 Clicks Score: 14  Score: 14      Sujata Sinha PT  2/13/2019

## 2019-02-13 NOTE — NURSING NOTE
Assisted patient up to the bedside commode, by way of a stand and pivot from the recliner, using a gait belt and walker.  Patient urinated some during the transfer and both knee immobilizers got wet and she proceeded to take them off and pulled off the ace wrap and gauze covering both knee incisions.  During this process she also accidentally pulled out her right leg nerve catheter (the left leg nerve catheter had already been removed prior to this).  Blue catheter tip intact.  Patient was very confused, anxious, and fidgety.  Her thoughts were very scattered and she had trouble following commands.      Applied clean 4x4 gauze and ace wraps to the left and right knee, then placed new clean, dry knee immobilizers on each leg.  It took the assistance of 3 people to assist her from the bedside commode back to bed.

## 2019-02-13 NOTE — PLAN OF CARE
Problem: Patient Care Overview  Goal: Plan of Care Review  Outcome: Ongoing (interventions implemented as appropriate)   02/13/19 0495   Coping/Psychosocial   Plan of Care Reviewed With patient   OTHER   Outcome Summary PT eval complete. Pt was alert and oriented; however, presented with impulsivity and confusion. Pt allowed for WBAT with bilateral knee immobilizers during mobility. Able to ambulate 2 feet to bedside chair with CGAx2 and RW. Pt required minAx2 for bed mobility and transfers. Also demonstrated poor safety awareness throughout evaluation. Recommend d/c to IRF to assist with return to PLOF.

## 2019-02-13 NOTE — PROGRESS NOTES
"IM progress note      Jyoti Luna  [id number]  1965     LOS: 3 days     Attending: Lorri Siddiqui MD    Primary Care Provider: Provider, No Known      Chief Complaint/Reason for visit:  Bilateral infected knee prosthesis     Subjective   Confused, agitated. Having pain. Wants a beer. Seeing bugs flying around the room. Reports chills and sweating last night. No n/v/sb/cp.    Objective     Vital Signs  Visit Vitals  /98 (BP Location: Left arm, Patient Position: Lying)   Pulse 106   Temp 97.9 °F (36.6 °C) (Oral)   Resp 16   Ht 157.5 cm (62\")   Wt 82.6 kg (182 lb)   SpO2 95%   BMI 33.29 kg/m²     Temp (24hrs), Av °F (36.7 °C), Min:97.6 °F (36.4 °C), Max:98.7 °F (37.1 °C)    Physical Exam:     General Appearance:    Alert, cooperative, in no acute distress   Head:    Normocephalic, without obvious abnormality, atraumatic    Lungs:     Normal effort, symmetric chest rise, no crepitus, clear to      auscultation bilaterally               Heart:    Regular rhythm and normal rate, normal S1 and S2    Abdomen:     Normal bowel sounds, no masses, no organomegaly, soft        non-tender, non-distended, no guarding, no rebound                tenderness   Extremities:   Bilateral knees ACE wrap CDI. Right adductor canal nerve block (left pulled out by pt this morning). Knee immobilizers in place. HV's-out   Pulses:   Pulses palpable and equal bilaterally   Skin:   No bleeding, bruising or rash. RUE PICC    Neurologic:   Cranial nerves 2 - 12 grossly intact, sensation intact. Flexion and dorsiflexion intact bilateral feet.          Results Review:     I reviewed the patient's new clinical results.   Results from last 7 days   Lab Units 19  0639 02/11/19  0504 02/10/19  1928   WBC 10*3/mm3 10.45 12.03* 10.76   HEMOGLOBIN g/dL 8.9* 11.3* 11.5   HEMATOCRIT % 29.1* 36.2 35.7   PLATELETS 10*3/mm3 272 262 278     Results from last 7 days   Lab Units 19  0639 19  0504 02/10/19  1928   SODIUM " mmol/L 131* 134 136   POTASSIUM mmol/L 3.7 3.9 3.7   CHLORIDE mmol/L 100 94* 95*   CO2 mmol/L 25.0 31.0 31.0   BUN mg/dL 9 10 9   CREATININE mg/dL 0.82 0.75 0.73   CALCIUM mg/dL 8.8 9.5 9.1   BILIRUBIN mg/dL  --   --  1.0   ALK PHOS U/L  --   --  162*   ALT (SGPT) U/L  --   --  40   AST (SGOT) U/L  --   --  29   GLUCOSE mg/dL 171* 165* 132*     Results for NABIL MATSON (MRN [id number]) as of 2/13/2019 10:02   Ref. Range 2/12/2019 16:23 2/12/2019 21:18 2/13/2019 06:39 2/13/2019 07:12   Glucose Latest Ref Range: 70 - 130 mg/dL 211 (H) 393 (H) 171 (H) 151 (H)     ECG 12 Lead   Order: [id number]   Status:  Preliminary result   Visible to patient:  No (Not Released) Next appt:  None      Narrative     Test Reason : Surgery  Blood Pressure : **/** mmHG  Vent. Rate : 099 BPM     Atrial Rate : 099 BPM     P-R Int : 128 ms          QRS Dur : 096 ms      QT Int : 364 ms       P-R-T Axes : 046 034 047 degrees     QTc Int : 467 ms    Normal sinus rhythm  Normal ECG  When compared with ECG of 23-MAR-2018 11:01,  No significant change was found    Referred By:  LINDA           Confirmed By:            Microbiology Results (last 21 days)     Collected  Updated  Procedure  Result Status      02/12/2019 1342 02/12/2019 1438  Anaerobic Culture - Tissue, Knee, Right [[id number]]   Tissue from Knee, Right     In process  No result data             02/12/2019 1342 02/12/2019 1438  Fungus Culture - Tissue, Knee, Right [[id number]]   Tissue from Knee, Right     In process  No result data             02/12/2019 1342 02/13/2019 0705  Tissue / Bone Culture - Tissue, Knee, Right [[id number]]   Tissue from Knee, Right     Preliminary result  Tissue Culture No growth at less than 24 hours   Gram Stain Moderate (3+) WBCs seen    No organisms seen             02/12/2019 1342 02/12/2019 1438  AFB Culture - Tissue, Knee, Right [[id number]]   Tissue from Knee, Right     In process  No result data             02/12/2019 1236  02/12/2019 1438  Fungus  Culture - Synovial Fluid, Knee, Left [[id number]]   Synovial Fluid from Knee, Left     In process  No result data             02/12/2019 1236  02/13/2019 0704  Body Fluid Culture - Synovial Fluid, Knee, Left [[id number]]   Synovial Fluid from Knee, Left     Preliminary result  BF Culture No growth at less than 24 hours   Gram Stain Many (4+) WBCs seen    No organisms seen             02/12/2019 1236  02/12/2019 1438  AFB Culture - Synovial Fluid, Knee, Left [[id number]]   Synovial Fluid from Knee, Left     In process  No result data             02/11/2019 0941  02/11/2019 1136  Clostridium Difficile Toxin - Stool, Per Rectum [[id number]]    Stool from Per Rectum     Final result  No result data             02/11/2019 0941  02/11/2019 1136  Clostridium Difficile Toxin, PCR - Stool, Per Rectum [[id number]]    (Abnormal)   Stool from Per Rectum     Final result  Clostridium difficile (toxin A/B) Detected Abnormal              02/10/2019 2028  02/13/2019 0714  Body Fluid Culture - Synovial Fluid, Knee, Right [[id number]]   Synovial Fluid from Knee, Right     Preliminary result  BF Culture No growth at 3 days   Gram Stain Many (4+) WBCs seen    No organisms seen             02/10/2019 2028  02/12/2019 1143  Anaerobic Culture - Synovial Fluid, Knee, Right [[id number]]   Synovial Fluid from Knee, Right     Preliminary result  Culture No anaerobes isolated             02/10/2019 2027  02/13/2019 0714  Body Fluid Culture - Synovial Fluid, Knee, Left [[id number]]   Synovial Fluid from Knee, Left     Preliminary result  BF Culture No growth at 3 days   Gram Stain Many (4+) WBCs seen    No organisms seen             02/10/2019 2027  02/11/2019 1338  Anaerobic Culture - Synovial Fluid, Knee, Left [[id number]]   Synovial Fluid from Knee, Left     Preliminary result  Culture No anaerobes isolated             02/10/2019 1937 02/12/2019 2031  Blood Culture - Blood, Arm, Left [[id number]]   Blood from Arm, Left     Preliminary result  Blood  Culture No growth at 2 days             02/10/2019 1913  02/12/2019 2031  Blood Culture - Blood, Arm, Right [[id number]]   Blood from Arm, Right     Preliminary result  Blood Culture No growth at 2 days                  Results for NABIL MATSON (MRN [id number]) as of 2/11/2019 09:31   Ref. Range 2/11/2019 05:05   C-Reactive Protein Latest Ref Range: 0.00 - 1.00 mg/dL 27.72 (H)     Right :  Results for NABIL MATSON (MRN [id number]) as of 2/11/2019 09:31   Ref. Range 2/10/2019 20:27   Color, Fluid Unknown Red   Appearance, Fluid Latest Ref Range: Clear  Bloody (A)   WBC, Fluid Latest Units: /mm3 52,860   RBC, Fluid Latest Units: /mm3 575,000   Crystals, Fluid Unknown No crystals seen     Left:   Results for NABIL MATSON (MRN [id number]) as of 2/11/2019 09:31   Ref. Range 2/10/2019 20:27   Color, Fluid Unknown Yellow   Appearance, Fluid Latest Ref Range: Clear  Turbid (A)   WBC, Fluid Latest Units: /mm3 232,950   RBC, Fluid Latest Units: /mm3 21,000   Neutrophils, Fluid Latest Units: % 94   Lymphocytes, Fluid Latest Units: % 3   Monocytes, Fluid Latest Units: % 3   Crystals, Fluid Unknown No crystals seen     Results for NABIL MATSON (MRN [id number]) as of 2/11/2019 09:31   Ref. Range 2/11/2019 05:04   Sed Rate Latest Ref Range: 0 - 30 mm/hr 100 (H)     I reviewed the patient's new imaging including images and reports.    All medications reviewed.     carvedilol 3.125 mg Oral BID With Meals   ceFAZolin 2 g Intravenous Q8H   enoxaparin 40 mg Subcutaneous Daily   folic acid 1 mg Oral Daily   insulin lispro 0-7 Units Subcutaneous 4x Daily With Meals & Nightly   lactobacillus acidophilus 1 capsule Oral Daily   meloxicam 15 mg Oral Daily   multivitamin 1 tablet Oral Daily   sodium chloride 10 mL Intravenous Q12H   sodium chloride 3 mL Intravenous Q12H   sodium chloride 3 mL Intravenous Q12H   thiamine 100 mg Oral Daily   traZODone 50 mg Oral Nightly   vancomycin 125 mg Oral Q6H   venlafaxine  mg Oral Daily With  Breakfast       acetaminophen 650 mg Q4H PRN   Or     acetaminophen 650 mg Q4H PRN   Or     acetaminophen 650 mg Q4H PRN   bisacodyl 10 mg Daily PRN   bisacodyl 10 mg Daily PRN   dextrose 25 g Q15 Min PRN   dextrose 15 g Q15 Min PRN   diphenhydrAMINE 50 mg Q6H PRN   docusate sodium 100 mg BID PRN   glucagon (human recombinant) 1 mg PRN   HYDROcodone-acetaminophen 1 tablet Q4H PRN   HYDROmorphone 0.5 mg Q2H PRN   And     naloxone 0.1 mg Q5 Min PRN   labetalol 10 mg Q4H PRN   LORazepam 1 mg Q2H PRN   Or     LORazepam 1 mg Q2H PRN   Or     LORazepam 2 mg Q1H PRN   Or     LORazepam 2 mg Q1H PRN   Or     LORazepam 2 mg Q15 Min PRN   Or     LORazepam 2 mg Q15 Min PRN   magnesium hydroxide 10 mL Daily PRN   Morphine 4 mg Q2H PRN   And     naloxone 0.4 mg Q5 Min PRN   ondansetron 4 mg Q6H PRN   Or     ondansetron 4 mg Q6H PRN   oxyCODONE-acetaminophen 1 tablet Q4H PRN   oxyCODONE-acetaminophen 1 tablet Q4H PRN   sodium chloride 500 mL TID PRN   sodium chloride 1-10 mL PRN   sodium chloride 10 mL PRN       Assessment/Plan     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 difficile diarrhea    Acute blood loss anemia, asymptomatic    Acute postoperative pain    Hyponatremia    Plan    Follow fluid studies, cultures from knee arthrocentesis.  Follow blood cultures.    ID following, Dr. Francis. Likely PICC and long-term IV abx. Cefazolin 2 g IV every 8 hrs  Cdiff- po vanc  Watch for ETOH WD. CIWA q shift. W/D protocol    1. PT/OT- WBAT  2. Pain control-prns, AC nerve block  3. IS-encouraged  4. DVT proph- mechs/Lovenox  5. Bowel regimen  6. Monitor post-op labs  7. DC planning. CM following.      BASILIO Jeff  02/13/19  10:03 AM

## 2019-02-13 NOTE — PROGRESS NOTES
Continued Stay Note  [facility]     Patient Name: Jyoti Luna  MRN: [id number]  Today's Date: 2/13/2019    Admit Date: 2/10/2019    Discharge Plan     Row Name 02/13/19 1608       Plan    Plan  to be determined    Plan Comments  Case mgt con't to follow. She will need inpt rehab or LTAC. Case mgt unable to discuss with her today secondary to confusion. Case mgt will follow and assist with d/c arrangements,        Discharge Codes    No documentation.       Expected Discharge Date and Time     Expected Discharge Date Expected Discharge Time    Feb 14, 2019             Sonja C Kellerman, RN

## 2019-02-13 NOTE — PLAN OF CARE
Problem: Patient Care Overview  Goal: Plan of Care Review  Outcome: Ongoing (interventions implemented as appropriate)   02/13/19 7886   Coping/Psychosocial   Plan of Care Reviewed With patient   OTHER   Outcome Summary PT eval complete. Pt was alert and oriented; however, presented with impulsivity and confusion. Pt allowed for WBAT with bilateral knee immobilizers during mobility. Able to ambulate 2 feet to bedside chair with CGAx2 and RW. Pt required minAx2 for bed mobility and transfers. Also demonstrated poor safety awareness throughout evaluation. Recommend d/c to IRF to assist with return to PLOF.

## 2019-02-13 NOTE — PLAN OF CARE
Problem: Skin Injury Risk (Adult)  Goal: Identify Related Risk Factors and Signs and Symptoms  Outcome: Ongoing (interventions implemented as appropriate)    Goal: Skin Health and Integrity  Outcome: Ongoing (interventions implemented as appropriate)      Problem: Fall Risk (Adult)  Goal: Identify Related Risk Factors and Signs and Symptoms  Outcome: Ongoing (interventions implemented as appropriate)    Goal: Absence of Fall  Outcome: Ongoing (interventions implemented as appropriate)      Problem: Pain, Acute (Adult)  Goal: Identify Related Risk Factors and Signs and Symptoms  Outcome: Ongoing (interventions implemented as appropriate)    Goal: Acceptable Pain Control/Comfort Level  Outcome: Ongoing (interventions implemented as appropriate)      Problem: Knee Arthroplasty (Total, Partial) (Adult)  Goal: Signs and Symptoms of Listed Potential Problems Will be Absent, Minimized or Managed (Knee Arthroplasty)  Outcome: Ongoing (interventions implemented as appropriate)    Goal: Anesthesia/Sedation Recovery  Outcome: Ongoing (interventions implemented as appropriate)      Problem: Patient Care Overview  Goal: Plan of Care Review  Outcome: Ongoing (interventions implemented as appropriate)   02/13/19 0340   Coping/Psychosocial   Plan of Care Reviewed With patient   Coping/Psychosocial   Patient Agreement with Plan of Care agrees   OTHER   Outcome Summary Pt consistently complaining of 10 out of 10 pain. We are giving IV and PO pain medications. She does report that her pain has gotten some better with her pain medication but not much. Pt Bilateral drains are draining sufficiently. Pt has voided some with the bedpan, but after bladder scanning, the pt was retaining 405mL post void. We cathed patient and obtained 500mL output. Last CIWA was 4.   Plan of Care Review   Progress no change       Problem: Infection, Risk/Actual (Adult)  Goal: Identify Related Risk Factors and Signs and Symptoms  Outcome: Ongoing  (interventions implemented as appropriate)    Goal: Infection Prevention/Resolution  Outcome: Ongoing (interventions implemented as appropriate)      Problem: Alcohol Withdrawal Acute, Risk/Actual (Adult)  Goal: Signs and Symptoms of Listed Potential Problems Will be Absent, Minimized or Managed (Alcohol Withdrawal Acute, Risk/Actual)  Outcome: Ongoing (interventions implemented as appropriate)

## 2019-02-13 NOTE — PLAN OF CARE
Problem: Patient Care Overview  Goal: Plan of Care Review  Outcome: Ongoing (interventions implemented as appropriate)   02/12/19 7649   Coping/Psychosocial   Plan of Care Reviewed With patient   OTHER   Outcome Summary Patient arrived back from surgery complaining of 10 out of 10 pain. Medicated with PRN 5 mg percocet and 2 mg Morphine. Denies numbness. Verbalizes that she does not want to be alone. JOHNNY incisions due to leg braces. VSS.   Plan of Care Review   Progress no change

## 2019-02-13 NOTE — PROGRESS NOTES
"[facility] Progress Note    Date of Admission: 2/10/2019      Antibiotics:  Cefazolin,  Po vanco    CC: No chief complaint on file.      S: Patient with bilateral prosthetic arthroplasties removed with some postoperative pain hemodynamically stable no fevers  O:  /98 (BP Location: Left arm, Patient Position: Lying)   Pulse 106   Temp 97.9 °F (36.6 °C) (Oral)   Resp 16   Ht 157.5 cm (62\")   Wt 82.6 kg (182 lb)   SpO2 95%   BMI 33.29 kg/m²   Temp (24hrs), Av °F (36.7 °C), Min:97.6 °F (36.4 °C), Max:98.7 °F (37.1 °C)      PE:     GENERAL: Awake and alert, in no acute distress.   HEENT: Normocephalic, atraumatic.  PERRL. EOMI. No conjunctival injection. No icterus. Oropharynx clear without evidence of thrush or exudate. No evidence of peridontal disease.    NECK: Supple without nuchal rigidity  LYMPH: No cervical, axillary or inguinal lymphadenopathy. No neck masses  HEART: RRR; No murmur, rubs, gallops.   LUNGS: Clear to auscultation bilaterally without wheezing, rales, rhonchi. Normal respiratory effort.  ABDOMEN: Soft, mild distention with increased bowel sounds. No rebound or guarding.   EXT:  Trace lower extremity edema : Normal appearing genitalia without Taylor catheter.  MSK: Bilateral knee dressed with decreased range of motion   SKIN: Warm and dry skin with eczematous rash    NEURO: Oriented to PPT. No focal deficits.   PSYCHIATRIC: Anxious mood. Cooperative with PE      Laboratory Data    Results from last 7 days   Lab Units 19  0639 19  0504 02/10/19  1928   WBC 10*3/mm3 10.45 12.03* 10.76   HEMOGLOBIN g/dL 8.9* 11.3* 11.5   HEMATOCRIT % 29.1* 36.2 35.7   PLATELETS 10*3/mm3 272 262 278     Results from last 7 days   Lab Units 19  0639   SODIUM mmol/L 131*   POTASSIUM mmol/L 3.7   CHLORIDE mmol/L 100   CO2 mmol/L 25.0   BUN mg/dL 9   CREATININE mg/dL 0.82   GLUCOSE mg/dL 171*   CALCIUM mg/dL 8.8     Results from last 7 days   Lab Units 02/10/19  1928   ALK PHOS U/L 162* "   BILIRUBIN mg/dL 1.0   ALT (SGPT) U/L 40   AST (SGOT) U/L 29     Results from last 7 days   Lab Units 02/11/19  0504   SED RATE mm/hr 100*     Results from last 7 days   Lab Units 02/11/19  0505   CRP mg/dL 27.72*       Estimated Creatinine Clearance: 78.1 mL/min (by C-G formula based on SCr of 0.82 mg/dL).      Microbiology:      Radiology:  Imaging Results (last 24 hours)     Procedure Component Value Units Date/Time    XR Knee 1 or 2 View Bilateral [[id number]] Updated:  02/12/19 1552          PROBLEM LIST:   Bilateral prosthetic joint infections  Prior history of methicillin sensitive staph aureus bilateral knee infections and bacteremia  leukocytosis  Chronically elevated sedimentation rate CRP  Alcoholism  Severe eczema  Bilateral knee pain and difficulty with ambulation  Low-grade fevers  C diff colitis with acute diarrhea        ASSESSMENT:  53-year-old female with severe eczema, history of alcoholism, with previous MSSA bacteremia with bilateral knee prostate joint infection attempted to be treated with single exchange long-term IV followed by oral antibiotics.  Unfortunately even with long-term treatment oral antibiotics she's had worsening of her bilateral knee pain swelling erythema and effusions appears to be consistent with acute on chronic infection and suspect ongoing MSSA infection agree with aggressive treatment with two-stage revision with bilateral knee X plans with spacers in place long-term PICC line and IV antibiotics to be guided by cultures for now we'll use cefazolin 2 g IV every 8 hrs.    Continue long-term IV antibiotics with now coverage to cover MSSA with cefazolin and oral vancomycin for C. difficile colitis     PLAN:  Cefazolin 2 g IV every 8 hrs  Follow-up blood cultures and knee fluid culture  Follow-up surgical plans  Will need PICC line and long-term IV antibiotics and will need placement as mobility will be impaired and needs long-term antibiotics  Cristóbal Francis,  MD  2/13/2019

## 2019-02-13 NOTE — PROGRESS NOTES
[facility]    Acute pain service Inpatient Progress Note    Patient Name: Jyoti Luna  :  1965  MRN:  [id number]        Acute Pain  Service Inpatient Progress Note:    Comments: RN called [facility] this am to inform us that pt had pulled the Left Nerve catheter out.  They called back just know to inform us that she pulled out the Right Nerve Catheter.  Thee patient has been confused, and receiving narcotics.  The group decided not to replace the catheters at this time.

## 2019-02-13 NOTE — THERAPY EVALUATION
Acute Care - Occupational Therapy Initial Evaluation   [facility]     Patient Name: Jyoti Luna  : 1965  MRN: [id number]  Today's Date: 2019  Onset of Illness/Injury or Date of Surgery: 02/10/19  Date of Referral to OT: 19  Referring Physician: MD Zuhair     Admit Date: 2/10/2019       ICD-10-CM ICD-9-CM   1. Infection of prosthetic knee joint, subsequent encounter T84.59XD V58.89    Z96.659    2. Impaired mobility and ADLs Z74.09 799.89     Patient Active Problem List   Diagnosis   • HO MSSA (methicillin susceptible Staphylococcus aureus) septicemia (CMS/[facility])   • Knee swelling; bilateral   • Alcoholism /alcohol abuse (CMS/[facility])   • Iron deficiency anemia   • Hypertension   • Depression   • Diabetes mellitus (CMS/HCC)   • Infected prosthetic knee joint (CMS/[facility])   • Constipation   • Diarrhea   • Leukocytosis   • Anemia, likely dilutional   • C. 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   • Acute blood loss anemia, asymptomatic   • Acute postoperative pain   • Hyponatremia     Past Medical History:   Diagnosis Date   • Acid reflux    • Alcoholism (CMS/[facility])    • Anemia 3/20/2018   • Arthritis    • Asthma    • Depression 3/20/2018   • Diabetes mellitus (CMS/[facility])     POSS R/T INFECTION PER PT   • Eczema    • History of transfusion    • Hypertension 3/20/2018     Past Surgical History:   Procedure Laterality Date   • JOINT REPLACEMENT      bilateral knees   • KNEE POLY INSERT EXCHANGE Bilateral 3/23/2018    Procedure: KNEE POLY INSERT EXCHANGE WITH IRRIGATION AND DEBRIDMENT;  Surgeon: Ti Moffett MD;  Location:  [facility];  Service: Orthopedics   • LAPAROSCOPIC CHOLECYSTECTOMY     • TOTAL KNEE ARTHROPLASTY REVISION Bilateral 2019    Procedure: TOTAL KNEE ARTHROPLASTY REVISION WITH REMOVAL AND PLACE OF  ANTIBIOTIC SPACERS BILATERAL;  Surgeon: Ti Moffett MD;  Location:  [facility];  Service: Orthopedics          OT ASSESSMENT  FLOWSHEET (last 72 hours)      Occupational Therapy Evaluation     Row Name 02/13/19 1000                   OT Evaluation Time/Intention    Subjective Information  complains of;weakness;fatigue;pain  -HK        Document Type  evaluation  -HK        Mode of Treatment  individual therapy;occupational therapy  -HK        Patient Effort  good  -HK        Symptoms Noted During/After Treatment  increased pain  -HK        Comment  Bilateral KI on at all times and no knee extension. Pt d/c L adductor canal nerve cath this morning per RN. Pt alert and Ox4 but confused.   (Significant)   -HK           General Information    Patient Profile Reviewed?  yes  -HK        Onset of Illness/Injury or Date of Surgery  02/10/19  -HK        Referring Physician  MD Zuhair   -HK        Patient Observations  cooperative;alert;agree to therapy  -HK        Patient/Family Observations  no family at bedside.   -HK        General Observations of Patient  Pt received supine in bed with R adductor canal nerve cathter intact.   -HK        Prior Level of Function  independent:;all household mobility;community mobility;transfer;gait;bed mobility;ADL's;home management;cooking;cleaning;driving  -HK        Equipment Currently Used at Home  walker, rolling  -HK        Pertinent History of Current Functional Problem  Pt is a 53 YOF who presents to [facility] as direct admit from OSH with complaints of bilateral knee pain, swelling and redness. Pt underwent bilateral knee I&D with poly exchange 3/23/18. Pt is now POD #1 s/p TKA revision with removal and place of antibiptic spacers bilaterally.       -HK        Existing Precautions/Restrictions  fall;other (see comments);brace worn when out of bed  (Significant)  confusion; B KI on at all times; no knee extension  -HK        Equipment Issued to Patient  leg ;long handled sponge;reacher;sock aid shoe horn   -HK        Risks Reviewed  patient:;LOB;nausea/vomiting;dizziness;increased drainage;increased  discomfort;change in vital signs;lines disloged  -HK        Benefits Reviewed  patient:;improve function;increase balance;increase independence;increase strength;decrease pain;improve skin integrity;decrease risk of DVT;increase knowledge  -HK        Barriers to Rehab  medically complex;cognitive status  -HK           Relationship/Environment    Primary Source of Support/Comfort  pet  -HK        Lives With  alone  -HK           Resource/Environmental Concerns    Current Living Arrangements  home/apartment/condo  -HK           Home Main Entrance    Number of Stairs, Main Entrance  other (see comments) 24  -HK        Stair Railings, Main Entrance  railings on both sides of stairs  -HK           Stairs Within Home, Primary    Stairs Comment, Within Home, Primary  per pt she has stairs to get to laundry. However unable to report how many.   -HK           Cognitive Assessment/Interventions    Additional Documentation  Cognitive Assessment/Intervention (Group)  -HK           Cognitive Assessment/Intervention- PT/OT    Affect/Mental Status (Cognitive)  WFL  -HK        Orientation Status (Cognition)  oriented x 4  -HK        Follows Commands (Cognition)  follows one step commands;75-90% accuracy  -HK        Cognitive Function (Cognitive)  safety deficit;memory deficit;attention deficit  -HK        Attention Deficit (Cognitive)  moderate deficit;distractible in noisy environment;distractible in quiet environment;focused/sustained attention;selective attention;alternating attention;divided attention;perseverates on recent conversation  -HK        Memory Deficit (Cognitive)  moderate deficit;short term memory  -HK        Safety Deficit (Cognitive)  moderate deficit;safety precautions follow-through/compliance;safety precautions awareness;insight into deficits/self awareness;awareness of need for assistance;at risk behavior observed;ability to follow commands;judgment  -HK        Personal Safety Interventions  fall prevention  program maintained;gait belt;nonskid shoes/slippers when out of bed  -HK           Safety Issues, Functional Mobility    Safety Issues Affecting Function (Mobility)  safety precaution awareness;safety precautions follow-through/compliance;judgment;insight into deficits/self awareness;awareness of need for assistance;ability to follow commands;sequencing abilities  -HK        Impairments Affecting Function (Mobility)  balance;pain  -HK           Mobility Assessment/Treatment    Extremity Weight-bearing Status  left lower extremity;right lower extremity  -HK        Left Lower Extremity (Weight-bearing Status)  weight-bearing as tolerated (WBAT) in brace at all times  -HK        Right Lower Extremity (Weight-bearing Status)  weight-bearing as tolerated (WBAT) in brace at all times   -HK           Bed Mobility Assessment/Treatment    Bed Mobility Assessment/Treatment  supine-sit;scooting/bridging  -HK        Scooting/Bridging [place] (Bed Mobility)  minimum assist (75% patient effort);verbal cues  -HK        Supine-Sit [place] (Bed Mobility)  minimum assist (75% patient effort);verbal cues  -HK        Bed Mobility, Safety Issues  decreased use of arms for pushing/pulling;decreased use of legs for bridging/pushing  -HK        Assistive Device (Bed Mobility)  head of bed elevated;bed rails  -HK        Comment (Bed Mobility)  OT issued leg  and educated pt on use. Pt requires Luzma to advance legs to EOB.   -HK           Functional Mobility    Functional Mobility- Ind. Level  minimum assist (75% patient effort);2 person assist required;verbal cues required  -HK        Functional Mobility- Device  rolling walker  -HK        Functional Mobility-Distance (Feet)  3  -HK        Functional Mobility- Safety Issues  step length decreased;weight-shifting ability decreased  -HK        Functional Mobility- Comment  Pt took steps from bed to chair with minAx2.   -HK           Transfer Assessment/Treatment    Transfer  Assessment/Treatment  sit-stand transfer;stand-sit transfer  -HK        Comment (Transfers)  Verbal cues for safe hand placement and sequencing. Pt required max cues to bring hand from bed to walker.   -HK           Sit-Stand Transfer    Sit-Stand [place] (Transfers)  moderate assist (50% patient effort);2 person assist;verbal cues  -HK        Assistive Device (Sit-Stand Transfers)  walker, front-wheeled  -HK           Stand-Sit Transfer    Stand-Sit [place] (Transfers)  moderate assist (50% patient effort);2 person assist;verbal cues  -HK        Assistive Device (Stand-Sit Transfers)  walker, front-wheeled  -HK           ADL Assessment/Intervention    BADL Assessment/Intervention  lower body dressing;bathing  -HK           Bathing Assessment/Intervention    Comment (Bathing)  OT issued LH sponge for completion of LBB.   -HK           Lower Body Dressing Assessment/Training    Lower Body Dressing [place] Level  doff;don;socks;moderate assist (50% patient effort);verbal cues  -HK        Assistive Devices (Lower Body Dressing)  reacher;sock-aid  -HK        Lower Body Dressing Position  unsupported sitting  -HK        Comment (Lower Body Dressing)  OT issued AE and educated pt on use. Pt able to dof/don socks with modA and use of reacher/sock aid.   -HK           BADL Safety/Performance    Impairments, BADL Safety/Performance  balance;pain;strength;cognition;endurance/activity tolerance  -HK        Cognitive Impairments, BADL Safety/Performance  safety precaution awareness;safety precaution follow-through  -HK           General ROM    RT Upper Ext  Rt Shoulder Flexion  -HK        LT Upper Ext  Lt Shoulder Flexion  -HK           Right Upper Ext    Rt Shoulder Flexion AROM  WFL for eval  -HK           Left Upper Ext    Lt Shoulder Flexion AROM  WFL for eval   -HK           MMT (Manual Muscle Testing)    Rt Upper Ext  Rt Shoulder WFL  -HK        Lt Upper Ext  Lt Shoulder WFL  -HK        General MMT Comments   WFL for eval   -HK           Motor Assessment/Interventions    Additional Documentation  Balance (Group)  -HK           Balance    Balance  static sitting balance;static standing balance;dynamic sitting balance  -HK           Static Sitting Balance    Level of [place] (Unsupported Sitting, Static Balance)  contact guard assist  -HK        Sitting Position (Unsupported Sitting, Static Balance)  sitting on edge of bed  -HK        Time Able to Maintain Position (Unsupported Sitting, Static Balance)  1 to 2 minutes  -HK           Dynamic Sitting Balance    Level of [place], Reaches Outside Midline (Sitting, Dynamic Balance)  contact guard assist  -HK        Sitting Position, Reaches Outside Midline (Sitting, Dynamic Balance)  sitting in chair  -HK        Comment, Reaches Outside Midline (Sitting, Dynamic Balance)  using AE for LBD   -HK           Static Standing Balance    Level of [place] (Supported Standing, Static Balance)  minimal assist, 75% patient effort  -HK        Time Able to Maintain Position (Supported Standing, Static Balance)  1 to 2 minutes  -HK        Assistive Device Utilized (Supported Standing, Static Balance)  walker, rolling  -HK           Sensory Assessment/Intervention    Sensory General Assessment  no sensation deficits identified  -HK           Positioning and Restraints    Pre-Treatment Position  in bed  -HK        Post Treatment Position  chair  -HK        In Chair  notified nsg;reclined;call light within reach;encouraged to call for assist;exit alarm on  -HK           Pain Assessment    Additional Documentation  Pain Scale: Numbers Pre/Post-Treatment (Group)  -HK           Pain Scale: Numbers Pre/Post-Treatment    Pain Scale: Numbers, Pretreatment  8/10  -HK        Pain Scale: Numbers, Post-Treatment  8/10  -HK        Pain Location - Side  Bilateral  -HK        Pain Location - Orientation  lower  -HK        Pain Location  extremity  -HK        Pain Intervention(s)   Repositioned;Ambulation/increased activity  -HK           [REMOVED] Wound 03/23/18 Right knee    Wound - Properties Group Date first assessed: 03/23/18  -CH Side: Right  -CH Location: knee  -CH Resolution Date: 02/12/19  -HV Resolution Time: 1135  -HV       [REMOVED] Wound 03/23/18 1352 Left knee incision    Wound - Properties Group Date first assessed: 03/23/18  -LB Time first assessed: 1352  -LB Side: Left  -CH Location: knee  -LB Type: incision  -LB Resolution Date: 02/12/19  -HV Resolution Time: 1136  -HV       [REMOVED] Wound 03/21/18 0600 Right toe other (see comments)    Wound - Properties Group Date first assessed: 03/21/18  -BM Time first assessed: 0600  -BM Present On Admission : yes  -BM Side: Right  -BM Location: toe  -BM Type: other (see comments)  -BM Additional Comments: dried crack medial right great toe and lateral right heel  -CP Resolution Date: 02/12/19  -HV Resolution Time: 1136  -HV       Wound 02/12/19 1418 Bilateral knee incision    Wound - Properties Group Date first assessed: 02/12/19  -RV Time first assessed: 1418  -RV Side: Bilateral  -RV Location: knee  -RV Type: incision  -RV       Plan of Care Review    Plan of Care Reviewed With  patient  -HK           Clinical Impression (OT)    Date of Referral to OT  02/12/19  -HK        OT Diagnosis  Decreased independence with ADLS and Mobility   -HK        Patient/Family Goals Statement (OT Eval)  Pt would like to improve and return home.   -HK        Criteria for Skilled Therapeutic Interventions Met (OT Eval)  yes;treatment indicated  -HK        Rehab Potential (OT Eval)  good, to achieve stated therapy goals  -HK        Therapy Frequency (OT Eval)  daily  -HK        Care Plan Review (OT)  evaluation/treatment results reviewed;care plan/treatment goals reviewed;risks/benefits reviewed;patient/other agree to care plan  -HK        Anticipated Discharge Disposition (OT)  inpatient rehabilitation facility  -HK           Vital Signs    Pre Systolic  BP Rehab  -- RN cleared for tx  -HK        Pre Patient Position  Supine  -HK        Intra Patient Position  Standing  -HK        Post Patient Position  Sitting  -HK           OT Goals    Bed Mobility Goal Selection (OT)  bed mobility, OT goal 1  -HK        Transfer Goal Selection (OT)  transfer, OT goal 1  -HK        Dressing Goal Selection (OT)  dressing, OT goal 1  -HK        Toileting Goal Selection (OT)  toileting, OT goal 1  -HK           Bed Mobility Goal 1 (OT)    Activity/Assistive Device (Bed Mobility Goal 1, OT)  sit to supine/supine to sit;scooting  -HK        [place] Level/Cues Needed (Bed Mobility Goal 1, OT)  contact guard assist;verbal cues required  -HK        Time Frame (Bed Mobility Goal 1, OT)  5 days  -HK        Progress/Outcomes (Bed Mobility Goal 1, OT)  goal ongoing  -HK           Transfer Goal 1 (OT)    Activity/Assistive Device (Transfer Goal 1, OT)  sit-to-stand/stand-to-sit;toilet  -HK        [place] Level/Cues Needed (Transfer Goal 1, OT)  minimum assist (75% or more patient effort);verbal cues required  -HK        Time Frame (Transfer Goal 1, OT)  5 days  -HK        Progress/Outcome (Transfer Goal 1, OT)  goal ongoing  -HK           Dressing Goal 1 (OT)    Activity/Assistive Device (Dressing Goal 1, OT)  lower body dressing;sock-aid;reacher;long handled shoe horn  -HK        [place]/Cues Needed (Dressing Goal 1, OT)  minimum assist (75% or more patient effort);verbal cues required  -HK        Time Frame (Dressing Goal 1, OT)  5 days  -HK        Progress/Outcome (Dressing Goal 1, OT)  goal ongoing  -HK           Toileting Goal 1 (OT)    Activity/Device (Toileting Goal 1, OT)  toileting skills, all  -HK        [place] Level/Cues Needed (Toileting Goal 1, OT)  moderate assist (50-74% patient effort);verbal cues required  -HK        Time Frame (Toileting Goal 1, OT)  5 days  -HK        Progress/Outcome (Toileting Goal 1, OT)  goal ongoing  -HK           Living Environment     Home Accessibility  not wheelchair accessible;stairs to enter home;stairs within home;tub/shower is not walk in  -          User Key  (r) = Recorded By, (t) = Taken By, (c) = Cosigned By    Initials Name Effective Dates    CP IraMarilyn KYLAH, APRN 04/06/17 - 06/07/18    Nelli Bird, RN 06/16/16 -     RV Kishan Merida, MARY 06/16/16 -     LB Darwin Bee, RN 11/16/16 -     HV Daniella Siddiqui, RN 06/16/16 -     BM Brittni Levin, RN 07/05/17 - 02/03/19    HK Chery Zarate, OT 03/07/18 -          Occupational Therapy Education     Title: PT OT SLP Therapies (In Progress)     Topic: Occupational Therapy (In Progress)     Point: ADL training (Done)     Description: Instruct learner(s) on proper safety adaptation and remediation techniques during self care or transfers.   Instruct in proper use of assistive devices.    Learning Progress Summary           Patient Acceptance, E,D, VU by  at 2/13/2019 11:50 AM    Comment:  Pt educated on ADL retraining with use of AE for completion of all LBD and LBB, safety precautions, and appropriate body mechanics to maintain knee precautions.                   Point: Precautions (Done)     Description: Instruct learner(s) on prescribed precautions during self-care and functional transfers.    Learning Progress Summary           Patient Acceptance, E,D, VU by HK at 2/13/2019 11:50 AM    Comment:  Pt educated on ADL retraining with use of AE for completion of all LBD and LBB, safety precautions, and appropriate body mechanics to maintain knee precautions.                   Point: Body mechanics (Done)     Description: Instruct learner(s) on proper positioning and spine alignment during self-care, functional mobility activities and/or exercises.    Learning Progress Summary           Patient Acceptance, E,D, VU by  at 2/13/2019 11:50 AM    Comment:  Pt educated on ADL retraining with use of AE for completion of all LBD and LBB, safety precautions, and  appropriate body mechanics to maintain knee precautions.                               User Key     Initials Effective Dates Name Provider Type [facility] 03/07/18 -  Chery Zarate OT Occupational Therapist OT                  OT Recommendation and Plan  Outcome Summary/Treatment Plan (OT)  Anticipated Discharge Disposition (OT): inpatient rehabilitation facility  Therapy Frequency (OT Eval): daily  Plan of Care Review  Plan of Care Reviewed With: patient  Plan of Care Reviewed With: patient  Outcome Summary: OT eval complete. Pt Ox4 but limited by confusion and decreased attention. OT issued AE and educated pt on use for completion of all LBD and LBB. Pt able to dof/don socks with modA and use of sock aid and reacher. Recommend d/c to IPR when medically ready.     Outcome Measures     Row Name 02/13/19 1000             How much help from another is currently needed...    Putting on and taking off regular lower body clothing?  2  -HK      Bathing (including washing, rinsing, and drying)  2  -HK      Toileting (which includes using toilet bed pan or urinal)  2  -HK      Putting on and taking off regular upper body clothing  3  -HK      Taking care of personal grooming (such as brushing teeth)  2  -HK      Eating meals  3  -HK      Score  14  -HK         Functional Assessment    Outcome Measure Options  AM-PAC 6 Clicks Daily Activity (OT)  -HK        User Key  (r) = Recorded By, (t) = Taken By, (c) = Cosigned By    Initials Name Provider Type     Chery Zarate OT Occupational Therapist          Time Calculation:   Time Calculation- OT     Row Name 02/13/19 1000             Time Calculation- OT    OT Start Time  1000  -HK      OT Received On  02/13/19  -      OT Goal Re-Cert Due Date  02/23/19  -        User Key  (r) = Recorded By, (t) = Taken By, (c) = Cosigned By    Initials Name Provider Type    Chery Monet OT Occupational Therapist        Therapy Suggested Charges     Code   Minutes Charges     None           Therapy Charges for Today     Code Description Service Date Service Provider Modifiers Qty    [id number]  OT EVAL MOD COMPLEXITY 4 2/13/2019 Chery Zarate, OT GO 1    [id number]  OT THER SUPP EA 15 MIN 2/13/2019 Chery Zarate OT GO 2               Chery Zarate OT  2/13/2019

## 2019-02-13 NOTE — CONSULTS
Placed by Rima Burns RN - confirmed with 3cg technology.  RUE single lumen PICC with 39cm total and 1cm exposed.

## 2019-02-13 NOTE — NURSING NOTE
The patient handed me her prescription bottle of oxycodone-acetaminophen 5/325 mg with 7 tablets from her purse and asked me to take the label off for her.  I told her we had to lock her medication up with the pharmacist since it is a controlled substance.  The unit pharmacist counted the tablets and took the medication to secure it in the pharmacy.      The patient also asked me if I could go to the store to  3 beers for her.  I reminded her that she is in the hospital and can't drink beer while she is here.

## 2019-02-14 LAB
BACTERIA FLD CULT: NORMAL
BACTERIA FLD CULT: NORMAL
DEPRECATED RDW RBC AUTO: 45.1 FL (ref 37–54)
ERYTHROCYTE [DISTWIDTH] IN BLOOD BY AUTOMATED COUNT: 14.9 % (ref 11.3–14.5)
GLUCOSE BLDC GLUCOMTR-MCNC: 155 MG/DL (ref 70–130)
GLUCOSE BLDC GLUCOMTR-MCNC: 156 MG/DL (ref 70–130)
GLUCOSE BLDC GLUCOMTR-MCNC: 158 MG/DL (ref 70–130)
GLUCOSE BLDC GLUCOMTR-MCNC: 211 MG/DL (ref 70–130)
GRAM STN SPEC: NORMAL
HCT VFR BLD AUTO: 25.9 % (ref 34.5–44)
HGB BLD-MCNC: 7.9 G/DL (ref 11.5–15.5)
MAGNESIUM SERPL-MCNC: 1.8 MG/DL (ref 1.3–2.7)
MCH RBC QN AUTO: 25.2 PG (ref 27–31)
MCHC RBC AUTO-ENTMCNC: 30.5 G/DL (ref 32–36)
MCV RBC AUTO: 82.5 FL (ref 80–99)
PLATELET # BLD AUTO: 234 10*3/MM3 (ref 150–450)
PMV BLD AUTO: 7.8 FL (ref 6–12)
POTASSIUM BLD-SCNC: 3.4 MMOL/L (ref 3.5–5.5)
RBC # BLD AUTO: 3.14 10*6/MM3 (ref 3.89–5.14)
WBC NRBC COR # BLD: 7.32 10*3/MM3 (ref 3.5–10.8)

## 2019-02-14 PROCEDURE — 63710000001 LACTOBACILLUS ACIDOPHILUS CAPSULE: Performed by: NURSE PRACTITIONER

## 2019-02-14 PROCEDURE — 63710000001 VANCOMYCIN 50 MG/ML RECONSTITUTED SOLUTION: Performed by: INTERNAL MEDICINE

## 2019-02-14 PROCEDURE — A9270 NON-COVERED ITEM OR SERVICE: HCPCS | Performed by: NURSE PRACTITIONER

## 2019-02-14 PROCEDURE — 63710000001 MULTIVITAMIN TABLET: Performed by: NURSE PRACTITIONER

## 2019-02-14 PROCEDURE — 25010000002 LORAZEPAM PER 2 MG: Performed by: NURSE PRACTITIONER

## 2019-02-14 PROCEDURE — 63710000001 CARVEDILOL 3.125 MG TABLET: Performed by: NURSE PRACTITIONER

## 2019-02-14 PROCEDURE — 99024 POSTOP FOLLOW-UP VISIT: CPT | Performed by: ORTHOPAEDIC SURGERY

## 2019-02-14 PROCEDURE — A9270 NON-COVERED ITEM OR SERVICE: HCPCS | Performed by: INTERNAL MEDICINE

## 2019-02-14 PROCEDURE — 63710000001 VENLAFAXINE XR 75 MG CAPSULE SUSTAINED-RELEASE 24 HR: Performed by: NURSE PRACTITIONER

## 2019-02-14 PROCEDURE — 83735 ASSAY OF MAGNESIUM: CPT | Performed by: INTERNAL MEDICINE

## 2019-02-14 PROCEDURE — 82962 GLUCOSE BLOOD TEST: CPT

## 2019-02-14 PROCEDURE — 63710000001 HYDROCODONE-ACETAMINOPHEN 5-325 MG TABLET: Performed by: ORTHOPAEDIC SURGERY

## 2019-02-14 PROCEDURE — 63710000001 MELOXICAM 7.5 MG TABLET: Performed by: ORTHOPAEDIC SURGERY

## 2019-02-14 PROCEDURE — 25010000003 CEFAZOLIN IN DEXTROSE 2-4 GM/100ML-% SOLUTION: Performed by: INTERNAL MEDICINE

## 2019-02-14 PROCEDURE — A9270 NON-COVERED ITEM OR SERVICE: HCPCS | Performed by: ORTHOPAEDIC SURGERY

## 2019-02-14 PROCEDURE — 63710000001 FOLIC ACID 1 MG TABLET: Performed by: NURSE PRACTITIONER

## 2019-02-14 PROCEDURE — 25010000002 HYDROMORPHONE PER 4 MG: Performed by: ORTHOPAEDIC SURGERY

## 2019-02-14 PROCEDURE — 25010000002 ENOXAPARIN PER 10 MG: Performed by: ORTHOPAEDIC SURGERY

## 2019-02-14 PROCEDURE — 63710000001 POTASSIUM CHLORIDE 10 MEQ CAPSULE CONTROLLED-RELEASE: Performed by: NURSE PRACTITIONER

## 2019-02-14 PROCEDURE — 84132 ASSAY OF SERUM POTASSIUM: CPT | Performed by: INTERNAL MEDICINE

## 2019-02-14 PROCEDURE — 63710000001 THIAMINE 100 MG TABLET: Performed by: NURSE PRACTITIONER

## 2019-02-14 PROCEDURE — 85027 COMPLETE CBC AUTOMATED: CPT | Performed by: ORTHOPAEDIC SURGERY

## 2019-02-14 RX ADMIN — POTASSIUM CHLORIDE 40 MEQ: 750 CAPSULE, EXTENDED RELEASE ORAL at 15:06

## 2019-02-14 RX ADMIN — CARVEDILOL 3.12 MG: 3.12 TABLET, FILM COATED ORAL at 17:50

## 2019-02-14 RX ADMIN — VENLAFAXINE HYDROCHLORIDE 300 MG: 75 CAPSULE, EXTENDED RELEASE ORAL at 09:20

## 2019-02-14 RX ADMIN — VANCOMYCIN HYDROCHLORIDE 125 MG: KIT at 00:12

## 2019-02-14 RX ADMIN — LORAZEPAM 2 MG: 2 INJECTION INTRAMUSCULAR; INTRAVENOUS at 15:07

## 2019-02-14 RX ADMIN — POTASSIUM CHLORIDE 40 MEQ: 750 CAPSULE, EXTENDED RELEASE ORAL at 09:20

## 2019-02-14 RX ADMIN — INSULIN LISPRO 2 UNITS: 100 INJECTION, SOLUTION INTRAVENOUS; SUBCUTANEOUS at 09:32

## 2019-02-14 RX ADMIN — CEFAZOLIN SODIUM 2 G: 2 INJECTION, SOLUTION INTRAVENOUS at 19:20

## 2019-02-14 RX ADMIN — SODIUM CHLORIDE, PRESERVATIVE FREE 3 ML: 5 INJECTION INTRAVENOUS at 21:51

## 2019-02-14 RX ADMIN — HYDROMORPHONE HYDROCHLORIDE 0.5 MG: 1 INJECTION, SOLUTION INTRAMUSCULAR; INTRAVENOUS; SUBCUTANEOUS at 01:46

## 2019-02-14 RX ADMIN — CEFAZOLIN SODIUM 2 G: 2 INJECTION, SOLUTION INTRAVENOUS at 04:01

## 2019-02-14 RX ADMIN — OXYCODONE HYDROCHLORIDE AND ACETAMINOPHEN 1 TABLET: 7.5; 325 TABLET ORAL at 17:50

## 2019-02-14 RX ADMIN — SODIUM CHLORIDE, PRESERVATIVE FREE 3 ML: 5 INJECTION INTRAVENOUS at 00:12

## 2019-02-14 RX ADMIN — Medication 1 TABLET: at 09:20

## 2019-02-14 RX ADMIN — Medication: at 16:14

## 2019-02-14 RX ADMIN — HYDROCODONE BITARTRATE AND ACETAMINOPHEN 1 TABLET: 5; 325 TABLET ORAL at 01:45

## 2019-02-14 RX ADMIN — CEFAZOLIN SODIUM 2 G: 2 INJECTION, SOLUTION INTRAVENOUS at 12:07

## 2019-02-14 RX ADMIN — SODIUM CHLORIDE 120 ML/HR: 9 INJECTION, SOLUTION INTRAVENOUS at 04:01

## 2019-02-14 RX ADMIN — ENOXAPARIN SODIUM 40 MG: 40 INJECTION SUBCUTANEOUS at 09:23

## 2019-02-14 RX ADMIN — HYDROCODONE BITARTRATE AND ACETAMINOPHEN 1 TABLET: 5; 325 TABLET ORAL at 12:54

## 2019-02-14 RX ADMIN — INSULIN LISPRO 2 UNITS: 100 INJECTION, SOLUTION INTRAVENOUS; SUBCUTANEOUS at 17:51

## 2019-02-14 RX ADMIN — VANCOMYCIN HYDROCHLORIDE 125 MG: KIT at 12:07

## 2019-02-14 RX ADMIN — HYDROCODONE BITARTRATE AND ACETAMINOPHEN 1 TABLET: 5; 325 TABLET ORAL at 21:51

## 2019-02-14 RX ADMIN — VANCOMYCIN HYDROCHLORIDE 125 MG: KIT at 05:30

## 2019-02-14 RX ADMIN — LORAZEPAM 1 MG: 1 TABLET ORAL at 21:54

## 2019-02-14 RX ADMIN — TRAZODONE HYDROCHLORIDE 50 MG: 50 TABLET ORAL at 21:51

## 2019-02-14 RX ADMIN — Medication 1 CAPSULE: at 09:22

## 2019-02-14 RX ADMIN — MELOXICAM 15 MG: 7.5 TABLET ORAL at 09:20

## 2019-02-14 RX ADMIN — VANCOMYCIN HYDROCHLORIDE 125 MG: KIT at 19:21

## 2019-02-14 RX ADMIN — HYDROCODONE BITARTRATE AND ACETAMINOPHEN 1 TABLET: 5; 325 TABLET ORAL at 09:20

## 2019-02-14 RX ADMIN — LORAZEPAM 1 MG: 2 INJECTION INTRAMUSCULAR; INTRAVENOUS at 00:13

## 2019-02-14 RX ADMIN — INSULIN LISPRO 2 UNITS: 100 INJECTION, SOLUTION INTRAVENOUS; SUBCUTANEOUS at 21:50

## 2019-02-14 RX ADMIN — SODIUM CHLORIDE 120 ML/HR: 9 INJECTION, SOLUTION INTRAVENOUS at 17:39

## 2019-02-14 RX ADMIN — LORAZEPAM 1 MG: 1 TABLET ORAL at 12:54

## 2019-02-14 RX ADMIN — Medication 100 MG: at 09:20

## 2019-02-14 RX ADMIN — CARVEDILOL 3.12 MG: 3.12 TABLET, FILM COATED ORAL at 09:21

## 2019-02-14 RX ADMIN — INSULIN LISPRO 3 UNITS: 100 INJECTION, SOLUTION INTRAVENOUS; SUBCUTANEOUS at 12:54

## 2019-02-14 RX ADMIN — FOLIC ACID 1 MG: 1 TABLET ORAL at 09:22

## 2019-02-14 NOTE — NURSING NOTE
Notified Dr. Siddiqui about the pulled PICC line per patient. MD order received not to culture insertion site nor catheter tip.

## 2019-02-14 NOTE — PLAN OF CARE
Problem: Skin Injury Risk (Adult)  Goal: Identify Related Risk Factors and Signs and Symptoms  Outcome: Ongoing (interventions implemented as appropriate)    Goal: Skin Health and Integrity  Outcome: Ongoing (interventions implemented as appropriate)      Problem: Fall Risk (Adult)  Goal: Identify Related Risk Factors and Signs and Symptoms  Outcome: Ongoing (interventions implemented as appropriate)    Goal: Absence of Fall  Outcome: Ongoing (interventions implemented as appropriate)      Problem: Pain, Acute (Adult)  Goal: Identify Related Risk Factors and Signs and Symptoms  Outcome: Ongoing (interventions implemented as appropriate)    Goal: Acceptable Pain Control/Comfort Level  Outcome: Ongoing (interventions implemented as appropriate)      Problem: Knee Arthroplasty (Total, Partial) (Adult)  Goal: Signs and Symptoms of Listed Potential Problems Will be Absent, Minimized or Managed (Knee Arthroplasty)  Outcome: Ongoing (interventions implemented as appropriate)    Goal: Anesthesia/Sedation Recovery  Outcome: Ongoing (interventions implemented as appropriate)      Problem: Patient Care Overview  Goal: Plan of Care Review  Outcome: Ongoing (interventions implemented as appropriate)   02/14/19 0604   Coping/Psychosocial   Plan of Care Reviewed With patient   OTHER   Outcome Summary Pt VSS. Pt confused, able to answer some questions correctly. Pt anxious and restless at times, CIWA scored at 12 and Ativan needed. Pt c/o ciro knee pain, prn pain medication given. Pt pulled PICC line Charge RN aware, charge completed PICC charting, and dressing applied. Pt required new IV r/t infiltrated. WOC consult ordered for open areas to buttocks. Pt up to BSC with assist of 3, pt afraid of falling. Ciro. knee immobilizers in place. Daughter called and was updated on pt. Pt does talk to people and animals not in the room and impulsive at times. Pad alarm on. NSR on tele. Weak pedal pulses required doppler. Cont. to monitor.     Plan of Care Review   Progress no change       Problem: Infection, Risk/Actual (Adult)  Goal: Identify Related Risk Factors and Signs and Symptoms  Outcome: Ongoing (interventions implemented as appropriate)    Goal: Infection Prevention/Resolution  Outcome: Ongoing (interventions implemented as appropriate)      Problem: Alcohol Withdrawal Acute, Risk/Actual (Adult)  Goal: Signs and Symptoms of Listed Potential Problems Will be Absent, Minimized or Managed (Alcohol Withdrawal Acute, Risk/Actual)  Outcome: Ongoing (interventions implemented as appropriate)

## 2019-02-14 NOTE — PROGRESS NOTES
"Orthopaedic Surgery Progress Note     LOS: 3 days   Patient Care Team:  Provider, No Known as PCP - General    POD 2    Subjective     Interval History:   Patient Reports: confused, she recognizes me but she is confused as to where she is    Objective     Vital Signs:  Temp (24hrs), Av.5 °F (36.4 °C), Min:97 °F (36.1 °C), Max:97.9 °F (36.6 °C)    /92 (BP Location: Left arm, Patient Position: Lying)   Pulse 97   Temp 97.8 °F (36.6 °C) (Oral)   Resp 16   Ht 157.5 cm (62\")   Wt 82.6 kg (182 lb)   SpO2 96%   BMI 33.29 kg/m²     Labs:  Lab Results (last 24 hours)     Procedure Component Value Units Date/Time    POC Glucose Once [[id number]]  (Abnormal) Collected:  19    Specimen:  Blood Updated:  19     Glucose 158 mg/dL     Tissue / Bone Culture - Tissue, Knee, Right [[id number]] Collected:  19 134    Specimen:  Tissue from Knee, Right Updated:  19     Tissue Culture No growth at 2 days     Gram Stain Moderate (3+) WBCs seen      No organisms seen    Body Fluid Culture - Synovial Fluid, Knee, Right [[id number]] Collected:  02/10/19 2028    Specimen:  Synovial Fluid from Knee, Right Updated:  19     BF Culture No growth at 4 days     Gram Stain Many (4+) WBCs seen      No organisms seen    Body Fluid Culture - Synovial Fluid, Knee, Left [[id number]] Collected:  02/10/19 2027    Specimen:  Synovial Fluid from Knee, Left Updated:  19     BF Culture No growth at 4 days     Gram Stain Many (4+) WBCs seen      No organisms seen    Potassium [[id number]]  (Abnormal) Collected:  19    Specimen:  Blood Updated:  19     Potassium 3.4 mmol/L     Magnesium [[id number]]  (Normal) Collected:  19    Specimen:  Blood Updated:  19     Magnesium 1.8 mg/dL     CBC (No Diff) [[id number]]  (Abnormal) Collected:  19    Specimen:  Blood Updated:  1912     WBC 7.32 10*3/mm3      RBC 3.14 10*6/mm3      " Hemoglobin 7.9 g/dL      Hematocrit 25.9 %      MCV 82.5 fL      MCH 25.2 pg      MCHC 30.5 g/dL      RDW 14.9 %      RDW-SD 45.1 fl      MPV 7.8 fL      Platelets 234 10*3/mm3     Blood Culture - Blood, Arm, Right [[id number]] Collected:  02/10/19 1913    Specimen:  Blood from Arm, Right Updated:  02/13/19 2030     Blood Culture No growth at 3 days    Blood Culture - Blood, Arm, Left [[id number]] Collected:  02/10/19 1937    Specimen:  Blood from Arm, Left Updated:  02/13/19 2030     Blood Culture No growth at 3 days    POC Glucose Once [[id number]]  (Abnormal) Collected:  02/13/19 2010    Specimen:  Blood Updated:  02/13/19 2011     Glucose 237 mg/dL     POC Glucose Once [[id number]]  (Abnormal) Collected:  02/13/19 1612    Specimen:  Blood Updated:  02/13/19 1615     Glucose 170 mg/dL     Anaerobic Culture - Tissue, Knee, Right [[id number]] Collected:  02/12/19 1342    Specimen:  Tissue from Knee, Right Updated:  02/13/19 1328     Culture No anaerobes isolated    AFB Culture - Tissue, Knee, Right [[id number]] Collected:  02/12/19 1342    Specimen:  Tissue from Knee, Right Updated:  02/13/19 1312     AFB Stain No acid fast bacilli seen on concentrated smear    AFB Culture - Synovial Fluid, Knee, Left [[id number]] Collected:  02/12/19 1236    Specimen:  Synovial Fluid from Knee, Left Updated:  02/13/19 1311     AFB Stain No acid fast bacilli seen on concentrated smear    POC Glucose Once [[id number]]  (Abnormal) Collected:  02/13/19 1141    Specimen:  Blood Updated:  02/13/19 1142     Glucose 202 mg/dL           Physical Exam:  Both knee incisions look good with no surrounding erythema.  There is no drainage.  Distally she is neurological and vascular intact    Assessment/Plan   Postop day 2 status post bilateral total knee arthroplasty removal for infection with placement of articulating spacer prosthesis  She is having symptoms of alcohol withdrawal.  She is awaiting definitive IV antibiotic recommended treatment based  upon culture sensitivities.    She will have placement issues regarding where she can go as she is not able to take care of herself at home.  She is weight-bear as tolerated in both knees with no range of motion of the left knee.  I will be out of town until Monday.  Dr. Gallego is covering for me.  Ti Moffett MD  02/14/19  8:49 AM

## 2019-02-14 NOTE — NURSING NOTE
Upon walking into the room due to patient screaming out in pain, patient's PICC line was found pulled completely out and on floor.     Patient was positioned supine to ensure that the insertion site was at or below the level of the heart.    Hands were washed, clean gloves were applied and central line dressing was gently removed. Catheter exit site was not cultured per MD order.     A new pair of clean gloves were then applied. Insertion site was cleansed with 2% Chlorhexidine swab using a circular motion beginning at the insertion site and moving outward for 30 seconds and allowed to dry.      A sterile 4x4 gauze pad was used to apply light pressure until bleeding stopped. At that time, petroleum-based gauze and a sterile occlusive dressing was applied to exit site.     Patient was instructed to keep dressing in place for at least 24 hours and to remain in a flat or reclining position for 30 minutes post-removal.     Catheter was inspected after removal and was intact. Tip of central line was not sent for culture per MD order. Patient tolerated procedure.

## 2019-02-14 NOTE — PROGRESS NOTES
" progress note      Jyoti Luna  [id number]  1965     LOS: 3 days     Attending: Lorri Siddiqui MD    Primary Care Provider: Provider, No Known      Chief Complaint/Reason for visit:  Bilateral infected knee prosthesis     Subjective   Drowsy, confused, mumbling incoherently. Reports pain and malaise.     Objective     Vital Signs  Visit Vitals  /89   Pulse 93   Temp 97.8 °F (36.6 °C) (Oral)   Resp 16   Ht 157.5 cm (62\")   Wt 82.6 kg (182 lb)   SpO2 96%   BMI 33.29 kg/m²     Temp (24hrs), Av.5 °F (36.4 °C), Min:97 °F (36.1 °C), Max:97.9 °F (36.6 °C)    Physical Exam:     General Appearance:    Dorwsy, in no acute distress   Head:    Normocephalic, without obvious abnormality, atraumatic    Lungs:     Normal effort, symmetric chest rise, no crepitus, clear to      auscultation bilaterally               Heart:    Regular rhythm and normal rate, normal S1 and S2    Abdomen:     Normal bowel sounds, no masses, no organomegaly, soft        non-tender, non-distended, no guarding, no rebound                tenderness   Extremities:   Bilateral knees ACE wrap CDI. Right adductor canal nerve block. Knee immobilizers in place. HV's-out   Pulses:   Pulses palpable and equal bilaterally   Skin:   No bleeding, bruising or rash. (RUE PICC- pulled out during the night)    Neurologic:   Cranial nerves 2 - 12 grossly intact, sensation intact. Flexion and dorsiflexion intact bilateral feet.          Results Review:     I reviewed the patient's new clinical results.   Results from last 7 days   Lab Units 19  0544 19  0639 19  0504   WBC 10*3/mm3 7.32 10.45 12.03*   HEMOGLOBIN g/dL 7.9* 8.9* 11.3*   HEMATOCRIT % 25.9* 29.1* 36.2   PLATELETS 10*3/mm3 234 272 262     Results from last 7 days   Lab Units 19  0544 19  0639 19  0504 02/10/19  1928   SODIUM mmol/L  --  131* 134 136   POTASSIUM mmol/L 3.4* 3.7 3.9 3.7   CHLORIDE mmol/L  --  100 94* 95*   CO2 mmol/L  --  25.0 31.0 " 31.0   BUN mg/dL  --  9 10 9   CREATININE mg/dL  --  0.82 0.75 0.73   CALCIUM mg/dL  --  8.8 9.5 9.1   BILIRUBIN mg/dL  --   --   --  1.0   ALK PHOS U/L  --   --   --  162*   ALT (SGPT) U/L  --   --   --  40   AST (SGOT) U/L  --   --   --  29   GLUCOSE mg/dL  --  171* 165* 132*     Results for NABIL MATSON (MRN [id number]) as of 2/14/2019 10:10   Ref. Range 2/13/2019 16:12 2/13/2019 20:10 2/14/2019 05:44 2/14/2019 08:06   Glucose Latest Ref Range: 70 - 130 mg/dL 170 (H) 237 (H)  158 (H)   Results for NABIL MATSON (MRN [id number]) as of 2/14/2019 10:10   Ref. Range 2/14/2019 05:44   Magnesium Latest Ref Range: 1.3 - 2.7 mg/dL 1.8     ECG 12 Lead   Order: [id number]   Status:  Preliminary result   Visible to patient:  No (Not Released) Next appt:  None      Narrative     Test Reason : Surgery  Blood Pressure : **/** mmHG  Vent. Rate : 099 BPM     Atrial Rate : 099 BPM     P-R Int : 128 ms          QRS Dur : 096 ms      QT Int : 364 ms       P-R-T Axes : 046 034 047 degrees     QTc Int : 467 ms    Normal sinus rhythm  Normal ECG  When compared with ECG of 23-MAR-2018 11:01,  No significant change was found    Referred By:  LINDA           Confirmed By:            Microbiology Results (last 21 days)     Collected  Updated  Procedure  Result Status      02/12/2019 1342 02/13/2019 1328  Anaerobic Culture - Tissue, Knee, Right [[id number]]   Tissue from Knee, Right     Preliminary result  Culture No anaerobes isolated             02/12/2019 1342  02/12/2019 1438  Fungus Culture - Tissue, Knee, Right [[id number]]   Tissue from Knee, Right     In process  No result data             02/12/2019 1342  02/14/2019 0736  Tissue / Bone Culture - Tissue, Knee, Right [[id number]]   Tissue from Knee, Right     Preliminary result  Tissue Culture No growth at 2 days   Gram Stain Moderate (3+) WBCs seen    No organisms seen             02/12/2019 1342  02/13/2019 1312  AFB Culture - Tissue, Knee, Right [[id number]]   Tissue from Knee,  Right     Preliminary result  AFB Stain No acid fast bacilli seen on concentrated smear             02/12/2019 1236  02/12/2019 1438  Fungus Culture - Synovial Fluid, Knee, Left [[id number]]   Synovial Fluid from Knee, Left     In process  No result data             02/12/2019 1236  02/13/2019 0704  Body Fluid Culture - Synovial Fluid, Knee, Left [[id number]]   Synovial Fluid from Knee, Left     Preliminary result  BF Culture No growth at less than 24 hours   Gram Stain Many (4+) WBCs seen    No organisms seen             02/12/2019 1236  02/13/2019 1311  AFB Culture - Synovial Fluid, Knee, Left [[id number]]   Synovial Fluid from Knee, Left     Preliminary result  AFB Stain No acid fast bacilli seen on concentrated smear             02/11/2019 0941  02/11/2019 1136  Clostridium Difficile Toxin - Stool, Per Rectum [[id number]]    Stool from Per Rectum     Final result  No result data             02/11/2019 0941  02/11/2019 1136  Clostridium Difficile Toxin, PCR - Stool, Per Rectum [[id number]]    (Abnormal)   Stool from Per Rectum     Final result  Clostridium difficile (toxin A/B) Detected Abnormal              02/10/2019 2028  02/14/2019 0711  Body Fluid Culture - Synovial Fluid, Knee, Right [[id number]]   Synovial Fluid from Knee, Right     Final result  BF Culture No growth at 4 days   Gram Stain Many (4+) WBCs seen    No organisms seen             02/10/2019 2028  02/12/2019 1143  Anaerobic Culture - Synovial Fluid, Knee, Right [[id number]]   Synovial Fluid from Knee, Right     Preliminary result  Culture No anaerobes isolated             02/10/2019 2027  02/14/2019 0711  Body Fluid Culture - Synovial Fluid, Knee, Left [[id number]]   Synovial Fluid from Knee, Left     Final result  BF Culture No growth at 4 days   Gram Stain Many (4+) WBCs seen    No organisms seen             02/10/2019 2027  02/11/2019 1338  Anaerobic Culture - Synovial Fluid, Knee, Left [[id number]]   Synovial Fluid from Knee, Left     Preliminary  result  Culture No anaerobes isolated             02/10/2019 1937  02/13/2019 2030  Blood Culture - Blood, Arm, Left [[id number]]   Blood from Arm, Left     Preliminary result  Blood Culture No growth at 3 days             02/10/2019 1913  02/13/2019 2030  Blood Culture - Blood, Arm, Right [[id number]]   Blood from Arm, Right     Preliminary result  Blood Culture No growth at 3 days                    Results for NABIL MATSON (MRN [id number]) as of 2/11/2019 09:31   Ref. Range 2/11/2019 05:05   C-Reactive Protein Latest Ref Range: 0.00 - 1.00 mg/dL 27.72 (H)     Right :  Results for NABIL MATSON (MRN [id number]) as of 2/11/2019 09:31   Ref. Range 2/10/2019 20:27   Color, Fluid Unknown Red   Appearance, Fluid Latest Ref Range: Clear  Bloody (A)   WBC, Fluid Latest Units: /mm3 52,860   RBC, Fluid Latest Units: /mm3 575,000   Crystals, Fluid Unknown No crystals seen     Left:   Results for NABIL MATSON (MRN [id number]) as of 2/11/2019 09:31   Ref. Range 2/10/2019 20:27   Color, Fluid Unknown Yellow   Appearance, Fluid Latest Ref Range: Clear  Turbid (A)   WBC, Fluid Latest Units: /mm3 232,950   RBC, Fluid Latest Units: /mm3 21,000   Neutrophils, Fluid Latest Units: % 94   Lymphocytes, Fluid Latest Units: % 3   Monocytes, Fluid Latest Units: % 3   Crystals, Fluid Unknown No crystals seen     Results for NABIL MATSON (MRN [id number]) as of 2/11/2019 09:31   Ref. Range 2/11/2019 05:04   Sed Rate Latest Ref Range: 0 - 30 mm/hr 100 (H)     I reviewed the patient's new imaging including images and reports.    All medications reviewed.     Pharmacy Consult  Does not apply BID   carvedilol 3.125 mg Oral BID With Meals   ceFAZolin 2 g Intravenous Q8H   enoxaparin 40 mg Subcutaneous Daily   folic acid 1 mg Oral Daily   insulin lispro 0-7 Units Subcutaneous 4x Daily With Meals & Nightly   lactobacillus acidophilus 1 capsule Oral Daily   meloxicam 15 mg Oral Daily   multivitamin 1 tablet Oral Daily   sodium chloride 10 mL  Intravenous Q12H   sodium chloride 3 mL Intravenous Q12H   sodium chloride 3 mL Intravenous Q12H   thiamine 100 mg Oral Daily   traZODone 50 mg Oral Nightly   vancomycin 125 mg Oral Q6H   venlafaxine  mg Oral Daily With Breakfast       acetaminophen 650 mg Q4H PRN   Or     acetaminophen 650 mg Q4H PRN   Or     acetaminophen 650 mg Q4H PRN   bisacodyl 10 mg Daily PRN   bisacodyl 10 mg Daily PRN   dextrose 25 g Q15 Min PRN   dextrose 15 g Q15 Min PRN   diphenhydrAMINE 50 mg Q6H PRN   docusate sodium 100 mg BID PRN   glucagon (human recombinant) 1 mg PRN   HYDROcodone-acetaminophen 1 tablet Q4H PRN   HYDROmorphone 0.5 mg Q2H PRN   And     naloxone 0.1 mg Q5 Min PRN   labetalol 10 mg Q4H PRN   LORazepam 1 mg Q2H PRN   Or     LORazepam 1 mg Q2H PRN   Or     LORazepam 2 mg Q1H PRN   Or     LORazepam 2 mg Q1H PRN   Or     LORazepam 2 mg Q15 Min PRN   Or     LORazepam 2 mg Q15 Min PRN   magnesium hydroxide 10 mL Daily PRN   magnesium sulfate 2 g PRN   Or     magnesium sulfate 2 g PRN   Or     magnesium sulfate 4 g PRN   Morphine 4 mg Q2H PRN   And     naloxone 0.4 mg Q5 Min PRN   ondansetron 4 mg Q6H PRN   Or     ondansetron 4 mg Q6H PRN   oxyCODONE-acetaminophen 1 tablet Q4H PRN   oxyCODONE-acetaminophen 1 tablet Q4H PRN   potassium chloride 40 mEq PRN   Or     potassium chloride 40 mEq PRN   Or     potassium chloride 10 mEq Q1H PRN   sodium chloride 500 mL TID PRN   sodium chloride 1-10 mL PRN   sodium chloride 10 mL PRN       Assessment/Plan     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 difficile diarrhea    Acute blood loss anemia, asymptomatic    Acute postoperative pain    Hyponatremia    Plan    Follow fluid studies, cultures from knee  arthrocentesis.  Follow blood cultures.    ID following, Dr. Francis. Long-term IV abx- Cefazolin 2 g IV every 8 hrs. Possibly replace PICC once mental status improves.  Cdiff- po vanc  ETOH WD. CIWA q shift. W/D protocol    1. PT/OT- WBAT BLE  2. Pain control-prns, AC nerve block  3. IS-encouraged  4. DVT proph- mechs/Lovenox  5. Bowel regimen  6. Monitor post-op labs  7. DC planning. CM following.      Kimberlee Callejas, APRN  02/14/19  10:07 AM

## 2019-02-14 NOTE — NURSING NOTE
Consulted to assess possible pressure areas on patients bottom. Assessment performed. No pressure areas can be seen or identified. Patient presents with several small abrasion areas. Patient dose scratch and pick. Just apply barrier cream for topical care. No further need for WOC nurse at this time. Thanks

## 2019-02-14 NOTE — PROGRESS NOTES
"[facility] Progress Note    Date of Admission: 2/10/2019      Antibiotics:  Cefazolin,  Po vanco    CC: bilateral knee pain    S: Patient with bilateral prosthetic joints  removed with some postoperative pain hemodynamically stable no fevers. Diarrhea improving.  O:  /92 (BP Location: Left arm, Patient Position: Lying)   Pulse 97   Temp 97.8 °F (36.6 °C) (Oral)   Resp 16   Ht 157.5 cm (62\")   Wt 82.6 kg (182 lb)   SpO2 96%   BMI 33.29 kg/m²   Temp (24hrs), Av.5 °F (36.4 °C), Min:97 °F (36.1 °C), Max:97.9 °F (36.6 °C)      PE:     GENERAL: Awake and alert, in no acute distress.   HEENT: Normocephalic, atraumatic.  PERRL. EOMI. No conjunctival injection. No icterus. Oropharynx clear without evidence of thrush or exudate. No evidence of peridontal disease.    NECK: Supple without nuchal rigidity  LYMPH: No cervical, axillary or inguinal lymphadenopathy. No neck masses  HEART: RRR; No murmur, rubs, gallops.   LUNGS: Clear to auscultation bilaterally without wheezing, rales, rhonchi. Normal respiratory effort.  ABDOMEN: Soft, mild distention with increased bowel sounds. No rebound or guarding.   EXT:  Trace lower extremity edema : Normal appearing genitalia without Taylor catheter.  MSK: Bilateral knee dressed with decreased range of motion   SKIN: Warm and dry skin with eczematous rash    NEURO: Oriented to PPT. No focal deficits.   PSYCHIATRIC: Anxious mood. Cooperative with PE      Laboratory Data    Results from last 7 days   Lab Units 19  0544 19  0639 19  0504   WBC 10*3/mm3 7.32 10.45 12.03*   HEMOGLOBIN g/dL 7.9* 8.9* 11.3*   HEMATOCRIT % 25.9* 29.1* 36.2   PLATELETS 10*3/mm3 234 272 262     Results from last 7 days   Lab Units 19  0544 19  0639   SODIUM mmol/L  --  131*   POTASSIUM mmol/L 3.4* 3.7   CHLORIDE mmol/L  --  100   CO2 mmol/L  --  25.0   BUN mg/dL  --  9   CREATININE mg/dL  --  0.82   GLUCOSE mg/dL  --  171*   CALCIUM mg/dL  --  8.8     Results from last  " days   Lab Units 02/10/19  1928   ALK PHOS U/L 162*   BILIRUBIN mg/dL 1.0   ALT (SGPT) U/L 40   AST (SGOT) U/L 29     Results from last 7 days   Lab Units 02/11/19  0504   SED RATE mm/hr 100*     Results from last 7 days   Lab Units 02/11/19  0505   CRP mg/dL 27.72*       Estimated Creatinine Clearance: 78.1 mL/min (by C-G formula based on SCr of 0.82 mg/dL).      Microbiology:  Knee cx neg    Radiology:  Imaging Results (last 24 hours)     Procedure Component Value Units Date/Time    XR Knee 1 or 2 View Bilateral [[id number]] Collected:  02/13/19 1602     Updated:  02/13/19 1611    Narrative:       EXAMINATION: XR KNEE 1 OR 2 VW, BILATERAL-02/12/2019:      INDICATION: Post-op.     TECHNIQUE:  Two views both knees.     COMPARISONS:  02/10/2019.     FINDINGS:       LEFT: The tibial component of the knee arthroplasty hardware has been  removed and replaced with antibiotic cement. The femoral component also  has been replaced. There is expected postoperative soft tissue swelling  and emphysema. A percutaneous drain is in place. Expected alignment  identified. Persistent fragmentation of the superior patella.     RIGHT:  As on the left, the tibial component of the knee arthroplasty  hardware has been removed and replaced with antibiotic cement and the  femoral component has been replaced. There is soft tissue swelling and  soft tissue emphysema as expected. A percutaneous drain is in place.       Impression:       Expected postoperative findings following revision of the  bilateral knee arthroplasty.     D:  02/13/2019  E:  02/13/2019     This report was finalized on 2/13/2019 4:09 PM by Jorge Roche.             PROBLEM LIST:   Bilateral prosthetic joint infections  Prior history of methicillin sensitive staph aureus bilateral knee infections and bacteremia  leukocytosis  Chronically elevated sedimentation rate CRP  Alcoholism  Severe eczema  Bilateral knee pain and difficulty with ambulation  Low-grade fevers  C diff  colitis with acute diarrhea  Acute blood loss anemia        ASSESSMENT:  53-year-old female with severe eczema, history of alcoholism, with previous MSSA bacteremia with bilateral knee prostate joint infection attempted to be treated with single exchange long-term IV followed by oral antibiotics.  Unfortunately even with long-term treatment oral antibiotics she's had worsening of her bilateral knee pain swelling erythema and effusions appears to be consistent with acute on chronic infection and suspect ongoing MSSA infection agree with aggressive treatment with two-stage revision with bilateral knee X plans with spacers in place long-term PICC line and IV antibiotics to be guided by cultures for now we'll use cefazolin 2 g IV every 8 hrs.    Continue long-term IV antibiotics with now coverage to cover MSSA with cefazolin and oral vancomycin for C. difficile colitis     PLAN:  Cefazolin 2 g IV every 8 hrs  Follow-up blood cultures and knee fluid culture, neg so far  picc placed will need placement.  Cristóbal Francis MD  2/14/2019

## 2019-02-14 NOTE — PLAN OF CARE
Problem: Patient Care Overview  Goal: Plan of Care Review  Outcome: Ongoing (interventions implemented as appropriate)   02/13/19 2001   Coping/Psychosocial   Plan of Care Reviewed With patient   OTHER   Outcome Summary Patient increasingly confused and agitated. Mediacted periodically with ativan as appropriate. Tries to climb out of bed often. Unable to reorient. Unable to stand and take steps without max assist of 2.    Plan of Care Review   Progress no change

## 2019-02-15 LAB
BACTERIA SPEC AEROBE CULT: NORMAL
BACTERIA SPEC AEROBE CULT: NORMAL
DEPRECATED RDW RBC AUTO: 44.7 FL (ref 37–54)
ERYTHROCYTE [DISTWIDTH] IN BLOOD BY AUTOMATED COUNT: 14.8 % (ref 11.3–14.5)
GLUCOSE BLDC GLUCOMTR-MCNC: 141 MG/DL (ref 70–130)
GLUCOSE BLDC GLUCOMTR-MCNC: 141 MG/DL (ref 70–130)
GLUCOSE BLDC GLUCOMTR-MCNC: 164 MG/DL (ref 70–130)
GLUCOSE BLDC GLUCOMTR-MCNC: 176 MG/DL (ref 70–130)
HCT VFR BLD AUTO: 26.1 % (ref 34.5–44)
HGB BLD-MCNC: 8.1 G/DL (ref 11.5–15.5)
MCH RBC QN AUTO: 25.5 PG (ref 27–31)
MCHC RBC AUTO-ENTMCNC: 31 G/DL (ref 32–36)
MCV RBC AUTO: 82.1 FL (ref 80–99)
PLATELET # BLD AUTO: 249 10*3/MM3 (ref 150–450)
PMV BLD AUTO: 7.6 FL (ref 6–12)
RBC # BLD AUTO: 3.18 10*6/MM3 (ref 3.89–5.14)
WBC NRBC COR # BLD: 6.83 10*3/MM3 (ref 3.5–10.8)

## 2019-02-15 PROCEDURE — 25010000003 CEFAZOLIN IN DEXTROSE 2-4 GM/100ML-% SOLUTION: Performed by: INTERNAL MEDICINE

## 2019-02-15 PROCEDURE — 97530 THERAPEUTIC ACTIVITIES: CPT | Performed by: OCCUPATIONAL THERAPIST

## 2019-02-15 PROCEDURE — 82962 GLUCOSE BLOOD TEST: CPT

## 2019-02-15 PROCEDURE — 25010000002 HYDROMORPHONE PER 4 MG: Performed by: ORTHOPAEDIC SURGERY

## 2019-02-15 PROCEDURE — 99024 POSTOP FOLLOW-UP VISIT: CPT | Performed by: ORTHOPAEDIC SURGERY

## 2019-02-15 PROCEDURE — 25010000002 ENOXAPARIN PER 10 MG: Performed by: ORTHOPAEDIC SURGERY

## 2019-02-15 PROCEDURE — 97530 THERAPEUTIC ACTIVITIES: CPT

## 2019-02-15 PROCEDURE — 85027 COMPLETE CBC AUTOMATED: CPT | Performed by: ORTHOPAEDIC SURGERY

## 2019-02-15 RX ADMIN — VANCOMYCIN HYDROCHLORIDE 125 MG: KIT at 18:41

## 2019-02-15 RX ADMIN — CARVEDILOL 3.12 MG: 3.12 TABLET, FILM COATED ORAL at 09:14

## 2019-02-15 RX ADMIN — SODIUM CHLORIDE 120 ML/HR: 9 INJECTION, SOLUTION INTRAVENOUS at 11:39

## 2019-02-15 RX ADMIN — INSULIN LISPRO 2 UNITS: 100 INJECTION, SOLUTION INTRAVENOUS; SUBCUTANEOUS at 11:40

## 2019-02-15 RX ADMIN — TRAZODONE HYDROCHLORIDE 50 MG: 50 TABLET ORAL at 20:09

## 2019-02-15 RX ADMIN — FOLIC ACID 1 MG: 1 TABLET ORAL at 09:14

## 2019-02-15 RX ADMIN — HYDROCODONE BITARTRATE AND ACETAMINOPHEN 1 TABLET: 5; 325 TABLET ORAL at 06:30

## 2019-02-15 RX ADMIN — HYDROCODONE BITARTRATE AND ACETAMINOPHEN 1 TABLET: 5; 325 TABLET ORAL at 20:09

## 2019-02-15 RX ADMIN — VANCOMYCIN HYDROCHLORIDE 125 MG: KIT at 05:50

## 2019-02-15 RX ADMIN — CEFAZOLIN SODIUM 2 G: 2 INJECTION, SOLUTION INTRAVENOUS at 11:40

## 2019-02-15 RX ADMIN — VANCOMYCIN HYDROCHLORIDE 125 MG: KIT at 00:58

## 2019-02-15 RX ADMIN — CEFAZOLIN SODIUM 2 G: 2 INJECTION, SOLUTION INTRAVENOUS at 02:56

## 2019-02-15 RX ADMIN — MELOXICAM 15 MG: 7.5 TABLET ORAL at 09:14

## 2019-02-15 RX ADMIN — VENLAFAXINE HYDROCHLORIDE 300 MG: 75 CAPSULE, EXTENDED RELEASE ORAL at 09:14

## 2019-02-15 RX ADMIN — ENOXAPARIN SODIUM 40 MG: 40 INJECTION SUBCUTANEOUS at 09:15

## 2019-02-15 RX ADMIN — INSULIN LISPRO 2 UNITS: 100 INJECTION, SOLUTION INTRAVENOUS; SUBCUTANEOUS at 18:40

## 2019-02-15 RX ADMIN — HYDROMORPHONE HYDROCHLORIDE 0.5 MG: 1 INJECTION, SOLUTION INTRAMUSCULAR; INTRAVENOUS; SUBCUTANEOUS at 15:57

## 2019-02-15 RX ADMIN — Medication 1 CAPSULE: at 09:15

## 2019-02-15 RX ADMIN — CARVEDILOL 3.12 MG: 3.12 TABLET, FILM COATED ORAL at 18:40

## 2019-02-15 RX ADMIN — Medication 1 TABLET: at 09:15

## 2019-02-15 RX ADMIN — VANCOMYCIN HYDROCHLORIDE 125 MG: KIT at 11:40

## 2019-02-15 RX ADMIN — Medication 100 MG: at 09:14

## 2019-02-15 RX ADMIN — BISACODYL 10 MG: 5 TABLET, COATED ORAL at 18:40

## 2019-02-15 NOTE — PLAN OF CARE
Problem: Patient Care Overview  Goal: Plan of Care Review  Outcome: Ongoing (interventions implemented as appropriate)   02/15/19 2148   Coping/Psychosocial   Plan of Care Reviewed With patient   OTHER   Outcome Summary Pt participatory this date in bed mobility and attempts at standing X2. Pt requiring min X2 then min A X2 for pivot to BSC and back to bed. Pt able to perform toileting hygiene herself and is progressing toward LBD w/o use of AE d/t excellent ROM at trunk. Pt emotionally labile over d/c.

## 2019-02-15 NOTE — PROGRESS NOTES
Continued Stay Note  [facility]     Patient Name: Jyoti Luna  MRN: [id number]  Today's Date: 2/15/2019    Admit Date: 2/10/2019    Discharge Plan     Row Name 02/15/19 1634       Plan    Plan  to be determined    Patient/Family in Agreement with Plan  yes    Plan Comments  [facility] con't to follow. Lenghty discussion by phone with daughter( Marina  [phone]) on 2/14. She stated Ms Luna began abusing ETOH and pain meds again last year after she returned home . I spoke with Ms Luna today since she is now alert and oriented. We discussed need for SNF, she is very reluctant. Case [facility] will f/u on Monday and make referrals. At this point her care could probably be managed in a SNF depending on final IV antibiotic needed.        Discharge Codes    No documentation.       Expected Discharge Date and Time     Expected Discharge Date Expected Discharge Time    Feb 14, 2019             Sonja C Kellerman, RN

## 2019-02-15 NOTE — PROGRESS NOTES
"Orthopaedic Surgery Progress Note     LOS: 5 days   Patient Care Team:  Provider, No Known as PCP - General    POD 3    Subjective     Interval History:   Patient resting comfortably in bed.  No complaints.  Confusion has apparently resolved.    Objective     Vital Signs:  Temp (24hrs), Av.5 °F (36.9 °C), Min:98.1 °F (36.7 °C), Max:98.9 °F (37.2 °C)    /89 (BP Location: Right arm, Patient Position: Lying)   Pulse 75   Temp 98.2 °F (36.8 °C) (Oral)   Resp 16   Ht 157.5 cm (62\")   Wt 82.6 kg (182 lb)   SpO2 99%   BMI 33.29 kg/m²     Labs:  Lab Results (last 24 hours)     Procedure Component Value Units Date/Time    POC Glucose Once [[id number]]  (Abnormal) Collected:  02/15/19 1115    Specimen:  Blood Updated:  02/15/19 1128     Glucose 164 mg/dL     Body Fluid Culture - Synovial Fluid, Knee, Left [[id number]] Collected:  19 1236    Specimen:  Synovial Fluid from Knee, Left Updated:  02/15/19 1120     BF Culture No growth at 3 days     Gram Stain Many (4+) WBCs seen      No organisms seen    POC Glucose Once [[id number]]  (Abnormal) Collected:  02/15/19 0810    Specimen:  Blood Updated:  02/15/19 0822     Glucose 141 mg/dL     Tissue / Bone Culture - Tissue, Knee, Right [[id number]] Collected:  19 1342    Specimen:  Tissue from Knee, Right Updated:  02/15/19 0739     Tissue Culture No growth at 3 days     Gram Stain Moderate (3+) WBCs seen      No organisms seen    CBC (No Diff) [[id number]]  (Abnormal) Collected:  02/15/19 0651    Specimen:  Blood Updated:  02/15/19 0715     WBC 6.83 10*3/mm3      RBC 3.18 10*6/mm3      Hemoglobin 8.1 g/dL      Hematocrit 26.1 %      MCV 82.1 fL      MCH 25.5 pg      MCHC 31.0 g/dL      RDW 14.8 %      RDW-SD 44.7 fl      MPV 7.6 fL      Platelets 249 10*3/mm3     POC Glucose Once [[id number]]  (Abnormal) Collected:  19    Specimen:  Blood Updated:  19     Glucose 156 mg/dL     Blood Culture - Blood, Arm, Right [[id number]] Collected:  " 02/10/19 1913    Specimen:  Blood from Arm, Right Updated:  02/14/19 2030     Blood Culture No growth at 4 days    Blood Culture - Blood, Arm, Left [[id number]] Collected:  02/10/19 1937    Specimen:  Blood from Arm, Left Updated:  02/14/19 2030     Blood Culture No growth at 4 days    POC Glucose Once [[id number]]  (Abnormal) Collected:  02/14/19 1722    Specimen:  Blood Updated:  02/14/19 1724     Glucose 155 mg/dL         Cultures remain negative    Physical Exam:  Sensation and motor intact in both feet.  Knee immobilizer is in place.  Straight leg raise intact bilaterally    Assessment/Plan   Postop day 3 status post bilateral total knee arthroplasty removal for infection with placement of articulating spacer prosthesis  Alcohol withdrawal symptoms improved  Definitive IV antibiotic treatment based upon culture sensitivities.    Placement issues - not able to take care of herself at home.  Weight-bear as tolerated in both knees with no range of motion of the left knee.    Covering for Dr. Moffett till Monday.      Martin Gallego MD  02/15/19  2:27 PM

## 2019-02-15 NOTE — PROGRESS NOTES
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)    Alcoholism /alcohol abuse (CMS/[facility])    Diabetes mellitus (CMS/[facility])    Infected prosthetic knee joint (CMS/[facility])    Diarrhea    Leukocytosis    Anemia, likely dilutional    C. difficile diarrhea    Acute blood loss anemia, asymptomatic    Acute postoperative pain    Hyponatremia      PMH: She  has a past medical history of Acid reflux, Alcoholism (CMS/[facility]), Anemia (3/20/2018), Arthritis, Asthma, Depression (3/20/2018), Diabetes mellitus (CMS/[facility]), Eczema, History of transfusion, and Hypertension (3/20/2018).   PSxH: She  has a past surgical history that includes Joint replacement; Laparoscopic cholecystectomy; TOTAL KNEE ARTHROPLASTY REVISION WITH REMOVAL AND PLACE OF  ANTIBIOTIC SPACERS BILATERAL (Bilateral, 2/12/2019); and KNEE POLY INSERT EXCHANGE WITH IRRIGATION AND DEBRIDMENT (Bilateral, 3/23/2018).       Anthropometrics     Height: 157.5 cm (62\")  Last filed wt: Weight: 82.6 kg (182 lb) (02/12/19 1111)  Weight Method: Standing scale    BMI: BMI (Calculated): 33.3  Obese Class I: 30-34.9kg/m2    Ideal Body Weight (IBW) (kg): 50.43    Medications reviewed     Applicable medical tests/Procedures since admission:  S/p TOTAL KNEE ARTHROPLASTY REVISION WITH REMOVAL AND PLACE OF  ANTIBIOTIC SPACERS BILATERAL (2/12)  Current Nutrition Prescription     PO: Diet Regular    Intake:  Per nursing documentation pt consuming 75% of 5 meals (2/11-2/15)         Nutrition Diagnosis       Problem No nutrition diagnosis at this time   Etiology    Signs/Symptoms      Nutrition " Intervention   1.  Follow treatment progress, Care plan reviewed, Menu adjusted  2. Adjusted pt menu to include consistent carb restriction- DM II     Monitoring/Evaluation:   Per protocol, PO intake      Will Continue to follow per protocol      Tila MARTINEZ Waits  Time Spent: 25 minutes

## 2019-02-15 NOTE — THERAPY TREATMENT NOTE
Acute Care - Physical Therapy Treatment Note  [facility]     Patient Name: Jyoti Luna  : 1965  MRN: [id number]  Today's Date: 2/15/2019  Onset of Illness/Injury or Date of Surgery: 02/10/19  Date of Referral to PT: 19  Referring Physician: MD Zuhair     Admit Date: 2/10/2019    Visit Dx:    ICD-10-CM ICD-9-CM   1. Infection of prosthetic knee joint, subsequent encounter T84.59XD V58.89    Z96.659    2. Impaired mobility and ADLs Z74.09 799.89   3. Impaired functional mobility, balance, gait, and endurance Z74.09 V49.89     Patient Active Problem List   Diagnosis   • HO MSSA (methicillin susceptible Staphylococcus aureus) septicemia (CMS/[facility])   • Knee swelling; bilateral   • Alcoholism /alcohol abuse (CMS/[facility])   • Iron deficiency anemia   • Hypertension   • Depression   • Diabetes mellitus (CMS/[facility])   • Infected prosthetic knee joint (CMS/[facility])   • Constipation   • Diarrhea   • Leukocytosis   • Anemia, likely dilutional   • C. 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  -      Care Plan Review  care plan/treatment goals reviewed;patient/other agree to care plan  -      Therapy Frequency (PT Clinical Impression)  daily  -      Patient Effort  good  -      Existing Precautions/Restrictions  fall;brace worn when out of bed;other (see comments)  (Significant)  BLE WBAT with bilat KI AAT; no ROM L knee, impulsive  -      Treatment  Considerations/Comments  Bilateral KI on at all times, no ROM L knee, impulsive.   -LM      Recorded by [LM] Sujata Sinha, PT 02/15/19 1447      Row Name 02/15/19 1413             Vital Signs    Pre Patient Position  Supine  -LM      Intra Patient Position  Standing  -LM      Post Patient Position  Supine  -LM      Recorded by [LM] Sujata Sinha, PT 02/15/19 1447      Row Name 02/15/19 1413             Cognitive Assessment/Intervention    Additional Documentation  Cognitive Assessment/Intervention (Group)  -LM      Recorded by [LM] Sujata Sinha, PT 02/15/19 1447      Row Name 02/15/19 1413             Cognitive Assessment/Intervention- PT/OT    Affect/Mental Status (Cognitive)  anxious  -LM      Behavioral Issues (Cognitive)  overwhelmed easily  -LM      Orientation Status (Cognition)  oriented x 4  -LM      Follows Commands (Cognition)  follows one step commands;over 90% accuracy;verbal cues/prompting required  -LM      Cognitive Function (Cognitive)  safety deficit  -LM      Safety Deficit (Cognitive)  moderate deficit;awareness of need for assistance;impulsivity;insight into deficits/self awareness;problem solving;judgment;safety precautions awareness;safety precautions follow-through/compliance  -LM      Personal Safety Interventions  fall prevention program maintained;gait belt;nonskid shoes/slippers when out of bed;supervised activity  -LM      Recorded by [LM] Sujata Sinha, PT 02/15/19 1447      Row Name 02/15/19 1413             Safety Issues, Functional Mobility    Safety Issues Affecting Function (Mobility)  at risk behavior observed;awareness of need for assistance;impulsivity;insight into deficits/self awareness;judgment;positioning of assistive device;problem solving;safety precaution awareness;safety precautions follow-through/compliance;sequencing abilities  -LM      Impairments Affecting Function (Mobility)  balance;pain;strength;endurance/activity tolerance  -LM      Recorded by [LM]  Sujata Sinha, PT 02/15/19 1447      Row Name 02/15/19 1413             Mobility Assessment/Intervention    Extremity Weight-bearing Status  left lower extremity;right lower extremity  -LM      Left Lower Extremity (Weight-bearing Status)  weight-bearing as tolerated (WBAT) with knee immobilizer  -LM      Right Lower Extremity (Weight-bearing Status)  weight-bearing as tolerated (WBAT) with knee immobilizer  -LM      Recorded by [LM] Sujata Sinha, PT 02/15/19 1447      Row Name 02/15/19 1413             Bed Mobility Assessment/Treatment    Bed Mobility Assessment/Treatment  supine-sit;sit-supine  -LM      Supine-Sit [place] (Bed Mobility)  supervision  -LM      Sit-Supine [place] (Bed Mobility)  contact guard;verbal cues  -LM      Bed Mobility, Safety Issues  decreased use of arms for pushing/pulling;decreased use of legs for bridging/pushing  -LM      Assistive Device (Bed Mobility)  head of bed elevated  -LM      Comment (Bed Mobility)  Pt required assist with BLEs when returning to supine, cues for sequencing. Pt reported increase in knee pain while sitting EOB.  -LM      Recorded by [LM] Sujata Sinha, PT 02/15/19 1447      Row Name 02/15/19 1413             Transfer Assessment/Treatment    Transfer Assessment/Treatment  sit-stand transfer;stand-sit transfer;toilet transfer  -LM      Comment (Transfers)  Pt performed x1 STS with bilateral UE support. Second STS and toilet transfer attempted with RW.   -LM      Recorded by [LM] Sujata Sinha, PT 02/15/19 1447      Row Name 02/15/19 1413             Sit-Stand Transfer    Sit-Stand [place] (Transfers)  minimum assist (75% patient effort);2 person assist;verbal cues  -LM      Assistive Device (Sit-Stand Transfers)  walker, front-wheeled  -LM      Recorded by [LM] Sujata Sinha, PT 02/15/19 1447      Row Name 02/15/19 1413             Stand-Sit Transfer    Stand-Sit [place] (Transfers)  minimum assist (75% patient effort);2 person  assist;verbal cues  -LM      Assistive Device (Stand-Sit Transfers)  walker, front-wheeled  -LM      Recorded by [LM] Sujata Sinha, PT 02/15/19 1447      Row Name 02/15/19 1413             Toilet Transfer    Type (Toilet Transfer)  sit-stand;stand-sit  -LM      [place] Level (Toilet Transfer)  minimum assist (75% patient effort);2 person assist;verbal cues  -LM      Assistive Device (Toilet Transfer)  commode, bedside without drop arms;walker, front-wheeled  -LM      Recorded by [LM] Sujata Sinha, PT 02/15/19 1447      Row Name 02/15/19 1413             Gait/Stairs Assessment/Training    Comment (Gait/Stairs)  Deferred due to increase in reported pain; pt declined further ambulation beyond transfers.  -LM      Recorded by [LM] Sujata Sinha, PT 02/15/19 1447      Row Name 02/15/19 1413             Motor Skills Assessment/Interventions    Additional Documentation  Therapeutic Exercise (Group);Therapeutic Exercise Interventions (Group)  -LM      Recorded by [LM] Sujata Sinha, PT 02/15/19 1453      Row Name 02/15/19 1413             Therapeutic Exercise    62567 - PT Therapeutic Activity Minutes  14  -LM      Recorded by [LM] Sujata Sinha, PT 02/15/19 1453      Row Name 02/15/19 1413             Balance    Balance  static sitting balance;dynamic sitting balance;static standing balance  -LM      Recorded by [LM] Sujata Sinha, PT 02/15/19 1447      Row Name 02/15/19 1413             Static Sitting Balance    Level of [place] (Unsupported Sitting, Static Balance)  supervision  -LM      Sitting Position (Unsupported Sitting, Static Balance)  sitting on edge of bed  -LM      Time Able to Maintain Position (Unsupported Sitting, Static Balance)  1 to 2 minutes  -LM      Recorded by [LM] Sujata Sinha, PT 02/15/19 1447      Row Name 02/15/19 1413             Dynamic Sitting Balance    Level of [place], Reaches Outside Midline (Sitting, Dynamic Balance)  contact guard assist  -LM       Sitting Position, Reaches Outside Midline (Sitting, Dynamic Balance)  sitting in chair  -LM      Recorded by [LM] Sujata Sinha, PT 02/15/19 1447      Row Name 02/15/19 1413             Static Standing Balance    Level of [place] (Supported Standing, Static Balance)  contact guard assist  -LM      Time Able to Maintain Position (Supported Standing, Static Balance)  1 to 2 minutes  -LM      Assistive Device Utilized (Supported Standing, Static Balance)  walker, rolling  -LM      Recorded by [LM] Sujata Sinha, PT 02/15/19 1447      Row Name 02/15/19 1413             Positioning and Restraints    Pre-Treatment Position  in bed  -LM      Post Treatment Position  bed per RN request  -LM      In Bed  notified nsg;supine;call light within reach;encouraged to call for assist;exit alarm on;with brace  -LM      Recorded by [LM] Sujata Sinha, PT 02/15/19 1447      Row Name 02/15/19 1413             Pain Assessment    Additional Documentation  Pain Scale: FACES Pre/Post-Treatment (Group)  -LM      Recorded by [LM] Sujata Sinha, PT 02/15/19 1447      Row Name 02/15/19 1413             Pain Scale: Numbers Pre/Post-Treatment    Pain Location - Side  Bilateral  -LM      Pain Location - Orientation  generalized  -LM      Pain Location  knee  -LM      Pain Intervention(s)  Repositioned;Ambulation/increased activity  -LM      Recorded by [LM] Sujata Sinha, PT 02/15/19 1447      Row Name 02/15/19 1413             Pain Scale: FACES Pre/Post-Treatment    Pain: FACES Scale, Pretreatment  4-->hurts little more  -LM      Pain: FACES Scale, Post-Treatment  6-->hurts even more  -LM      Recorded by [LM] Sujata Sinha, PT 02/15/19 1447      Row Name                Wound 02/12/19 1418 Bilateral knee incision    Wound - Properties Group Date first assessed: 02/12/19 [RV] Time first assessed: 1418 [RV] Side: Bilateral [RV] Location: knee [RV] Type: incision [RV] Recorded by:  [RV] Kishan Merida, MARY 02/12/19 1418     Row Name                Wound 02/13/19 2030 Left upper gluteal other (see comments)    Wound - Properties Group Date first assessed: 02/13/19 [AB] Time first assessed: 2030 [AB] Side: Left [AB] Orientation: upper [AB] Location: gluteal [AB] Type: other (see comments) [AB2], open area   Recorded by:  [AB] Heidy Gill, MARY 02/14/19 0254 [AB2] Heidy Gill RN 02/14/19 0312    Row Name                Wound 02/13/19 2030 Right medial gluteal    Wound - Properties Group Date first assessed: 02/13/19 [AB] Time first assessed: 2030 [AB] Side: Right [AB] Orientation: medial [AB] Location: gluteal [AB] Type: -- [AB2] Additional Comments: -- [AB2], inner buttock on right side   Recorded by:  [AB] Heidy Gill, MARY 02/14/19 0256 [AB2] Heidy Gill RN 02/14/19 0313    Row Name 02/15/19 1413             Coping    Observed Emotional State  tearful/crying;overwhelmed  -LM      Recorded by [LM] Sujata Sinha, PT 02/15/19 1447      Row Name 02/15/19 1413             Plan of Care Review    Plan of Care Reviewed With  patient  -LM      Recorded by [LM] Sujata Sinha, PT 02/15/19 1447      Row Name 02/15/19 1413             Outcome Summary/Treatment Plan (PT)    Daily Summary of Progress (PT)  progress towards functional goals is fair  -LM      Recorded by [LM] Sujata Sinha, PT 02/15/19 1447        User Key  (r) = Recorded By, (t) = Taken By, (c) = Cosigned By    Initials Name Effective Dates Discipline    RV Kishan Merida, RN 06/16/16 -  Nurse    Heidy [place], RN 09/30/16 -  Nurse    Sujata Sanchez, PT 04/03/18 -  PT          Wound 02/12/19 1418 Bilateral knee incision (Active)   Dressing Appearance dry;intact 2/14/2019  9:50 PM   Dressing Care, Wound elastic bandage 2/14/2019  9:50 PM       Wound 02/13/19 2030 Left upper gluteal other (see comments) (Active)   Dressing Appearance dry;intact 2/14/2019  9:50 PM   Drainage Amount scant 2/14/2019  9:50 PM   Dressing Care, Wound low-adherent  2/14/2019  9:50 PM       Wound 02/13/19 2030 Right medial gluteal (Active)   Dressing Appearance open to air 2/14/2019  9:50 PM   Drainage Amount none 2/14/2019  9:50 PM           Physical Therapy Education     Title: PT OT SLP Therapies (In Progress)     Topic: Physical Therapy (Done)     Point: Mobility training (Done)     Learning Progress Summary           Patient Acceptance, E,D, VU,NR by LM at 2/15/2019  2:13 PM    Comment:  Reviewed benefits of activity, safety with mobility, weight-bearing status, indications for use of knee immobilizers.    Acceptance, E,D, VU,NR by LM at 2/13/2019  8:31 AM    Comment:  Reviewed benefits of activity, WBAT orders, ROM expectations (no ROM L knee), progression of POC, safety with mobility.                   Point: Home exercise program (Done)     Learning Progress Summary           Patient Acceptance, E,D, VU,NR by LM at 2/15/2019  2:13 PM    Comment:  Reviewed benefits of activity, safety with mobility, weight-bearing status, indications for use of knee immobilizers.    Acceptance, E,D, VU,NR by LM at 2/13/2019  8:31 AM    Comment:  Reviewed benefits of activity, WBAT orders, ROM expectations (no ROM L knee), progression of POC, safety with mobility.                   Point: Body mechanics (Done)     Learning Progress Summary           Patient Acceptance, E,D, VU,NR by LM at 2/15/2019  2:13 PM    Comment:  Reviewed benefits of activity, safety with mobility, weight-bearing status, indications for use of knee immobilizers.    Acceptance, E,D, VU,NR by LM at 2/13/2019  8:31 AM    Comment:  Reviewed benefits of activity, WBAT orders, ROM expectations (no ROM L knee), progression of POC, safety with mobility.                   Point: Precautions (Done)     Learning Progress Summary           Patient Acceptance, E,D, VU,NR by LM at 2/15/2019  2:13 PM    Comment:  Reviewed benefits of activity, safety with mobility, weight-bearing status, indications for use of knee immobilizers.     Acceptance, E,D, VU,NR by TERESE at 2/13/2019  8:31 AM    Comment:  Reviewed benefits of activity, WBAT orders, ROM expectations (no ROM L knee), progression of POC, safety with mobility.                               User Key     Initials Effective Dates Name Provider Type Discipline    TERESE 04/03/18 -  Sujata Sinha, PT Physical Therapist PT                PT Recommendation and Plan  Anticipated Discharge Disposition (PT): inpatient rehabilitation facility  Planned Therapy Interventions (PT Eval): balance training, bed mobility training, gait training, home exercise program, patient/family education, strengthening, transfer training  Therapy Frequency (PT Clinical Impression): daily  Outcome Summary/Treatment Plan (PT)  Daily Summary of Progress (PT): progress towards functional goals is fair  Anticipated Equipment Needs at Discharge (PT): other (see comments)(defer to rehab)  Anticipated Discharge Disposition (PT): inpatient rehabilitation facility  Plan of Care Reviewed With: patient  Outcome Summary: Pt alert and oriented, cooperative with therapy; tearful regarding medical status. Pt required supervision-CGA with bed mobility; minAx2 with transfers. Pt able to stand at bedside and transfer to bedside commode with use of RW. Reported increasing pain in bilateral knees, limiting progress with therapy. Will progress with mobility as able.   Outcome Measures     Row Name 02/15/19 1413 02/13/19 1000 02/13/19 0831       How much help from another person do you currently need...    Turning from your back to your side while in flat bed without using bedrails?  3  -LM  --  3  -LM    Moving from lying on back to sitting on the side of a flat bed without bedrails?  3  -LM  --  2  -LM    Moving to and from a bed to a chair (including a wheelchair)?  3  -LM  --  3  -LM    Standing up from a chair using your arms (e.g., wheelchair, bedside chair)?  3  -LM  --  3  -LM    Climbing 3-5 steps with a railing?  1  -LM  --  1  -LM     To walk in hospital room?  2  -LM  --  2  -LM    AM-PAC 6 Clicks Score  15  -LM  --  14  -LM       How much help from another is currently needed...    Putting on and taking off regular lower body clothing?  --  2  -HK  --    Bathing (including washing, rinsing, and drying)  --  2  -HK  --    Toileting (which includes using toilet bed pan or urinal)  --  2  -HK  --    Putting on and taking off regular upper body clothing  --  3  -HK  --    Taking care of personal grooming (such as brushing teeth)  --  2  -HK  --    Eating meals  --  3  -HK  --    Score  --  14  -HK  --       Functional Assessment    Outcome Measure Options  AM-PAC 6 Clicks Basic Mobility (PT)  -LM  AM-PAC 6 Clicks Daily Activity (OT)  -HK  AM-PAC 6 Clicks Basic Mobility (PT)  -LM      User Key  (r) = Recorded By, (t) = Taken By, (c) = Cosigned By    Initials Name Provider Type    Sujata Sanchez, PT Physical Therapist    HK Chery Zarate, OT Occupational Therapist         Time Calculation:   PT Charges     Row Name 02/15/19 1413             Time Calculation    Start Time  1413  -LM      PT Received On  02/15/19  -LM      PT Goal Re-Cert Due Date  02/23/19  -LM         Time Calculation- PT    Total Timed Code Minutes- PT  14 minute(s)  -LM         Timed Charges    27172 - PT Therapeutic Activity Minutes  14  -LM        User Key  (r) = Recorded By, (t) = Taken By, (c) = Cosigned By    Initials Name Provider Type    Sujata Sanchez, PT Physical Therapist        Therapy Suggested Charges     Code   Minutes Charges    07289 (CPT®) Hc Pt Neuromusc Re Education Ea 15 Min      83776 (CPT®) Hc Pt Ther Proc Ea 15 Min      39630 (CPT®) Hc Gait Training Ea 15 Min      09665 (CPT®) Hc Pt Therapeutic Act Ea 15 Min 14 1    11763 (CPT®) Hc Pt Manual Therapy Ea 15 Min      57962 (CPT®) Hc Pt Iontophoresis Ea 15 Min      01225 (CPT®) Hc Pt Elec Stim Ea-Per 15 Min      21550 (CPT®) Hc Pt Ultrasound Ea 15 Min      25060 (CPT®) Hc Pt Self Care/Mgmt/Train Ea  15 Min      31791 (CPT®) Hc Pt Prosthetic (S) Train Initial Encounter, Each 15 Min      95121 (CPT®) Hc Pt Orthotic(S)/Prosthetic(S) Encounter, Each 15 Min      07193 (CPT®) Hc Orthotic(S) Mgmt/Train Initial Encounter, Each 15min      Total  14 1        Therapy Charges for Today     Code Description Service Date Service Provider Modifiers Qty    [id number] HC PT THERAPEUTIC ACT EA 15 MIN 2/15/2019 Sujata Sinha, PT GP 1          PT G-Codes  Outcome Measure Options: AM-PAC 6 Clicks Basic Mobility (PT)  AM-PAC 6 Clicks Score: 15  Score: 14    Sujata Sinha, PT  2/15/2019

## 2019-02-15 NOTE — PLAN OF CARE
Problem: Patient Care Overview  Goal: Plan of Care Review  Outcome: Ongoing (interventions implemented as appropriate)   02/14/19 2009   Coping/Psychosocial   Plan of Care Reviewed With patient   OTHER   Outcome Summary Patient alert the majority of the day. Still confused, speaks to and about people who are not present in the room but that she insists is. She is pleasant and tries to be cooperative. VSS. Dressing, and immobiloizers remain in place.   Plan of Care Review   Progress no change

## 2019-02-15 NOTE — PLAN OF CARE
Problem: Patient Care Overview  Goal: Plan of Care Review  Outcome: Ongoing (interventions implemented as appropriate)   02/15/19 0383   Coping/Psychosocial   Plan of Care Reviewed With patient   OTHER   Outcome Summary Pt alert and oriented, cooperative with therapy; tearful regarding medical status. Pt required supervision-CGA with bed mobility; minAx2 with transfers. Pt able to stand at bedside and transfer to bedside commode with use of RW. Reported increasing pain in bilateral knees, limiting progress with therapy. Will progress with mobility as able.

## 2019-02-15 NOTE — THERAPY TREATMENT NOTE
Acute Care - Occupational Therapy Treatment Note  Psychiatric     Patient Name: Jyoti Luna  : 1965  MRN: [id number]  Today's Date: 2/15/2019  Onset of Illness/Injury or Date of Surgery: 02/10/19  Date of Referral to OT: 19  Referring Physician: MD Zuhair     Admit Date: 2/10/2019       ICD-10-CM ICD-9-CM   1. Infection of prosthetic knee joint, subsequent encounter T84.59XD V58.89    Z96.659    2. Impaired mobility and ADLs Z74.09 799.89   3. Impaired functional mobility, balance, gait, and endurance Z74.09 V49.89     Patient Active Problem List   Diagnosis   • HO MSSA (methicillin susceptible Staphylococcus aureus) septicemia (CMS/HCC)   • Knee swelling; bilateral   • Alcoholism /alcohol abuse (CMS/HCC)   • Iron deficiency anemia   • Hypertension   • Depression   • Diabetes mellitus (CMS/HCC)   • Infected prosthetic knee joint (CMS/HCC)   • Constipation   • Diarrhea   • Leukocytosis   • Anemia, likely dilutional   • C. 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   • Acute blood loss anemia, asymptomatic   • Acute postoperative pain   • Hyponatremia     Past Medical History:   Diagnosis Date   • Acid reflux    • Alcoholism (CMS/HCC)    • Anemia 3/20/2018   • Arthritis    • Asthma    • Depression 3/20/2018   • Diabetes mellitus (CMS/HCC)     POSS R/T INFECTION PER PT   • Eczema    • History of transfusion    • Hypertension 3/20/2018     Past Surgical History:   Procedure Laterality Date   • JOINT REPLACEMENT      bilateral knees   • KNEE POLY INSERT EXCHANGE Bilateral 3/23/2018    Procedure: KNEE POLY INSERT EXCHANGE WITH IRRIGATION AND DEBRIDMENT;  Surgeon: Ti Moffett MD;  Location: [facility];  Service: Orthopedics   • LAPAROSCOPIC CHOLECYSTECTOMY     • TOTAL KNEE ARTHROPLASTY REVISION Bilateral 2019    Procedure: TOTAL KNEE ARTHROPLASTY REVISION WITH REMOVAL AND PLACE OF  ANTIBIOTIC SPACERS BILATERAL;  Surgeon: Ti Moffett MD;   Location: [facility];  Service: Orthopedics       Therapy Treatment    Rehabilitation Treatment Summary     Row Name 02/15/19 1415 02/15/19 1413          Treatment Time/Intention    Discipline  occupational therapist  -ST  physical therapist  -LM     Document Type  therapy note (daily note)  -ST  therapy note (daily note)  -     Subjective Information  complains of;weakness;fatigue;pain  -ST  complains of;pain;fatigue  -LM     Mode of Treatment  occupational therapy;co-treatment  -ST  physical therapy  -LM     Patient/Family Observations  Pt supine; bilateral KI's intact; IV intact; SZ pads  -ST  Pt received semi-douglas in bed, IV intact, bilateral knee immobilizers.  -LM     Care Plan Review  --  care plan/treatment goals reviewed;patient/other agree to care plan  -LM     Therapy Frequency (PT Clinical Impression)  --  daily  -LM     Therapy Frequency (OT Eval)  daily  -ST  --     Patient Effort  good  -ST  good  -LM     Existing Precautions/Restrictions  fall;other (see comments);seizures  (Significant)  B KI's on AAT, NO ROM LLE, impulsive  -ST  fall;brace worn when out of bed;other (see comments)  (Significant)  BLE WBAT with bilat KI AAT; no ROM L knee, impulsive  -LM     Treatment Considerations/Comments  --  Bilateral KI on at all times, no ROM L knee, impulsive.   -LM     Recorded by [ST] Kinga Owen, OTR 02/15/19 1513 [LM] Sujata Sinha, PT 02/15/19 1447     Row Name 02/15/19 1413             Vital Signs    Pre Patient Position  Supine  -LM      Intra Patient Position  Standing  -LM      Post Patient Position  Supine  -LM      Recorded by [LM] Sujata Sinha, PT 02/15/19 1447      Row Name 02/15/19 1413             Cognitive Assessment/Intervention    Additional Documentation  Cognitive Assessment/Intervention (Group)  -LM      Recorded by [LM] Sujata Sinha, PT 02/15/19 1447      Row Name 02/15/19 1415 02/15/19 1413          Cognitive Assessment/Intervention- PT/OT    Affect/Mental Status  (Cognitive)  anxious;emotionally labile;sad/depressed affect  -ST  anxious  -LM     Behavioral Issues (Cognitive)  overwhelmed easily  -ST  overwhelmed easily  -LM     Orientation Status (Cognition)  oriented x 4  -ST  oriented x 4  -LM     Follows Commands (Cognition)  follows one step commands;over 90% accuracy;repetition of directions required  -ST  follows one step commands;over 90% accuracy;verbal cues/prompting required  -LM     Cognitive Function (Cognitive)  safety deficit;attention deficit  -ST  safety deficit  -LM     Attention Deficit (Cognitive)  moderate deficit  -ST  --     Memory Deficit (Cognitive)  moderate deficit  -ST  --     Safety Deficit (Cognitive)  moderate deficit  -ST  moderate deficit;awareness of need for assistance;impulsivity;insight into deficits/self awareness;problem solving;judgment;safety precautions awareness;safety precautions follow-through/compliance  -LM     Personal Safety Interventions  fall prevention program maintained;gait belt;nonskid shoes/slippers when out of bed  -ST  fall prevention program maintained;gait belt;nonskid shoes/slippers when out of bed;supervised activity  -LM     Recorded by [ST] Kinga Owen, OTR 02/15/19 1513 [LM] Sujata Sinha, PT 02/15/19 1447     Row Name 02/15/19 1415 02/15/19 1413          Safety Issues, Functional Mobility    Safety Issues Affecting Function (Mobility)  impulsivity;insight into deficits/self awareness;judgment;positioning of assistive device;problem solving;safety precaution awareness;safety precautions follow-through/compliance  -ST  at risk behavior observed;awareness of need for assistance;impulsivity;insight into deficits/self awareness;judgment;positioning of assistive device;problem solving;safety precaution awareness;safety precautions follow-through/compliance;sequencing abilities  -LM     Impairments Affecting Function (Mobility)  balance;endurance/activity tolerance;pain;range of motion (ROM);strength  -ST   "balance;pain;strength;endurance/activity tolerance  -LM     Comment, Safety Issues/Impairments (Mobility)  pt fixated on getting home to her dog, crying through session and stating \"I wish I would have never had this surgery\"  -ST  --     Recorded by [ST] Kinga Owen, OTR 02/15/19 1513 [LM] Sujata Sinha, PT 02/15/19 1447     Row Name 02/15/19 1415 02/15/19 1413          Mobility Assessment/Intervention    Extremity Weight-bearing Status  left lower extremity;right lower extremity  -ST  left lower extremity;right lower extremity  -LM     Left Lower Extremity (Weight-bearing Status)  weight-bearing as tolerated (WBAT)  -ST  weight-bearing as tolerated (WBAT) with knee immobilizer  -LM     Right Lower Extremity (Weight-bearing Status)  weight-bearing as tolerated (WBAT)  -ST  weight-bearing as tolerated (WBAT) with knee immobilizer  -LM     Recorded by [ST] Kinga Owen, OTR 02/15/19 1513 [LM] Sujata Sinha, PT 02/15/19 1447     Row Name 02/15/19 1415 02/15/19 1413          Bed Mobility Assessment/Treatment    Bed Mobility Assessment/Treatment  --  supine-sit;sit-supine  -LM     Supine-Sit [place] (Bed Mobility)  supervision  -ST  supervision  -LM     Sit-Supine [place] (Bed Mobility)  contact guard  -ST  contact guard;verbal cues  -LM     Bed Mobility, Safety Issues  decreased use of legs for bridging/pushing  -ST  decreased use of arms for pushing/pulling;decreased use of legs for bridging/pushing  -LM     Assistive Device (Bed Mobility)  head of bed elevated  -ST  head of bed elevated  -LM     Comment (Bed Mobility)  CGA for RLE back into bed d/t pain level   -ST  Pt required assist with BLEs when returning to supine, cues for sequencing. Pt reported increase in knee pain while sitting EOB.  -LM     Recorded by [ST] Kinga Owen, OTR 02/15/19 1513 [LM] Sujata Sinha, PT 02/15/19 1447     Row Name 02/15/19 1415 02/15/19 1413          Transfer Assessment/Treatment    Transfer " Assessment/Treatment  sit-stand transfer;stand-sit transfer  -ST  sit-stand transfer;stand-sit transfer;toilet transfer  -LM     Comment (Transfers)  cuing for hand placement  -ST  Pt performed x1 STS with bilateral UE support. Second STS and toilet transfer attempted with RW.   -LM     Recorded by [ST] Kinga Owen, OTR 02/15/19 1513 [LM] Sujata Sinha, PT 02/15/19 1447     Row Name 02/15/19 1415 02/15/19 1413          Sit-Stand Transfer    Sit-Stand [place] (Transfers)  minimum assist (75% patient effort);2 person assist  -ST  minimum assist (75% patient effort);2 person assist;verbal cues  -LM     Assistive Device (Sit-Stand Transfers)  walker, front-wheeled  -ST  walker, front-wheeled  -LM     Recorded by [ST] Kinga Owen, OTR 02/15/19 1513 [LM] Sujata Sinha, PT 02/15/19 1447     Row Name 02/15/19 1415 02/15/19 1413          Stand-Sit Transfer    Stand-Sit [place] (Transfers)  minimum assist (75% patient effort);2 person assist  -ST  minimum assist (75% patient effort);2 person assist;verbal cues  -LM     Assistive Device (Stand-Sit Transfers)  walker, front-wheeled  -ST  walker, front-wheeled  -LM     Recorded by [ST] Kinga Owen, OTR 02/15/19 1513 [LM] Sujata Sinha, PT 02/15/19 1447     Row Name 02/15/19 1415 02/15/19 1413          Toilet Transfer    Type (Toilet Transfer)  sit-stand;stand-sit  -ST  sit-stand;stand-sit  -LM     [place] Level (Toilet Transfer)  minimum assist (75% patient effort);2 person assist  -ST  minimum assist (75% patient effort);2 person assist;verbal cues  -LM     Assistive Device (Toilet Transfer)  commode, bedside without drop arms not using rwx fully when transferring to [facility], using HHA   -ST  commode, bedside without drop arms;walker, front-wheeled  -LM     Recorded by [ST] Kinga Owen, OTR 02/15/19 1513 [LM] Sujata Sinha, PT 02/15/19 1447     Row Name 02/15/19 1413             Gait/Stairs Assessment/Training    Comment  (Gait/Stairs)  Deferred due to increase in reported pain; pt declined further ambulation beyond transfers.  -LM      Recorded by [LM] Sujata Sinha, PT 02/15/19 1447      Row Name 02/15/19 1415             ADL Assessment/Intervention    BADL Assessment/Intervention  toileting  -ST      Recorded by [ST] Kinga Owen, OTR 02/15/19 1513      Row Name 02/15/19 1415             Lower Body Dressing Assessment/Training    Lower Body Dressing Bent Level  don;socks;minimum assist (75% patient effort)  -ST      Lower Body Dressing Position  unsupported sitting  -ST      Recorded by [ST] Kinga Owen, OTR 02/15/19 1513      Row Name 02/15/19 1415             Toileting Assessment/Training    Bent Level (Toileting)  adjust/manage clothing;perform perineal hygiene;set up  -ST      Assistive Devices (Toileting)  commode, bedside without drop arms  -ST      Toileting Position  unsupported sitting  -ST      Recorded by [ST] Kinga Owen, OTR 02/15/19 1513      Row Name 02/15/19 1415             BADL Safety/Performance    Impairments, BADL Safety/Performance  pain;range of motion  -ST      Cognitive Impairments, BADL Safety/Performance  problem solving/reasoning;insight into deficits/self awareness  -ST      Recorded by [ST] Kinga Owen, OTR 02/15/19 1513      Row Name 02/15/19 1413             Motor Skills Assessment/Interventions    Additional Documentation  Therapeutic Exercise (Group);Therapeutic Exercise Interventions (Group)  -LM      Recorded by [LM] Sujata Sinha, PT 02/15/19 1453      Row Name 02/15/19 1415 02/15/19 1413          Therapeutic Exercise    19820 - PT Therapeutic Activity Minutes  --  14  -LM     13540 - OT Therapeutic Activity Minutes  19  -ST  --     Recorded by [ST] Kinga Owen, OTR 02/15/19 1513 [LM] Sujata Sinha, PT 02/15/19 1453     Row Name 02/15/19 1413             Balance    Balance  static sitting balance;dynamic sitting balance;static standing  balance  -LM      Recorded by [LM] Sujata Sinha, PT 02/15/19 1447      Row Name 02/15/19 1413             Static Sitting Balance    Level of [place] (Unsupported Sitting, Static Balance)  supervision  -LM      Sitting Position (Unsupported Sitting, Static Balance)  sitting on edge of bed  -LM      Time Able to Maintain Position (Unsupported Sitting, Static Balance)  1 to 2 minutes  -LM      Recorded by [LM] Sujata Sinha, PT 02/15/19 1447      Row Name 02/15/19 1415 02/15/19 1413          Dynamic Sitting Balance    Level of [place], Reaches Outside Midline (Sitting, Dynamic Balance)  supervision  -ST  contact guard assist  -LM     Sitting Position, Reaches Outside Midline (Sitting, Dynamic Balance)  sitting on edge of bed  -ST  sitting in chair  -LM     Recorded by [ST] Kinga Owen, OTR 02/15/19 1513 [LM] Sujata Sinha, PT 02/15/19 1447     Row Name 02/15/19 1415 02/15/19 1413          Static Standing Balance    Level of [place] (Supported Standing, Static Balance)  minimal assist, 75% patient effort  -ST  contact guard assist  -LM     Time Able to Maintain Position (Supported Standing, Static Balance)  3 to 4 minutes  -ST  1 to 2 minutes  -LM     Assistive Device Utilized (Supported Standing, Static Balance)  walker, rolling  -ST  walker, rolling  -LM     Recorded by [ST] Kinga Owen, OTR 02/15/19 1513 [LM] Sujata Sinha, PT 02/15/19 1447     Row Name 02/15/19 1415 02/15/19 1413          Positioning and Restraints    Pre-Treatment Position  in bed  -ST  in bed  -LM     Post Treatment Position  bed  -ST  bed per RN request  -LM     In Bed  notified nsg;supine;call light within reach;encouraged to call for assist;exit alarm on  -ST  notified nsg;supine;call light within reach;encouraged to call for assist;exit alarm on;with brace  -LM     Recorded by [ST] Kinga Owen, OTR 02/15/19 1513 [LM] Sujata Sinha, PT 02/15/19 1447     Row Name 02/15/19 1413             Pain  Assessment    Additional Documentation  Pain Scale: FACES Pre/Post-Treatment (Group)  -LM      Recorded by [LM] Sujata Sinha, PT 02/15/19 1447      Row Name 02/15/19 1415 02/15/19 1413          Pain Scale: Numbers Pre/Post-Treatment    Pain Scale: Numbers, Pretreatment  6/10  -ST  --     Pain Scale: Numbers, Post-Treatment  6/10  -ST  --     Pain Location - Side  Bilateral  -ST  Bilateral  -LM     Pain Location - Orientation  generalized  -ST  generalized  -LM     Pain Location  knee  -ST  knee  -LM     Pain Intervention(s)  Repositioned  -ST  Repositioned;Ambulation/increased activity  -LM     Recorded by [ST] Kinga Owen, OTR 02/15/19 1513 [LM] Sujata Sinha, PT 02/15/19 1447     Row Name 02/15/19 1413             Pain Scale: FACES Pre/Post-Treatment    Pain: FACES Scale, Pretreatment  4-->hurts little more  -LM      Pain: FACES Scale, Post-Treatment  6-->hurts even more  -LM      Recorded by [LM] Sujata Sinha, PT 02/15/19 1447      Row Name                Wound 02/12/19 1418 Bilateral knee incision    Wound - Properties Group Date first assessed: 02/12/19 [RV] Time first assessed: 1418 [RV] Side: Bilateral [RV] Location: knee [RV] Type: incision [RV] Recorded by:  [RV] Kishan Merida RN 02/12/19 1418    Row Name                Wound 02/13/19 2030 Left upper gluteal other (see comments)    Wound - Properties Group Date first assessed: 02/13/19 [AB] Time first assessed: 2030 [AB] Side: Left [AB] Orientation: upper [AB] Location: gluteal [AB] Type: other (see comments) [AB2], open area   Recorded by:  [AB] Heidy Gill RN 02/14/19 0254 [AB2] Heidy Gill RN 02/14/19 0312    Row Name                Wound 02/13/19 2030 Right medial gluteal    Wound - Properties Group Date first assessed: 02/13/19 [AB] Time first assessed: 2030 [AB] Side: Right [AB] Orientation: medial [AB] Location: gluteal [AB] Type: -- [AB2] Additional Comments: -- [AB2], inner buttock on right side   Recorded by:   [AB] Heidy Gill RN 02/14/19 0256 [AB2] Heidy Gill, RN 02/14/19 0313    Row Name 02/15/19 1413             Coping    Observed Emotional State  tearful/crying;overwhelmed  -LM      Recorded by [LM] Sujata Sinha, PT 02/15/19 1447      Row Name 02/15/19 1413             Plan of Care Review    Plan of Care Reviewed With  patient  -LM      Recorded by [LM] Sujata Sinha, PT 02/15/19 1447      Row Name 02/15/19 1413             Outcome Summary/Treatment Plan (PT)    Daily Summary of Progress (PT)  progress towards functional goals is fair  -LM      Recorded by [LM] Sujata Sinha, PT 02/15/19 1447        User Key  (r) = Recorded By, (t) = Taken By, (c) = Cosigned By    Initials Name Effective Dates Discipline    Kinga Coreas OTR 06/10/18 -  OT    Kishan Cm, RN 06/16/16 -  Nurse    Heidy Bay, RN 09/30/16 -  Nurse    Sujata Sanchez, PT 04/03/18 -  PT        Wound 02/12/19 1418 Bilateral knee incision (Active)   Dressing Appearance dry;intact;no drainage 2/15/2019 12:00 PM   Dressing Care, Wound elastic bandage 2/14/2019  9:50 PM       Wound 02/13/19 2030 Left upper gluteal other (see comments) (Active)   Dressing Appearance dry;intact 2/15/2019 12:00 PM   Drainage Amount scant 2/14/2019  9:50 PM   Dressing Care, Wound low-adherent 2/14/2019  9:50 PM       Wound 02/13/19 2030 Right medial gluteal (Active)   Dressing Appearance open to air 2/15/2019 12:00 PM   Drainage Amount none 2/14/2019  9:50 PM       Occupational Therapy Education     Title: PT OT SLP Therapies (In Progress)     Topic: Occupational Therapy (In Progress)     Point: ADL training (Done)     Description: Instruct learner(s) on proper safety adaptation and remediation techniques during self care or transfers.   Instruct in proper use of assistive devices.    Learning Progress Summary           Patient Acceptance, E,TB,D, VU,DU by  at 2/15/2019  2:15 PM    Comment:  refer to flow sheet    Acceptance,  E,D, VU by  at 2/13/2019 11:50 AM    Comment:  Pt educated on ADL retraining with use of AE for completion of all LBD and LBB, safety precautions, and appropriate body mechanics to maintain knee precautions.                   Point: Precautions (Done)     Description: Instruct learner(s) on prescribed precautions during self-care and functional transfers.    Learning Progress Summary           Patient Acceptance, E,TB,D, VU,DU by  at 2/15/2019  2:15 PM    Comment:  refer to flow sheet    Acceptance, E,D, VU by  at 2/13/2019 11:50 AM    Comment:  Pt educated on ADL retraining with use of AE for completion of all LBD and LBB, safety precautions, and appropriate body mechanics to maintain knee precautions.                   Point: Body mechanics (Done)     Description: Instruct learner(s) on proper positioning and spine alignment during self-care, functional mobility activities and/or exercises.    Learning Progress Summary           Patient Acceptance, E,TB,D, VU,DU by  at 2/15/2019  2:15 PM    Comment:  refer to flow sheet    Acceptance, E,D, VU by  at 2/13/2019 11:50 AM    Comment:  Pt educated on ADL retraining with use of AE for completion of all LBD and LBB, safety precautions, and appropriate body mechanics to maintain knee precautions.                               User Key     Initials Effective Dates Name Provider Type Discipline     06/10/18 -  Kinga Owen, OTR Occupational Therapist OT     03/07/18 -  Chery Zarate, OT Occupational Therapist OT                OT Recommendation and Plan  Therapy Frequency (OT Eval): daily  Plan of Care Review  Plan of Care Reviewed With: patient  Plan of Care Reviewed With: patient  Outcome Summary: Pt participatory this date in bed mobility and attempts at standing X2. Pt requiring min X2 then min A X2 for pivot to BSC and back to bed. Pt able to perform toileting hygiene herself and is progressing toward LBD w/o use of AE d/t excellent ROM at trunk. Pt  emotionally labile over d/c.   Outcome Measures     Row Name 02/15/19 1415 02/15/19 1413 02/13/19 1000       How much help from another person do you currently need...    Turning from your back to your side while in flat bed without using bedrails?  --  3  -LM  --    Moving from lying on back to sitting on the side of a flat bed without bedrails?  --  3  -LM  --    Moving to and from a bed to a chair (including a wheelchair)?  --  3  -LM  --    Standing up from a chair using your arms (e.g., wheelchair, bedside chair)?  --  3  -LM  --    Climbing 3-5 steps with a railing?  --  1  -LM  --    To walk in hospital room?  --  2  -LM  --    AM-PAC 6 Clicks Score  --  15  -LM  --       How much help from another is currently needed...    Putting on and taking off regular lower body clothing?  2  -ST  --  2  -HK    Bathing (including washing, rinsing, and drying)  2  -ST  --  2  -HK    Toileting (which includes using toilet bed pan or urinal)  3  -ST  --  2  -HK    Putting on and taking off regular upper body clothing  3  -ST  --  3  -HK    Taking care of personal grooming (such as brushing teeth)  3  -ST  --  2  -HK    Eating meals  4  -ST  --  3  -HK    Score  17  -ST  --  14  -HK       Functional Assessment    Outcome Measure Options  --  AM-PAC 6 Clicks Basic Mobility (PT)  -LM  AM-PAC 6 Clicks Daily Activity (OT)  -HK    Row Name 02/13/19 0831             How much help from another person do you currently need...    Turning from your back to your side while in flat bed without using bedrails?  3  -LM      Moving from lying on back to sitting on the side of a flat bed without bedrails?  2  -LM      Moving to and from a bed to a chair (including a wheelchair)?  3  -LM      Standing up from a chair using your arms (e.g., wheelchair, bedside chair)?  3  -LM      Climbing 3-5 steps with a railing?  1  -LM      To walk in hospital room?  2  -LM      AM-PAC 6 Clicks Score  14  -LM         Functional Assessment    Outcome  Measure Options  AM-PAC 6 Clicks Basic Mobility (PT)  -LM        User Key  (r) = Recorded By, (t) = Taken By, (c) = Cosigned By    Initials Name Provider Type    ST Kinga Owen OTR Occupational Therapist    Sujata Sanchez, PT Physical Therapist    HK Chery Zarate, OT Occupational Therapist           Time Calculation:   Time Calculation- OT     Row Name 02/15/19 1415             Time Calculation- OT    OT Start Time  1415  -ST      OT Received On  02/15/19  -ST      OT Goal Re-Cert Due Date  02/23/19  -ST         Timed Charges    54847 - OT Therapeutic Activity Minutes  19  -ST        User Key  (r) = Recorded By, (t) = Taken By, (c) = Cosigned By    Initials Name Provider Type    Kinga Coreas OTR Occupational Therapist           Therapy Suggested Charges     Code   Minutes Charges    28231 (CPT®) Hc Ot Neuromusc Re Education Ea 15 Min      02617 (CPT®) Hc Ot Ther Proc Ea 15 Min      77158 (CPT®) Hc Ot Therapeutic Act Ea 15 Min 19 1    12736 (CPT®) Hc Ot Manual Therapy Ea 15 Min      10585 (CPT®) Hc Ot Iontophoresis Ea 15 Min      51166 (CPT®) Hc Ot Elec Stim Ea-Per 15 Min      61906 (CPT®) Hc Ot Ultrasound Ea 15 Min      36373 (CPT®) Hc Ot Self Care/Mgmt/Train Ea 15 Min      Total  19 1        Therapy Charges for Today     Code Description Service Date Service Provider Modifiers Qty    [id number] HC OT THERAPEUTIC ACT EA 15 MIN 2/15/2019 Kinga Owen OTR GO 1               BART Morris  2/15/2019

## 2019-02-15 NOTE — PROGRESS NOTES
" progress note      Jyoti Luna  [id number]  1965     LOS: 5 days     Attending: Lorri Siddiqui MD    Primary Care Provider: Provider, No Known      Chief Complaint/Reason for visit:  Bilateral infected knee prosthesis     Subjective   Mental status improved today. Oriented to person, place and time. Reports good pain control. Complains of feeling tired and weak. Denies f/c/n/v/sob/cp.    ( much improved mentation. Alert and oriented to self, place, surgery. )wy  Objective     Vital Signs  Visit Vitals  /89 (BP Location: Right arm, Patient Position: Lying)   Pulse 75   Temp 98.2 °F (36.8 °C) (Oral)   Resp 16   Ht 157.5 cm (62\")   Wt 82.6 kg (182 lb)   SpO2 99%   BMI 33.29 kg/m²     Temp (24hrs), Av.5 °F (36.9 °C), Min:98.1 °F (36.7 °C), Max:98.9 °F (37.2 °C)    Physical Exam:     General Appearance:    Dorwsy, in no acute distress   Head:    Normocephalic, without obvious abnormality, atraumatic    Lungs:     Normal effort, symmetric chest rise, no crepitus, clear to      auscultation bilaterally               Heart:    Regular rhythm and normal rate, normal S1 and S2    Abdomen:     Normal bowel sounds, no masses, no organomegaly, soft        non-tender, non-distended, no guarding, no rebound                tenderness   Extremities:   Bilateral knees ACE wrap CDI. Right adductor canal nerve block. Knee immobilizers in place.   Pulses:   Pulses palpable and equal bilaterally   Skin:   No bleeding, bruising or rash.    Neurologic:   Cranial nerves 2 - 12 grossly intact, sensation intact. Flexion and dorsiflexion intact bilateral feet.          Results Review:     I reviewed the patient's new clinical results.   Results from last 7 days   Lab Units 02/15/19  0651 19   WBC 10*3/mm3 6.83 7.32 10.45   HEMOGLOBIN g/dL 8.1* 7.9* 8.9*   HEMATOCRIT % 26.1* 25.9* 29.1*   PLATELETS 10*3/mm3 249 234 272     Results from last 7 days   Lab Units 19 " 02/11/19  0504 02/10/19  1928   SODIUM mmol/L  --  131* 134 136   POTASSIUM mmol/L 3.4* 3.7 3.9 3.7   CHLORIDE mmol/L  --  100 94* 95*   CO2 mmol/L  --  25.0 31.0 31.0   BUN mg/dL  --  9 10 9   CREATININE mg/dL  --  0.82 0.75 0.73   CALCIUM mg/dL  --  8.8 9.5 9.1   BILIRUBIN mg/dL  --   --   --  1.0   ALK PHOS U/L  --   --   --  162*   ALT (SGPT) U/L  --   --   --  40   AST (SGOT) U/L  --   --   --  29   GLUCOSE mg/dL  --  171* 165* 132*     Results for NABIL MATSON (MRN [id number]) as of 2/15/2019 10:47   Ref. Range 2/14/2019 12:25 2/14/2019 17:22 2/14/2019 20:30 2/15/2019 06:51 2/15/2019 08:10   Glucose Latest Ref Range: 70 - 130 mg/dL 211 (H) 155 (H) 156 (H)  141 (H)       ECG 12 Lead   Order: [id number]   Status:  Preliminary result   Visible to patient:  No (Not Released) Next appt:  None      Narrative     Test Reason : Surgery  Blood Pressure : **/** mmHG  Vent. Rate : 099 BPM     Atrial Rate : 099 BPM     P-R Int : 128 ms          QRS Dur : 096 ms      QT Int : 364 ms       P-R-T Axes : 046 034 047 degrees     QTc Int : 467 ms    Normal sinus rhythm  Normal ECG  When compared with ECG of 23-MAR-2018 11:01,  No significant change was found    Referred By:  LINDA           Confirmed By:            Microbiology Results (last 21 days)     Collected  Updated  Procedure  Result Status      02/12/2019 1342  02/13/2019 1328  Anaerobic Culture - Tissue, Knee, Right [[id number]]   Tissue from Knee, Right     Preliminary result  Culture No anaerobes isolated             02/12/2019 1342  02/12/2019 1438  Fungus Culture - Tissue, Knee, Right [[id number]]   Tissue from Knee, Right     In process  No result data             02/12/2019 1342  02/15/2019 0739  Tissue / Bone Culture - Tissue, Knee, Right [[id number]]   Tissue from Knee, Right     Preliminary result  Tissue Culture No growth at 3 days   Gram Stain Moderate (3+) WBCs seen    No organisms seen             02/12/2019 1342  02/13/2019 1312  AFB Culture - Tissue,  Knee, Right [[id number]]   Tissue from Knee, Right     Preliminary result  AFB Stain No acid fast bacilli seen on concentrated smear             02/12/2019 1236  02/12/2019 1438  Fungus Culture - Synovial Fluid, Knee, Left [[id number]]   Synovial Fluid from Knee, Left     In process  No result data             02/12/2019 1236  02/14/2019 1016  Body Fluid Culture - Synovial Fluid, Knee, Left [[id number]]   Synovial Fluid from Knee, Left     Preliminary result  BF Culture No growth at 2 days   Gram Stain Many (4+) WBCs seen    No organisms seen             02/12/2019 1236  02/13/2019 1311  AFB Culture - Synovial Fluid, Knee, Left [[id number]]   Synovial Fluid from Knee, Left     Preliminary result  AFB Stain No acid fast bacilli seen on concentrated smear             02/11/2019 0941  02/11/2019 1136  Clostridium Difficile Toxin - Stool, Per Rectum [[id number]]    Stool from Per Rectum     Final result  No result data             02/11/2019 0941  02/11/2019 1136  Clostridium Difficile Toxin, PCR - Stool, Per Rectum [[id number]]    (Abnormal)   Stool from Per Rectum     Final result  Clostridium difficile (toxin A/B) Detected Abnormal              02/10/2019 2028  02/14/2019 0711  Body Fluid Culture - Synovial Fluid, Knee, Right [[id number]]   Synovial Fluid from Knee, Right     Final result  BF Culture No growth at 4 days   Gram Stain Many (4+) WBCs seen    No organisms seen             02/10/2019 2028  02/12/2019 1143  Anaerobic Culture - Synovial Fluid, Knee, Right [[id number]]   Synovial Fluid from Knee, Right     Preliminary result  Culture No anaerobes isolated             02/10/2019 2027  02/14/2019 0711  Body Fluid Culture - Synovial Fluid, Knee, Left [[id number]]   Synovial Fluid from Knee, Left     Final result  BF Culture No growth at 4 days   Gram Stain Many (4+) WBCs seen    No organisms seen             02/10/2019 2027  02/11/2019 1338  Anaerobic Culture - Synovial Fluid, Knee, Left [[id number]]   Synovial Fluid  from Knee, Left     Preliminary result  Culture No anaerobes isolated             02/10/2019 1937 02/14/2019 2030  Blood Culture - Blood, Arm, Left [[id number]]   Blood from Arm, Left     Preliminary result  Blood Culture No growth at 4 days             02/10/2019 1913  02/14/2019 2030  Blood Culture - Blood, Arm, Right [[id number]]   Blood from Arm, Right     Preliminary result  Blood Culture No growth at 4 days                    Results for NABIL MATSON (MRN [id number]) as of 2/11/2019 09:31   Ref. Range 2/11/2019 05:05   C-Reactive Protein Latest Ref Range: 0.00 - 1.00 mg/dL 27.72 (H)     Right :  Results for NABIL MATSON (MRN [id number]) as of 2/11/2019 09:31   Ref. Range 2/10/2019 20:27   Color, Fluid Unknown Red   Appearance, Fluid Latest Ref Range: Clear  Bloody (A)   WBC, Fluid Latest Units: /mm3 52,860   RBC, Fluid Latest Units: /mm3 575,000   Crystals, Fluid Unknown No crystals seen     Left:   Results for NABIL MATSON (MRN [id number]) as of 2/11/2019 09:31   Ref. Range 2/10/2019 20:27   Color, Fluid Unknown Yellow   Appearance, Fluid Latest Ref Range: Clear  Turbid (A)   WBC, Fluid Latest Units: /mm3 232,950   RBC, Fluid Latest Units: /mm3 21,000   Neutrophils, Fluid Latest Units: % 94   Lymphocytes, Fluid Latest Units: % 3   Monocytes, Fluid Latest Units: % 3   Crystals, Fluid Unknown No crystals seen     Results for NABIL MATSON (MRN [id number]) as of 2/11/2019 09:31   Ref. Range 2/11/2019 05:04   Sed Rate Latest Ref Range: 0 - 30 mm/hr 100 (H)     I reviewed the patient's new imaging including images and reports.    All medications reviewed.     Pharmacy Consult  Does not apply BID   carvedilol 3.125 mg Oral BID With Meals   ceFAZolin 2 g Intravenous Q8H   enoxaparin 40 mg Subcutaneous Daily   folic acid 1 mg Oral Daily   insulin lispro 0-7 Units Subcutaneous 4x Daily With Meals & Nightly   lactobacillus acidophilus 1 capsule Oral Daily   meloxicam 15 mg Oral Daily   multivitamin 1 tablet Oral  Daily   sodium chloride 10 mL Intravenous Q12H   sodium chloride 3 mL Intravenous Q12H   sodium chloride 3 mL Intravenous Q12H   thiamine 100 mg Oral Daily   traZODone 50 mg Oral Nightly   vancomycin 125 mg Oral Q6H   venlafaxine  mg Oral Daily With Breakfast       acetaminophen 650 mg Q4H PRN   Or     acetaminophen 650 mg Q4H PRN   Or     acetaminophen 650 mg Q4H PRN   bisacodyl 10 mg Daily PRN   bisacodyl 10 mg Daily PRN   dextrose 25 g Q15 Min PRN   dextrose 15 g Q15 Min PRN   diphenhydrAMINE 50 mg Q6H PRN   docusate sodium 100 mg BID PRN   glucagon (human recombinant) 1 mg PRN   HYDROcodone-acetaminophen 1 tablet Q4H PRN   HYDROmorphone 0.5 mg Q2H PRN   And     naloxone 0.1 mg Q5 Min PRN   labetalol 10 mg Q4H PRN   LORazepam 1 mg Q2H PRN   Or     LORazepam 1 mg Q2H PRN   Or     LORazepam 2 mg Q1H PRN   Or     LORazepam 2 mg Q1H PRN   Or     LORazepam 2 mg Q15 Min PRN   Or     LORazepam 2 mg Q15 Min PRN   magnesium hydroxide 10 mL Daily PRN   magnesium sulfate 2 g PRN   Or     magnesium sulfate 2 g PRN   Or     magnesium sulfate 4 g PRN   Morphine 4 mg Q2H PRN   And     naloxone 0.4 mg Q5 Min PRN   ondansetron 4 mg Q6H PRN   Or     ondansetron 4 mg Q6H PRN   oxyCODONE-acetaminophen 1 tablet Q4H PRN   oxyCODONE-acetaminophen 1 tablet Q4H PRN   potassium chloride 40 mEq PRN   Or     potassium chloride 40 mEq PRN   Or     potassium chloride 10 mEq Q1H PRN   sodium chloride 500 mL TID PRN   sodium chloride 1-10 mL PRN   sodium chloride 10 mL PRN       Assessment/Plan     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 difficile diarrhea    Acute blood loss anemia, asymptomatic    Acute postoperative pain    Hyponatremia    Hypokalemia, replace.    Plan  ID  following, Dr. Francis. Long-term IV abx- Cefazolin 2 g IV every 8 hrs. Hopefully replace PICC soon.  Cdiff- po vanc  ETOH WD. CIWA q shift. W/D protocol, improved. wy    1. PT/OT- WBAT BLE  2. Pain control-prns, AC nerve block  3. IS-encouraged  4. DVT proph- mechs/Lovenox  5. Bowel regimen  6. Monitor post-op labs  7. DC planning. CM following.( Discussed with  about possible options for discharge including LTACs and SNF)BAISLIO Stlies  02/15/19  10:45 AM     I have personally performed the evaluation on this patient. My history is consistent  with HPI obtained. My exam findings are listed above. I have personally reviewed and discussed the above formulated treatment plan with BASILIO.

## 2019-02-15 NOTE — PROGRESS NOTES
"[facility] Progress Note    Date of Admission: 2/10/2019      Antibiotics:  Cefazolin,  Po vanco    CC: bilateral knee pain    S: Patient with bilateral prosthetic joints  removed post op confusion yesterday and removed picc line, piv in place, ms improved today  O:  /89 (BP Location: Right arm, Patient Position: Lying)   Pulse 75   Temp 98.2 °F (36.8 °C) (Oral)   Resp 16   Ht 157.5 cm (62\")   Wt 82.6 kg (182 lb)   SpO2 99%   BMI 33.29 kg/m²   Temp (24hrs), Av.5 °F (36.9 °C), Min:98.1 °F (36.7 °C), Max:98.9 °F (37.2 °C)      PE:     GENERAL: Awake and alert today, in no acute distress.   HEENT: Normocephalic, atraumatic.  PERRL. EOMI. No conjunctival injection. No icterus. Oropharynx clear without evidence of thrush or exudate. No evidence of peridontal disease.    NECK: Supple without nuchal rigidity  LYMPH: No cervical, axillary or inguinal lymphadenopathy. No neck masses  HEART: RRR; No murmur, rubs, gallops.   LUNGS: Clear to auscultation bilaterally without wheezing, rales, rhonchi. Normal respiratory effort.  ABDOMEN: Soft, mild distention with increased bowel sounds. No rebound or guarding.   EXT:  Trace lower extremity edema : Normal appearing genitalia without Taylor catheter.  MSK: Bilateral knee dressed with decreased range of motion   SKIN: Warm and dry skin with eczematous rash    NEURO: Oriented to Person and place. No focal deficits.   PSYCHIATRIC: confusion better  Laboratory Data    Results from last 7 days   Lab Units 02/15/19  0651 19  0544 19  0639   WBC 10*3/mm3 6.83 7.32 10.45   HEMOGLOBIN g/dL 8.1* 7.9* 8.9*   HEMATOCRIT % 26.1* 25.9* 29.1*   PLATELETS 10*3/mm3 249 234 272     Results from last 7 days   Lab Units 19  0544 19  0639   SODIUM mmol/L  --  131*   POTASSIUM mmol/L 3.4* 3.7   CHLORIDE mmol/L  --  100   CO2 mmol/L  --  25.0   BUN mg/dL  --  9   CREATININE mg/dL  --  0.82   GLUCOSE mg/dL  --  171*   CALCIUM mg/dL  --  8.8     Results from last 7 days "   Lab Units 02/10/19  1928   ALK PHOS U/L 162*   BILIRUBIN mg/dL 1.0   ALT (SGPT) U/L 40   AST (SGOT) U/L 29     Results from last 7 days   Lab Units 02/11/19  0504   SED RATE mm/hr 100*     Results from last 7 days   Lab Units 02/11/19  0505   CRP mg/dL 27.72*       Estimated Creatinine Clearance: 78.1 mL/min (by C-G formula based on SCr of 0.82 mg/dL).      Microbiology:  Knee cx neg    Radiology:  Imaging Results (last 24 hours)     ** No results found for the last 24 hours. **          PROBLEM LIST:   Bilateral prosthetic joint infections  Prior history of methicillin sensitive staph aureus bilateral knee infections and bacteremia  leukocytosis  Chronically elevated sedimentation rate CRP  Alcoholism  Severe eczema  Bilateral knee pain and difficulty with ambulation  Low-grade fevers  C diff colitis with acute diarrhea  Acute blood loss anemia  confusion        ASSESSMENT:  53-year-old female with severe eczema, history of alcoholism, with previous MSSA bacteremia with bilateral knee prostate joint infection attempted to be treated with single exchange long-term IV followed by oral antibiotics.  Unfortunately even with long-term treatment oral antibiotics she's had worsening of her bilateral knee pain swelling erythema and effusions appears to be consistent with acute on chronic infection and suspect ongoing MSSA infection agree with aggressive treatment with two-stage revision with bilateral knee X plans with spacers in place long-term PICC line and IV antibiotics to be guided by cultures for now we'll use cefazolin 2 g IV every 8 hrs.    Continue long-term IV antibiotics with now coverage to cover MSSA with cefazolin and oral vancomycin for C. difficile colitis     PLAN:  Cefazolin 2 g IV every 8 hrs  Follow-up blood cultures and knee fluid culture, neg so far  picc placed will need replacement will plan on Monday, ok sooner if needed and mental status stable  Po vanco    I will see again on Monday call if any  ?      Brenden Francis MD  2/15/2019

## 2019-02-16 LAB
ANION GAP SERPL CALCULATED.3IONS-SCNC: 5 MMOL/L (ref 3–11)
BACTERIA SPEC AEROBE CULT: NORMAL
BUN BLD-MCNC: 6 MG/DL (ref 9–23)
BUN/CREAT SERPL: 10.2 (ref 7–25)
CALCIUM SPEC-SCNC: 9 MG/DL (ref 8.7–10.4)
CHLORIDE SERPL-SCNC: 104 MMOL/L (ref 99–109)
CO2 SERPL-SCNC: 29 MMOL/L (ref 20–31)
CREAT BLD-MCNC: 0.59 MG/DL (ref 0.6–1.3)
DEPRECATED RDW RBC AUTO: 43.5 FL (ref 37–54)
ERYTHROCYTE [DISTWIDTH] IN BLOOD BY AUTOMATED COUNT: 14.6 % (ref 11.3–14.5)
GFR SERPL CREATININE-BSD FRML MDRD: 106 ML/MIN/1.73
GLUCOSE BLD-MCNC: 131 MG/DL (ref 70–100)
GLUCOSE BLDC GLUCOMTR-MCNC: 127 MG/DL (ref 70–130)
GLUCOSE BLDC GLUCOMTR-MCNC: 139 MG/DL (ref 70–130)
GLUCOSE BLDC GLUCOMTR-MCNC: 188 MG/DL (ref 70–130)
GLUCOSE BLDC GLUCOMTR-MCNC: 194 MG/DL (ref 70–130)
GRAM STN SPEC: NORMAL
GRAM STN SPEC: NORMAL
HCT VFR BLD AUTO: 28.3 % (ref 34.5–44)
HGB BLD-MCNC: 8.8 G/DL (ref 11.5–15.5)
MCH RBC QN AUTO: 25.2 PG (ref 27–31)
MCHC RBC AUTO-ENTMCNC: 31.1 G/DL (ref 32–36)
MCV RBC AUTO: 81.1 FL (ref 80–99)
PLATELET # BLD AUTO: 306 10*3/MM3 (ref 150–450)
PMV BLD AUTO: 8 FL (ref 6–12)
POTASSIUM BLD-SCNC: 4.4 MMOL/L (ref 3.5–5.5)
RBC # BLD AUTO: 3.49 10*6/MM3 (ref 3.89–5.14)
SODIUM BLD-SCNC: 138 MMOL/L (ref 132–146)
WBC NRBC COR # BLD: 6.75 10*3/MM3 (ref 3.5–10.8)

## 2019-02-16 PROCEDURE — 25010000003 CEFAZOLIN IN DEXTROSE 2-4 GM/100ML-% SOLUTION: Performed by: INTERNAL MEDICINE

## 2019-02-16 PROCEDURE — 25010000002 ENOXAPARIN PER 10 MG: Performed by: ORTHOPAEDIC SURGERY

## 2019-02-16 PROCEDURE — 80048 BASIC METABOLIC PNL TOTAL CA: CPT | Performed by: INTERNAL MEDICINE

## 2019-02-16 PROCEDURE — 85027 COMPLETE CBC AUTOMATED: CPT | Performed by: INTERNAL MEDICINE

## 2019-02-16 PROCEDURE — 82962 GLUCOSE BLOOD TEST: CPT

## 2019-02-16 PROCEDURE — 99024 POSTOP FOLLOW-UP VISIT: CPT | Performed by: ORTHOPAEDIC SURGERY

## 2019-02-16 PROCEDURE — 97116 GAIT TRAINING THERAPY: CPT

## 2019-02-16 PROCEDURE — 63710000001 DIPHENHYDRAMINE PER 50 MG: Performed by: NURSE PRACTITIONER

## 2019-02-16 PROCEDURE — 94799 UNLISTED PULMONARY SVC/PX: CPT

## 2019-02-16 RX ORDER — OXYCODONE AND ACETAMINOPHEN 7.5; 325 MG/1; MG/1
1 TABLET ORAL EVERY 4 HOURS PRN
Status: DISCONTINUED | OUTPATIENT
Start: 2019-02-16 | End: 2019-02-16 | Stop reason: SDUPTHER

## 2019-02-16 RX ADMIN — CEFAZOLIN SODIUM 2 G: 2 INJECTION, SOLUTION INTRAVENOUS at 19:38

## 2019-02-16 RX ADMIN — VENLAFAXINE HYDROCHLORIDE 300 MG: 75 CAPSULE, EXTENDED RELEASE ORAL at 09:59

## 2019-02-16 RX ADMIN — Medication 1 CAPSULE: at 10:01

## 2019-02-16 RX ADMIN — VANCOMYCIN HYDROCHLORIDE 125 MG: KIT at 05:39

## 2019-02-16 RX ADMIN — VANCOMYCIN HYDROCHLORIDE 125 MG: KIT at 13:15

## 2019-02-16 RX ADMIN — VANCOMYCIN HYDROCHLORIDE 125 MG: KIT at 19:38

## 2019-02-16 RX ADMIN — DIPHENHYDRAMINE HYDROCHLORIDE 50 MG: 50 CAPSULE ORAL at 21:34

## 2019-02-16 RX ADMIN — INSULIN LISPRO 2 UNITS: 100 INJECTION, SOLUTION INTRAVENOUS; SUBCUTANEOUS at 21:23

## 2019-02-16 RX ADMIN — HYDROCODONE BITARTRATE AND ACETAMINOPHEN 1 TABLET: 5; 325 TABLET ORAL at 03:56

## 2019-02-16 RX ADMIN — ENOXAPARIN SODIUM 40 MG: 40 INJECTION SUBCUTANEOUS at 10:00

## 2019-02-16 RX ADMIN — VANCOMYCIN HYDROCHLORIDE 125 MG: KIT at 00:50

## 2019-02-16 RX ADMIN — Medication 100 MG: at 09:59

## 2019-02-16 RX ADMIN — HYDROCODONE BITARTRATE AND ACETAMINOPHEN 1 TABLET: 5; 325 TABLET ORAL at 21:23

## 2019-02-16 RX ADMIN — MELOXICAM 15 MG: 7.5 TABLET ORAL at 10:00

## 2019-02-16 RX ADMIN — HYDROCODONE BITARTRATE AND ACETAMINOPHEN 1 TABLET: 5; 325 TABLET ORAL at 17:31

## 2019-02-16 RX ADMIN — CARVEDILOL 3.12 MG: 3.12 TABLET, FILM COATED ORAL at 17:31

## 2019-02-16 RX ADMIN — SODIUM CHLORIDE, PRESERVATIVE FREE 10 ML: 5 INJECTION INTRAVENOUS at 21:25

## 2019-02-16 RX ADMIN — CEFAZOLIN SODIUM 2 G: 2 INJECTION, SOLUTION INTRAVENOUS at 11:12

## 2019-02-16 RX ADMIN — Medication 1 TABLET: at 10:01

## 2019-02-16 RX ADMIN — CARVEDILOL 3.12 MG: 3.12 TABLET, FILM COATED ORAL at 10:01

## 2019-02-16 RX ADMIN — HYDROCODONE BITARTRATE AND ACETAMINOPHEN 1 TABLET: 5; 325 TABLET ORAL at 11:12

## 2019-02-16 RX ADMIN — TRAZODONE HYDROCHLORIDE 50 MG: 50 TABLET ORAL at 21:23

## 2019-02-16 RX ADMIN — FOLIC ACID 1 MG: 1 TABLET ORAL at 09:59

## 2019-02-16 RX ADMIN — ONDANSETRON 4 MG: 4 TABLET, FILM COATED ORAL at 03:55

## 2019-02-16 RX ADMIN — SODIUM CHLORIDE, PRESERVATIVE FREE 3 ML: 5 INJECTION INTRAVENOUS at 21:24

## 2019-02-16 NOTE — THERAPY TREATMENT NOTE
Acute Care - Physical Therapy Treatment Note  [facility]     Patient Name: Jyoti Luna  : 1965  MRN: [id number]  Today's Date: 2019  Onset of Illness/Injury or Date of Surgery: 02/10/19  Date of Referral to PT: 19  Referring Physician: MD Zuhair     Admit Date: 2/10/2019    Visit Dx:    ICD-10-CM ICD-9-CM   1. Infection of prosthetic knee joint, subsequent encounter T84.59XD V58.89    Z96.659    2. Impaired mobility and ADLs Z74.09 799.89   3. Impaired functional mobility, balance, gait, and endurance Z74.09 V49.89     Patient Active Problem List   Diagnosis   • HO MSSA (methicillin susceptible Staphylococcus aureus) septicemia (CMS/HCC)   • Knee swelling; bilateral   • Alcoholism /alcohol abuse (CMS/HCC)   • Iron deficiency anemia   • Hypertension   • Depression   • Diabetes mellitus (CMS/HCC)   • Infected prosthetic knee joint (CMS/[facility])   • Constipation   • Diarrhea   • Leukocytosis   • Anemia, likely dilutional   • C. 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   • Acute blood loss anemia, asymptomatic   • Acute postoperative pain   • Hyponatremia       Therapy Treatment    Rehabilitation Treatment Summary     Row Name 19 1045             Treatment Time/Intention    Discipline  physical therapy assistant  -AS      Document Type  therapy note (daily note)  -AS      Subjective Information  complains of;pain  -AS      Mode of Treatment  physical therapy  -AS      Patient/Family Observations  patient supine and agreed to therapy  -AS      Patient Effort  good  -AS      Comment  Bilateral KI on at all times, WBAT, no ROM L knee  -AS      Existing Precautions/Restrictions  fall;other (see comments) B KI's on AAT, WBAT. No ROM L LE  -AS      Recorded by [AS] Tila Garcia, PTA 19 1135      Row Name 19 1045             Cognitive Assessment/Intervention- PT/OT    Affect/Mental Status (Cognitive)  emotionally labile;WFL  patient crying throughout session, pain/worried about dog  -AS      Orientation Status (Cognition)  oriented x 4  -AS      Follows Commands (Cognition)  follows one step commands;over 90% accuracy;repetition of directions required;verbal cues/prompting required  -AS      Cognitive Function (Cognitive)  safety deficit  -AS      Safety Deficit (Cognitive)  moderate deficit  -AS      Personal Safety Interventions  fall prevention program maintained;gait belt;nonskid shoes/slippers when out of bed;other (see comments) exit alarm  -AS      Recorded by [AS] Tila Garcia, [facility] 02/16/19 1135      Row Name 02/16/19 1045             Bed Mobility Assessment/Treatment    Supine-Sit [place] (Bed Mobility)  supervision  -AS      Sit-Supine [place] (Bed Mobility)  minimum assist (75% patient effort)  -AS      Bed Mobility, Safety Issues  decreased use of arms for pushing/pulling;decreased use of legs for bridging/pushing  -AS      Assistive Device (Bed Mobility)  bed rails;head of bed elevated  -AS      Comment (Bed Mobility)  Assist with B LE when returning to supine  -AS      Recorded by [AS] Tila Garcia, JIM 02/16/19 1135      Row Name 02/16/19 1045             Sit-Stand Transfer    Sit-Stand [place] (Transfers)  verbal cues;maximum assist (25% patient effort);2 person assist  -AS      Assistive Device (Sit-Stand Transfers)  walker, front-wheeled  -AS      Recorded by [AS] Tila Garcia, JIM 02/16/19 1135      Row Name 02/16/19 1045             Stand-Sit Transfer    Stand-Sit [place] (Transfers)  verbal cues;maximum assist (25% patient effort);2 person assist  -AS      Assistive Device (Stand-Sit Transfers)  walker, front-wheeled  -AS      Recorded by [AS] Tila Garcia, [facility] 02/16/19 1135      Row Name 02/16/19 1045             Gait/Stairs Assessment/Training    32330 - Gait Training Minutes   23 pre-gait activity  -AS      Gait/Stairs Assessment/Training  gait/ambulation assistive  device  -AS      [place] Level (Gait)  verbal cues;maximum assist (25% patient effort);2 person assist  -AS      Assistive Device (Gait)  walker, front-wheeled  -AS      Comment (Gait/Stairs)  patient performed 2 attempts of sit<>stand from bed, limited by pain ~ 20 seconds stand each attempt. patient refused UIC due to pain.  -AS      Recorded by [AS] Tila Garcia, JIM 02/16/19 1135      Row Name 02/16/19 1045             Static Sitting Balance    Level of [place] (Unsupported Sitting, Static Balance)  supervision  -AS      Sitting Position (Unsupported Sitting, Static Balance)  sitting on edge of bed  -AS      Time Able to Maintain Position (Unsupported Sitting, Static Balance)  more than 5 minutes  -AS      Comment (Unsupported Sitting, Static Balance)  time at EOB limited by pain  -AS      Recorded by [AS] Tila Garcia, JIM 02/16/19 1135      Row Name 02/16/19 1045             Positioning and Restraints    Pre-Treatment Position  in bed  -AS      Post Treatment Position  bed  -AS      In Bed  supine;call light within reach;encouraged to call for assist;exit alarm on  -AS      Recorded by [AS] Tila Garcia, JIM 02/16/19 1135      Row Name 02/16/19 1045             Pain Scale: Numbers Pre/Post-Treatment    Pain Scale: Numbers, Pretreatment  8/10  -AS      Pain Scale: Numbers, Post-Treatment  8/10  -AS      Pain Location - Side  Bilateral  -AS      Pain Location - Orientation  lower  -AS      Pain Location  knee  -AS      Pain Intervention(s)  Repositioned;Ambulation/increased activity;Medication (See MAR) notified RN  -AS      Recorded by [AS] Tila Garcia, JIM 02/16/19 1135      Row Name                Wound 02/12/19 1418 Bilateral knee incision    Wound - Properties Group Date first assessed: 02/12/19 [RV] Time first assessed: 1418 [RV] Side: Bilateral [RV] Location: knee [RV] Type: incision [RV] Recorded by:  [RV] Kishan Merida, RN 02/12/19 1418    Row Name                 Wound 02/13/19 2030 Left upper gluteal other (see comments)    Wound - Properties Group Date first assessed: 02/13/19 [AB] Time first assessed: 2030 [AB] Side: Left [AB] Orientation: upper [AB] Location: gluteal [AB] Type: other (see comments) [AB2], open area   Recorded by:  [AB] Heidy Gill RN 02/14/19 0254 [AB2] Heidy Gill RN 02/14/19 0312    Row Name                Wound 02/13/19 2030 Right medial gluteal    Wound - Properties Group Date first assessed: 02/13/19 [AB] Time first assessed: 2030 [AB] Side: Right [AB] Orientation: medial [AB] Location: gluteal [AB] Type: -- [AB2] Additional Comments: -- [AB2], inner buttock on right side   Recorded by:  [AB] Heidy Gill RN 02/14/19 0256 [AB2] Heidy Gill RN 02/14/19 0313      User Key  (r) = Recorded By, (t) = Taken By, (c) = Cosigned By    Initials Name Effective Dates Discipline    AS Tila Garcia, JIM 06/22/15 -  PT    Kishan Cm RN 06/16/16 -  Nurse    AB Heidy Gill RN 09/30/16 -  Nurse          Wound 02/12/19 1418 Bilateral knee incision (Active)   Dressing Appearance dry;intact 2/16/2019 10:00 AM   Closure JOHNNY 2/16/2019 10:00 AM   Drainage Amount none 2/16/2019 10:00 AM   Dressing Care, Wound elastic bandage 2/16/2019  8:00 AM       Wound 02/13/19 2030 Left upper gluteal other (see comments) (Active)   Dressing Appearance dry;intact 2/16/2019 10:00 AM   Closure JOHNNY 2/16/2019 10:00 AM   Drainage Amount none 2/16/2019 10:00 AM   Dressing Care, Wound foam 2/16/2019  8:00 AM       Wound 02/13/19 2030 Right medial gluteal (Active)   Dressing Appearance open to air 2/16/2019 10:00 AM   Drainage Amount none 2/16/2019 10:00 AM   Dressing Care, Wound open to air 2/16/2019  6:03 AM           Physical Therapy Education     Title: PT OT SLP Therapies (In Progress)     Topic: Physical Therapy (In Progress)     Point: Mobility training (In Progress)     Learning Progress Summary           Patient Acceptance, E, NR by AS  at 2/16/2019 11:35 AM    Acceptance, E,D, VU,NR by LM at 2/15/2019  2:13 PM    Comment:  Reviewed benefits of activity, safety with mobility, weight-bearing status, indications for use of knee immobilizers.    Acceptance, E,D, VU,NR by LM at 2/13/2019  8:31 AM    Comment:  Reviewed benefits of activity, WBAT orders, ROM expectations (no ROM L knee), progression of POC, safety with mobility.                   Point: Home exercise program (In Progress)     Learning Progress Summary           Patient Acceptance, E, NR by AS at 2/16/2019 11:35 AM    Acceptance, E,D, VU,NR by LM at 2/15/2019  2:13 PM    Comment:  Reviewed benefits of activity, safety with mobility, weight-bearing status, indications for use of knee immobilizers.    Acceptance, E,D, VU,NR by LM at 2/13/2019  8:31 AM    Comment:  Reviewed benefits of activity, WBAT orders, ROM expectations (no ROM L knee), progression of POC, safety with mobility.                   Point: Body mechanics (In Progress)     Learning Progress Summary           Patient Acceptance, E, NR by AS at 2/16/2019 11:35 AM    Acceptance, E,D, VU,NR by LM at 2/15/2019  2:13 PM    Comment:  Reviewed benefits of activity, safety with mobility, weight-bearing status, indications for use of knee immobilizers.    Acceptance, E,D, VU,NR by LM at 2/13/2019  8:31 AM    Comment:  Reviewed benefits of activity, WBAT orders, ROM expectations (no ROM L knee), progression of POC, safety with mobility.                   Point: Precautions (In Progress)     Learning Progress Summary           Patient Acceptance, E, NR by AS at 2/16/2019 11:35 AM    Acceptance, E,D, VU,NR by LM at 2/15/2019  2:13 PM    Comment:  Reviewed benefits of activity, safety with mobility, weight-bearing status, indications for use of knee immobilizers.    Acceptance, E,D, VU,NR by LM at 2/13/2019  8:31 AM    Comment:  Reviewed benefits of activity, WBAT orders, ROM expectations (no ROM L knee), progression of POC, safety with  mobility.                               User Key     Initials Effective Dates Name Provider Type Discipline    AS 06/22/15 -  Tila Garcia, PTA Physical Therapy Assistant PT    LM 04/03/18 -  Sujata Sinha PT Physical Therapist PT                PT Recommendation and Plan     Plan of Care Reviewed With: patient  Progress: improving  Outcome Summary: Patient performed 2 attempts of sit<>stand transfers from EOB, limited by pain ~ 15 seconds stand each attempt. patient refused UIC due to pain. Returned back to supine per patients request.  Outcome Measures     Row Name 02/16/19 1045 02/15/19 1415 02/15/19 1413       How much help from another person do you currently need...    Turning from your back to your side while in flat bed without using bedrails?  4  -AS  --  3  -LM    Moving from lying on back to sitting on the side of a flat bed without bedrails?  3  -AS  --  3  -LM    Moving to and from a bed to a chair (including a wheelchair)?  1  -AS  --  3  -LM    Standing up from a chair using your arms (e.g., wheelchair, bedside chair)?  2  -AS  --  3  -LM    Climbing 3-5 steps with a railing?  1  -AS  --  1  -LM    To walk in hospital room?  1  -AS  --  2  -LM    AM-PAC 6 Clicks Score  12  -AS  --  15  -LM       How much help from another is currently needed...    Putting on and taking off regular lower body clothing?  --  2  -ST  --    Bathing (including washing, rinsing, and drying)  --  2  -ST  --    Toileting (which includes using toilet bed pan or urinal)  --  3  -ST  --    Putting on and taking off regular upper body clothing  --  3  -ST  --    Taking care of personal grooming (such as brushing teeth)  --  3  -ST  --    Eating meals  --  4  -ST  --    Score  --  17  -ST  --       Functional Assessment    Outcome Measure Options  AM-PAC 6 Clicks Basic Mobility (PT)  -AS  --  AM-PAC 6 Clicks Basic Mobility (PT)  -LM      User Key  (r) = Recorded By, (t) = Taken By, (c) = Cosigned By    Initials Name  Provider Type    Kinga Coreas, OTR Occupational Therapist    AS Tila Garcia, PTA Physical Therapy Assistant    Sujata Sanchez, PT Physical Therapist         Time Calculation:   PT Charges     Row Name 02/16/19 1045             Time Calculation    Start Time  1045  -AS      PT Received On  02/16/19  -AS      PT Goal Re-Cert Due Date  02/23/19  -AS         Timed Charges    17698 - Gait Training Minutes   23 pre-gait activity  -AS        User Key  (r) = Recorded By, (t) = Taken By, (c) = Cosigned By    Initials Name Provider Type    AS Tila Garcia, JIM Physical Therapy Assistant        Therapy Suggested Charges     Code   Minutes Charges    40085 (CPT®) Hc Pt Neuromusc Re Education Ea 15 Min      03179 (CPT®) Hc Pt Ther Proc Ea 15 Min      81399 (CPT®) Hc Gait Training Ea 15 Min 23 2    63038 (CPT®) Hc Pt Therapeutic Act Ea 15 Min      75028 (CPT®) Hc Pt Manual Therapy Ea 15 Min      50963 (CPT®) Hc Pt Iontophoresis Ea 15 Min      38404 (CPT®) Hc Pt Elec Stim Ea-Per 15 Min      44633 (CPT®) Hc Pt Ultrasound Ea 15 Min      98906 (CPT®) Hc Pt Self Care/Mgmt/Train Ea 15 Min      03760 (CPT®) Hc Pt Prosthetic (S) Train Initial Encounter, Each 15 Min      70916 (CPT®) Hc Pt Orthotic(S)/Prosthetic(S) Encounter, Each 15 Min      47354 (CPT®) Hc Orthotic(S) Mgmt/Train Initial Encounter, Each 15min      Total  23 2        Therapy Charges for Today     Code Description Service Date Service Provider Modifiers Qty    49962356806 HC PT THER SUPP EA 15 MIN 2/16/2019 Tila Garcia, PTA GP 2    14594989072 HC GAIT TRAINING EA 15 MIN 2/16/2019 Tila Garcia, JIM GP 2          PT G-Codes  Outcome Measure Options: AM-PAC 6 Clicks Basic Mobility (PT)  AM-PAC 6 Clicks Score: 12  Score: 17    Tila Garcia PTA  2/16/2019

## 2019-02-16 NOTE — PROGRESS NOTES
"Orthopaedic Surgery Progress Note     LOS: 6 days   Patient Care Team:  Provider, No Known as PCP - General    POD 4    Subjective     Interval History:   No new complaints this morning    Objective     Vital Signs:  Temp (24hrs), Av.4 °F (36.3 °C), Min:96.6 °F (35.9 °C), Max:97.8 °F (36.6 °C)    /92   Pulse 81   Temp 97.1 °F (36.2 °C) (Oral)   Resp 16   Ht 157.5 cm (62\")   Wt 82.6 kg (182 lb)   SpO2 100%   BMI 33.29 kg/m²     Labs:  Lab Results (last 24 hours)     Procedure Component Value Units Date/Time    Anaerobic Culture - Synovial Fluid, Knee, Right [[id number]]  (Abnormal) Collected:  02/10/19 2028    Specimen:  Synovial Fluid from Knee, Right Updated:  19 1005     Culture No anaerobes isolated      Scant growth (1+) Staphylococcus aureus    Anaerobic Culture - Synovial Fluid, Knee, Left [[id number]]  (Abnormal) Collected:  02/10/19 2027    Specimen:  Synovial Fluid from Knee, Left Updated:  19 1003     Culture No anaerobes isolated      Staphylococcus aureus     Comment: Isolated from broth culture         Basic Metabolic Panel [[id number]]  (Abnormal) Collected:  19    Specimen:  Blood Updated:  19     Glucose 131 mg/dL      BUN 6 mg/dL      Creatinine 0.59 mg/dL      Sodium 138 mmol/L      Potassium 4.4 mmol/L      Chloride 104 mmol/L      CO2 29.0 mmol/L      Calcium 9.0 mg/dL      eGFR Non African Amer 106 mL/min/1.73      BUN/Creatinine Ratio 10.2     Anion Gap 5.0 mmol/L     Narrative:       [employer] Guidelines    Stage     Description        GFR  1         Normal or High     90+  2         Mild decrease      60-89  3         Moderate decrease  30-59  4         Severe decrease    15-29  5         Kidney failure     <15    The MDRD GFR formula is only valid for adults with stable renal function between ages 18 and 70.    CBC (No Diff) [[id number]]  (Abnormal) Collected:  19    Specimen:  Blood Updated:  19 0809     WBC " 6.75 10*3/mm3      RBC 3.49 10*6/mm3      Hemoglobin 8.8 g/dL      Hematocrit 28.3 %      MCV 81.1 fL      MCH 25.2 pg      MCHC 31.1 g/dL      RDW 14.6 %      RDW-SD 43.5 fl      MPV 8.0 fL      Platelets 306 10*3/mm3     POC Glucose Once [[id number]]  (Abnormal) Collected:  02/16/19 0728    Specimen:  Blood Updated:  02/16/19 0729     Glucose 139 mg/dL     Body Fluid Culture - Synovial Fluid, Knee, Left [[id number]] Collected:  02/12/19 1236    Specimen:  Synovial Fluid from Knee, Left Updated:  02/16/19 0634     BF Culture No growth at 4 days     Gram Stain Many (4+) WBCs seen      No organisms seen    Tissue / Bone Culture - Tissue, Knee, Right [[id number]] Collected:  02/12/19 1342    Specimen:  Tissue from Knee, Right Updated:  02/16/19 0628     Tissue Culture No growth at 4 days     Gram Stain Moderate (3+) WBCs seen      No organisms seen    POC Glucose Once [[id number]]  (Abnormal) Collected:  02/15/19 2055    Specimen:  Blood Updated:  02/15/19 2106     Glucose 141 mg/dL     Blood Culture - Blood, Arm, Right [[id number]] Collected:  02/10/19 1913    Specimen:  Blood from Arm, Right Updated:  02/15/19 2030     Blood Culture No growth at 5 days    Blood Culture - Blood, Arm, Left [[id number]] Collected:  02/10/19 1937    Specimen:  Blood from Arm, Left Updated:  02/15/19 2030     Blood Culture No growth at 5 days    POC Glucose Once [[id number]]  (Abnormal) Collected:  02/15/19 1543    Specimen:  Blood Updated:  02/15/19 1545     Glucose 176 mg/dL     POC Glucose Once [[id number]]  (Abnormal) Collected:  02/15/19 1115    Specimen:  Blood Updated:  02/15/19 1128     Glucose 164 mg/dL           Physical Exam:  Sensation and motor intact in both feet.  Knee immobilizers are in place.    Assessment/Plan   Postop day 4 status post bilateral total knee arthroplasty removal for infection with placement of articulating spacer prosthesis-cultures positive for staph aureus  Alcohol withdrawal symptoms improved  Definitive IV  antibiotic treatment based upon culture sensitivities.    Placement issues - not able to take care of herself at home.  Weight-bear as tolerated in both knees with no range of motion of the left knee.    Covering for Dr. Moffett till Monday.      Martin Gallego MD  02/16/19  11:22 AM

## 2019-02-16 NOTE — PROGRESS NOTES
" progress note      Jyoti Luna  [id number]  1965     LOS: 6 days     Attending: Lorri Siddiqui MD    Primary Care Provider: Provider, No Known      Chief Complaint/Reason for visit:  Bilateral infected knee prosthesis     Subjective   Doing well . Fair pain control. Mentation improved. Back to baseline with resolving alcohol withdrawal symptoms.     Review of Systems  General: No Fever/chills  Respiratory: :Patient denies cough, chest pain, or dyspnea   Cardiovascular: Patient denies chest pain, palpitations, dyspnea, orthopnea or swelling   Skin:No Rash or pruritis,   GI: No Nausea,vomiting or diarrhea  : No Dysuria, hematuria    Objective     Vital Signs  Visit Vitals  /97 (BP Location: Left arm, Patient Position: Lying)   Pulse 72   Temp 97.1 °F (36.2 °C) (Oral)   Resp 16   Ht 157.5 cm (62\")   Wt 82.6 kg (182 lb)   SpO2 100%   BMI 33.29 kg/m²     Temp (24hrs), Av.4 °F (36.3 °C), Min:96.6 °F (35.9 °C), Max:97.8 °F (36.6 °C)    Physical Exam:     General Appearance:    Dorwsy, in no acute distress   Head:    Normocephalic, without obvious abnormality, atraumatic    Lungs:     Normal effort, symmetric chest rise, no crepitus, clear to      auscultation bilaterally               Heart:    Regular rhythm and normal rate, normal S1 and S2    Abdomen:     Normal bowel sounds, no masses, no organomegaly, soft        non-tender, non-distended, no guarding, no rebound                tenderness   Extremities:   Bilateral knees ACE wrap CDI.   Knee immobilizers in place.   Pulses:   Pulses palpable and equal bilaterally   Skin:   No bleeding, bruising or rash.    Neurologic:   Cranial nerves 2 - 12 grossly intact, sensation intact. Flexion and dorsiflexion intact bilateral feet.          Results Review:     I reviewed the patient's new clinical results.   Results from last 7 days   Lab Units 19  0746 02/15/19  0651 19  0544   WBC 10*3/mm3 6.75 6.83 7.32   HEMOGLOBIN g/dL 8.8* 8.1* 7.9* "   HEMATOCRIT % 28.3* 26.1* 25.9*   PLATELETS 10*3/mm3 306 249 234     Results from last 7 days   Lab Units 02/16/19  0746 02/14/19  0544 02/13/19  0639 02/11/19  0504 02/10/19  1928   SODIUM mmol/L 138  --  131* 134 136   POTASSIUM mmol/L 4.4 3.4* 3.7 3.9 3.7   CHLORIDE mmol/L 104  --  100 94* 95*   CO2 mmol/L 29.0  --  25.0 31.0 31.0   BUN mg/dL 6*  --  9 10 9   CREATININE mg/dL 0.59*  --  0.82 0.75 0.73   CALCIUM mg/dL 9.0  --  8.8 9.5 9.1   BILIRUBIN mg/dL  --   --   --   --  1.0   ALK PHOS U/L  --   --   --   --  162*   ALT (SGPT) U/L  --   --   --   --  40   AST (SGOT) U/L  --   --   --   --  29   GLUCOSE mg/dL 131*  --  171* 165* 132*        Results for NABIL MATSON (MRN [id number]) as of 2/16/2019 10:13   Ref. Range 2/15/2019 15:43 2/15/2019 20:55 2/16/2019 07:28 2/16/2019 07:46   Glucose Latest Ref Range: 70 - 100 mg/dL 176 (H) 141 (H) 139 (H) 131 (H)       ECG 12 Lead   Order: [id number]   Status:  Preliminary result   Visible to patient:  No (Not Released) Next appt:  None      Narrative     Test Reason : Surgery  Blood Pressure : **/** mmHG  Vent. Rate : 099 BPM     Atrial Rate : 099 BPM     P-R Int : 128 ms          QRS Dur : 096 ms      QT Int : 364 ms       P-R-T Axes : 046 034 047 degrees     QTc Int : 467 ms    Normal sinus rhythm  Normal ECG  When compared with ECG of 23-MAR-2018 11:01,  No significant change was found    Referred By:  LINDA           Confirmed By:           Results for NABIL MATSON (MRN [id number]) as of 2/11/2019 09:31   Ref. Range 2/11/2019 05:05   C-Reactive Protein Latest Ref Range: 0.00 - 1.00 mg/dL 27.72 (H)     Right :  Results for NABIL MATSON (MRN [id number]) as of 2/11/2019 09:31   Ref. Range 2/10/2019 20:27   Color, Fluid Unknown Red   Appearance, Fluid Latest Ref Range: Clear  Bloody (A)   WBC, Fluid Latest Units: /mm3 52,860   RBC, Fluid Latest Units: /mm3 575,000   Crystals, Fluid Unknown No crystals seen     Left:   Results for NABIL MATSON (MRN [id number]) as  of 2/11/2019 09:31   Ref. Range 2/10/2019 20:27   Color, Fluid Unknown Yellow   Appearance, Fluid Latest Ref Range: Clear  Turbid (A)   WBC, Fluid Latest Units: /mm3 232,950   RBC, Fluid Latest Units: /mm3 21,000   Neutrophils, Fluid Latest Units: % 94   Lymphocytes, Fluid Latest Units: % 3   Monocytes, Fluid Latest Units: % 3   Crystals, Fluid Unknown No crystals seen     Results for NABIL MATSON (MRN [id number]) as of 2/11/2019 09:31   Ref. Range 2/11/2019 05:04   Sed Rate Latest Ref Range: 0 - 30 mm/hr 100 (H)     I reviewed the patient's new imaging including images and reports.    All medications reviewed.     Pharmacy Consult  Does not apply BID   carvedilol 3.125 mg Oral BID With Meals   ceFAZolin 2 g Intravenous Q8H   enoxaparin 40 mg Subcutaneous Daily   folic acid 1 mg Oral Daily   insulin lispro 0-7 Units Subcutaneous 4x Daily With Meals & Nightly   lactobacillus acidophilus 1 capsule Oral Daily   meloxicam 15 mg Oral Daily   multivitamin 1 tablet Oral Daily   sodium chloride 10 mL Intravenous Q12H   sodium chloride 3 mL Intravenous Q12H   sodium chloride 3 mL Intravenous Q12H   thiamine 100 mg Oral Daily   traZODone 50 mg Oral Nightly   vancomycin 125 mg Oral Q6H   venlafaxine  mg Oral Daily With Breakfast       acetaminophen 650 mg Q4H PRN   Or     acetaminophen 650 mg Q4H PRN   Or     acetaminophen 650 mg Q4H PRN   bisacodyl 10 mg Daily PRN   bisacodyl 10 mg Daily PRN   dextrose 25 g Q15 Min PRN   dextrose 15 g Q15 Min PRN   diphenhydrAMINE 50 mg Q6H PRN   docusate sodium 100 mg BID PRN   glucagon (human recombinant) 1 mg PRN   HYDROcodone-acetaminophen 1 tablet Q4H PRN   HYDROmorphone 0.5 mg Q2H PRN   And     naloxone 0.1 mg Q5 Min PRN   labetalol 10 mg Q4H PRN   LORazepam 1 mg Q2H PRN   Or     LORazepam 1 mg Q2H PRN   Or     LORazepam 2 mg Q1H PRN   Or     LORazepam 2 mg Q1H PRN   Or     LORazepam 2 mg Q15 Min PRN   Or     LORazepam 2 mg Q15 Min PRN   magnesium hydroxide 10 mL Daily PRN    magnesium sulfate 2 g PRN   Or     magnesium sulfate 2 g PRN   Or     magnesium sulfate 4 g PRN   Morphine 4 mg Q2H PRN   And     naloxone 0.4 mg Q5 Min PRN   ondansetron 4 mg Q6H PRN   Or     ondansetron 4 mg Q6H PRN   oxyCODONE-acetaminophen 1 tablet Q4H PRN   oxyCODONE-acetaminophen 1 tablet Q4H PRN   potassium chloride 40 mEq PRN   Or     potassium chloride 40 mEq PRN   Or     potassium chloride 10 mEq Q1H PRN   sodium chloride 500 mL TID PRN   sodium chloride 1-10 mL PRN   sodium chloride 10 mL PRN       Assessment/Plan     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    Leukocytosis, resolved.    C. difficile diarrhea    Acute blood loss anemia, asymptomatic    Acute postoperative pain    Hyponatremia, resolved.    Hypokalemia, replace.    Plan  ID following, Dr. Francis. Long-term IV abx- Cefazolin 2 g IV every 8 hrs. Hopefully replace PICC soon.  Cdiff- po vanc  ETOH WD.  mproved.      1. PT/OT- WBAT BLE/ with knee immobilizer.   2. Pain control-prns, AC nerve block  3. IS-encouraged  4. DVT proph- mechs/Lovenox  5. Bowel regimen  6. Monitor post-op labs  7. DC planning. CM following.    ( Discussed with  about possible options for discharge including LTACs and SNF)candida Siddiqui MD  02/16/19  10:11 AM

## 2019-02-16 NOTE — PLAN OF CARE
Problem: Patient Care Overview  Goal: Plan of Care Review  PT is A&Ox4, mood/affect appropriate/pleasant, able to make needs known verbally.Currently rests in bed without sx/si of pain/acute distress. Resp even/unlabored. Skin w/d to touch. Knee immobilizer in place bilaterally, refused SCUDS. CIWA score has been 0. VS WNL. SR on the cardiac monitor. Will cont to mx. Call light in reach.   02/16/19 6028   Coping/Psychosocial   Plan of Care Reviewed With patient   Plan of Care Review   Progress improving

## 2019-02-16 NOTE — PLAN OF CARE
Problem: Patient Care Overview  Goal: Plan of Care Review  Outcome: Ongoing (interventions implemented as appropriate)   02/16/19 2724   Coping/Psychosocial   Plan of Care Reviewed With patient   OTHER   Outcome Summary Patient performed 2 attempts of sit<>stand transfers from EOB, limited by pain ~ 15 seconds stand each attempt. patient refused UIC due to pain. Returned back to supine per patients request.   Plan of Care Review   Progress improving

## 2019-02-17 LAB
GLUCOSE BLDC GLUCOMTR-MCNC: 112 MG/DL (ref 70–130)
GLUCOSE BLDC GLUCOMTR-MCNC: 153 MG/DL (ref 70–130)
GLUCOSE BLDC GLUCOMTR-MCNC: 163 MG/DL (ref 70–130)
GLUCOSE BLDC GLUCOMTR-MCNC: 198 MG/DL (ref 70–130)

## 2019-02-17 PROCEDURE — 99024 POSTOP FOLLOW-UP VISIT: CPT | Performed by: ORTHOPAEDIC SURGERY

## 2019-02-17 PROCEDURE — 97116 GAIT TRAINING THERAPY: CPT

## 2019-02-17 PROCEDURE — 25010000002 ENOXAPARIN PER 10 MG: Performed by: ORTHOPAEDIC SURGERY

## 2019-02-17 PROCEDURE — 63710000001 DIPHENHYDRAMINE PER 50 MG: Performed by: INTERNAL MEDICINE

## 2019-02-17 PROCEDURE — 25010000003 CEFAZOLIN IN DEXTROSE 2-4 GM/100ML-% SOLUTION: Performed by: INTERNAL MEDICINE

## 2019-02-17 PROCEDURE — 97535 SELF CARE MNGMENT TRAINING: CPT

## 2019-02-17 PROCEDURE — 25010000002 HYDROMORPHONE PER 4 MG: Performed by: ORTHOPAEDIC SURGERY

## 2019-02-17 PROCEDURE — 82962 GLUCOSE BLOOD TEST: CPT

## 2019-02-17 RX ORDER — DIPHENHYDRAMINE HCL 25 MG
25 CAPSULE ORAL EVERY 6 HOURS PRN
Status: DISCONTINUED | OUTPATIENT
Start: 2019-02-17 | End: 2019-02-19

## 2019-02-17 RX ADMIN — Medication 100 MG: at 09:04

## 2019-02-17 RX ADMIN — OXYCODONE AND ACETAMINOPHEN 1 TABLET: 5; 325 TABLET ORAL at 09:03

## 2019-02-17 RX ADMIN — CEFAZOLIN SODIUM 2 G: 2 INJECTION, SOLUTION INTRAVENOUS at 12:09

## 2019-02-17 RX ADMIN — LORAZEPAM 1 MG: 1 TABLET ORAL at 22:42

## 2019-02-17 RX ADMIN — OXYCODONE AND ACETAMINOPHEN 1 TABLET: 5; 325 TABLET ORAL at 18:28

## 2019-02-17 RX ADMIN — VENLAFAXINE HYDROCHLORIDE 300 MG: 75 CAPSULE, EXTENDED RELEASE ORAL at 09:03

## 2019-02-17 RX ADMIN — VANCOMYCIN HYDROCHLORIDE 125 MG: KIT at 00:10

## 2019-02-17 RX ADMIN — Medication 10 ML: at 18:27

## 2019-02-17 RX ADMIN — TRAZODONE HYDROCHLORIDE 50 MG: 50 TABLET ORAL at 21:53

## 2019-02-17 RX ADMIN — INSULIN LISPRO 2 UNITS: 100 INJECTION, SOLUTION INTRAVENOUS; SUBCUTANEOUS at 12:10

## 2019-02-17 RX ADMIN — OXYCODONE AND ACETAMINOPHEN 1 TABLET: 5; 325 TABLET ORAL at 14:27

## 2019-02-17 RX ADMIN — CEFAZOLIN SODIUM 2 G: 2 INJECTION, SOLUTION INTRAVENOUS at 02:57

## 2019-02-17 RX ADMIN — Medication 1 CAPSULE: at 09:03

## 2019-02-17 RX ADMIN — OXYCODONE AND ACETAMINOPHEN 1 TABLET: 5; 325 TABLET ORAL at 22:37

## 2019-02-17 RX ADMIN — Medication 1 TABLET: at 09:03

## 2019-02-17 RX ADMIN — HYDROMORPHONE HYDROCHLORIDE 0.5 MG: 1 INJECTION, SOLUTION INTRAMUSCULAR; INTRAVENOUS; SUBCUTANEOUS at 00:19

## 2019-02-17 RX ADMIN — Medication 10 ML: at 12:09

## 2019-02-17 RX ADMIN — DIPHENHYDRAMINE HYDROCHLORIDE 25 MG: 25 CAPSULE ORAL at 21:53

## 2019-02-17 RX ADMIN — FOLIC ACID 1 MG: 1 TABLET ORAL at 09:04

## 2019-02-17 RX ADMIN — CARVEDILOL 3.12 MG: 3.12 TABLET, FILM COATED ORAL at 09:03

## 2019-02-17 RX ADMIN — VANCOMYCIN HYDROCHLORIDE 125 MG: KIT at 19:56

## 2019-02-17 RX ADMIN — INSULIN LISPRO 2 UNITS: 100 INJECTION, SOLUTION INTRAVENOUS; SUBCUTANEOUS at 09:05

## 2019-02-17 RX ADMIN — MELOXICAM 15 MG: 7.5 TABLET ORAL at 09:03

## 2019-02-17 RX ADMIN — CARVEDILOL 3.12 MG: 3.12 TABLET, FILM COATED ORAL at 18:28

## 2019-02-17 RX ADMIN — CEFAZOLIN SODIUM 2 G: 2 INJECTION, SOLUTION INTRAVENOUS at 18:27

## 2019-02-17 RX ADMIN — VANCOMYCIN HYDROCHLORIDE 125 MG: KIT at 12:09

## 2019-02-17 RX ADMIN — SODIUM CHLORIDE, PRESERVATIVE FREE 3 ML: 5 INJECTION INTRAVENOUS at 09:05

## 2019-02-17 RX ADMIN — ENOXAPARIN SODIUM 40 MG: 40 INJECTION SUBCUTANEOUS at 09:04

## 2019-02-17 RX ADMIN — Medication 10 ML: at 21:54

## 2019-02-17 RX ADMIN — INSULIN LISPRO 2 UNITS: 100 INJECTION, SOLUTION INTRAVENOUS; SUBCUTANEOUS at 22:36

## 2019-02-17 RX ADMIN — VANCOMYCIN HYDROCHLORIDE 125 MG: KIT at 05:18

## 2019-02-17 RX ADMIN — Medication: at 09:06

## 2019-02-17 NOTE — PROGRESS NOTES
" progress note      Jyoti Luna  [id number]  1965     LOS: 7 days     Attending: Lorri Siddiqui MD    Primary Care Provider: Provider, No Known      Chief Complaint/Reason for visit:  Bilateral infected knee prosthesis     Subjective     Feels ok. No new problems. Inquiring about going home with abx as she has an old dog she is worried about.     Review of Systems  General: No Fever/chills  Respiratory: :Patient denies cough, chest pain, or dyspnea   Cardiovascular: Patient denies chest pain, palpitations, dyspnea, orthopnea or swelling   Skin:No Rash or pruritis,   GI: No Nausea,vomiting or diarrhea  : No Dysuria, hematuria    Objective     Vital Signs  Visit Vitals  /94 (BP Location: Left arm, Patient Position: Lying)   Pulse 62   Temp 97 °F (36.1 °C) (Oral)   Resp 18   Ht 157.5 cm (62\")   Wt 82.6 kg (182 lb)   SpO2 100%   BMI 33.29 kg/m²     Temp (24hrs), Av.3 °F (36.3 °C), Min:96.3 °F (35.7 °C), Max:98.1 °F (36.7 °C)    Physical Exam:     General Appearance:    Dorwsy, in no acute distress   Head:    Normocephalic, without obvious abnormality, atraumatic    Lungs:     Normal effort, symmetric chest rise, no crepitus, clear to      auscultation bilaterally               Heart:    Regular rhythm and normal rate, normal S1 and S2    Abdomen:     Normal bowel sounds, no masses, no organomegaly, soft        non-tender, non-distended, no guarding, no rebound                tenderness   Extremities:   Bilateral knees ACE wrap CDI.   Knee immobilizers in place.   Pulses:   Pulses palpable and equal bilaterally   Skin:   No bleeding, bruising or rash.    Neurologic:   Cranial nerves 2 - 12 grossly intact, sensation intact. Flexion and dorsiflexion intact bilateral feet.          Results Review:     I reviewed the patient's new clinical results.   Results from last 7 days   Lab Units 19  0746 02/15/19  0651 19  0544   WBC 10*3/mm3 6.75 6.83 7.32   HEMOGLOBIN g/dL 8.8* 8.1* 7.9* "   HEMATOCRIT % 28.3* 26.1* 25.9*   PLATELETS 10*3/mm3 306 249 234     Results from last 7 days   Lab Units 02/16/19  0746 02/14/19  0544 02/13/19  0639 02/11/19  0504 02/10/19  1928   SODIUM mmol/L 138  --  131* 134 136   POTASSIUM mmol/L 4.4 3.4* 3.7 3.9 3.7   CHLORIDE mmol/L 104  --  100 94* 95*   CO2 mmol/L 29.0  --  25.0 31.0 31.0   BUN mg/dL 6*  --  9 10 9   CREATININE mg/dL 0.59*  --  0.82 0.75 0.73   CALCIUM mg/dL 9.0  --  8.8 9.5 9.1   BILIRUBIN mg/dL  --   --   --   --  1.0   ALK PHOS U/L  --   --   --   --  162*   ALT (SGPT) U/L  --   --   --   --  40   AST (SGOT) U/L  --   --   --   --  29   GLUCOSE mg/dL 131*  --  171* 165* 132*        Results for NABIL MATSON (MRN [id number]) as of 2/16/2019 10:13   Ref. Range 2/15/2019 15:43 2/15/2019 20:55 2/16/2019 07:28 2/16/2019 07:46   Glucose Latest Ref Range: 70 - 100 mg/dL 176 (H) 141 (H) 139 (H) 131 (H)       ECG 12 Lead   Order: [id number]   Status:  Preliminary result   Visible to patient:  No (Not Released) Next appt:  None      Narrative     Test Reason : Surgery  Blood Pressure : **/** mmHG  Vent. Rate : 099 BPM     Atrial Rate : 099 BPM     P-R Int : 128 ms          QRS Dur : 096 ms      QT Int : 364 ms       P-R-T Axes : 046 034 047 degrees     QTc Int : 467 ms    Normal sinus rhythm  Normal ECG  When compared with ECG of 23-MAR-2018 11:01,  No significant change was found    Referred By:  LINDA           Confirmed By:           Results for NABIL MATSON (MRN [id number]) as of 2/11/2019 09:31   Ref. Range 2/11/2019 05:05   C-Reactive Protein Latest Ref Range: 0.00 - 1.00 mg/dL 27.72 (H)     Right :  Results for NABIL MATSON (MRN [id number]) as of 2/11/2019 09:31   Ref. Range 2/10/2019 20:27   Color, Fluid Unknown Red   Appearance, Fluid Latest Ref Range: Clear  Bloody (A)   WBC, Fluid Latest Units: /mm3 52,860   RBC, Fluid Latest Units: /mm3 575,000   Crystals, Fluid Unknown No crystals seen     Left:   Results for NABIL MATSON (MRN [id number]) as  of 2/11/2019 09:31   Ref. Range 2/10/2019 20:27   Color, Fluid Unknown Yellow   Appearance, Fluid Latest Ref Range: Clear  Turbid (A)   WBC, Fluid Latest Units: /mm3 232,950   RBC, Fluid Latest Units: /mm3 21,000   Neutrophils, Fluid Latest Units: % 94   Lymphocytes, Fluid Latest Units: % 3   Monocytes, Fluid Latest Units: % 3   Crystals, Fluid Unknown No crystals seen     Results for NABIL MATSON (MRN [id number]) as of 2/11/2019 09:31   Ref. Range 2/11/2019 05:04   Sed Rate Latest Ref Range: 0 - 30 mm/hr 100 (H)     Collected  Updated  Procedure  Result Status      02/12/2019 1342  02/13/2019 1328  Anaerobic Culture - Tissue, Knee, Right [[id number]]   Tissue from Knee, Right     Preliminary result  Culture No anaerobes isolated             02/12/2019 1342  02/12/2019 1438  Fungus Culture - Tissue, Knee, Right [[id number]]   Tissue from Knee, Right     In process  No result data             02/12/2019 1342  02/16/2019 0628  Tissue / Bone Culture - Tissue, Knee, Right [[id number]]   Tissue from Knee, Right     Final result  Tissue Culture No growth at 4 days   Gram Stain Moderate (3+) WBCs seen    No organisms seen             02/12/2019 1342  02/13/2019 1312  AFB Culture - Tissue, Knee, Right [[id number]]   Tissue from Knee, Right     Preliminary result  AFB Stain No acid fast bacilli seen on concentrated smear             02/12/2019 1236  02/12/2019 1438  Fungus Culture - Synovial Fluid, Knee, Left [[id number]]   Synovial Fluid from Knee, Left     In process  No result data             02/12/2019 1236  02/16/2019 0634  Body Fluid Culture - Synovial Fluid, Knee, Left [[id number]]   Synovial Fluid from Knee, Left     Preliminary result  BF Culture No growth at 4 days   Gram Stain Many (4+) WBCs seen    No organisms seen             02/12/2019 1236  02/13/2019 1311  AFB Culture - Synovial Fluid, Knee, Left [[id number]]   Synovial Fluid from Knee, Left     Preliminary result  AFB Stain No acid fast bacilli seen on  concentrated smear             02/11/2019 0941  02/11/2019 1136  Clostridium Difficile Toxin - Stool, Per Rectum [[id number]]    Stool from Per Rectum     Final result  No result data             02/11/2019 0941  02/11/2019 1136  Clostridium Difficile Toxin, PCR - Stool, Per Rectum [[id number]]    (Abnormal)   Stool from Per Rectum     Final result  Clostridium difficile (toxin A/B) Detected Abnormal              02/10/2019 2028  02/14/2019 0711  Body Fluid Culture - Synovial Fluid, Knee, Right [[id number]]   Synovial Fluid from Knee, Right     Final result  BF Culture No growth at 4 days   Gram Stain Many (4+) WBCs seen    No organisms seen             02/10/2019 2028  02/16/2019 1005  Anaerobic Culture - Synovial Fluid, Knee, Right [[id number]]   (Abnormal)   Synovial Fluid from Knee, Right     Preliminary result  Culture No anaerobes isolated    Scant growth (1+) Staphylococcus aureus Abnormal              02/10/2019 2027  02/14/2019 0711  Body Fluid Culture - Synovial Fluid, Knee, Left [[id number]]   Synovial Fluid from Knee, Left     Final result  BF Culture No growth at 4 days   Gram Stain Many (4+) WBCs seen    No organisms seen             02/10/2019 2027  02/16/2019 1003  Anaerobic Culture - Synovial Fluid, Knee, Left [[id number]]   (Abnormal)   Synovial Fluid from Knee, Left     Preliminary result  Culture No anaerobes isolated    Staphylococcus aureus Abnormal     Isolated from broth culture              02/10/2019 1937  02/15/2019 2030  Blood Culture - Blood, Arm, Left [[id number]]   Blood from Arm, Left     Final result  Blood Culture No growth at 5 days             02/10/2019 1913  02/15/2019 2030  Blood Culture - Blood, Arm, Right [[id number]]   Blood from Arm, Right     Final result  Blood Culture No growth at 5 days                      I reviewed the patient's new imaging including images and reports.    All medications reviewed.     Pharmacy Consult  Does not apply BID   carvedilol 3.125 mg Oral BID With  Meals   ceFAZolin 2 g Intravenous Q8H   enoxaparin 40 mg Subcutaneous Daily   folic acid 1 mg Oral Daily   insulin lispro 0-7 Units Subcutaneous 4x Daily With Meals & Nightly   lactobacillus acidophilus 1 capsule Oral Daily   magic mouthwash 10 mL Swish & Spit 4x Daily   meloxicam 15 mg Oral Daily   multivitamin 1 tablet Oral Daily   sodium chloride 10 mL Intravenous Q12H   sodium chloride 3 mL Intravenous Q12H   sodium chloride 3 mL Intravenous Q12H   thiamine 100 mg Oral Daily   traZODone 50 mg Oral Nightly   vancomycin 125 mg Oral Q6H   venlafaxine  mg Oral Daily With Breakfast       acetaminophen 650 mg Q4H PRN   Or     acetaminophen 650 mg Q4H PRN   Or     acetaminophen 650 mg Q4H PRN   bisacodyl 10 mg Daily PRN   bisacodyl 10 mg Daily PRN   dextrose 25 g Q15 Min PRN   dextrose 15 g Q15 Min PRN   diphenhydrAMINE 50 mg Q6H PRN   docusate sodium 100 mg BID PRN   glucagon (human recombinant) 1 mg PRN   HYDROcodone-acetaminophen 1 tablet Q4H PRN   HYDROmorphone 0.5 mg Q2H PRN   And     naloxone 0.1 mg Q5 Min PRN   labetalol 10 mg Q4H PRN   LORazepam 1 mg Q2H PRN   Or     LORazepam 1 mg Q2H PRN   Or     LORazepam 2 mg Q1H PRN   Or     LORazepam 2 mg Q1H PRN   Or     LORazepam 2 mg Q15 Min PRN   Or     LORazepam 2 mg Q15 Min PRN   magnesium hydroxide 10 mL Daily PRN   magnesium sulfate 2 g PRN   Or     magnesium sulfate 2 g PRN   Or     magnesium sulfate 4 g PRN   Morphine 4 mg Q2H PRN   And     naloxone 0.4 mg Q5 Min PRN   ondansetron 4 mg Q6H PRN   Or     ondansetron 4 mg Q6H PRN   oxyCODONE-acetaminophen 1 tablet Q4H PRN   potassium chloride 40 mEq PRN   Or     potassium chloride 40 mEq PRN   Or     potassium chloride 10 mEq Q1H PRN   sodium chloride 500 mL TID PRN   sodium chloride 1-10 mL PRN   sodium chloride 10 mL PRN       Assessment/Plan     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    Leukocytosis, resolved.    C. difficile diarrhea    Acute blood loss anemia, asymptomatic    Acute postoperative pain    Hyponatremia, resolved.    Hypokalemia, replaced.    Plan  ID following, Dr. Francis. Long-term IV abx- Cefazolin 2 g IV every 8 hrs. Hopefully replace PICC soon.  Cdiff- po vanc  ETOH WD.  mproved.      1. PT/OT- WBAT BLE/ with knee immobilizer.   2. Pain control-prns, AC nerve block  3. IS-encouraged  4. DVT proph- mechs/Lovenox  5. Bowel regimen  6. Monitor post-op labs  7. DC planning. CM following.     Lorri Siddiqui MD  02/17/19  11:24 AM

## 2019-02-17 NOTE — PLAN OF CARE
Problem: Patient Care Overview  Goal: Plan of Care Review  Outcome: Ongoing (interventions implemented as appropriate)   02/17/19 4102   Coping/Psychosocial   Plan of Care Reviewed With patient   OTHER   Outcome Summary Pt able to perform STS and stand pivot t/f using RW with min A x2. Pt ambulated 10' with CGA using RW. Pt demonstrated excellent effort. Continue IPPT POC.   Plan of Care Review   Progress improving

## 2019-02-17 NOTE — THERAPY TREATMENT NOTE
Acute Care - Occupational Therapy Treatment Note  [facility]     Patient Name: Jyoti Luna  : 1965  MRN: [id number]  Today's Date: 2019  Onset of Illness/Injury or Date of Surgery: 02/10/19  Date of Referral to OT: 19  Referring Physician: MD Zuhair     Admit Date: 2/10/2019       ICD-10-CM ICD-9-CM   1. Infection of prosthetic knee joint, subsequent encounter T84.59XD V58.89    Z96.659    2. Impaired mobility and ADLs Z74.09 799.89   3. Impaired functional mobility, balance, gait, and endurance Z74.09 V49.89     Patient Active Problem List   Diagnosis   • HO MSSA (methicillin susceptible Staphylococcus aureus) septicemia (CMS/HCC)   • Knee swelling; bilateral   • Alcoholism /alcohol abuse (CMS/HCC)   • Iron deficiency anemia   • Hypertension   • Depression   • Diabetes mellitus (CMS/HCC)   • Infected prosthetic knee joint (CMS/HCC)   • Constipation   • Diarrhea   • Leukocytosis   • Anemia, likely dilutional   • C. 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   • Acute blood loss anemia, asymptomatic   • Acute postoperative pain   • Hyponatremia     Past Medical History:   Diagnosis Date   • Acid reflux    • Alcoholism (CMS/HCC)    • Anemia 3/20/2018   • Arthritis    • Asthma    • Depression 3/20/2018   • Diabetes mellitus (CMS/HCC)     POSS R/T INFECTION PER PT   • Eczema    • History of transfusion    • Hypertension 3/20/2018     Past Surgical History:   Procedure Laterality Date   • JOINT REPLACEMENT      bilateral knees   • KNEE POLY INSERT EXCHANGE Bilateral 3/23/2018    Procedure: KNEE POLY INSERT EXCHANGE WITH IRRIGATION AND DEBRIDMENT;  Surgeon: Ti Moffett MD;  Location: [facility];  Service: Orthopedics   • LAPAROSCOPIC CHOLECYSTECTOMY     • TOTAL KNEE ARTHROPLASTY REVISION Bilateral 2019    Procedure: TOTAL KNEE ARTHROPLASTY REVISION WITH REMOVAL AND PLACE OF  ANTIBIOTIC SPACERS BILATERAL;  Surgeon: Ti Moffett MD;   Location: [facility] OR;  Service: Orthopedics       Therapy Treatment    Rehabilitation Treatment Summary     Row Name 02/17/19 1318             Treatment Time/Intention    Discipline  occupational therapist  -      Document Type  therapy note (daily note)  -      Subjective Information  no complaints  -      Mode of Treatment  occupational therapy  -      Patient/Family Observations  Pt received in supine, IV intact, on room air, exit alarm, no visitors present  -      Care Plan Review  risks/benefits reviewed  -      Patient Effort  excellent  -2      Existing Precautions/Restrictions  fall;other (see comments) bilateral KI on AAT, WBAT BLE, no ROM LLE  -      Recorded by [MC] Cinthya Alvarado, OT 02/17/19 1353  [MC2] Cinthya Alvarado, OT 02/17/19 1355      Row Name 02/17/19 131             Vital Signs    Pre Systolic BP Rehab  -- VSS throughout  -      Recorded by [] Cinthya Alvarado OT 02/17/19 1354      Row Name 02/17/19 1311             Cognitive Assessment/Intervention- PT/OT    Affect/Mental Status (Cognitive)  WFL  -      Orientation Status (Cognition)  oriented x 4  -      Follows Commands (Cognition)  follows one step commands;over 90% accuracy;repetition of directions required;verbal cues/prompting required  -      Cognitive Function (Cognitive)  safety deficit  -      Attention Deficit (Cognitive)  mild deficit  -      Memory Deficit (Cognitive)  mild deficit  -      Safety Deficit (Cognitive)  mild deficit  -      Personal Safety Interventions  fall prevention program maintained;gait belt;muscle strengthening facilitated;nonskid shoes/slippers when out of bed;supervised activity  -      Recorded by [MC] Cinthya Alvarado, OT 02/17/19 0460      Row Name 02/17/19 1316             Mobility Assessment/Intervention    Left Lower Extremity (Weight-bearing Status)  weight-bearing as tolerated (WBAT)  -      Right Lower Extremity (Weight-bearing Status)  weight-bearing as tolerated (WBAT)   -MC      Recorded by [MC] Cinthya Alvarado, OT 02/17/19 1354      Row Name 02/17/19 1315             Bed Mobility Assessment/Treatment    Scooting/Bridging [place] (Bed Mobility)  conditional independence  -MC      Supine-Sit [place] (Bed Mobility)  conditional independence  -MC      Sit-Supine [place] (Bed Mobility)  conditional independence  -      Assistive Device (Bed Mobility)  bed rails;head of bed elevated  -      Comment (Bed Mobility)  Good safety awareness during bed mobility.  Knee immobilizers on BLE when OT entered room  -MC      Recorded by [MC] Cinthya Alvarado, OT 02/17/19 1354      Row Name 02/17/19 1315             Functional Mobility    Functional Mobility- Ind. Level  contact guard assist;2 person assist required  -      Functional Mobility- Device  rolling walker  -      Functional Mobility- Comment  Functional distance in room.    -MC      Recorded by [MC] Cinthya Alvarado, OT 02/17/19 1354      Row Name 02/17/19 1315             Transfer Assessment/Treatment    Transfer Assessment/Treatment  sit-stand transfer;stand-sit transfer;bed-chair transfer  -      Comment (Transfers)  Cues for hand placement, safe transfer technique   -MC      Recorded by [MC] Cinthya Alvarado, OT 02/17/19 1354      Row Name 02/17/19 1315             Bed-Chair Transfer    Bed-Chair [place] (Transfers)  minimum assist (75% patient effort);2 person assist  -      Assistive Device (Bed-Chair Transfers)  walker, front-wheeled  -      Recorded by [MC] Cinthya Alvarado, OT 02/17/19 1354      Row Name 02/17/19 1315             Sit-Stand Transfer    Sit-Stand [place] (Transfers)  minimum assist (75% patient effort);2 person assist  -      Assistive Device (Sit-Stand Transfers)  walker, front-wheeled  -      Recorded by [MC] Cinthya Alvarado, OT 02/17/19 1354      Row Name 02/17/19 1315             Stand-Sit Transfer    Stand-Sit [place] (Transfers)  minimum assist (75% patient effort);2 person assist   -      Assistive Device (Stand-Sit Transfers)  walker, front-wheeled  -      Recorded by [MC] Cinthya Alvarado, OT 02/17/19 1354      Row Name 02/17/19 1315             Lower Body Dressing Assessment/Training    Lower Body Dressing [place] Level  don;socks;set up  -      Assistive Devices (Lower Body Dressing)  -- no AE   -      Lower Body Dressing Position  long sitting  -MC      Recorded by [MC] Cinthya Alvarado, OT 02/17/19 1354      Row Name 02/17/19 1315             Therapeutic Exercise    Upper Extremity Range of Motion (Therapeutic Exercise)  shoulder flexion/extension, bilateral;shoulder abduction/adduction, bilateral;shoulder horizontal abduction/adduction, bilateral;elbow flexion/extension, bilateral  -      Exercise Type (Therapeutic Exercise)  AROM (active range of motion)  -      Position (Therapeutic Exercise)  seated  -      Sets/Reps (Therapeutic Exercise)  1/15  -MC      Recorded by [MC] Cinthya Alvarado, OT 02/17/19 1354      Row Name 02/17/19 1315             Static Sitting Balance    Level of [place] (Unsupported Sitting, Static Balance)  independent  -      Sitting Position (Unsupported Sitting, Static Balance)  sitting on edge of bed  -      Recorded by [MC] Cinthya Alvarado, OT 02/17/19 1354      Row Name 02/17/19 1315             Dynamic Sitting Balance    Level of [place], Reaches Outside Midline (Sitting, Dynamic Balance)  supervision  -      Sitting Position, Reaches Outside Midline (Sitting, Dynamic Balance)  sitting on edge of bed  -MC      Recorded by [MC] Cinthya Alvarado, OT 02/17/19 1354      Row Name 02/17/19 1315             Static Standing Balance    Level of [place] (Supported Standing, Static Balance)  contact guard assist  -MC      Recorded by [MC] Cinthya Alvarado, OT 02/17/19 1354      Row Name 02/17/19 1315             Positioning and Restraints    Pre-Treatment Position  in bed  -      Post Treatment Position  chair  -MC      In Chair  notified  nsg;reclined;call light within reach;encouraged to call for assist;exit alarm on;waffle cushion;legs elevated;R knee immobilizer;L knee immobilizer  -MC      Recorded by [MC] Cinthya Alvarado, OT 02/17/19 1354      Row Name 02/17/19 1315             Pain Scale: Numbers Pre/Post-Treatment    Pain Scale: Numbers, Pretreatment  7/10  -MC      Pain Scale: Numbers, Post-Treatment  7/10  -MC      Pain Location - Side  Bilateral  -MC      Pain Location - Orientation  lower  -MC      Pain Location  knee  -MC      Pain Intervention(s)  Repositioned;Ambulation/increased activity Notified RN  -MC      Recorded by [MC] Cinthya Alvarado, OT 02/17/19 1354      Row Name                Wound 02/12/19 1418 Bilateral knee incision    Wound - Properties Group Date first assessed: 02/12/19 [RV] Time first assessed: 1418 [RV] Side: Bilateral [RV] Location: knee [RV] Type: incision [RV] Recorded by:  [RV] Kishan Merida RN 02/12/19 1418    Row Name                Wound 02/13/19 2030 Left upper gluteal other (see comments)    Wound - Properties Group Date first assessed: 02/13/19 [AB] Time first assessed: 2030 [AB] Side: Left [AB] Orientation: upper [AB] Location: gluteal [AB] Type: other (see comments) [AB2], open area   Recorded by:  [AB] Heidy Gill, MARY 02/14/19 0254 [AB2] Heidy Gill, RN 02/14/19 0312    Row Name                Wound 02/13/19 2030 Right medial gluteal    Wound - Properties Group Date first assessed: 02/13/19 [AB] Time first assessed: 2030 [AB] Side: Right [AB] Orientation: medial [AB] Location: gluteal [AB] Type: -- [AB2] Additional Comments: -- [AB2], inner buttock on right side   Recorded by:  [AB] Heidy Gill, RN 02/14/19 0256 [AB2] Hiedy Gill, RN 02/14/19 0313    Row Name 02/17/19 1315             Coping    Observed Emotional State  calm;cooperative  -MC      Verbalized Emotional State  acceptance  -MC      Recorded by [MC] Cinthya Alvarado, OT 02/17/19 1351      Row Name 02/17/19 1312             Plan  of Care Review    Plan of Care Reviewed With  patient  -MC      Recorded by [MC] Cinthya Alvarado, OT 02/17/19 1354      Row Name 02/17/19 6384             Outcome Summary/Treatment Plan (OT)    Daily Summary of Progress (OT)  progress toward functional goals is good  -MC      Recorded by [MC] Cinthya Alvarado, OT 02/17/19 1354        User Key  (r) = Recorded By, (t) = Taken By, (c) = Cosigned By    Initials Name Effective Dates Discipline    RV Kishan Merida, RN 06/16/16 -  Nurse    DAVON Cinthya Alvarado, OT 03/14/16 -  OT    AB Heidy Gill RN 09/30/16 -  Nurse        Wound 02/12/19 1418 Bilateral knee incision (Active)   Dressing Appearance dry;intact 2/16/2019  8:45 PM   Closure JOHNNY 2/16/2019  8:45 PM   Drainage Amount none 2/16/2019  8:45 PM   Dressing Care, Wound elastic bandage 2/16/2019  8:45 PM       Wound 02/13/19 2030 Left upper gluteal other (see comments) (Active)   Dressing Appearance dry;intact 2/16/2019  8:45 PM   Closure JOHNNY 2/16/2019  8:45 PM   Drainage Amount none 2/16/2019  8:45 PM   Dressing Care, Wound foam 2/16/2019  8:45 PM       Wound 02/13/19 2030 Right medial gluteal (Active)   Dressing Appearance open to air 2/16/2019  8:45 PM   Drainage Amount none 2/16/2019  8:45 PM   Dressing Care, Wound open to air 2/16/2019  8:45 PM       Occupational Therapy Education     Title: PT OT SLP Therapies (In Progress)     Topic: Occupational Therapy (Done)     Point: ADL training (Done)     Description: Instruct learner(s) on proper safety adaptation and remediation techniques during self care or transfers.   Instruct in proper use of assistive devices.    Learning Progress Summary           Patient Acceptance, E,D, VU,NR by DAVON at 2/17/2019  1:54 PM    Acceptance, E,TB,D, VU,DU by ST at 2/15/2019  2:15 PM    Comment:  refer to flow sheet    Acceptance, E,D, VU by HK at 2/13/2019 11:50 AM    Comment:  Pt educated on ADL retraining with use of AE for completion of all LBD and LBB, safety precautions, and  appropriate body mechanics to maintain knee precautions.                   Point: Home exercise program (Done)     Description: Instruct learner(s) on appropriate technique for monitoring, assisting and/or progressing therapeutic exercises/activities.    Learning Progress Summary           Patient Acceptance, E,D, VU,NR by  at 2/17/2019  1:54 PM                   Point: Precautions (Done)     Description: Instruct learner(s) on prescribed precautions during self-care and functional transfers.    Learning Progress Summary           Patient Acceptance, E,D, VU,NR by  at 2/17/2019  1:54 PM    Acceptance, E,TB,D, VU,DU by  at 2/15/2019  2:15 PM    Comment:  refer to flow sheet    Acceptance, E,D, VU by  at 2/13/2019 11:50 AM    Comment:  Pt educated on ADL retraining with use of AE for completion of all LBD and LBB, safety precautions, and appropriate body mechanics to maintain knee precautions.                   Point: Body mechanics (Done)     Description: Instruct learner(s) on proper positioning and spine alignment during self-care, functional mobility activities and/or exercises.    Learning Progress Summary           Patient Acceptance, E,D, VU,NR by  at 2/17/2019  1:54 PM    Acceptance, E,TB,D, VU,DU by  at 2/15/2019  2:15 PM    Comment:  refer to flow sheet    Acceptance, E,D, VU by  at 2/13/2019 11:50 AM    Comment:  Pt educated on ADL retraining with use of AE for completion of all LBD and LBB, safety precautions, and appropriate body mechanics to maintain knee precautions.                               User Key     Initials Effective Dates Name Provider Type Discipline     06/10/18 -  Kinga Owen OTR Occupational Therapist OT     03/14/16 -  Cinthya Alvarado, OT Occupational Therapist OT     03/07/18 -  Chery Zarate, OT Occupational Therapist OT                OT Recommendation and Plan  Outcome Summary/Treatment Plan (OT)  Daily Summary of Progress (OT): progress toward functional  goals is good  Daily Summary of Progress (OT): progress toward functional goals is good  Plan of Care Review  Plan of Care Reviewed With: patient  Plan of Care Reviewed With: patient  Outcome Summary: Pt demonstrated improved independence with LB ADLS (donning socks), bed mobility and transfers.  Pt very pleasant and put forth excellent effort this date.  Cont OT POC   Outcome Measures     Row Name 02/17/19 1315 02/16/19 1045 02/15/19 1415       How much help from another person do you currently need...    Turning from your back to your side while in flat bed without using bedrails?  --  4  -AS  --    Moving from lying on back to sitting on the side of a flat bed without bedrails?  --  3  -AS  --    Moving to and from a bed to a chair (including a wheelchair)?  --  1  -AS  --    Standing up from a chair using your arms (e.g., wheelchair, bedside chair)?  --  2  -AS  --    Climbing 3-5 steps with a railing?  --  1  -AS  --    To walk in hospital room?  --  1  -AS  --    AM-PAC 6 Clicks Score  --  12  -AS  --       How much help from another is currently needed...    Putting on and taking off regular lower body clothing?  3  -MC  --  2  -ST    Bathing (including washing, rinsing, and drying)  3  -MC  --  2  -ST    Toileting (which includes using toilet bed pan or urinal)  3  -MC  --  3  -ST    Putting on and taking off regular upper body clothing  3  -MC  --  3  -ST    Taking care of personal grooming (such as brushing teeth)  4  -MC  --  3  -ST    Eating meals  4  -MC  --  4  -ST    Score  20  -MC  --  17  -ST       Functional Assessment    Outcome Measure Options  AM-PAC 6 Clicks Daily Activity (OT)  -  AM-PAC 6 Clicks Basic Mobility (PT)  -AS  --    Row Name 02/15/19 1413             How much help from another person do you currently need...    Turning from your back to your side while in flat bed without using bedrails?  3  -LM      Moving from lying on back to sitting on the side of a flat bed without bedrails?   3  -LM      Moving to and from a bed to a chair (including a wheelchair)?  3  -LM      Standing up from a chair using your arms (e.g., wheelchair, bedside chair)?  3  -LM      Climbing 3-5 steps with a railing?  1  -LM      To walk in hospital room?  2  -LM      AM-PAC 6 Clicks Score  15  -LM         Functional Assessment    Outcome Measure Options  AM-PAC 6 Clicks Basic Mobility (PT)  -LM        User Key  (r) = Recorded By, (t) = Taken By, (c) = Cosigned By    Initials Name Provider Type    ST Kinga Owen, OTR Occupational Therapist    AS Tila Garcia, PTA Physical Therapy Assistant    Cinthya Stone, OT Occupational Therapist    LM Sujata Sinha, PT Physical Therapist           Time Calculation:   Time Calculation- OT     Row Name 02/17/19 1315             Time Calculation- OT    OT Start Time  1315  -      Total Timed Code Minutes- OT  20 minute(s)  -      OT Received On  02/17/19  -      OT Goal Re-Cert Due Date  02/23/19  -         Timed Charges    09296 - OT Self Care/Mgmt Minutes  20  -        User Key  (r) = Recorded By, (t) = Taken By, (c) = Cosigned By    Initials Name Provider Type     Cinthya Alvarado, OT Occupational Therapist           Therapy Suggested Charges     Code   Minutes Charges    01453 (CPT®) Hc Ot Neuromusc Re Education Ea 15 Min      21900 (CPT®) Hc Ot Ther Proc Ea 15 Min      61749 (CPT®) Hc Ot Therapeutic Act Ea 15 Min      22172 (CPT®) Hc Ot Manual Therapy Ea 15 Min      68475 (CPT®) Hc Ot Iontophoresis Ea 15 Min      83090 (CPT®) Hc Ot Elec Stim Ea-Per 15 Min      84005 (CPT®) Hc Ot Ultrasound Ea 15 Min      59733 (CPT®) Hc Ot Self Care/Mgmt/Train Ea 15 Min 20 1    Total  20 1        Therapy Charges for Today     Code Description Service Date Service Provider Modifiers Qty    [id number] HC OT SELF CARE/MGMT/TRAIN EA 15 MIN 2/17/2019 Cinthya Alvarado, OT GO 1               Cinthya Alvarado OT  2/17/2019

## 2019-02-17 NOTE — PLAN OF CARE
Problem: Patient Care Overview  Goal: Plan of Care Review  Outcome: Ongoing (interventions implemented as appropriate)   02/17/19 7399   Coping/Psychosocial   Plan of Care Reviewed With patient   OTHER   Outcome Summary Pt demonstrated improved independence with LB ADLS (donning socks), bed mobility and transfers. Pt very pleasant and put forth excellent effort this date. Cont OT POC    Plan of Care Review   Progress improving

## 2019-02-17 NOTE — THERAPY TREATMENT NOTE
Acute Care - Physical Therapy Treatment Note  [facility]     Patient Name: Jyoti Luna  : 1965  MRN: [id number]  Today's Date: 2019  Onset of Illness/Injury or Date of Surgery: 02/10/19  Date of Referral to PT: 19  Referring Physician: MD Zuhair     Admit Date: 2/10/2019    Visit Dx:    ICD-10-CM ICD-9-CM   1. Infection of prosthetic knee joint, subsequent encounter T84.59XD V58.89    Z96.659    2. Impaired mobility and ADLs Z74.09 799.89   3. Impaired functional mobility, balance, gait, and endurance Z74.09 V49.89     Patient Active Problem List   Diagnosis   • HO MSSA (methicillin susceptible Staphylococcus aureus) septicemia (CMS/[facility])   • Knee swelling; bilateral   • Alcoholism /alcohol abuse (CMS/[facility])   • Iron deficiency anemia   • Hypertension   • Depression   • Diabetes mellitus (CMS/[facility])   • Infected prosthetic knee joint (CMS/[facility])   • Constipation   • Diarrhea   • Leukocytosis   • Anemia, likely dilutional   • C. 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   • Acute blood loss anemia, asymptomatic   • Acute postoperative pain   • Hyponatremia       Therapy Treatment    Rehabilitation Treatment Summary     Row Name 19 1320 19 1315          Treatment Time/Intention    Discipline  physical therapist  -ES  occupational therapist  -     Document Type  therapy note (daily note)  -ES  therapy note (daily note)  -     Subjective Information  no complaints  -ES  no complaints  -     Mode of Treatment  physical therapy  -ES  occupational therapy  -     Patient/Family Observations  Pt supine, IV intact, on room air, exit alarm, no visitors present  -ES  Pt received in supine, IV intact, on room air, exit alarm, no visitors present  -     Care Plan Review  --  risks/benefits reviewed  -     Patient Effort  excellent  -ES  excellent  -MC2     Comment  Bilateral KI on at all times, WBAT, no ROM L knee  (Significant)   -ES   --     Existing Precautions/Restrictions  fall;other (see comments) bilateral KI on AAT, WBAT BLE, no knee ROM  -ES  fall;other (see comments) bilateral KI on AAT, WBAT BLE, no ROM LLE  -MC     Recorded by [ES] Niki Ames, PT 02/17/19 1622 [MC] Cinthya Alvarado LADI, OT 02/17/19 1354  [MC2] ChristianoCinthya LADI, OT 02/17/19 1355     Row Name 02/17/19 1320 02/17/19 1315          Vital Signs    Pre Systolic BP Rehab  -- VSS throughout, RN cleared for tx  -ES  -- VSS throughout  -MC     Recorded by [ES] Niki Ames, PT 02/17/19 1622 [MC] Christiano Cinthya LADI, OT 02/17/19 1354     Row Name 02/17/19 1320 02/17/19 1315          Cognitive Assessment/Intervention- PT/OT    Affect/Mental Status (Cognitive)  WFL  -ES  WFL  -MC     Orientation Status (Cognition)  oriented x 4  -ES  oriented x 4  -MC     Follows Commands (Cognition)  follows one step commands;over 90% accuracy;repetition of directions required;verbal cues/prompting required  -ES  follows one step commands;over 90% accuracy;repetition of directions required;verbal cues/prompting required  -     Cognitive Function (Cognitive)  safety deficit  -ES  safety deficit  -MC     Attention Deficit (Cognitive)  --  mild deficit  -MC     Memory Deficit (Cognitive)  --  mild deficit  -MC     Safety Deficit (Cognitive)  mild deficit;awareness of need for assistance;safety precautions awareness;safety precautions follow-through/compliance;insight into deficits/self awareness  -ES  mild deficit  -MC     Personal Safety Interventions  fall prevention program maintained;gait belt;muscle strengthening facilitated;nonskid shoes/slippers when out of bed  -ES  fall prevention program maintained;gait belt;muscle strengthening facilitated;nonskid shoes/slippers when out of bed;supervised activity  -MC     Recorded by [ES] Niki Ames, PT 02/17/19 1622 [MC] Cinthya Alvarado LADI, OT 02/17/19 1354     Row Name 02/17/19 1320 02/17/19 1315          Mobility Assessment/Intervention    Left Lower Extremity  (Weight-bearing Status)  weight-bearing as tolerated (WBAT)  -ES  weight-bearing as tolerated (WBAT)  -MC     Right Lower Extremity (Weight-bearing Status)  weight-bearing as tolerated (WBAT)  -ES  weight-bearing as tolerated (WBAT)  -MC     Recorded by [ES] Niki Ames, PT 02/17/19 1622 [MC] Cinthya Alvarado, OT 02/17/19 1354     Row Name 02/17/19 1315             Bed Mobility Assessment/Treatment    Scooting/Bridging [place] (Bed Mobility)  conditional independence  -MC      Supine-Sit [place] (Bed Mobility)  conditional independence  -MC      Sit-Supine [place] (Bed Mobility)  conditional independence  -MC      Assistive Device (Bed Mobility)  bed rails;head of bed elevated  -      Comment (Bed Mobility)  Good safety awareness during bed mobility.  Knee immobilizers on BLE when OT entered room  -MC      Recorded by [MC] Cinthya Alvarado, OT 02/17/19 1354      Row Name 02/17/19 1315             Functional Mobility    Functional Mobility- Ind. Level  contact guard assist;2 person assist required  -      Functional Mobility- Device  rolling walker  -      Functional Mobility- Comment  Functional distance in room.    -MC      Recorded by [MC] Cinthya Alvarado, OT 02/17/19 1354      Row Name 02/17/19 1320 02/17/19 1315          Transfer Assessment/Treatment    Transfer Assessment/Treatment  sit-stand transfer;stand-sit transfer;stand pivot/stand step transfer  -ES  sit-stand transfer;stand-sit transfer;bed-chair transfer  -     Comment (Transfers)  Cues for hand placement  -ES  Cues for hand placement, safe transfer technique   -MC     Recorded by [ES] Niki Ames, PT 02/17/19 1622 [MC] Cinthya Alvarado, OT 02/17/19 1354     Row Name 02/17/19 1320 02/17/19 1315          Bed-Chair Transfer    Bed-Chair [place] (Transfers)  minimum assist (75% patient effort);2 person assist  -ES  minimum assist (75% patient effort);2 person assist  -     Assistive Device (Bed-Chair Transfers)  [place]  front-wheeled  -ES  walker, front-wheeled  -MC     Recorded by [ES] Niki Ames, PT 02/17/19 1622 [MC] Cinthya Alvarado, OT 02/17/19 1354     Row Name 02/17/19 1320 02/17/19 1315          Sit-Stand Transfer    Sit-Stand [place] (Transfers)  minimum assist (75% patient effort);2 person assist;verbal cues  -ES  minimum assist (75% patient effort);2 person assist  -     Assistive Device (Sit-Stand Transfers)  walker, front-wheeled  -ES  walker, front-wheeled  -MC     Recorded by [ES] Niki Ames, PT 02/17/19 1622 [MC] Cinthya Alvarado, OT 02/17/19 1354     Row Name 02/17/19 1320 02/17/19 1315          Stand-Sit Transfer    Stand-Sit [place] (Transfers)  minimum assist (75% patient effort);2 person assist;verbal cues  -ES  minimum assist (75% patient effort);2 person assist  -     Assistive Device (Stand-Sit Transfers)  walker, front-wheeled  -ES  walker, front-wheeled  -MC     Recorded by [ES] Niki Ames, PT 02/17/19 1622 [MC] Cinthya Alvarado, OT 02/17/19 1354     Row Name 02/17/19 1320             Gait/Stairs Assessment/Training    31120 - Gait Training Minutes   10  -ES      [place] Level (Gait)  contact guard;2 person assist;verbal cues  -ES      Assistive Device (Gait)  walker, front-wheeled  -ES      Distance in Feet (Gait)  10  -ES      Pattern (Gait)  step-to  -ES      Deviations/Abnormal Patterns (Gait)  bilateral deviations;zaid decreased;gait speed decreased;stride length decreased  -ES      Bilateral Gait Deviations  weight shift ability decreased  -ES      Recorded by [ES] Niki Ames, PT 02/17/19 1622      Row Name 02/17/19 1315             Lower Body Dressing Assessment/Training    Lower Body Dressing [place] Level  don;socks;set up  -      Assistive Devices (Lower Body Dressing)  -- no AE   -      Lower Body Dressing Position  long sitting  -MC      Recorded by [MC] Cinthya Alvarado, OT 02/17/19 1354      Row Name 02/17/19 1315             Therapeutic Exercise    Upper  Extremity Range of Motion (Therapeutic Exercise)  shoulder flexion/extension, bilateral;shoulder abduction/adduction, bilateral;shoulder horizontal abduction/adduction, bilateral;elbow flexion/extension, bilateral  -      Exercise Type (Therapeutic Exercise)  AROM (active range of motion)  -      Position (Therapeutic Exercise)  seated  -      Sets/Reps (Therapeutic Exercise)  1/15  -      Recorded by [MC] Cinthya Alvarado, OT 02/17/19 1354      Row Name 02/17/19 1315             Static Sitting Balance    Level of [place] (Unsupported Sitting, Static Balance)  independent  -      Sitting Position (Unsupported Sitting, Static Balance)  sitting on edge of bed  -      Recorded by [MC] Cinthya Alvarado, OT 02/17/19 1354      Row Name 02/17/19 1315             Dynamic Sitting Balance    Level of [place], Reaches Outside Midline (Sitting, Dynamic Balance)  supervision  -      Sitting Position, Reaches Outside Midline (Sitting, Dynamic Balance)  sitting on edge of bed  -      Recorded by [MC] Cinthya Alvarado, OT 02/17/19 1354      Row Name 02/17/19 1315             Static Standing Balance    Level of [place] (Supported Standing, Static Balance)  contact guard assist  -      Recorded by [MC] Cinthya Alvarado, OT 02/17/19 1354      Row Name 02/17/19 1315             Positioning and Restraints    Pre-Treatment Position  in bed  -      Post Treatment Position  chair  -MC      In Chair  notified nsg;reclined;call light within reach;encouraged to call for assist;exit alarm on;waffle cushion;legs elevated;R knee immobilizer;L knee immobilizer  -      Recorded by [MC] Cinthya Alvarado, OT 02/17/19 1354      Row Name 02/17/19 1315             Pain Scale: Numbers Pre/Post-Treatment    Pain Scale: Numbers, Pretreatment  7/10  -      Pain Scale: Numbers, Post-Treatment  7/10  -      Pain Location - Side  Bilateral  -      Pain Location - Orientation  lower  -      Pain Location  knee  -      Pain  Intervention(s)  Repositioned;Ambulation/increased activity Notified RN  -MC      Recorded by [MC] Cinthya Alvarado, OT 02/17/19 1354      Row Name                Wound 02/12/19 1418 Bilateral knee incision    Wound - Properties Group Date first assessed: 02/12/19 [RV] Time first assessed: 1418 [RV] Side: Bilateral [RV] Location: knee [RV] Type: incision [RV] Recorded by:  [RV] Kishan Merida RN 02/12/19 1418    Row Name                Wound 02/13/19 2030 Left upper gluteal other (see comments)    Wound - Properties Group Date first assessed: 02/13/19 [AB] Time first assessed: 2030 [AB] Side: Left [AB] Orientation: upper [AB] Location: gluteal [AB] Type: other (see comments) [AB2], open area   Recorded by:  [AB] Heidy Gill, MARY 02/14/19 0254 [AB2] Heidy Gill RN 02/14/19 0312    Row Name                Wound 02/13/19 2030 Right medial gluteal    Wound - Properties Group Date first assessed: 02/13/19 [AB] Time first assessed: 2030 [AB] Side: Right [AB] Orientation: medial [AB] Location: gluteal [AB] Type: -- [AB2] Additional Comments: -- [AB2], inner buttock on right side   Recorded by:  [AB] Heidy Gill, MARY 02/14/19 0256 [AB2] Heidy Gill, MARY 02/14/19 0313    Row Name 02/17/19 1315             Coping    Observed Emotional State  calm;cooperative  -MC      Verbalized Emotional State  acceptance  -MC      Recorded by [MC] Cinthya Alvarado, OT 02/17/19 1354      Row Name 02/17/19 1315             Plan of Care Review    Plan of Care Reviewed With  patient  -MC      Recorded by [MC] Cinthya Alvarado, OT 02/17/19 1354      Row Name 02/17/19 1315             Outcome Summary/Treatment Plan (OT)    Daily Summary of Progress (OT)  progress toward functional goals is good  -MC      Recorded by [MC] Cinthya Alvarado, OT 02/17/19 1354        User Key  (r) = Recorded By, (t) = Taken By, (c) = Cosigned By    Initials Name Effective Dates Discipline    RV Merida, Kishan Vivek, RN 06/16/16 -  Nurse    Cinthya Stone, OT  03/14/16 -  OT    Heidy Bay, RN 09/30/16 -  Nurse    Niki Headley, PT 11/13/17 -  PT          Wound 02/12/19 1418 Bilateral knee incision (Active)   Dressing Appearance dry;intact 2/16/2019  8:45 PM   Closure JOHNNY 2/16/2019  8:45 PM   Drainage Amount none 2/16/2019  8:45 PM   Dressing Care, Wound elastic bandage 2/16/2019  8:45 PM       Wound 02/13/19 2030 Left upper gluteal other (see comments) (Active)   Dressing Appearance dry;intact 2/16/2019  8:45 PM   Closure JOHNNY 2/16/2019  8:45 PM   Drainage Amount none 2/16/2019  8:45 PM   Dressing Care, Wound foam 2/16/2019  8:45 PM       Wound 02/13/19 2030 Right medial gluteal (Active)   Dressing Appearance open to air 2/16/2019  8:45 PM   Drainage Amount none 2/16/2019  8:45 PM   Dressing Care, Wound open to air 2/16/2019  8:45 PM           Physical Therapy Education     Title: PT OT SLP Therapies (In Progress)     Topic: Physical Therapy (In Progress)     Point: Mobility training (In Progress)     Learning Progress Summary           Patient Acceptance, E, NR by CEZAR at 2/17/2019  4:22 PM    Acceptance, E, NR by AS at 2/16/2019 11:35 AM    Acceptance, E,D, VU,NR by LM at 2/15/2019  2:13 PM    Comment:  Reviewed benefits of activity, safety with mobility, weight-bearing status, indications for use of knee immobilizers.    Acceptance, E,D, VU,NR by LM at 2/13/2019  8:31 AM    Comment:  Reviewed benefits of activity, WBAT orders, ROM expectations (no ROM L knee), progression of POC, safety with mobility.                   Point: Home exercise program (In Progress)     Learning Progress Summary           Patient Acceptance, E, NR by AS at 2/16/2019 11:35 AM    Acceptance, E,D, VU,NR by LM at 2/15/2019  2:13 PM    Comment:  Reviewed benefits of activity, safety with mobility, weight-bearing status, indications for use of knee immobilizers.    Acceptance, E,D, VU,NR by LM at 2/13/2019  8:31 AM    Comment:  Reviewed benefits of activity, WBAT orders, ROM  expectations (no ROM L knee), progression of POC, safety with mobility.                   Point: Body mechanics (In Progress)     Learning Progress Summary           Patient Acceptance, E, NR by ES at 2/17/2019  4:22 PM    Acceptance, E, NR by AS at 2/16/2019 11:35 AM    Acceptance, E,D, VU,NR by LM at 2/15/2019  2:13 PM    Comment:  Reviewed benefits of activity, safety with mobility, weight-bearing status, indications for use of knee immobilizers.    Acceptance, E,D, VU,NR by LM at 2/13/2019  8:31 AM    Comment:  Reviewed benefits of activity, WBAT orders, ROM expectations (no ROM L knee), progression of POC, safety with mobility.                   Point: Precautions (In Progress)     Learning Progress Summary           Patient Acceptance, E, NR by ES at 2/17/2019  4:22 PM    Acceptance, E, NR by AS at 2/16/2019 11:35 AM    Acceptance, E,D, VU,NR by LM at 2/15/2019  2:13 PM    Comment:  Reviewed benefits of activity, safety with mobility, weight-bearing status, indications for use of knee immobilizers.    Acceptance, E,D, VU,NR by LM at 2/13/2019  8:31 AM    Comment:  Reviewed benefits of activity, WBAT orders, ROM expectations (no ROM L knee), progression of POC, safety with mobility.                               User Key     Initials Effective Dates Name Provider Type Discipline    AS 06/22/15 -  Tila Garcia, PTA Physical Therapy Assistant PT     04/03/18 -  Sujata Sinha, PT Physical Therapist PT     11/13/17 -  Niki Ames, PT Physical Therapist PT                PT Recommendation and Plan     Plan of Care Reviewed With: patient  Progress: improving  Outcome Summary: Pt able to perform STS and stand pivot t/f using RW with min A x2.  Pt ambulated 10' with CGA using RW.  Pt demonstrated excellent effort.  Continue IPPT POC.  Outcome Measures     Row Name 02/17/19 1320 02/17/19 1315 02/16/19 1045       How much help from another person do you currently need...    Turning from your back to  your side while in flat bed without using bedrails?  4  -ES  --  4  -AS    Moving from lying on back to sitting on the side of a flat bed without bedrails?  3  -ES  --  3  -AS    Moving to and from a bed to a chair (including a wheelchair)?  3  -ES  --  1  -AS    Standing up from a chair using your arms (e.g., wheelchair, bedside chair)?  3  -ES  --  2  -AS    Climbing 3-5 steps with a railing?  2  -ES  --  1  -AS    To walk in hospital room?  3  -ES  --  1  -AS    AM-[facility] 6 Clicks Score  18  -ES  --  12  -AS       How much help from another is currently needed...    Putting on and taking off regular lower body clothing?  --  3  -MC  --    Bathing (including washing, rinsing, and drying)  --  3  -MC  --    Toileting (which includes using toilet bed pan or urinal)  --  3  -MC  --    Putting on and taking off regular upper body clothing  --  3  -MC  --    Taking care of personal grooming (such as brushing teeth)  --  4  -MC  --    Eating meals  --  4  -MC  --    Score  --  20  -MC  --       Functional Assessment    Outcome Measure Options  -[facility] 6 Clicks Basic Mobility (PT)  -ES  -[facility] 6 Clicks Daily Activity (OT)  -[facility] 6 Clicks Basic Mobility (PT)  -AS    Row Name 02/15/19 1415 02/15/19 1413          How much help from another person do you currently need...    Turning from your back to your side while in flat bed without using bedrails?  --  3  -LM     Moving from lying on back to sitting on the side of a flat bed without bedrails?  --  3  -LM     Moving to and from a bed to a chair (including a wheelchair)?  --  3  -LM     Standing up from a chair using your arms (e.g., wheelchair, bedside chair)?  --  3  -LM     Climbing 3-5 steps with a railing?  --  1  -LM     To walk in hospital room?  --  2  -LM     -[facility] 6 Clicks Score  --  15  -LM        How much help from another is currently needed...    Putting on and taking off regular lower body clothing?  2  -ST  --     Bathing (including washing, rinsing, and  drying)  2  -ST  --     Toileting (which includes using toilet bed pan or urinal)  3  -ST  --     Putting on and taking off regular upper body clothing  3  -ST  --     Taking care of personal grooming (such as brushing teeth)  3  -ST  --     Eating meals  4  -ST  --     Score  17  -ST  --        Functional Assessment    Outcome Measure Options  --  AM-[facility] 6 Clicks Basic Mobility (PT)  -LM       User Key  (r) = Recorded By, (t) = Taken By, (c) = Cosigned By    Initials Name Provider Type    ST Kinga Owen, OTR Occupational Therapist    AS Tila Garcia, PTA Physical Therapy Assistant    Cinthya Stone, OT Occupational Therapist    Sujata Sanchez, PT Physical Therapist    ES Niki Ames, PT Physical Therapist         Time Calculation:   PT Charges     Row Name 02/17/19 1320             Time Calculation    Start Time  1320  -ES      PT Received On  02/17/19  -ES      PT Goal Re-Cert Due Date  02/23/19  -ES         Timed Charges    53894 - Gait Training Minutes   10  -ES        User Key  (r) = Recorded By, (t) = Taken By, (c) = Cosigned By    Initials Name Provider Type    ES Niki Ames, PT Physical Therapist        Therapy Suggested Charges     Code   Minutes Charges    11409 (CPT®) Hc Pt Neuromusc Re Education Ea 15 Min      55616 (CPT®) Hc Pt Ther Proc Ea 15 Min      63582 (CPT®) Hc Gait Training Ea 15 Min 10 1    93215 (CPT®) Hc Pt Therapeutic Act Ea 15 Min      36531 (CPT®) Hc Pt Manual Therapy Ea 15 Min      36540 (CPT®) Hc Pt Iontophoresis Ea 15 Min      29729 (CPT®) Hc Pt Elec Stim Ea-Per 15 Min      72831 (CPT®) Hc Pt Ultrasound Ea 15 Min      25793 (CPT®) Hc Pt Self Care/Mgmt/Train Ea 15 Min      60555 (CPT®) Hc Pt Prosthetic (S) Train Initial Encounter, Each 15 Min      08861 (CPT®) Hc Pt Orthotic(S)/Prosthetic(S) Encounter, Each 15 Min      32057 (CPT®) Hc Orthotic(S) Mgmt/Train Initial Encounter, Each 15min      Total  10 1        Therapy Charges for Today     Code Description  Service Date Service Provider Modifiers Qty    07311957912 HC GAIT TRAINING EA 15 MIN 2/17/2019 Niki Ames, PT GP 1          PT G-Codes  Outcome Measure Options: AM-[facility] 6 Clicks Basic Mobility (PT)  AM-[facility] 6 Clicks Score: 18  Score: 20    Niki Ames, PT  2/17/2019

## 2019-02-17 NOTE — PROGRESS NOTES
"Orthopaedic Surgery Progress Note     LOS: 7 days   Patient Care Team:  Keli, No Known as PCP - General    POD 5    Subjective     Interval History:   She feels better this morning.  She was concerned about her dog yesterday.    Objective     Vital Signs:  Temp (24hrs), Av.3 °F (36.3 °C), Min:96.3 °F (35.7 °C), Max:98.1 °F (36.7 °C)    /94 (BP Location: Left arm, Patient Position: Lying)   Pulse 62   Temp 97 °F (36.1 °C) (Oral)   Resp 18   Ht 157.5 cm (62\")   Wt 82.6 kg (182 lb)   SpO2 100%   BMI 33.29 kg/m²     Labs:  Lab Results (last 24 hours)     Procedure Component Value Units Date/Time    POC Glucose Once [[id number]]  (Abnormal) Collected:  19    Specimen:  Blood Updated:  19 07     Glucose 163 mg/dL     POC Glucose Once [[id number]]  (Abnormal) Collected:  19    Specimen:  Blood Updated:  19     Glucose 188 mg/dL     POC Glucose Once [[id number]]  (Normal) Collected:  19 1630    Specimen:  Blood Updated:  19 1650     Glucose 127 mg/dL     POC Glucose Once [[id number]]  (Abnormal) Collected:  19 122    Specimen:  Blood Updated:  19 1224     Glucose 194 mg/dL     Anaerobic Culture - Synovial Fluid, Knee, Right [[id number]]  (Abnormal) Collected:  02/10/19 2028    Specimen:  Synovial Fluid from Knee, Right Updated:  19 1005     Culture No anaerobes isolated      Scant growth (1+) Staphylococcus aureus    Anaerobic Culture - Synovial Fluid, Knee, Left [[id number]]  (Abnormal) Collected:  02/10/19 2027    Specimen:  Synovial Fluid from Knee, Left Updated:  19 1003     Culture No anaerobes isolated      Staphylococcus aureus     Comment: Isolated from broth culture               Physical Exam:  Sensation and motor intact in both feet.  Knee immobilizers are in place.    Assessment/Plan   Postop day 5 status post bilateral total knee arthroplasty removal for infection with placement of articulating spacer " prosthesis-cultures positive for staph aureus  Alcohol withdrawal symptoms improved  Definitive IV antibiotic treatment based upon culture sensitivities.    Placement issues - not able to take care of herself at home.  Weight-bear as tolerated in both knees with no range of motion of the left knee.    Covering for Dr. Moffett till Monday.    Martin Gallego MD  02/17/19  9:05 AM

## 2019-02-17 NOTE — PLAN OF CARE
Problem: Skin Injury Risk (Adult)  Goal: Identify Related Risk Factors and Signs and Symptoms  Outcome: Ongoing (interventions implemented as appropriate)      Problem: Fall Risk (Adult)  Goal: Absence of Fall  Outcome: Ongoing (interventions implemented as appropriate)      Problem: Pain, Acute (Adult)  Goal: Acceptable Pain Control/Comfort Level  Outcome: Ongoing (interventions implemented as appropriate)      Problem: Knee Arthroplasty (Total, Partial) (Adult)  Goal: Signs and Symptoms of Listed Potential Problems Will be Absent, Minimized or Managed (Knee Arthroplasty)  Outcome: Ongoing (interventions implemented as appropriate)      Problem: Patient Care Overview  Goal: Plan of Care Review   02/17/19 0441   Coping/Psychosocial   Plan of Care Reviewed With patient   OTHER   Outcome Summary Pt c/o more pain this shift. IV dilaudid given x1. New IV placed in RFA at 0300. New swelling present in RLE, elevated on pillows and some has subsided. Bilateral immobilizers applied. VSS. No other noted issues.    Plan of Care Review   Progress improving       Problem: Patient Care Overview  Goal: Plan of Care Review  Outcome: Ongoing (interventions implemented as appropriate)      Problem: Infection, Risk/Actual (Adult)  Goal: Identify Related Risk Factors and Signs and Symptoms  Outcome: Ongoing (interventions implemented as appropriate)    Goal: Infection Prevention/Resolution  Outcome: Ongoing (interventions implemented as appropriate)

## 2019-02-18 LAB
GLUCOSE BLDC GLUCOMTR-MCNC: 125 MG/DL (ref 70–130)
GLUCOSE BLDC GLUCOMTR-MCNC: 149 MG/DL (ref 70–130)
GLUCOSE BLDC GLUCOMTR-MCNC: 154 MG/DL (ref 70–130)
GLUCOSE BLDC GLUCOMTR-MCNC: 168 MG/DL (ref 70–130)

## 2019-02-18 PROCEDURE — 94799 UNLISTED PULMONARY SVC/PX: CPT

## 2019-02-18 PROCEDURE — C1894 INTRO/SHEATH, NON-LASER: HCPCS

## 2019-02-18 PROCEDURE — 02HV33Z INSERTION OF INFUSION DEVICE INTO SUPERIOR VENA CAVA, PERCUTANEOUS APPROACH: ICD-10-PCS | Performed by: ORTHOPAEDIC SURGERY

## 2019-02-18 PROCEDURE — C1751 CATH, INF, PER/CENT/MIDLINE: HCPCS

## 2019-02-18 PROCEDURE — 82962 GLUCOSE BLOOD TEST: CPT

## 2019-02-18 PROCEDURE — 97116 GAIT TRAINING THERAPY: CPT

## 2019-02-18 PROCEDURE — 63710000001 DIPHENHYDRAMINE PER 50 MG: Performed by: INTERNAL MEDICINE

## 2019-02-18 PROCEDURE — 25010000002 ENOXAPARIN PER 10 MG: Performed by: ORTHOPAEDIC SURGERY

## 2019-02-18 PROCEDURE — 25010000003 CEFAZOLIN IN DEXTROSE 2-4 GM/100ML-% SOLUTION: Performed by: INTERNAL MEDICINE

## 2019-02-18 RX ORDER — SODIUM CHLORIDE 0.9 % (FLUSH) 0.9 %
20 SYRINGE (ML) INJECTION AS NEEDED
Status: DISCONTINUED | OUTPATIENT
Start: 2019-02-18 | End: 2019-02-20 | Stop reason: HOSPADM

## 2019-02-18 RX ORDER — LORAZEPAM 0.5 MG/1
0.5 TABLET ORAL ONCE
Status: COMPLETED | OUTPATIENT
Start: 2019-02-18 | End: 2019-02-18

## 2019-02-18 RX ORDER — SODIUM CHLORIDE 0.9 % (FLUSH) 0.9 %
10 SYRINGE (ML) INJECTION EVERY 12 HOURS SCHEDULED
Status: DISCONTINUED | OUTPATIENT
Start: 2019-02-18 | End: 2019-02-19

## 2019-02-18 RX ORDER — SODIUM CHLORIDE 0.9 % (FLUSH) 0.9 %
10 SYRINGE (ML) INJECTION AS NEEDED
Status: DISCONTINUED | OUTPATIENT
Start: 2019-02-18 | End: 2019-02-20 | Stop reason: HOSPADM

## 2019-02-18 RX ADMIN — VANCOMYCIN HYDROCHLORIDE 125 MG: KIT at 01:09

## 2019-02-18 RX ADMIN — Medication 1 CAPSULE: at 08:33

## 2019-02-18 RX ADMIN — LORAZEPAM 0.5 MG: 0.5 TABLET ORAL at 18:25

## 2019-02-18 RX ADMIN — DIPHENHYDRAMINE HYDROCHLORIDE 25 MG: 25 CAPSULE ORAL at 08:33

## 2019-02-18 RX ADMIN — SODIUM CHLORIDE, PRESERVATIVE FREE 10 ML: 5 INJECTION INTRAVENOUS at 21:15

## 2019-02-18 RX ADMIN — INSULIN LISPRO 2 UNITS: 100 INJECTION, SOLUTION INTRAVENOUS; SUBCUTANEOUS at 21:21

## 2019-02-18 RX ADMIN — VANCOMYCIN HYDROCHLORIDE 125 MG: KIT at 18:25

## 2019-02-18 RX ADMIN — VANCOMYCIN HYDROCHLORIDE 125 MG: KIT at 06:28

## 2019-02-18 RX ADMIN — VANCOMYCIN HYDROCHLORIDE 125 MG: KIT at 11:24

## 2019-02-18 RX ADMIN — MELOXICAM 15 MG: 7.5 TABLET ORAL at 08:33

## 2019-02-18 RX ADMIN — Medication 10 ML: at 08:34

## 2019-02-18 RX ADMIN — CEFAZOLIN SODIUM 2 G: 2 INJECTION, SOLUTION INTRAVENOUS at 04:41

## 2019-02-18 RX ADMIN — DIPHENHYDRAMINE HYDROCHLORIDE 25 MG: 25 CAPSULE ORAL at 21:14

## 2019-02-18 RX ADMIN — VANCOMYCIN HYDROCHLORIDE 125 MG: KIT at 23:53

## 2019-02-18 RX ADMIN — SODIUM CHLORIDE, PRESERVATIVE FREE 10 ML: 5 INJECTION INTRAVENOUS at 08:32

## 2019-02-18 RX ADMIN — Medication 10 ML: at 11:24

## 2019-02-18 RX ADMIN — SODIUM CHLORIDE, PRESERVATIVE FREE 10 ML: 5 INJECTION INTRAVENOUS at 21:17

## 2019-02-18 RX ADMIN — Medication 10 ML: at 21:15

## 2019-02-18 RX ADMIN — TRAZODONE HYDROCHLORIDE 50 MG: 50 TABLET ORAL at 21:14

## 2019-02-18 RX ADMIN — FOLIC ACID 1 MG: 1 TABLET ORAL at 08:33

## 2019-02-18 RX ADMIN — MICONAZOLE NITRATE 1 APPLICATION: 2 CREAM TOPICAL at 21:21

## 2019-02-18 RX ADMIN — CARVEDILOL 3.12 MG: 3.12 TABLET, FILM COATED ORAL at 18:25

## 2019-02-18 RX ADMIN — CARVEDILOL 3.12 MG: 3.12 TABLET, FILM COATED ORAL at 08:33

## 2019-02-18 RX ADMIN — Medication 10 ML: at 18:25

## 2019-02-18 RX ADMIN — ENOXAPARIN SODIUM 40 MG: 40 INJECTION SUBCUTANEOUS at 08:33

## 2019-02-18 RX ADMIN — CEFAZOLIN SODIUM 2 G: 2 INJECTION, SOLUTION INTRAVENOUS at 11:24

## 2019-02-18 RX ADMIN — Medication 1 TABLET: at 08:33

## 2019-02-18 RX ADMIN — Medication 100 MG: at 08:33

## 2019-02-18 RX ADMIN — CEFAZOLIN SODIUM 2 G: 2 INJECTION, SOLUTION INTRAVENOUS at 18:26

## 2019-02-18 NOTE — PROGRESS NOTES
"[facility] Progress Note    Date of Admission: 2/10/2019      Antibiotics:  Cefazolin,  Po vanco    CC: bilateral knee pain    S:  No f/c/s. No n/v. Tolerating antibiotics. Reports some tevin-rectal and oral ulceration. Diarrhea improving. Worried about her dog at home. Requesting medication for anxiety.     O:  /86 (BP Location: Left arm, Patient Position: Lying)   Pulse 66   Temp 96.4 °F (35.8 °C) (Oral)   Resp 16   Ht 157.5 cm (62\")   Wt 82.6 kg (182 lb)   SpO2 97%   BMI 33.29 kg/m²   Temp (24hrs), Av.3 °F (36.3 °C), Min:96.4 °F (35.8 °C), Max:97.9 °F (36.6 °C)      PE:     GENERAL: Awake and alert today, in no acute distress.   HEENT: Normocephalic, atraumatic.  PERRL. EOMI. No conjunctival injection. No icterus. Oropharynx clear without evidence of thrush or exudate. No evidence of peridontal disease.    NECK: Supple without nuchal rigidity  LYMPH: No cervical, axillary or inguinal lymphadenopathy. No neck masses  HEART: RRR; No murmur, rubs, gallops.   LUNGS: Clear to auscultation bilaterally without wheezing, rales, rhonchi. Normal respiratory effort.  ABDOMEN: Soft, mild distention with increased bowel sounds. No rebound or guarding.   EXT:  Trace lower extremity edema   : Normal appearing genitalia without Taylor catheter.  MSK: Bilateral knee dressed with decreased range of motion   SKIN: Warm and dry skin with eczematous rash  Bilateral knees with support in place.   NEURO: Oriented to Person and place. No focal deficits.   PSYCHIATRIC: confusion better  Laboratory Data    Results from last 7 days   Lab Units 19  0746 02/15/19  0651 19  0544   WBC 10*3/mm3 6.75 6.83 7.32   HEMOGLOBIN g/dL 8.8* 8.1* 7.9*   HEMATOCRIT % 28.3* 26.1* 25.9*   PLATELETS 10*3/mm3 306 249 234     Results from last 7 days   Lab Units 19  0746   SODIUM mmol/L 138   POTASSIUM mmol/L 4.4   CHLORIDE mmol/L 104   CO2 mmol/L 29.0   BUN mg/dL 6*   CREATININE mg/dL 0.59*   GLUCOSE mg/dL 131*   CALCIUM mg/dL " 9.0                   Estimated Creatinine Clearance: 108.6 mL/min (A) (by C-G formula based on SCr of 0.59 mg/dL (L)).      Microbiology:  R knee Synovial Fluid: MSSA   L knee Synovial Fluid MSSA    Surgical cultures: Pending    Radiology:  Imaging Results (last 24 hours)     ** No results found for the last 24 hours. **          PROBLEM LIST:   Bilateral prosthetic joint infections  Prior history of methicillin sensitive staph aureus bilateral knee infections    Leukocytosis  Chronically elevated sedimentation rate CRP  Alcoholism  Severe eczema  Bilateral knee pain and difficulty with ambulation  Low-grade fevers  C diff colitis with acute diarrhea  Acute blood loss anemia  Confusion  Yeast Dermatitis        ASSESSMENT:  53-year-old female with severe eczema, history of alcoholism, with previous MSSA bacteremia with bilateral knee prostate joint infection attempted to be treated with single exchange long-term IV followed by oral antibiotics.  Unfortunately even with long-term treatment oral antibiotics she's had worsening of her bilateral knee pain swelling erythema and effusions appears to be consistent with acute on chronic infection. Synovial fluid positive for MSSA and now s/p bilateral knee hardware removal with articulating spacer placement. Will require prolonged IV antibiotics and can continue cefazolin 2 g IV every 8 hrs.    Continue  oral vancomycin for C. difficile colitis. Recommend Miconazole Cream for tevin-rectal yeast. Continue Magic mouthwash for oral thrush. Anxiety medication changes per primary team.      PLAN:  Cefazolin 2 g IV every 8 hrs  Follow-up blood cultures and knee fluid culture, neg so far  Po vanco  PICC placement again today   Miconazole Cream for tevin-rectal yeast.     Dr. Brenden Francis saw the patient, performed the physical exam, reviewed the laboratory data and guided with the formulation of the above problem list, assessment and treatment plan.       Brenden Francis,  MD  2/18/2019

## 2019-02-18 NOTE — THERAPY TREATMENT NOTE
Acute Care - Physical Therapy Treatment Note  [facility]     Patient Name: Jyoti Luna  : 1965  MRN: [id number]  Today's Date: 2019  Onset of Illness/Injury or Date of Surgery: 02/10/19  Date of Referral to PT: 19  Referring Physician: MD Zuhair     Admit Date: 2/10/2019    Visit Dx:    ICD-10-CM ICD-9-CM   1. Infection of prosthetic knee joint, subsequent encounter T84.59XD V58.89    Z96.659    2. Impaired mobility and ADLs Z74.09 799.89   3. Impaired functional mobility, balance, gait, and endurance Z74.09 V49.89     Patient Active Problem List   Diagnosis   • HO MSSA (methicillin susceptible Staphylococcus aureus) septicemia (CMS/[facility])   • Knee swelling; bilateral   • Alcoholism /alcohol abuse (CMS/[facility])   • Iron deficiency anemia   • Hypertension   • Depression   • Diabetes mellitus (CMS/[facility])   • Infected prosthetic knee joint (CMS/[facility])   • Constipation   • Diarrhea   • Leukocytosis   • Anemia, likely dilutional   • C. 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 Exit alarm.   -LR      Care Plan Review  care plan/treatment goals reviewed;risks/benefits reviewed;current/potential barriers reviewed;patient/other agree to care plan  -LR      Patient Effort  excellent  -LR      Existing Precautions/Restrictions  fall;brace worn when out of bed;other (see comments)  (Significant)  Bilateral knee immobilizers at all times, no L knee ROM  -LR2      Recorded by [LR]  Savanah Zimmerman, PT 02/18/19 1002  [LR2] Savanah Zimmerman, PT 02/18/19 1006      Row Name 02/18/19 0944             Cognitive Assessment/Intervention- PT/OT    Affect/Mental Status (Cognitive)  WFL  -LR      Behavioral Issues (Cognitive)  difficulty managing stress  -LR      Orientation Status (Cognition)  oriented x 4  -LR      Follows Commands (Cognition)  WFL;follows one step commands;over 90% accuracy;verbal cues/prompting required  -LR      Safety Deficit (Cognitive)  insight into deficits/self awareness  -LR      Recorded by [LR] Savanah Zimmerman, PT 02/18/19 1006      Row Name 02/18/19 0944             Safety Issues, Functional Mobility    Safety Issues Affecting Function (Mobility)  safety precaution awareness;safety precautions follow-through/compliance;sequencing abilities;insight into deficits/self awareness  -LR      Recorded by [LR] Savanah Zimmerman, PT 02/18/19 1006      Row Name 02/18/19 0944             Mobility Assessment/Intervention    Extremity Weight-bearing Status  left lower extremity;right lower extremity  -LR      Left Lower Extremity (Weight-bearing Status)  weight-bearing as tolerated (WBAT)  -LR      Right Lower Extremity (Weight-bearing Status)  weight-bearing as tolerated (WBAT)  -LR      Recorded by [LR] Savanah Zimmerman, PT 02/18/19 1006      Row Name 02/18/19 0944             Bed Mobility Assessment/Treatment    Supine-Sit [place] (Bed Mobility)  verbal cues;conditional independence  -LR      Sit-Supine [place] (Bed Mobility)  verbal cues;conditional independence  -LR      Bed Mobility, Safety Issues  decreased use of legs for bridging/pushing  -LR      Assistive Device (Bed Mobility)  head of bed elevated;bed rails  -LR      Comment (Bed Mobility)  Fitted knee immobilizers while supine in bed. Verbal cues to move LEs towards EOB and to push up from bed to raise trunk into sitting and to scoot hips out to get feet on floor. Denied dizziness  upon sitting up. Verbal cues to push from bed to scoot hips back and up into bed and then to lift LEs into bed.   -LR      Recorded by [LR] Savanah Zimmerman, PT 02/18/19 1006      Row Name 02/18/19 0944             Transfer Assessment/Treatment    Transfer Assessment/Treatment  sit-stand transfer;stand-sit transfer  -LR      Comment (Transfers)  Verbal cues to push up from bed to stand and to walk feet under her as she stood. Verbal cues to reach back for bed to lower into sitting while walking feet out from under her.   -LR      Recorded by [LR] Savanah Zimmerman, PT 02/18/19 1006      Row Name 02/18/19 0944             Sit-Stand Transfer    Sit-Stand [place] (Transfers)  verbal cues;contact guard  -LR      Assistive Device (Sit-Stand Transfers)  walker, front-wheeled  -LR      Recorded by [LR] Savanah Zimmerman, PT 02/18/19 1006      Row Name 02/18/19 0944             Stand-Sit Transfer    Stand-Sit [place] (Transfers)  verbal cues;contact guard  -LR      Assistive Device (Stand-Sit Transfers)  walker, front-wheeled  -LR      Recorded by [LR] Savanah Zimmerman, PT 02/18/19 1006      Row Name 02/18/19 0944             Gait/Stairs Assessment/Training    61144 - Gait Training Minutes   15  -LR      [place] Level (Gait)  verbal cues;contact guard  -LR      Assistive Device (Gait)  walker, front-wheeled  -LR      Distance in Feet (Gait)  50  -LR      Pattern (Gait)  step-to  -LR      Deviations/Abnormal Patterns (Gait)  bilateral deviations;zaid decreased;gait speed decreased;stride length decreased  -LR      Bilateral Gait Deviations  heel strike decreased;forward flexed posture;weight shift ability decreased  -LR      Comment (Gait/Stairs)  Patient ambulated with step to gait pattern at slow pace. Verbal cues for increased LE weight bearing/stance phase, decreased UE weight bearing, increased step length, and upright posture. Improved with cues for correction. Gait limited by  pain and fatigue.   -LR      Recorded by [LR] Savanah Zimmerman, PT 02/18/19 1006      Row Name 02/18/19 0944             Positioning and Restraints    Pre-Treatment Position  in bed  -LR      Post Treatment Position  bed  -LR      In Bed  notified nsg;supine;call light within reach;exit alarm on;side rails up x2;R knee immobilizer;L knee immobilizer  -LR      Recorded by [LR] Savanah Zimmerman, PT 02/18/19 1006      Row Name 02/18/19 0944             Pain Assessment    Additional Documentation  Pain Scale: Numbers Pre/Post-Treatment (Group)  -LR      Recorded by [LR] Savanah Zimmerman, PT 02/18/19 1006      Row Name 02/18/19 0944             Pain Scale: Numbers Pre/Post-Treatment    Pain Scale: Numbers, Pretreatment  7/10  -LR      Pain Scale: Numbers, Post-Treatment  7/10  -LR      Pain Location - Side  Bilateral  -LR      Pain Location  knee  -LR      Pain Intervention(s)  Repositioned;Ambulation/increased activity  -LR      Recorded by [LR] Savanah Zimmerman, PT 02/18/19 1006      Row Name                Wound 02/12/19 1418 Bilateral knee incision    Wound - Properties Group Date first assessed: 02/12/19 [RV] Time first assessed: 1418 [RV] Side: Bilateral [RV] Location: knee [RV] Type: incision [RV] Recorded by:  [RV] Kishan Merida RN 02/12/19 1418    Row Name                Wound 02/13/19 2030 Left upper gluteal other (see comments)    Wound - Properties Group Date first assessed: 02/13/19 [AB] Time first assessed: 2030 [AB] Side: Left [AB] Orientation: upper [AB] Location: gluteal [AB] Type: other (see comments) [AB2], open area   Recorded by:  [AB] Heidy Gill RN 02/14/19 0254 [AB2] Heidy Gill RN 02/14/19 0312    Row Name                Wound 02/13/19 2030 Right medial gluteal    Wound - Properties Group Date first assessed: 02/13/19 [AB] Time first assessed: 2030 [AB] Side: Right [AB] Orientation: medial [AB] Location: gluteal [AB] Type: -- [AB2] Additional Comments: --  [AB2], inner buttock on right side   Recorded by:  [AB] Heidy Gill, MARY 02/14/19 0256 [AB2] Heidy Gill, MARY 02/14/19 0313    Row Name 02/18/19 0944             Coping    Observed Emotional State  accepting;cooperative  -LR      Verbalized Emotional State  acceptance  -LR      Recorded by [LR] Savanah Zimmerman, PT 02/18/19 1006      Row Name 02/18/19 0944             Plan of Care Review    Plan of Care Reviewed With  patient  -LR      Recorded by [LR] Savanah Zimmerman, PT 02/18/19 1006      Row Name 02/18/19 0944             Outcome Summary/Treatment Plan (PT)    Daily Summary of Progress (PT)  progress toward functional goals as expected  -LR      Recorded by [LR] Savanah Zimmerman, PT 02/18/19 1006        User Key  (r) = Recorded By, (t) = Taken By, (c) = Cosigned By    Initials Name Effective Dates Discipline    LR Savanah Zimmerman, PT 06/19/15 -  PT    Kishan Cm, RN 06/16/16 -  Nurse    AB Heidy Gill, RN 09/30/16 -  Nurse          Wound 02/12/19 1418 Bilateral knee incision (Active)   Dressing Appearance dry;intact 2/18/2019  8:00 AM   Closure JOHNNY 2/18/2019  8:00 AM   Drainage Amount none 2/18/2019  8:00 AM       Wound 02/13/19 2030 Left upper gluteal other (see comments) (Active)   Dressing Appearance open to air 2/18/2019  8:00 AM   Drainage Amount none 2/18/2019  8:00 AM       Wound 02/13/19 2030 Right medial gluteal (Active)   Dressing Appearance open to air 2/18/2019  8:00 AM   Drainage Amount none 2/18/2019  8:00 AM       Rehab Goal Summary     Row Name 02/18/19 0944             Physical Therapy Goals    Bed Mobility Goal Selection (PT)  bed mobility, PT goal 1  -LR      Transfer Goal Selection (PT)  transfer, PT goal 1  -LR      Gait Training Goal Selection (PT)  gait training, PT goal 1  -LR         Bed Mobility Goal 1 (PT)    Activity/Assistive Device (Bed Mobility Goal 1, PT)  sit to supine/supine to sit  -LR      [place] Level/Cues Needed (Bed  Mobility Goal 1, PT)  conditional independence  -LR      Time Frame (Bed Mobility Goal 1, PT)  long term goal (LTG);5 days  -LR      Progress/Outcomes (Bed Mobility Goal 1, PT)  goal met  -LR         Transfer Goal 1 (PT)    Activity/Assistive Device (Transfer Goal 1, PT)  sit-to-stand/stand-to-sit;walker, rolling  -LR      [place] Level/Cues Needed (Transfer Goal 1, PT)  conditional independence  -LR      Time Frame (Transfer Goal 1, PT)  long term goal (LTG);5 days  -LR      Progress/Outcome (Transfer Goal 1, PT)  goal ongoing  -LR         Gait Training Goal 1 (PT)    Activity/Assistive Device (Gait Training Goal 1, PT)  gait (walking locomotion);assistive device use;walker, rolling  -LR      [place] Level (Gait Training Goal 1, PT)  conditional independence  -LR      Distance (Gait Goal 1, PT)  150 feet  -LR      Time Frame (Gait Training Goal 1, PT)  long term goal (LTG);5 days  -LR      Progress/Outcome (Gait Training Goal 1, PT)  goal partially met;goal revised this date;goal ongoing achieved gait distance, new gait distance goal set today  -LR        User Key  (r) = Recorded By, (t) = Taken By, (c) = Cosigned By    Initials Name Provider Type Discipline    LR Savanah Zimmerman, PT Physical Therapist PT          Physical Therapy Education     Title: PT OT SLP Therapies (Done)     Topic: Physical Therapy (Done)     Point: Mobility training (Done)     Learning Progress Summary           Patient Acceptance, E,D, VU,NR by LR at 2/18/2019  9:44 AM    Comment:  Educated on correct supine <->sit t/f technique, correct sit<->stand t/f technique, correct gait mechanics, progression of POC, goals that would need to be met to safely d/c home.    Acceptance, E, NR by ES at 2/17/2019  4:22 PM    Acceptance, E, NR by AS at 2/16/2019 11:35 AM    Acceptance, E,D, VU,NR by TERESE at 2/15/2019  2:13 PM    Comment:  Reviewed benefits of activity, safety with mobility, weight-bearing status, indications for use of knee  immobilizers.    Acceptance, E,D, VU,NR by LM at 2/13/2019  8:31 AM    Comment:  Reviewed benefits of activity, WBAT orders, ROM expectations (no ROM L knee), progression of POC, safety with mobility.                   Point: Home exercise program (Done)     Learning Progress Summary           Patient Acceptance, E,D, VU,NR by LR at 2/18/2019  9:44 AM    Comment:  Educated on correct supine <->sit t/f technique, correct sit<->stand t/f technique, correct gait mechanics, progression of POC, goals that would need to be met to safely d/c home.    Acceptance, E, NR by AS at 2/16/2019 11:35 AM    Acceptance, E,D, VU,NR by LM at 2/15/2019  2:13 PM    Comment:  Reviewed benefits of activity, safety with mobility, weight-bearing status, indications for use of knee immobilizers.    Acceptance, E,D, VU,NR by LM at 2/13/2019  8:31 AM    Comment:  Reviewed benefits of activity, WBAT orders, ROM expectations (no ROM L knee), progression of POC, safety with mobility.                   Point: Body mechanics (Done)     Learning Progress Summary           Patient Acceptance, E,D, VU,NR by LR at 2/18/2019  9:44 AM    Comment:  Educated on correct supine <->sit t/f technique, correct sit<->stand t/f technique, correct gait mechanics, progression of POC, goals that would need to be met to safely d/c home.    Acceptance, E, NR by ES at 2/17/2019  4:22 PM    Acceptance, E, NR by AS at 2/16/2019 11:35 AM    Acceptance, E,D, VU,NR by LM at 2/15/2019  2:13 PM    Comment:  Reviewed benefits of activity, safety with mobility, weight-bearing status, indications for use of knee immobilizers.    Acceptance, E,D, VU,NR by LM at 2/13/2019  8:31 AM    Comment:  Reviewed benefits of activity, WBAT orders, ROM expectations (no ROM L knee), progression of POC, safety with mobility.                   Point: Precautions (Done)     Learning Progress Summary           Patient Acceptance, E,D, VU,NR by LR at 2/18/2019  9:44 AM    Comment:  Educated on  correct supine <->sit t/f technique, correct sit<->stand t/f technique, correct gait mechanics, progression of POC, goals that would need to be met to safely d/c home.    Acceptance, E, NR by ES at 2/17/2019  4:22 PM    Acceptance, E, NR by AS at 2/16/2019 11:35 AM    Acceptance, E,D, VU,NR by LM at 2/15/2019  2:13 PM    Comment:  Reviewed benefits of activity, safety with mobility, weight-bearing status, indications for use of knee immobilizers.    Acceptance, E,D, VU,NR by LM at 2/13/2019  8:31 AM    Comment:  Reviewed benefits of activity, WBAT orders, ROM expectations (no ROM L knee), progression of POC, safety with mobility.                               User Key     Initials Effective Dates Name Provider Type Discipline    LR 06/19/15 -  Savanah Zimmerman, PT Physical Therapist PT    AS 06/22/15 -  Tila Garcia, PTA Physical Therapy Assistant PT    LM 04/03/18 -  Sujata Sinha, PT Physical Therapist PT     11/13/17 -  Niki Ames, PT Physical Therapist PT                PT Recommendation and Plan     Outcome Summary/Treatment Plan (PT)  Daily Summary of Progress (PT): progress toward functional goals as expected  Plan of Care Reviewed With: patient  Progress: improving  Outcome Summary: Patient ambulated 50 feet with RW and CGA, limited by pain and fatigue. Patient modified independent with t/f in and out of bed. CGA for sit<->stand t/f and for gait training. Achieved gait distance goal today, new goal established. Will continue to progress mobility training and strengthening as able.   Outcome Measures     Row Name 02/18/19 0944 02/17/19 1320 02/17/19 1315       How much help from another person do you currently need...    Turning from your back to your side while in flat bed without using bedrails?  4  -LR  4  -ES  --    Moving from lying on back to sitting on the side of a flat bed without bedrails?  4  -LR  3  -ES  --    Moving to and from a bed to a chair (including a wheelchair)?  3   -LR  3  -ES  --    Standing up from a chair using your arms (e.g., wheelchair, bedside chair)?  3  -LR  3  -ES  --    Climbing 3-5 steps with a railing?  2  -LR  2  -ES  --    To walk in hospital room?  3  -LR  3  -ES  --    AM-[facility] 6 Clicks Score  19  -LR  18  -ES  --       How much help from another is currently needed...    Putting on and taking off regular lower body clothing?  --  --  3  -MC    Bathing (including washing, rinsing, and drying)  --  --  3  -MC    Toileting (which includes using toilet bed pan or urinal)  --  --  3  -MC    Putting on and taking off regular upper body clothing  --  --  3  -MC    Taking care of personal grooming (such as brushing teeth)  --  --  4  -MC    Eating meals  --  --  4  -MC    Score  --  --  20  -MC       Functional Assessment    Outcome Measure Options  AM-[facility] 6 Clicks Basic Mobility (PT)  -LR  -[facility] 6 Clicks Basic Mobility (PT)  -ES  -[facility] 6 Clicks Daily Activity (OT)  -    Row Name 02/16/19 1045 02/15/19 1415 02/15/19 1413       How much help from another person do you currently need...    Turning from your back to your side while in flat bed without using bedrails?  4  -AS  --  3  -LM    Moving from lying on back to sitting on the side of a flat bed without bedrails?  3  -AS  --  3  -LM    Moving to and from a bed to a chair (including a wheelchair)?  1  -AS  --  3  -LM    Standing up from a chair using your arms (e.g., wheelchair, bedside chair)?  2  -AS  --  3  -LM    Climbing 3-5 steps with a railing?  1  -AS  --  1  -LM    To walk in hospital room?  1  -AS  --  2  -LM    AM-[facility] 6 Clicks Score  12  -AS  --  15  -LM       How much help from another is currently needed...    Putting on and taking off regular lower body clothing?  --  2  -ST  --    Bathing (including washing, rinsing, and drying)  --  2  -ST  --    Toileting (which includes using toilet bed pan or urinal)  --  3  -ST  --    Putting on and taking off regular upper body clothing  --  3  -ST  --     Taking care of personal grooming (such as brushing teeth)  --  3  -ST  --    Eating meals  --  4  -ST  --    Score  --  17  -ST  --       Functional Assessment    Outcome Measure Options  AM-[facility] 6 Clicks Basic Mobility (PT)  -AS  --  AM-[facility] 6 Clicks Basic Mobility (PT)  -LM      User Key  (r) = Recorded By, (t) = Taken By, (c) = Cosigned By    Initials Name Provider Type    ST Kinga Owen, OTR Occupational Therapist    LR Savanah Zimmerman, PT Physical Therapist    AS Tila Garcia, PTA Physical Therapy Assistant    Cinthya Stone, OT Occupational Therapist    LM Sujata Sinha, PT Physical Therapist    Niki Headley, PT Physical Therapist         Time Calculation:   PT Charges     Row Name 02/18/19 0944             Time Calculation    Start Time  0944  -LR      PT Received On  02/18/19  -LR      PT Goal Re-Cert Due Date  02/23/19  -LR         Time Calculation- PT    Total Timed Code Minutes- PT  15 minute(s)  -LR         Timed Charges    87233 - Gait Training Minutes   15  -LR        User Key  (r) = Recorded By, (t) = Taken By, (c) = Cosigned By    Initials Name Provider Type    Savanah Acosta, PT Physical Therapist        Therapy Suggested Charges     Code   Minutes Charges    12173 (CPT®) Hc Pt Neuromusc Re Education Ea 15 Min      55877 (CPT®) Hc Pt Ther Proc Ea 15 Min      58366 (CPT®) Hc Gait Training Ea 15 Min 15 1    17820 (CPT®) Hc Pt Therapeutic Act Ea 15 Min      39465 (CPT®) Hc Pt Manual Therapy Ea 15 Min      65432 (CPT®) Hc Pt Iontophoresis Ea 15 Min      42695 (CPT®) Hc Pt Elec Stim Ea-Per 15 Min      42358 (CPT®) Hc Pt Ultrasound Ea 15 Min      52968 (CPT®) Hc Pt Self Care/Mgmt/Train Ea 15 Min      66509 (CPT®) Hc Pt Prosthetic (S) Train Initial Encounter, Each 15 Min      73088 (CPT®) Hc Pt Orthotic(S)/Prosthetic(S) Encounter, Each 15 Min      45694 (CPT®) Hc Orthotic(S) Mgmt/Train Initial Encounter, Each 15min      Total  15 1        Therapy Charges for Today      Code Description Service Date Service Provider Modifiers Qty    61173804292 HC GAIT TRAINING EA 15 MIN 2/18/2019 Savanah Zimmerman, PT GP 1          PT G-Codes  Outcome Measure Options: AM-[facility] 6 Clicks Basic Mobility (PT)  AM-[facility] 6 Clicks Score: 19  Score: 20    Savanah Zimmerman, PT  2/18/2019

## 2019-02-18 NOTE — PROGRESS NOTES
" progress note      Jyoti Luna  [id number]  1965     LOS: 8 days     Attending: Lorri Siddiqui MD    Primary Care Provider: Provider, No Known      Chief Complaint/Reason for visit:  Bilateral infected knee prosthesis     Subjective   Doing ok. Moderate knee pain. Tearful about needing rehab at discharge. Reports 4-5 diarrhea stools daily. Denies f/c/n/v/sob/cp.    Objective     Vital Signs  Visit Vitals  /86 (BP Location: Left arm, Patient Position: Lying)   Pulse 66   Temp 96.4 °F (35.8 °C) (Oral)   Resp 16   Ht 157.5 cm (62\")   Wt 82.6 kg (182 lb)   SpO2 97%   BMI 33.29 kg/m²     Temp (24hrs), Av.3 °F (36.3 °C), Min:96.4 °F (35.8 °C), Max:97.9 °F (36.6 °C)    Physical Exam:     General Appearance:    Dorwsy, in no acute distress   Head:    Normocephalic, without obvious abnormality, atraumatic    Lungs:     Normal effort, symmetric chest rise, no crepitus, clear to      auscultation bilaterally               Heart:    Regular rhythm and normal rate, normal S1 and S2    Abdomen:     Normal bowel sounds, no masses, no organomegaly, soft        non-tender, non-distended, no guarding, no rebound                tenderness   Extremities:   Bilateral knees ACE wrap CDI.   Knee immobilizers in place.   Pulses:   Pulses palpable and equal bilaterally   Skin:   No bleeding, bruising or rash.    Neurologic:   Cranial nerves 2 - 12 grossly intact, sensation intact. Flexion and dorsiflexion intact bilateral feet.          Physical Therapy: Patient ambulated 50 feet with RW and CGA, limited by pain and fatigue. Patient modified independent with t/f in and out of bed. CGA for sit<->stand t/f and for gait training. Achieved gait distance goal today, new goal established. Will continue to progress mobility training and strengthening as able.     Results Review:     I reviewed the patient's new clinical results.   Results from last 7 days   Lab Units 19  0746 02/15/19  0651 19  0544   WBC " 10*3/mm3 6.75 6.83 7.32   HEMOGLOBIN g/dL 8.8* 8.1* 7.9*   HEMATOCRIT % 28.3* 26.1* 25.9*   PLATELETS 10*3/mm3 306 249 234     Results from last 7 days   Lab Units 02/16/19  0746 02/14/19  0544 02/13/19  0639   SODIUM mmol/L 138  --  131*   POTASSIUM mmol/L 4.4 3.4* 3.7   CHLORIDE mmol/L 104  --  100   CO2 mmol/L 29.0  --  25.0   BUN mg/dL 6*  --  9   CREATININE mg/dL 0.59*  --  0.82   CALCIUM mg/dL 9.0  --  8.8   GLUCOSE mg/dL 131*  --  171*     Results for NABIL MATSON (MRN [id number]) as of 2/18/2019 12:12   Ref. Range 2/17/2019 15:50 2/17/2019 20:08 2/18/2019 07:44 2/18/2019 11:44   Glucose Latest Ref Range: 70 - 130 mg/dL 112 198 (H) 125 149 (H)       ECG 12 Lead   Order: [id number]   Status:  Preliminary result   Visible to patient:  No (Not Released) Next appt:  None      Narrative     Test Reason : Surgery  Blood Pressure : **/** mmHG  Vent. Rate : 099 BPM     Atrial Rate : 099 BPM     P-R Int : 128 ms          QRS Dur : 096 ms      QT Int : 364 ms       P-R-T Axes : 046 034 047 degrees     QTc Int : 467 ms    Normal sinus rhythm  Normal ECG  When compared with ECG of 23-MAR-2018 11:01,  No significant change was found    Referred By:  LINDA           Confirmed By:           Results for NABIL MATSON (MRN [id number]) as of 2/11/2019 09:31   Ref. Range 2/11/2019 05:05   C-Reactive Protein Latest Ref Range: 0.00 - 1.00 mg/dL 27.72 (H)     Right :  Results for NABIL MATSON (MRN [id number]) as of 2/11/2019 09:31   Ref. Range 2/10/2019 20:27   Color, Fluid Unknown Red   Appearance, Fluid Latest Ref Range: Clear  Bloody (A)   WBC, Fluid Latest Units: /mm3 52,860   RBC, Fluid Latest Units: /mm3 575,000   Crystals, Fluid Unknown No crystals seen     Left:   Results for NABIL MATSON (MRN [id number]) as of 2/11/2019 09:31   Ref. Range 2/10/2019 20:27   Color, Fluid Unknown Yellow   Appearance, Fluid Latest Ref Range: Clear  Turbid (A)   WBC, Fluid Latest Units: /mm3 232,950   RBC, Fluid Latest Units: /mm3 21,000    Neutrophils, Fluid Latest Units: % 94   Lymphocytes, Fluid Latest Units: % 3   Monocytes, Fluid Latest Units: % 3   Crystals, Fluid Unknown No crystals seen     Results for NABIL MATSON (MRN [id number]) as of 2/11/2019 09:31   Ref. Range 2/11/2019 05:04   Sed Rate Latest Ref Range: 0 - 30 mm/hr 100 (H)     Collected  Updated  Procedure  Result Status      02/12/2019 1342  02/13/2019 1328  Anaerobic Culture - Tissue, Knee, Right [[id number]]   Tissue from Knee, Right     Preliminary result  Culture No anaerobes isolated             02/12/2019 1342  02/12/2019 1438  Fungus Culture - Tissue, Knee, Right [[id number]]   Tissue from Knee, Right     In process  No result data             02/12/2019 1342  02/16/2019 0628  Tissue / Bone Culture - Tissue, Knee, Right [[id number]]   Tissue from Knee, Right     Final result  Tissue Culture No growth at 4 days   Gram Stain Moderate (3+) WBCs seen    No organisms seen             02/12/2019 1342  02/13/2019 1312  AFB Culture - Tissue, Knee, Right [[id number]]   Tissue from Knee, Right     Preliminary result  AFB Stain No acid fast bacilli seen on concentrated smear             02/12/2019 1236  02/12/2019 1438  Fungus Culture - Synovial Fluid, Knee, Left [[id number]]   Synovial Fluid from Knee, Left     In process  No result data             02/12/2019 1236  02/16/2019 0634  Body Fluid Culture - Synovial Fluid, Knee, Left [[id number]]   Synovial Fluid from Knee, Left     Preliminary result  BF Culture No growth at 4 days   Gram Stain Many (4+) WBCs seen    No organisms seen             02/12/2019 1236  02/13/2019 1311  AFB Culture - Synovial Fluid, Knee, Left [[id number]]   Synovial Fluid from Knee, Left     Preliminary result  AFB Stain No acid fast bacilli seen on concentrated smear             02/11/2019 0941  02/11/2019 1136  Clostridium Difficile Toxin - Stool, Per Rectum [[id number]]    Stool from Per Rectum     Final result  No result data             02/11/2019 0941   02/11/2019 1136  Clostridium Difficile Toxin, PCR - Stool, Per Rectum [[id number]]    (Abnormal)   Stool from Per Rectum     Final result  Clostridium difficile (toxin A/B) Detected Abnormal              02/10/2019 2028  02/14/2019 0711  Body Fluid Culture - Synovial Fluid, Knee, Right [[id number]]   Synovial Fluid from Knee, Right     Final result  BF Culture No growth at 4 days   Gram Stain Many (4+) WBCs seen    No organisms seen             02/10/2019 2028  02/16/2019 1005  Anaerobic Culture - Synovial Fluid, Knee, Right [[id number]]   (Abnormal)   Synovial Fluid from Knee, Right     Preliminary result  Culture No anaerobes isolated    Scant growth (1+) Staphylococcus aureus Abnormal              02/10/2019 2027  02/14/2019 0711  Body Fluid Culture - Synovial Fluid, Knee, Left [[id number]]   Synovial Fluid from Knee, Left     Final result  BF Culture No growth at 4 days   Gram Stain Many (4+) WBCs seen    No organisms seen             02/10/2019 2027  02/16/2019 1003  Anaerobic Culture - Synovial Fluid, Knee, Left [[id number]]   (Abnormal)   Synovial Fluid from Knee, Left     Preliminary result  Culture No anaerobes isolated    Staphylococcus aureus Abnormal     Isolated from broth culture              02/10/2019 1937  02/15/2019 2030  Blood Culture - Blood, Arm, Left [[id number]]   Blood from Arm, Left     Final result  Blood Culture No growth at 5 days             02/10/2019 1913  02/15/2019 2030  Blood Culture - Blood, Arm, Right [[id number]]   Blood from Arm, Right     Final result  Blood Culture No growth at 5 days                  I reviewed the patient's new imaging including images and reports.    All medications reviewed.     Pharmacy Consult  Does not apply BID   carvedilol 3.125 mg Oral BID With Meals   ceFAZolin 2 g Intravenous Q8H   enoxaparin 40 mg Subcutaneous Daily   folic acid 1 mg Oral Daily   insulin lispro 0-7 Units Subcutaneous 4x Daily With Meals & Nightly   lactobacillus acidophilus 1 capsule  Oral Daily   magic mouthwash 10 mL Swish & Spit 4x Daily   meloxicam 15 mg Oral Daily   multivitamin 1 tablet Oral Daily   sodium chloride 10 mL Intravenous Q12H   sodium chloride 3 mL Intravenous Q12H   sodium chloride 3 mL Intravenous Q12H   thiamine 100 mg Oral Daily   traZODone 50 mg Oral Nightly   vancomycin 125 mg Oral Q6H   venlafaxine  mg Oral Daily With Breakfast       acetaminophen 650 mg Q4H PRN   Or     acetaminophen 650 mg Q4H PRN   Or     acetaminophen 650 mg Q4H PRN   bisacodyl 10 mg Daily PRN   bisacodyl 10 mg Daily PRN   dextrose 25 g Q15 Min PRN   dextrose 15 g Q15 Min PRN   diphenhydrAMINE 25 mg Q6H PRN   docusate sodium 100 mg BID PRN   glucagon (human recombinant) 1 mg PRN   HYDROcodone-acetaminophen 1 tablet Q4H PRN   HYDROmorphone 0.5 mg Q2H PRN   And     naloxone 0.1 mg Q5 Min PRN   labetalol 10 mg Q4H PRN   LORazepam 1 mg Q2H PRN   Or     LORazepam 1 mg Q2H PRN   Or     LORazepam 2 mg Q1H PRN   Or     LORazepam 2 mg Q1H PRN   Or     LORazepam 2 mg Q15 Min PRN   Or     LORazepam 2 mg Q15 Min PRN   magnesium hydroxide 10 mL Daily PRN   magnesium sulfate 2 g PRN   Or     magnesium sulfate 2 g PRN   Or     magnesium sulfate 4 g PRN   Morphine 4 mg Q2H PRN   And     naloxone 0.4 mg Q5 Min PRN   ondansetron 4 mg Q6H PRN   Or     ondansetron 4 mg Q6H PRN   oxyCODONE-acetaminophen 1 tablet Q4H PRN   potassium chloride 40 mEq PRN   Or     potassium chloride 40 mEq PRN   Or     potassium chloride 10 mEq Q1H PRN   sodium chloride 500 mL TID PRN   sodium chloride 1-10 mL PRN   sodium chloride 10 mL PRN       Assessment/Plan     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    Leukocytosis, resolved.    C. difficile diarrhea    Acute blood loss anemia, asymptomatic    Acute postoperative  pain    Hyponatremia, resolved.    Hypokalemia, replaced.    Plan  ID following, Dr. Francis. Long-term IV abx- Cefazolin 2 g IV every 8 hrs. PICC today.  Cdiff- po vanc  ETOH WD.  mproved.      1. PT/OT- WBAT BLE/ with knee immobilizer.   2. Pain control-prns  3. IS-encouraged  4. DVT proph- mechs/Lovenox  5. Bowel regimen  6. Monitor post-op labs  7. DC planning for rehab. CM following.       Kimberlee Callejas, BASILIO  02/18/19  12:09 PM

## 2019-02-18 NOTE — CONSULTS
4FR PICC placed in the right arm by Rima Burns RN [facility], tip verified by 3CG, 38cm with 2cm exposed.

## 2019-02-18 NOTE — PLAN OF CARE
Problem: Patient Care Overview  Goal: Plan of Care Review  Outcome: Ongoing (interventions implemented as appropriate)   02/17/19 2031   Coping/Psychosocial   Plan of Care Reviewed With patient   OTHER   Outcome Summary Patient alert and oriented. She verbalizes that she is embarrassed and sorry for her behaviors earlier in her admission and wonders why she acted that way. VSS. Transferring to bedside commode. Frequent requests for pain medication. Pain continually stays high and never completely diminished. She is worried about her 19 year old dog at home and the level of care he is getting. States she needs to go home as soon as possible.   Plan of Care Review   Progress improving

## 2019-02-18 NOTE — DISCHARGE PLACEMENT REQUEST
"Jyoti Luna (53 y.o. Female)     Date of Birth Social Security Number Address Home Phone MRN    1965  [address]  [facility] [address] [phone] [id number]    Rastafarian Marital Status          Church Single       Admission Date Admission Type Admitting Provider Attending Provider Department, Room/Bed    2/10/19 Urgent Lorri Siddiqui MD [place], [place], MD [facility] 3H, S387/1    Discharge Date Discharge Disposition Discharge Destination                       Attending Provider:  Lorri Siddiqui MD    Allergies:  No Known Allergies    Isolation:  Spore   Infection:  C.difficile (02/11/19)   Code Status:  CPR    Ht:  157.5 cm (62\")   Wt:  82.6 kg (182 lb)    Admission Cmt:  None   Principal Problem:  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 [Z96.653]                 Active Insurance as of 2/10/2019     Primary Coverage     Payor Plan Insurance Group Employer/Plan Group    MEDICARE MEDICARE A & B      Payor Plan Address Payor Plan Phone Number Payor Plan Fax Number Effective Dates    [address] [phone]  3/1/2017 - None Entered    [facility] [id number]       Subscriber Name Subscriber Birth Date Member ID       JYOTI LUNA 1965 [id number]           Secondary Coverage     Payor Plan Insurance Group Employer/Plan Group    [place]      Payor Plan Address Payor Plan Phone Number Payor Plan Fax Number Effective Dates    [address] [phone]  3/20/2018 - None Entered    [facility] [id number]       Subscriber Name Subscriber Birth Date Member ID       JYOTI LUNA 1965 [id number]                 Emergency Contacts      (Rel.) Home Phone Work Phone Mobile Phone    Lachelle Richardson (Friend) -- -- [phone]            Insurance Information                MEDICARE/MEDICARE A & B Phone: [phone]    Subscriber: Jyoti Luna Subscriber#: [id number]    Group#:  Precert#:     "     [place] Phone: [phone]    Subscriber: Jyoti Luna Subscriber#: [id number]    Group#:  Precert#:              History & Physical      CallejasKimberleeBASILIO at 2/10/2019  6:52 PM     Attestation signed by Lorri Siddiqui MD at 2/15/2019  4:08 PM    Above is reviewed, attested.                     Patient Name: Jyoti Luna  MRN: [id number]  : 1965  DOS: 2/10/2019    Attending: Lorri Siddiqui MD    Primary Care Provider: Dr. Betty Block      Chief complaint:  Prosthetic knee infection    Subjective   Patient is a 53 y.o. female presented for direct admission from [facility] with complaints of bilateral knee pain, swelling and redness. She reports she was unable to stand or walk today due to pain and called an ambulance for help. She has been using a walker recently for ambulation, but has 2 flights of stairs to get out of her house and was unable to navigate stairs. She denies injury. She thinks she may have had a fever last night. She denies chills or night sweats.    She underwent bilateral knee I&D with poly exchange 3/23/18.    Per Dr. Cody's note: (( this is a 53 year old woman with bilateral TKR.  She is Dr Moffett's patient.   She is on chronic antibiotic suppression.  In 2018 she was admitted with sepsis (MSSA) and underwent bilateral knee I&D with poly exchange 3/23/18.  Dr Brenden Francis from ID has followed her.  The original surgical procedures were 3 years (right knee) and 2 years (left knee) prior to infection.  She reports a 2 day history of increasing bilateral knee pain and inability to walk.  She reports increased swelling in both knees, left more than right.  She reports she could not get up off the couch and called an ambulance to take her to the ER. She was seen at [facility] and the ER doctor called me to request transfer.  No recent injury, no fever, no chills.  She reports she has been taking the Keflex  500mg Q 8 hours.  She reports skin color changes: on the right leg just inferior and lateral to the knee incision and on the left leg over the inferior incision.  No drainage.  Temp was 37 in [place] ER, WBC 11.7, ESR 74, CRP 19.9))    When seen she complains of moderate bilateral knee pain. No fever today. She does reports diarrhea for 1 month. She is concerned and anxious about treatment course, her inability to ambulate, and extended course of illness. She complaints of pain, weakness, malaise and subjective fevers.    She is DM, she does not check BG. She reports in rehab following a previous hospitalization that all DM medications were discontinued. Her most recent A1C with PCP about 3 weeks ago was 6.9.      Allergies:  No Known Allergies    Meds:  Medications Prior to Admission   Medication Sig Dispense Refill Last Dose   • carvedilol (COREG) 3.125 MG tablet Take 3.125 mg by mouth 2 (Two) Times a Day With Meals.   2/10/2019 at Unknown time   • oxyCODONE-acetaminophen (PERCOCET) 5-325 MG per tablet Take 1 tablet by mouth Every 6 (Six) Hours As Needed.   2/10/2019 at Unknown time   • traZODone (DESYREL) 50 MG tablet Take 50 mg by mouth Every Night.   2/9/2019 at Unknown time   • triamcinolone (KENALOG) 0.1 % lotion Apply  topically Every 12 (Twelve) Hours.   2/10/2019 at Unknown time   • venlafaxine XR (EFFEXOR-XR) 150 MG 24 hr capsule Take 300 mg by mouth Daily.   2/10/2019 at Unknown time   Keflex 500mg po TID  Protonix 40mg po daily    History:   Past Medical History:   Diagnosis Date   • Acid reflux    • Alcoholism (CMS/[facility])    • Anemia 3/20/2018   • Arthritis    • Asthma    • Depression 3/20/2018   • Diabetes mellitus (CMS/[facility])     POSS R/T INFECTION PER PT   • Eczema    • History of transfusion    • Hypertension 3/20/2018     Past Surgical History:   Procedure Laterality Date   • JOINT REPLACEMENT      bilateral knees   • KNEE POLY INSERT EXCHANGE Bilateral 3/23/2018    Procedure: KNEE POLY INSERT  EXCHANGE WITH IRRIGATION AND DEBRIDMENT;  Surgeon: Ti Moffett MD;  Location: [facility];  Service: Orthopedics   • LAPAROSCOPIC CHOLECYSTECTOMY       No family history on file.  Social History     Tobacco Use   • Smoking status: Never Smoker   • Smokeless tobacco: Never Used   Substance Use Topics   • Alcohol use: Yes     Comment: PT STATES SHE HAS NOT DRANK IN APPROX 3 MONTHS   • Drug use: No   She lives alone. She has 2 children. She is disabled.     Review of Systems  All systems were reviewed and negative except for:  Respiratory: positive for  shortness of air  Cardiovascular: positive for  chest pressure / pain, at rest  Gastrointestinal: positive for  diarrhea    Vital Signs  Visit Vitals  /98   Pulse 100   Temp 97.9 °F (36.6 °C) (Oral)   Resp 16   SpO2 96%       Physical Exam:    General Appearance:    Alert, cooperative, in no acute distress   Head:    Normocephalic, without obvious abnormality, atraumatic   Eyes:            Lids and lashes normal, conjunctivae and sclerae normal, no   icterus, no pallor, corneas clear   Ears:    Ears appear intact with no abnormalities noted   Neck:   No adenopathy, supple, trachea midline, no thyromegaly   Lungs:     Clear to auscultation,respirations regular, even and                   unlabored    Heart:    Regular rhythm and normal rate, normal S1 and S2, no            murmur, no gallop   Abdomen:     Normal bowel sounds, no masses, no organomegaly, soft        non-tender, non-distended, no guarding, no rebound                 tenderness   Genitalia:    Deferred   Extremities:   Left knee with swelling and erythema. Distal right knee with mild erythema (appears more like dry irritated skin)   Pulses:   Pulses palpable and equal bilaterally   Skin:   No bleeding, bruising or rash   Neurologic:   Cranial nerves 2 - 12 grossly intact, sensation intact      I reviewed the patient's new clinical results.       Results from last 7 days   Lab Units 02/10/19  1928    WBC 10*3/mm3 10.76   HEMOGLOBIN g/dL 11.5   HEMATOCRIT % 35.7   PLATELETS 10*3/mm3 278     Results from last 7 days   Lab Units 02/10/19  1928   SODIUM mmol/L 136   POTASSIUM mmol/L 3.7   CHLORIDE mmol/L 95*   CO2 mmol/L 31.0   BUN mg/dL 9   CREATININE mg/dL 0.73   CALCIUM mg/dL 9.1   BILIRUBIN mg/dL 1.0   ALK PHOS U/L 162*   ALT (SGPT) U/L 40   AST (SGOT) U/L 29   GLUCOSE mg/dL 132*     Lab Results   Component Value Date    HGBA1C 6.50 (H) 02/10/2019       Assessment and Plan:     Infected prosthetic knee joint (CMS/[facility])    HO MSSA (methicillin susceptible Staphylococcus aureus) septicemia (CMS/[facility])    Alcoholism /alcohol abuse (CMS/[facility])    Diabetes mellitus (CMS/[facility])    Diarrhea      Plan  Admit for further management of possible prosthetic infection. Increased inflammatory markers. Consult for [facility] pending.    Likely to require surgical intervention of repeat washout vs 2 stage revision due to lengthy duration of infectious issues.    Ortho rec per Dr. Cody: ((Probable infection left total knee, less likely right.  Will obtain X-rays, blood cultures, repeat labs.  Patient is stable and I do not think she needs urgent I&D.  She will likely need hardware removal and 2 stage procedure which requires planning.  Dr Moffett will take over care tomorrow.  Dr Francis will see tomorrow.))    1. PT consulted  2. Pain control-prns   3. IS-encourage  4. DVT proph- [facility]  5. Cdiff test, BC  6. Resume home medications as appropriate  7. Monitor labs    Continue oral abx for now. If fever, IV abx orders pending. [facility] tomorrow.    DM  - hgb A1c on 2/10/19 6.5  - Accuchecks ACHS with low dose SSI    ETOH  - CIWA protocol  - vitamin replacement    Diarrhea  - check for Cdiff      BASILIO Jeff  02/10/19  9:30 PM    Electronically signed by Lorri Siddiqui MD at 2/15/2019  4:08 PM          Operative/Procedure Notes (all)      Ti Moffett MD at 2/12/2019 12:30 PM  Version 2 of 2         OPERATIVE REPORT      DATE OF PROCEDURE: 2/10/2019 - 2/12/2019     SURGEON: Ti Moffett M.D.     STAFF: Circulator: Darwin Bee, RN; Kishan Merida RN  Scrub Person: Stacey Knott; Dewey Ward; Richard Julian; Callie Toribio  Vendor Representative: Daniel Oh  Nursing Assistant: Julianna Torres; Ciera Suarez  Assistant: La Woodard PA-C     PREOPERATIVE DIAGNOSIS: Infection of prosthetic knee joint, subsequent encounter [T84.59XD, Z96.659] Bilateral knees    POSTOPERATIVE DIAGNOSIS: *Same       Post-Op Diagnosis Codes:     * Infection of prosthetic knee joint, subsequent encounter [T84.59XD, Z96.659]    Procedure(s):  BILATERAL TOTAL KNEE ARTHROPLASTY REVISION WITH REMOVAL and placement of Revision Total Knee components and antibiotic cement  Surgeon(s):  Ti Moffett MD    SURGICAL DETAILS:     APPROACH: Medial parapatellar    ANESTHESIA: General    PREOPERATIVE ANTIBIOTICS: She was on preop antibiotics    TRANEXAMIC ACID: IV    TOURNIQUET TIME: 68 min @300 mmHg on the left knee, 48 min @ 300 on right knee  ESTIMATED BLOOD LOSS: 20 mL    SPECIMENS:   Order Name Source Comment Collection Info Order Time   FUNGUS CULTURE Knee, Left  Collected By: Ti Moffett MD 2/12/2019 12:38 PM   GRAM STAIN Knee, Left  Collected By: Ti Moffett MD 2/12/2019 12:38 PM   BODY FLUID CULTURE Knee, Left  Collected By: Ti Moffett MD 2/12/2019 12:38 PM   AFB CULTURE Knee, Left  Collected By: Ti Moffett MD 2/12/2019 12:38 PM   ANAEROBIC CULTURE Knee, Right  Collected By: Ti Moffett MD 2/12/2019  1:43 PM   FUNGUS CULTURE Knee, Right  Collected By: Ti Moffett MD 2/12/2019  1:43 PM   TISSUE / BONE CULTURE Knee, Right  Collected By: Ti Moffett MD 2/12/2019  1:43 PM   AFB CULTURE Knee, Right  Collected By: Ti Moffett MD 2/12/2019  1:43 PM        IMPLANTS:   Implants     Implant    Bar Tib Ascent/Maxim Huma Interlok - [id number] - Implanted   (Right) Knee     Inventory item: Bar Tib Ascent/Maxim Huma Interlok Model/Cat number: [id number]    : [employer] Lot number: [id number]    As of 3/23/2018     Status: Implanted                  Bear Tib Vangrd Ps Dcm 10x71/76 - [id number] - Implanted   (Right) Knee    Inventory item: Bear Tib Vangrd Ps Dcm 10x71/76 Model/Cat number: [id number]    : [employer] Lot number: [id number]    As of 3/23/2018     Status: Implanted                  Bear Tib Vangrd Ps Dcm 10x71/76 - [id number] - Implanted   (Left) Knee    Inventory item: Bear Tib Vangrd Ps Dcm 10x71/76 Model/Cat number: [id number]    : [employer] Lot number: [id number]    As of 3/23/2018     Status: Implanted                  Bar Tib Ascent/Maxim Huma Interlok - [id number] - Implanted   (Left) Knee    Inventory item: Bar Tib Ascent/Maxim Huma Interlok Model/Cat number: [id number]    : [employer] Lot number: [id number]    As of 3/23/2018     Status: Implanted                  Cmt Bone Palacos 929230 - [id number] - Implanted   (Left) Knee    Inventory item: Cmt Bone Palacos 541014 Model/Cat number: [id number]    : [employer] Lot number: [id number]    As of 2/12/2019     Status: Implanted                  Cmt Bone Palacos 479538 - [id number] - Implanted   (Left) Knee    Inventory item: Cmt Bone Palacos 732002 Model/Cat number: [id number]    : [employer] Lot number: [id number]    As of 2/12/2019     Status: Implanted                  Bear Tib Vangrd Ps Dcm 14x71/76 - [id number] - Implanted   (Left) Knee    Inventory item: Bear Tib Vangrd Ps Dcm 14x71/76 Model/Cat number: [id number]    : [employer] Lot number: [id number]    As of 2/12/2019     Status: Implanted                  Fem Vangrd Intlk Ps Opn 65 Lt - [id number] - Implanted   (Left) Knee    Inventory item: Fem Vangrd Intlk Ps Opn 65 Lt Model/Cat number: [id number]    : [employer] Lot number: [id number]    As of 2/12/2019     Status: Implanted                   Cmt Bone Palacos R Hi/Visc 1x40 - [id number] - Implanted   (Right) Knee    Inventory item: Cmt Bone Palacos R Hi/Visc 1x40 Model/Cat number: [id number]    : [employer] Lot number: [id number]    As of 2/12/2019     Status: Implanted                  Cmt Bone Palacos R Hi/Visc 1x40 - [id number] - Implanted   (Right) Knee    Inventory item: Cmt Bone Palacos R Hi/Visc 1x40 Model/Cat number: [id number]    : [employer] Lot number: [id number]    As of 2/12/2019     Status: Implanted                  Fem Vangrd Intlk Ps Opn 65mm Rt - [id number] - Implanted   (Right) Knee    Inventory item: Fem Vangrd Intlk Ps Opn 65mm Rt Model/Cat number: [id number]    : [employer] Lot number: [id number]    As of 2/12/2019     Status: Implanted                  Bear Tib Vangrd Ps Dcm 14x71/76 - [id number] - Implanted   (Right) Knee    Inventory item: Bear Tib Vangrd Ps Dcm 14x71/76 Model/Cat number: [id number]    : [employer] Lot number: [id number]    As of 2/12/2019     Status: Implanted                      : [email or web address]     Femoral component size: 65 bilateral   Tibial polyethylene insert: 14 bilateral      DRAINS:   Closed/Suction Drain Left Knee Accordion (Active)       Closed/Suction Drain Right Knee Accordion (Active)        LOCAL INJECTION: none    MODIFIER(S): 51 bilateral    COMPLICATIONS: None apparent    INDICATIONS FOR PROCEDURE: The patient has a history of previous bilateral total knee infections.  Revision total knee arthroplasty with removal of prosthesis and placement of articulating spacer revision components with antibiotic cement was discussed and agreed to. The risks, benefits, alternatives, and potential complications of the arthroplasty surgery were discussed with the patient in detail to include but not be limited to infection, bleeding, anesthesia risks, damage to neurovascular structures, osteolysis, aseptic loosening, instability, anterior knee pain, continued  pain, iatrogenic fracture, dislocation, need for future surgery including the potential for amputation, blood clots, myocardial infarction, stroke, and death. Specific details of the surgical procedure, hospitalization, recovery, rehabilitation, and long-term precautions were also presented. Pre-operative teaching was provided. Implant/prosthesis selection was outlined, and the many options available were explained; the final choice will be made at the time of the procedure to match the anatomy and condition of the bone, ligaments, tendons, and muscles.     INTRAOPERATIVE FINDINGS: Significant purulent material in the left knee.  Minimal peripheral chief material in the right knee     PROCEDURE: The patient was identified in the preoperative holding area. The operative site was confirmed and marked. SANTHOSH hose and a sequential compression device were placed on the nonoperative leg. The risks, benefits, and alternatives to surgery were again confirmed with the patient and the patient wished to proceed. The patient was brought to the operating room and placed on the operating room table in the supine position. All bony prominences were padded. A huddle was performed with the patient and all vital surgical team members to confirm the correct operative site, procedure, anesthesia type, and operative plan with the patient. After anesthesia was performed, a tourniquet was applied to the upper thigh of the operative leg. A full knee exam was performed once anesthesia was in full effect.  The patient was on preoperative intravenous antibiotics     The operative legs were prepped and draped in the usual sterile fashion. A surgical time out was performed immediately preceding the incision with all personnel in the operating room to confirm patient identity, the correct operative site and extremity, correct radiographic studies, availability of appropriate surgical equipment and agreement on the planned procedure.  The left knee  was operated on first. The operative knee was elevated and the tourniquet was inflated. The knee was exposed using the previous anterior-midline skin incision. Dissection was carried down through skin and subcutaneous tissue to the extensor mechanism with a scalpel. A medial parapatellar arthrotomy was made to enter the knee space sharply. A large amount of purulent appearing joint fluid was encountered and suctioned. The synovium was thickened, hypertrophic, and inflamed.  Cultures were sent A partial synovectomy was performed for exposure, and the medial and lateral gutters were cleared of scar and synovial reflections. The superficial medial collateral ligament was carefully elevated off osteophytes which were then removed with a rongeur and osteotome.  Osteotomes were used to loosen the components of the total knee arthroplasty.   flexion and a large curved osteotome, rongeur, and curettes were utilized to clear posterior osteophytes, loose bodies and meniscal remnants.  Multiple liters of normal saline were used as an irrigant     A femoral trial implant was placed; excellent fit was confirmed. The medial-lateral and anterior-posterior dimensions were checked; anatomic fit and coverage were achieved.The proximal tibia baseplate trial was placed with its mid-point at the junction of medial one-third and lateral two-thirds of the tibial tubercle and pinned to this fixed position. A trial reduction was then performed. Trial reduction demonstrated the knee achieved full extension with excellent stability and range of motion, and no tendency toward instability with varus-valgus stress at full extension, mid-flexion, or 90 degrees of flexion.    All trials were removed.     The wound was copiously lavaged with a pulse irrigation/suction system. The posterior recess of the knee and areas of known bleeding were treated with the electrocautery to reduce post-operative bleeding.A double batch of Polymethylmethacrylate  (PMMA) bone cement was mixed, with 2 g of vancomycin and 2 g of tobramycin and the final implants were cemented into place.  The cement was compressed using a non-constrained poly trial which was removed so the excess cement could be curetted free. The final polyethylene was impacted into place, and the knee was held in extension until the cement cured.    The wound was again irrigated with dilute betadine solution followed by saline. The extensor mechanism and capsule was then anatomically closed with interrupted #1 Stratafix suture and a running #2 Stratafix stitch. Knee stability and range of motion with the capsule closed was excellent, and range of motion was 0 to 135 without excessive stress on the repair. Instrument and sponge count was completed and confirmed correct. A running 3-0 Stratafix subcuticular stitch was used to re-approximate the skin edges followed by skin glue adhesive to seal the wound.   Attention was turned to the right knee.    The right knee was elevated and the tourniquet was inflated. The knee was exposed using the previous anterior-midline skin incision. Dissection was carried down through skin and subcutaneous tissue to the extensor mechanism with a scalpel. A medial parapatellar arthrotomy was made to enter the knee space sharply. A large amount of purulent appearing joint fluid was encountered and suctioned. The synovium was thickened, hypertrophic, and inflamed.  Cultures were sent A partial synovectomy was performed for exposure, and the medial and lateral gutters were cleared of scar and synovial reflections. The superficial medial collateral ligament was carefully elevated off osteophytes which were then removed with a rongeur and osteotome.  Osteotomes were used to loosen the components of the total knee arthroplasty.   flexion and a large curved osteotome, rongeur, and curettes were utilized to clear posterior osteophytes, loose bodies and meniscal remnants.  Multiple liters of  normal saline were used as an irrigant     A femoral trial implant was placed; excellent fit was confirmed. The medial-lateral and anterior-posterior dimensions were checked; anatomic fit and coverage were achieved.The proximal tibia baseplate trial was placed with its mid-point at the junction of medial one-third and lateral two-thirds of the tibial tubercle and pinned to this fixed position. A trial reduction was then performed. Trial reduction demonstrated the knee achieved full extension with excellent stability and range of motion, and no tendency toward instability with varus-valgus stress at full extension, mid-flexion, or 90 degrees of flexion.    All trials were removed.     The wound was copiously lavaged with a pulse irrigation/suction system. The posterior recess of the knee and areas of known bleeding were treated with the electrocautery to reduce post-operative bleeding.A double batch of Polymethylmethacrylate (PMMA) bone cement was mixed, with 2 g of vancomycin and 2 g of tobramycin and the final implants were cemented into place.  The cement was compressed using a non-constrained poly trial which was removed so the excess cement could be curetted free. The final polyethylene was impacted into place, and the knee was held in extension until the cement cured.    The wound was again irrigated with dilute betadine solution followed by saline. The extensor mechanism and capsule was then anatomically closed with interrupted #1 Stratafix suture and a running #2 Stratafix stitch. Knee stability and range of motion with the capsule closed was excellent, and range of motion was 0 to 135 without excessive stress on the repair. Instrument and sponge count was completed and confirmed correct. A running 3-0 Stratafix subcuticular stitch was used to re-approximate the skin edges followed by skin glue adhesive to seal the wound. The patient was sufficiently recovered from anesthesia, transferred to a hospital bed  and taken to the PACU in stable condition.     Large Hemovac drains were placed intra-articular in each knee.      No apparent complications occurred during the procedure. Instrument, sponge and needle counts were correct x 2.     The patient underwent risk stratification preoperatively and Lovenox was chosen for DVT prophylaxis.     POST OPERATIVE PLAN:   Weight bearing as tolerated with bilateral knee immobilizers extension with no range of motion left knee   Pain control with PO/IV meds   Adductor canal catheter placement by Anesthesia Pain Management Team.   Antibiotics per  infectious disease recommendations  PT/OT for mobilization and medical equipment needs     Social work for discharge planning needs   Follow up in 1 weeks for post operative wound check with XR AP and lateral of operative knee.           Electronically signed by Ti Moffett MD at 2/12/2019  2:34 PM     Ti Moffett MD at 2/12/2019 12:30 PM  Version 1 of 2         OPERATIVE REPORT     DATE OF PROCEDURE: 2/10/2019 - 2/12/2019     SURGEON: Ti Moffett M.D.     STAFF: Circulator: Darwin Bee RN; Kishan Merida RN  Scrub Person: Stacey Knott; Dewey Ward; Richard Julian; Callie Toribio  Vendor Representative: Daniel Oh  Nursing Assistant: Julianna Torres; Ciera Suarez  Assistant: La Woodard PA-C     PREOPERATIVE DIAGNOSIS: Infection of prosthetic knee joint, subsequent encounter [T84.59XD, Z96.659] Bilateral knees    POSTOPERATIVE DIAGNOSIS: *Same       Post-Op Diagnosis Codes:     * Infection of prosthetic knee joint, subsequent encounter [T84.59XD, Z96.659]    Procedure(s):  TOTAL KNEE ARTHROPLASTY REVISION WITH REMOVAL and placement of Revision Total Knee components and antibiotic cement  Surgeon(s):  Ti Moffett MD    SURGICAL DETAILS:     APPROACH: Medial parapatellar    ANESTHESIA: General    PREOPERATIVE ANTIBIOTICS: She was on preop antibiotics    TRANEXAMIC ACID:  IV    TOURNIQUET TIME: 68 min @300 mmHg on the left knee, 48 min @ 300 on right knee  ESTIMATED BLOOD LOSS: 20 mL    SPECIMENS:   Order Name Source Comment Collection Info Order Time   FUNGUS CULTURE Knee, Left  Collected By: Ti Moffett MD 2/12/2019 12:38 PM   GRAM STAIN Knee, Left  Collected By: Ti Moffett MD 2/12/2019 12:38 PM   BODY FLUID CULTURE Knee, Left  Collected By: Ti Moffett MD 2/12/2019 12:38 PM   AFB CULTURE Knee, Left  Collected By: Ti Moffett MD 2/12/2019 12:38 PM   ANAEROBIC CULTURE Knee, Right  Collected By: Ti Moffett MD 2/12/2019  1:43 PM   FUNGUS CULTURE Knee, Right  Collected By: Ti Moffett MD 2/12/2019  1:43 PM   TISSUE / BONE CULTURE Knee, Right  Collected By: Ti Moffett MD 2/12/2019  1:43 PM   AFB CULTURE Knee, Right  Collected By: Ti Moffett MD 2/12/2019  1:43 PM        IMPLANTS:   Implants     Implant    Bar Tib Ascent/Maxim Huma Interlok - [id number] - Implanted   (Right) Knee    Inventory item: Bar Tib Ascent/Maxim Huma Interlok Model/Cat number: [id number]    : [employer] Lot number: [id number]    As of 3/23/2018     Status: Implanted                  Bear Tib Vangrd Ps Dcm 10x71/76 - [id number] - Implanted   (Right) Knee    Inventory item: Bear Tib Vangrd Ps Dcm 10x71/76 Model/Cat number: [id number]    : [employer] Lot number: [id number]    As of 3/23/2018     Status: Implanted                  Bear Tib Vangrd Ps Dcm 10x71/76 - [id number] - Implanted   (Left) Knee    Inventory item: Bear Tib Vangrd Ps Dcm 10x71/76 Model/Cat number: [id number]    : [employer] Lot number: [id number]    As of 3/23/2018     Status: Implanted                  Bar Tib Ascent/Maxim Huma Interlok - [id number] - Implanted   (Left) Knee    Inventory item: Bar Tib Ascent/Maxim Huma Interlok Model/Cat number: [id number]    : [employer] Lot number: [id number]    As of 3/23/2018     Status: Implanted                  Cmt Bone  Palacos 000135 - [id number] - Implanted   (Left) Knee    Inventory item: Cmt Bone Palacos 226057 Model/Cat number: [id number]    : [employer] Lot number: [id number]    As of 2/12/2019     Status: Implanted                  Cmt Bone Palacos 700083 - [id number] - Implanted   (Left) Knee    Inventory item: Cmt Bone Palacos 600106 Model/Cat number: [id number]    : [employer] Lot number: [id number]    As of 2/12/2019     Status: Implanted                  Bear Tib Vangrd Ps Dcm 14x71/76 - [id number] - Implanted   (Left) Knee    Inventory item: Bear Tib Vangrd Ps Dcm 14x71/76 Model/Cat number: [id number]    : [employer] Lot number: [id number]    As of 2/12/2019     Status: Implanted                  Fem Vangrd Intlk Ps Opn 65 Lt - [id number] - Implanted   (Left) Knee    Inventory item: Fem Vangrd Intlk Ps Opn 65 Lt Model/Cat number: [id number]    : [employer] Lot number: [id number]    As of 2/12/2019     Status: Implanted                  Cmt Bone Palacos R Hi/Visc 1x40 - [id number] - Implanted   (Right) Knee    Inventory item: Cmt Bone Palacos R Hi/Visc 1x40 Model/Cat number: [id number]    : [employer] Lot number: [id number]    As of 2/12/2019     Status: Implanted                  Cmt Bone Palacos R Hi/Visc 1x40 - [id number] - Implanted   (Right) Knee    Inventory item: Cmt Bone Palacos R Hi/Visc 1x40 Model/Cat number: [id number]    : [employer] Lot number: [id number]    As of 2/12/2019     Status: Implanted                  Fem Vangrd Intlk Ps Opn 65mm Rt - [id number] - Implanted   (Right) Knee    Inventory item: Fem Vangrd Intlk Ps Opn 65mm Rt Model/Cat number: [id number]    : [employer] Lot number: [id number]    As of 2/12/2019     Status: Implanted                  Bear Tib Vangrd Ps Dcm 14x71/76 - [id number] - Implanted   (Right) Knee    Inventory item: Bear Tib Vangrd Ps Dcm 14x71/76 Model/Cat number: [id number]    : [employer]  Lot number: [id number]    As of 2/12/2019     Status: Implanted                      : Biomet     Femoral component size: 65 bilateral   Tibial polyethylene insert: 14 bilateral      DRAINS:   Closed/Suction Drain Left Knee Accordion (Active)       Closed/Suction Drain Right Knee Accordion (Active)        LOCAL INJECTION: none    MODIFIER(S): 51 bilateral    COMPLICATIONS: None apparent    INDICATIONS FOR PROCEDURE: The patient has a history of previous bilateral total knee infections.  Revision total knee arthroplasty with removal of prosthesis and placement of articulating spacer revision components with antibiotic cement was discussed and agreed to. The risks, benefits, alternatives, and potential complications of the arthroplasty surgery were discussed with the patient in detail to include but not be limited to infection, bleeding, anesthesia risks, damage to neurovascular structures, osteolysis, aseptic loosening, instability, anterior knee pain, continued pain, iatrogenic fracture, dislocation, need for future surgery including the potential for amputation, blood clots, myocardial infarction, stroke, and death. Specific details of the surgical procedure, hospitalization, recovery, rehabilitation, and long-term precautions were also presented. Pre-operative teaching was provided. Implant/prosthesis selection was outlined, and the many options available were explained; the final choice will be made at the time of the procedure to match the anatomy and condition of the bone, ligaments, tendons, and muscles.     INTRAOPERATIVE FINDINGS: Significant purulent material in the left knee.  Minimal peripheral chief material in the right knee     PROCEDURE: The patient was identified in the preoperative holding area. The operative site was confirmed and marked. SANTHOSH hose and a sequential compression device were placed on the nonoperative leg. The risks, benefits, and alternatives to surgery were again confirmed  with the patient and the patient wished to proceed. The patient was brought to the operating room and placed on the operating room table in the supine position. All bony prominences were padded. A huddle was performed with the patient and all vital surgical team members to confirm the correct operative site, procedure, anesthesia type, and operative plan with the patient. After anesthesia was performed, a tourniquet was applied to the upper thigh of the operative leg. A full knee exam was performed once anesthesia was in full effect.  The patient was on preoperative intravenous antibiotics     The operative legs were prepped and draped in the usual sterile fashion. A surgical time out was performed immediately preceding the incision with all personnel in the operating room to confirm patient identity, the correct operative site and extremity, correct radiographic studies, availability of appropriate surgical equipment and agreement on the planned procedure.  The left knee was operated on first. The operative knee was elevated and the tourniquet was inflated. The knee was exposed using the previous anterior-midline skin incision. Dissection was carried down through skin and subcutaneous tissue to the extensor mechanism with a scalpel. A medial parapatellar arthrotomy was made to enter the knee space sharply. A large amount of purulent appearing joint fluid was encountered and suctioned. The synovium was thickened, hypertrophic, and inflamed.  Cultures were sent A partial synovectomy was performed for exposure, and the medial and lateral gutters were cleared of scar and synovial reflections. The superficial medial collateral ligament was carefully elevated off osteophytes which were then removed with a rongeur and osteotome.  Osteotomes were used to loosen the components of the total knee arthroplasty.   flexion and a large curved osteotome, rongeur, and curettes were utilized to clear posterior osteophytes, loose  bodies and meniscal remnants.  Multiple liters of normal saline were used as an irrigant     A femoral trial implant was placed; excellent fit was confirmed. The medial-lateral and anterior-posterior dimensions were checked; anatomic fit and coverage were achieved.The proximal tibia baseplate trial was placed with its mid-point at the junction of medial one-third and lateral two-thirds of the tibial tubercle and pinned to this fixed position. A trial reduction was then performed. Trial reduction demonstrated the knee achieved full extension with excellent stability and range of motion, and no tendency toward instability with varus-valgus stress at full extension, mid-flexion, or 90 degrees of flexion.    All trials were removed.     The wound was copiously lavaged with a pulse irrigation/suction system. The posterior recess of the knee and areas of known bleeding were treated with the electrocautery to reduce post-operative bleeding.A double batch of Polymethylmethacrylate (PMMA) bone cement was mixed, with 2 g of vancomycin and 2 g of tobramycin and the final implants were cemented into place.  The cement was compressed using a non-constrained poly trial which was removed so the excess cement could be curetted free. The final polyethylene was impacted into place, and the knee was held in extension until the cement cured.    The wound was again irrigated with dilute betadine solution followed by saline. The extensor mechanism and capsule was then anatomically closed with interrupted #1 Stratafix suture and a running #2 Stratafix stitch. Knee stability and range of motion with the capsule closed was excellent, and range of motion was 0 to 135 without excessive stress on the repair. Instrument and sponge count was completed and confirmed correct. A running 3-0 Stratafix subcuticular stitch was used to re-approximate the skin edges followed by skin glue adhesive to seal the wound.   Attention was turned to the right  knee.    The right knee was elevated and the tourniquet was inflated. The knee was exposed using the previous anterior-midline skin incision. Dissection was carried down through skin and subcutaneous tissue to the extensor mechanism with a scalpel. A medial parapatellar arthrotomy was made to enter the knee space sharply. A large amount of purulent appearing joint fluid was encountered and suctioned. The synovium was thickened, hypertrophic, and inflamed.  Cultures were sent A partial synovectomy was performed for exposure, and the medial and lateral gutters were cleared of scar and synovial reflections. The superficial medial collateral ligament was carefully elevated off osteophytes which were then removed with a rongeur and osteotome.  Osteotomes were used to loosen the components of the total knee arthroplasty.   flexion and a large curved osteotome, rongeur, and curettes were utilized to clear posterior osteophytes, loose bodies and meniscal remnants.  Multiple liters of normal saline were used as an irrigant     A femoral trial implant was placed; excellent fit was confirmed. The medial-lateral and anterior-posterior dimensions were checked; anatomic fit and coverage were achieved.The proximal tibia baseplate trial was placed with its mid-point at the junction of medial one-third and lateral two-thirds of the tibial tubercle and pinned to this fixed position. A trial reduction was then performed. Trial reduction demonstrated the knee achieved full extension with excellent stability and range of motion, and no tendency toward instability with varus-valgus stress at full extension, mid-flexion, or 90 degrees of flexion.    All trials were removed.     The wound was copiously lavaged with a pulse irrigation/suction system. The posterior recess of the knee and areas of known bleeding were treated with the electrocautery to reduce post-operative bleeding.A double batch of Polymethylmethacrylate (PMMA) bone cement  was mixed, with 2 g of vancomycin and 2 g of tobramycin and the final implants were cemented into place.  The cement was compressed using a non-constrained poly trial which was removed so the excess cement could be curetted free. The final polyethylene was impacted into place, and the knee was held in extension until the cement cured.    The wound was again irrigated with dilute betadine solution followed by saline. The extensor mechanism and capsule was then anatomically closed with interrupted #1 Stratafix suture and a running #2 Stratafix stitch. Knee stability and range of motion with the capsule closed was excellent, and range of motion was 0 to 135 without excessive stress on the repair. Instrument and sponge count was completed and confirmed correct. A running 3-0 Stratafix subcuticular stitch was used to re-approximate the skin edges followed by skin glue adhesive to seal the wound. The patient was sufficiently recovered from anesthesia, transferred to a hospital bed and taken to the PACU in stable condition.     Large Hemovac drains were placed intra-articular in each knee.      No apparent complications occurred during the procedure. Instrument, sponge and needle counts were correct x 2.     The patient underwent risk stratification preoperatively and Lovenox was chosen for DVT prophylaxis.     POST OPERATIVE PLAN:   Weight bearing as tolerated with bilateral knee immobilizers extension with no range of motion left knee   Pain control with PO/IV meds   Adductor canal catheter placement by Anesthesia Pain Management Team.   Antibiotics per  infectious disease recommendations  PT/OT for mobilization and medical equipment needs     Social work for discharge planning needs   Follow up in 1 weeks for post operative wound check with XR AP and lateral of operative knee.           Electronically signed by Ti Moffett MD at 2/12/2019  2:34 PM          Physician Progress Notes (last 24 hours) (Notes from  "2019 12:43 PM through 2019 12:43 PM)      Kimberlee Callejas, APRN at 2019 12:09 PM          IM progress note      Jyoti Luna  [id number]  1965     LOS: 8 days     Attending: Lorri Siddiqui MD    Primary Care Provider: Provider, No Known      Chief Complaint/Reason for visit:  Bilateral infected knee prosthesis     Subjective   Doing ok. Moderate knee pain. Tearful about needing rehab at discharge. Reports 4-5 diarrhea stools daily. Denies f/c/n/v/sob/cp.    Objective     Vital Signs  Visit Vitals  /86 (BP Location: Left arm, Patient Position: Lying)   Pulse 66   Temp 96.4 °F (35.8 °C) (Oral)   Resp 16   Ht 157.5 cm (62\")   Wt 82.6 kg (182 lb)   SpO2 97%   BMI 33.29 kg/m²     Temp (24hrs), Av.3 °F (36.3 °C), Min:96.4 °F (35.8 °C), Max:97.9 °F (36.6 °C)    Physical Exam:     General Appearance:    Dorwsy, in no acute distress   Head:    Normocephalic, without obvious abnormality, atraumatic    Lungs:     Normal effort, symmetric chest rise, no crepitus, clear to      auscultation bilaterally               Heart:    Regular rhythm and normal rate, normal S1 and S2    Abdomen:     Normal bowel sounds, no masses, no organomegaly, soft        non-tender, non-distended, no guarding, no rebound                tenderness   Extremities:   Bilateral knees ACE wrap CDI.   Knee immobilizers in place.   Pulses:   Pulses palpable and equal bilaterally   Skin:   No bleeding, bruising or rash.    Neurologic:   Cranial nerves 2 - 12 grossly intact, sensation intact. Flexion and dorsiflexion intact bilateral feet.          Physical Therapy: Patient ambulated 50 feet with RW and CGA, limited by pain and fatigue. Patient modified independent with t/f in and out of bed. CGA for sit<->stand t/f and for gait training. Achieved gait distance goal today, new goal established. Will continue to progress mobility training and strengthening as able.     Results Review:     I reviewed the patient's new " clinical results.   Results from last 7 days   Lab Units 02/16/19  0746 02/15/19  0651 02/14/19  0544   WBC 10*3/mm3 6.75 6.83 7.32   HEMOGLOBIN g/dL 8.8* 8.1* 7.9*   HEMATOCRIT % 28.3* 26.1* 25.9*   PLATELETS 10*3/mm3 306 249 234     Results from last 7 days   Lab Units 02/16/19  0746 02/14/19  0544 02/13/19  0639   SODIUM mmol/L 138  --  131*   POTASSIUM mmol/L 4.4 3.4* 3.7   CHLORIDE mmol/L 104  --  100   CO2 mmol/L 29.0  --  25.0   BUN mg/dL 6*  --  9   CREATININE mg/dL 0.59*  --  0.82   CALCIUM mg/dL 9.0  --  8.8   GLUCOSE mg/dL 131*  --  171*     Results for NABIL MATSON (MRN [id number]) as of 2/18/2019 12:12   Ref. Range 2/17/2019 15:50 2/17/2019 20:08 2/18/2019 07:44 2/18/2019 11:44   Glucose Latest Ref Range: 70 - 130 mg/dL 112 198 (H) 125 149 (H)       ECG 12 Lead   Order: [id number]   Status:  Preliminary result   Visible to patient:  No (Not Released) Next appt:  None      Narrative     Test Reason : Surgery  Blood Pressure : **/** mmHG  Vent. Rate : 099 BPM     Atrial Rate : 099 BPM     P-R Int : 128 ms          QRS Dur : 096 ms      QT Int : 364 ms       P-R-T Axes : 046 034 047 degrees     QTc Int : 467 ms    Normal sinus rhythm  Normal ECG  When compared with ECG of 23-MAR-2018 11:01,  No significant change was found    Referred By:  LINDA           Confirmed By:           Results for NABIL MATSON (MRN [id number]) as of 2/11/2019 09:31   Ref. Range 2/11/2019 05:05   C-Reactive Protein Latest Ref Range: 0.00 - 1.00 mg/dL 27.72 (H)     Right :  Results for NABIL MATSON (MRN [id number]) as of 2/11/2019 09:31   Ref. Range 2/10/2019 20:27   Color, Fluid Unknown Red   Appearance, Fluid Latest Ref Range: Clear  Bloody (A)   WBC, Fluid Latest Units: /mm3 52,860   RBC, Fluid Latest Units: /mm3 575,000   Crystals, Fluid Unknown No crystals seen     Left:   Results for HUYEN NABIL L (MRN [id number]) as of 2/11/2019 09:31   Ref. Range 2/10/2019 20:27   Color, Fluid Unknown Yellow   Appearance, Fluid Latest  Ref Range: Clear  Turbid (A)   WBC, Fluid Latest Units: /mm3 232,950   RBC, Fluid Latest Units: /mm3 21,000   Neutrophils, Fluid Latest Units: % 94   Lymphocytes, Fluid Latest Units: % 3   Monocytes, Fluid Latest Units: % 3   Crystals, Fluid Unknown No crystals seen     Results for NABIL MATSON (MRN [id number]) as of 2/11/2019 09:31   Ref. Range 2/11/2019 05:04   Sed Rate Latest Ref Range: 0 - 30 mm/hr 100 (H)     Collected  Updated  Procedure  Result Status      02/12/2019 1342  02/13/2019 1328  Anaerobic Culture - Tissue, Knee, Right [[id number]]   Tissue from Knee, Right     Preliminary result  Culture No anaerobes isolated             02/12/2019 1342  02/12/2019 1438  Fungus Culture - Tissue, Knee, Right [[id number]]   Tissue from Knee, Right     In process  No result data             02/12/2019 1342  02/16/2019 0628  Tissue / Bone Culture - Tissue, Knee, Right [[id number]]   Tissue from Knee, Right     Final result  Tissue Culture No growth at 4 days   Gram Stain Moderate (3+) WBCs seen    No organisms seen             02/12/2019 1342  02/13/2019 1312  AFB Culture - Tissue, Knee, Right [[id number]]   Tissue from Knee, Right     Preliminary result  AFB Stain No acid fast bacilli seen on concentrated smear             02/12/2019 1236  02/12/2019 1438  Fungus Culture - Synovial Fluid, Knee, Left [[id number]]   Synovial Fluid from Knee, Left     In process  No result data             02/12/2019 1236  02/16/2019 0634  Body Fluid Culture - Synovial Fluid, Knee, Left [[id number]]   Synovial Fluid from Knee, Left     Preliminary result  BF Culture No growth at 4 days   Gram Stain Many (4+) WBCs seen    No organisms seen             02/12/2019 1236  02/13/2019 1311  AFB Culture - Synovial Fluid, Knee, Left [[id number]]   Synovial Fluid from Knee, Left     Preliminary result  AFB Stain No acid fast bacilli seen on concentrated smear             02/11/2019 0941  02/11/2019 1136  Clostridium Difficile Toxin - Stool, Per Rectum  [[id number]]    Stool from Per Rectum     Final result  No result data             02/11/2019 0941  02/11/2019 1136  Clostridium Difficile Toxin, PCR - Stool, Per Rectum [[id number]]    (Abnormal)   Stool from Per Rectum     Final result  Clostridium difficile (toxin A/B) Detected Abnormal              02/10/2019 2028  02/14/2019 0711  Body Fluid Culture - Synovial Fluid, Knee, Right [[id number]]   Synovial Fluid from Knee, Right     Final result  BF Culture No growth at 4 days   Gram Stain Many (4+) WBCs seen    No organisms seen             02/10/2019 2028  02/16/2019 1005  Anaerobic Culture - Synovial Fluid, Knee, Right [[id number]]   (Abnormal)   Synovial Fluid from Knee, Right     Preliminary result  Culture No anaerobes isolated    Scant growth (1+) Staphylococcus aureus Abnormal              02/10/2019 2027  02/14/2019 0711  Body Fluid Culture - Synovial Fluid, Knee, Left [[id number]]   Synovial Fluid from Knee, Left     Final result  BF Culture No growth at 4 days   Gram Stain Many (4+) WBCs seen    No organisms seen             02/10/2019 2027  02/16/2019 1003  Anaerobic Culture - Synovial Fluid, Knee, Left [[id number]]   (Abnormal)   Synovial Fluid from Knee, Left     Preliminary result  Culture No anaerobes isolated    Staphylococcus aureus Abnormal     Isolated from broth culture              02/10/2019 1937  02/15/2019 2030  Blood Culture - Blood, Arm, Left [[id number]]   Blood from Arm, Left     Final result  Blood Culture No growth at 5 days             02/10/2019 1913  02/15/2019 2030  Blood Culture - Blood, Arm, Right [[id number]]   Blood from Arm, Right     Final result  Blood Culture No growth at 5 days                  I reviewed the patient's new imaging including images and reports.    All medications reviewed.     Pharmacy Consult  Does not apply BID   carvedilol 3.125 mg Oral BID With Meals   ceFAZolin 2 g Intravenous Q8H   enoxaparin 40 mg Subcutaneous Daily   folic acid 1 mg Oral Daily   insulin  lispro 0-7 Units Subcutaneous 4x Daily With Meals & Nightly   lactobacillus acidophilus 1 capsule Oral Daily   magic mouthwash 10 mL Swish & Spit 4x Daily   meloxicam 15 mg Oral Daily   multivitamin 1 tablet Oral Daily   sodium chloride 10 mL Intravenous Q12H   sodium chloride 3 mL Intravenous Q12H   sodium chloride 3 mL Intravenous Q12H   thiamine 100 mg Oral Daily   traZODone 50 mg Oral Nightly   vancomycin 125 mg Oral Q6H   venlafaxine  mg Oral Daily With Breakfast       acetaminophen 650 mg Q4H PRN   Or     acetaminophen 650 mg Q4H PRN   Or     acetaminophen 650 mg Q4H PRN   bisacodyl 10 mg Daily PRN   bisacodyl 10 mg Daily PRN   dextrose 25 g Q15 Min PRN   dextrose 15 g Q15 Min PRN   diphenhydrAMINE 25 mg Q6H PRN   docusate sodium 100 mg BID PRN   glucagon (human recombinant) 1 mg PRN   HYDROcodone-acetaminophen 1 tablet Q4H PRN   HYDROmorphone 0.5 mg Q2H PRN   And     naloxone 0.1 mg Q5 Min PRN   labetalol 10 mg Q4H PRN   LORazepam 1 mg Q2H PRN   Or     LORazepam 1 mg Q2H PRN   Or     LORazepam 2 mg Q1H PRN   Or     LORazepam 2 mg Q1H PRN   Or     LORazepam 2 mg Q15 Min PRN   Or     LORazepam 2 mg Q15 Min PRN   magnesium hydroxide 10 mL Daily PRN   magnesium sulfate 2 g PRN   Or     magnesium sulfate 2 g PRN   Or     magnesium sulfate 4 g PRN   Morphine 4 mg Q2H PRN   And     naloxone 0.4 mg Q5 Min PRN   ondansetron 4 mg Q6H PRN   Or     ondansetron 4 mg Q6H PRN   oxyCODONE-acetaminophen 1 tablet Q4H PRN   potassium chloride 40 mEq PRN   Or     potassium chloride 40 mEq PRN   Or     potassium chloride 10 mEq Q1H PRN   sodium chloride 500 mL TID PRN   sodium chloride 1-10 mL PRN   sodium chloride 10 mL PRN       Assessment/Plan     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    Leukocytosis,  "resolved.    C. difficile diarrhea    Acute blood loss anemia, asymptomatic    Acute postoperative pain    Hyponatremia, resolved.    Hypokalemia, replaced.    Plan  ID following, Dr. Francis. Long-term IV abx- Cefazolin 2 g IV every 8 hrs. PICC today.  Cdiff- po vanc  ETOH WD.  mproved.      1. PT/OT- WBAT BLE/ with knee immobilizer.   2. Pain control-prns  3. IS-encouraged  4. DVT proph- mechs/Lovenox  5. Bowel regimen  6. Monitor post-op labs  7. DC planning for rehab. CM following.       BASILIO Jeff  19  12:09 PM            Electronically signed by Kimberlee Callejas APRN at 2019 12:15 PM     Betty Basilio PA at 2019  8:03 AM          [facility] Progress Note    Date of Admission: 2/10/2019      Antibiotics:  Cefazolin,  Po vanco    CC: bilateral knee pain    S:  No f/c/s. No n/v. Tolerating antibiotics.     O:  /86 (BP Location: Left arm, Patient Position: Lying)   Pulse 66   Temp 96.4 °F (35.8 °C) (Oral)   Resp 16   Ht 157.5 cm (62\")   Wt 82.6 kg (182 lb)   SpO2 97%   BMI 33.29 kg/m²    Temp (24hrs), Av.3 °F (36.3 °C), Min:96.4 °F (35.8 °C), Max:97.9 °F (36.6 °C)      PE:     GENERAL: Awake and alert today, in no acute distress.   HEENT: Normocephalic, atraumatic.  PERRL. EOMI. No conjunctival injection. No icterus. Oropharynx clear without evidence of thrush or exudate. No evidence of peridontal disease.    NECK: Supple without nuchal rigidity  LYMPH: No cervical, axillary or inguinal lymphadenopathy. No neck masses  HEART: RRR; No murmur, rubs, gallops.   LUNGS: Clear to auscultation bilaterally without wheezing, rales, rhonchi. Normal respiratory effort.  ABDOMEN: Soft, mild distention with increased bowel sounds. No rebound or guarding.   EXT:  Trace lower extremity edema : Normal appearing genitalia without Taylor catheter.  MSK: Bilateral knee dressed with decreased range of motion   SKIN: Warm and dry skin with eczematous rash    NEURO: Oriented to Person and " place. No focal deficits.   PSYCHIATRIC: confusion better  Laboratory Data    Results from last 7 days   Lab Units 02/16/19  0746 02/15/19  0651 02/14/19  0544   WBC 10*3/mm3 6.75 6.83 7.32   HEMOGLOBIN g/dL 8.8* 8.1* 7.9*   HEMATOCRIT % 28.3* 26.1* 25.9*   PLATELETS 10*3/mm3 306 249 234     Results from last 7 days   Lab Units 02/16/19  0746   SODIUM mmol/L 138   POTASSIUM mmol/L 4.4   CHLORIDE mmol/L 104   CO2 mmol/L 29.0   BUN mg/dL 6*   CREATININE mg/dL 0.59*   GLUCOSE mg/dL 131*   CALCIUM mg/dL 9.0                   Estimated Creatinine Clearance: 108.6 mL/min (A) (by C-G formula based on SCr of 0.59 mg/dL (L)).      Microbiology:  R knee Synovial Fluid: MSSA   L knee Synovial Fluid MSSA    Surgical cultures: Pending    Radiology:  Imaging Results (last 24 hours)     ** No results found for the last 24 hours. **          PROBLEM LIST:   Bilateral prosthetic joint infections  Prior history of methicillin sensitive staph aureus bilateral knee infections    Leukocytosis  Chronically elevated sedimentation rate CRP  Alcoholism  Severe eczema  Bilateral knee pain and difficulty with ambulation  Low-grade fevers  C diff colitis with acute diarrhea  Acute blood loss anemia  confusion        ASSESSMENT:  53-year-old female with severe eczema, history of alcoholism, with previous MSSA bacteremia with bilateral knee prostate joint infection attempted to be treated with single exchange long-term IV followed by oral antibiotics.  Unfortunately even with long-term treatment oral antibiotics she's had worsening of her bilateral knee pain swelling erythema and effusions appears to be consistent with acute on chronic infection. Synovial fluid positive for MSSA and now s/p bilateral knee hardware removal with articulating spacer placement. Will require prolonged IV antibiotics and can continue cefazolin 2 g IV every 8 hrs.    Continue  oral vancomycin for C. difficile colitis     PLAN:  Cefazolin 2 g IV every 8 hrs  Follow-up  blood cultures and knee fluid culture, neg so far  Po vanco  PICC placement again today     Dr. Brenden Francis saw the patient, performed the physical exam, reviewed the laboratory data and guided with the formulation of the above problem list, assessment and treatment plan.       Brenden Francis MD  2/18/2019        Electronically signed by Betty Basilio PA at 2/18/2019  8:07 AM          Consult Notes (all)      Brenden Francis MD at 2/11/2019  3:18 PM      Consult Orders    1. Inpatient Infectious Diseases Consult [[id number]] ordered by Kimberlee Callejas APRN at 02/10/19 1922                Jyoti Luna  1965  [id number]    Date of Consult: 2/11/2019    Date of Admission: 2/10/2019      Requesting Provider: Lorri Siddiqui MD  Evaluating Physician: Brenden Francis MD    CC: Bilateral knee pain    Reason for Consultation: History of bilateral knee methicillin sensitive staph aureus prosthetic joint infections    History of present illness:  Patient is a 53 y.o.  Yr old female with history of alcoholism and depression chronic eczema who had severe infection over a year ago with methicillin sensitive staph aureus bacteremia with bilateral prosthetic knees involve underwent poly-exchange in hopes of keeping patient mobile.  We did long-term IV antibiotics for several months with some improvement then long-term treatment oral dose for the past year.  We have been able to control the knee pain and swelling but does seem to be worsening over the past several visits ongoing effusion of both knees with concern for chronic prostate joint infection which is now acutely worsened requiring admission with arthrocentesis consistent with acute on chronic infection.  No fevers chills but increased bilateral knee pain plans for two-stage revision of both knees.  Infectious these consultation obtained for further evaluation.    Past Medical History:   Diagnosis Date   • Acid reflux    • Alcoholism (CMS/[facility])    • Anemia 3/20/2018    • Arthritis    • Asthma    • Depression 3/20/2018   • Diabetes mellitus (CMS/HCC)     POSS R/T INFECTION PER PT   • Eczema    • History of transfusion    • Hypertension 3/20/2018       Past Surgical History:   Procedure Laterality Date   • JOINT REPLACEMENT      bilateral knees   • KNEE POLY INSERT EXCHANGE Bilateral 3/23/2018    Procedure: KNEE POLY INSERT EXCHANGE WITH IRRIGATION AND DEBRIDMENT;  Surgeon: Ti Moffett MD;  Location: [facility];  Service: Orthopedics   • LAPAROSCOPIC CHOLECYSTECTOMY         Pediatric History   Patient Guardian Status   • Not on file     Other Topics Concern   • Not on file   Social History Narrative   • Not on file   No tobacco use chronic alcohol use no illicit drug use    Family history: Hypertension and coronary disease in parents  Allergies:    No Known Allergies    Medication:  Current Facility-Administered Medications   Medication Dose Route Frequency Provider Last Rate Last Dose   • acetaminophen (TYLENOL) tablet 650 mg  650 mg Oral Q4H PRN Kimberlee Callejas APRN       • carvedilol (COREG) tablet 3.125 mg  3.125 mg Oral BID With Meals Kimberlee Callejas APRN   3.125 mg at 02/11/19 0841   • ceFAZolin in dextrose (ANCEF) IVPB solution 2 g  2 g Intravenous Q8H Brenden Francis MD   2 g at 02/11/19 1205   • DAPTOmycin (CUBICIN) 400 mg in sodium chloride 0.9 % 50 mL IVPB  6 mg/kg (Adjusted) Intravenous Q24H Kimberlee Callejas APRN 100 mL/hr at 02/11/19 1224 400 mg at 02/11/19 1224   • dextrose (D50W) 25 g/ 50mL Intravenous Solution 25 g  25 g Intravenous Q15 Min PRN Kimberlee Callejas APRN       • dextrose (GLUTOSE) oral gel 15 g  15 g Oral Q15 Min PRN Kimberlee Callejas APRN       • diphenhydrAMINE (BENADRYL) capsule 50 mg  50 mg Oral Q6H PRN Kimberlee Callejas APRN       • fluconazole (DIFLUCAN) tablet 200 mg  200 mg Oral Q24H Kimberlee Callejas APRN       • folic acid (FOLVITE) tablet 1 mg  1 mg Oral Daily Kimberlee Callejas APRN   1 mg at 02/11/19 0841   • glucagon (human  recombinant) (GLUCAGEN DIAGNOSTIC) injection 1 mg  1 mg Subcutaneous PRN Kimberlee Callejas APRN       • insulin lispro (humaLOG) injection 0-7 Units  0-7 Units Subcutaneous 4x Daily With Meals & Nightly Kimberlee Callejas APRN   2 Units at 02/11/19 1217   • labetalol (NORMODYNE,TRANDATE) injection 10 mg  10 mg Intravenous Q4H PRN Kimberlee Callejas APRN       • lactobacillus acidophilus (RISAQUAD) capsule 1 capsule  1 capsule Oral Daily Kimberlee Callejas APRN   1 capsule at 02/11/19 0841   • LORazepam (ATIVAN) tablet 1 mg  1 mg Oral Q2H PRN Kimberlee Callejas APRN   1 mg at 02/11/19 1206    Or   • LORazepam (ATIVAN) injection 1 mg  1 mg Intravenous Q2H PRN Kimberlee Callejas APRN        Or   • LORazepam (ATIVAN) tablet 2 mg  2 mg Oral Q1H PRN Kimberlee Callejas APRN        Or   • LORazepam (ATIVAN) injection 2 mg  2 mg Intravenous Q1H PRN Kimberlee Callejas APRN        Or   • LORazepam (ATIVAN) injection 2 mg  2 mg Intravenous Q15 Min PRN Kimberlee Callejas APRN        Or   • LORazepam (ATIVAN) injection 2 mg  2 mg Intramuscular Q15 Min PRN Kimberlee Callejas APRN       • multivitamin (THERAGRAN) tablet 1 tablet  1 tablet Oral Daily Kimberlee Callejas APRN   1 tablet at 02/11/19 0841   • ondansetron (ZOFRAN) tablet 4 mg  4 mg Oral Q6H PRN Kimberlee Callejas APRN        Or   • ondansetron (ZOFRAN) injection 4 mg  4 mg Intravenous Q6H PRN Kimberlee Callejas APRN       • ondansetron (ZOFRAN) injection 4 mg  4 mg Intravenous Q6H PRN Kimberlee Callejas APRN       • oxyCODONE-acetaminophen (PERCOCET) 5-325 MG per tablet 1 tablet  1 tablet Oral Q4H PRN Lorri Siddiqui MD   1 tablet at 02/11/19 0541   • pantoprazole (PROTONIX) EC tablet 40 mg  40 mg Oral Q AM Kimberlee Callejas APRN   40 mg at 02/11/19 0535   • sodium chloride 0.9 % bolus 500 mL  500 mL Intravenous TID PRN Kimberlee Callejas APRN       • sodium chloride 0.9 % flush 3 mL  3 mL Intravenous Q12H Kimberlee Callejas APRN   3 mL at 02/11/19 0844   • sodium chloride 0.9 %  "flush 3-10 mL  3-10 mL Intravenous PRN Kimberlee Callejas APRN       • thiamine (VITAMIN B-1) tablet 100 mg  100 mg Oral Daily Kimberlee Callejas APRN   100 mg at 19 0841   • traZODone (DESYREL) tablet 50 mg  50 mg Oral Nightly Kimberlee Callejas APRN   50 mg at 02/10/19 2205   • venlafaxine XR (EFFEXOR-XR) 24 hr capsule 300 mg  300 mg Oral Daily With Breakfast Kimberlee Callejas APRN   300 mg at 19 0841       Antibiotics:  Keflex at home now cefazolin      Review of Systems  Full 12 point review of systems reviewed and negative except for fatigue malaise chronic rash and dry skin, bilateral knee pain swelling erythema with effusions low-grade fevers decreased appetite    Physical Exam:   Vital Signs   /80 (BP Location: Left arm, Patient Position: Lying)   Pulse 97   Temp 99.7 °F (37.6 °C) (Oral)   Resp 16   Ht 157.5 cm (62\")   Wt 82.6 kg (182 lb)   SpO2 94%   BMI 33.29 kg/m²    Temp (24hrs), Av.5 °F (36.9 °C), Min:97.9 °F (36.6 °C), Max:99.7 °F (37.6 °C)      GENERAL: Awake and alert, in no acute distress.   HEENT: Normocephalic, atraumatic.  PERRL. EOMI. No conjunctival injection. No icterus. Oropharynx clear without evidence of thrush or exudate. No evidence of peridontal disease.    NECK: Supple without nuchal rigidity  LYMPH: No cervical, axillary or inguinal lymphadenopathy. No neck masses  HEART: RRR; No murmur, rubs, gallops.   LUNGS: Clear to auscultation bilaterally without wheezing, rales, rhonchi. Normal respiratory effort.  ABDOMEN: Soft, nontender, nondistended. Positive bowel sounds. No rebound or guarding.   EXT:  Trace lower extremity edema : Normal appearing genitalia without Taylor catheter.  MSK: Bilateral knee warmth with effusions decreased range of motion  SKIN: Warm and dry without cutaneous eruptions.    NEURO: Oriented to PPT. No focal deficits.   PSYCHIATRIC: Anxious mood. Cooperative with PE    Laboratory Data    Results from last 7 days   Lab Units " 02/11/19  0504 02/10/19  1928   WBC 10*3/mm3 12.03* 10.76   HEMOGLOBIN g/dL 11.3* 11.5   HEMATOCRIT % 36.2 35.7   PLATELETS 10*3/mm3 262 278     Results from last 7 days   Lab Units 02/11/19  0504   SODIUM mmol/L 134   POTASSIUM mmol/L 3.9   CHLORIDE mmol/L 94*   CO2 mmol/L 31.0   BUN mg/dL 10   CREATININE mg/dL 0.75   GLUCOSE mg/dL 165*   CALCIUM mg/dL 9.5     Results from last 7 days   Lab Units 02/10/19  1928   ALK PHOS U/L 162*   BILIRUBIN mg/dL 1.0   ALT (SGPT) U/L 40   AST (SGOT) U/L 29     Results from last 7 days   Lab Units 02/11/19  0504   SED RATE mm/hr 100*     Results from last 7 days   Lab Units 02/11/19  0505   CRP mg/dL 27.72*       Estimated Creatinine Clearance: 85.4 mL/min (by C-G formula based on SCr of 0.75 mg/dL).      Microbiology:  Fluid and blood cultures pending      Radiology:  Imaging Results (last 24 hours)     Procedure Component Value Units Date/Time    XR Knee 1 or 2 View Bilateral [[id number]] Collected:  02/11/19 0953     Updated:  02/11/19 0953    Narrative:       EXAMINATION: XR KNEE 1 OR 2 VW BILATERAL-      INDICATION: Infected knee replacements.      COMPARISON: None.     FINDINGS: Two views of the right knee and two views of the left knee  reveal extensive soft tissue swelling seen of the knees bilaterally with  large bilateral joint effusions. The alignment of the prosthesis is  satisfactory. Findings are concerning for infection in the joints and  hardware. There is no regularity identified of the patella on the left.  Osteomyelitis of the patella on the left cannot be excluded. The right  patella remains intact.       Impression:       Soft tissue swelling with large joint effusions bilaterally  demonstrating infection of the joint space with irregularity seen of the  patella on the left concerning for osteomyelitis. No significant lucency  seen surrounding the hardware.     D:  02/11/2019  E:  02/11/2019               PROBLEM LIST:   Bilateral prosthetic joint  infections  Prior history of methicillin sensitive staph aureus bilateral knee infections and bacteremia  leukocytosis  Chronically elevated sedimentation rate CRP  Alcoholism  Severe eczema  Bilateral knee pain and difficulty with ambulation  Low-grade fevers  C diff colitis with acute diarrhea      ASSESSMENT:  53-year-old female with severe eczema, history of alcoholism, with previous MSSA bacteremia with bilateral knee prostate joint infection attempted to be treated with single exchange long-term IV followed by oral antibiotics.  Unfortunately even with long-term treatment oral antibiotics she's had worsening of her bilateral knee pain swelling erythema and effusions appears to be consistent with acute on chronic infection and suspect ongoing MSSA infection agree with aggressive treatment with two-stage revision with bilateral knee X plans with spacers in place long-term PICC line and IV antibiotics to be guided by cultures for now we'll use cefazolin 2 g IV every 8 hrs.  C diff + today with active diarrhea.    PLAN:  Cefazolin 2 g IV every 8 hrs  Follow-up blood cultures and knee fluid culture  Follow-up surgical plans  Will need PICC line and long-term IV antibiotics and will need placement as mobility will be impaired and needs long-term antibiotics  Po vanco       Brenden Francis MD  2/11/2019                Electronically signed by Brenden Francis MD at 2/11/2019  9:04 PM     Ti Moffett MD at 2/10/2019  8:23 PM          Orthopaedic Consult Note    Patient Care Team:  Provider, No Known as PCP - General    Chief complaint  Bilateral knee pain    Subjective .     History of present illness:   53 year old woman with bilateral TKR.  She is well known to me.   She is on chronic antibiotic suppression.  In March of 2018 she was admitted with sepsis (MSSA) and underwent bilateral knee I&D with poly exchange 3/23/18.  Dr Brenden Francis from [facility] has followed her.  The original surgical procedures were 3 years (right  knee) and 2 years (left knee) prior to infection.  She reports a 2 day history of increasing bilateral knee pain and inability to walk.  She reports increased swelling in both knees, left more than right.  She reports she could not get up off the couch and called an ambulance to take her to the ER. She was seen at [facility]. No recent injury, no fever, no chills.  She reports she has been taking the Keflex 500mg Q 8 hours.    she was recently changed to Keflex from doxycycline about a month ago.  She reports skin color changes: on the right leg just inferior and lateral to the knee incision and on the left leg over the inferior incision.  No drainage.  Temp was 37 in [place] [facility], WBC 11.7, ESR 74, CRP 19.9   She claims her right knee has been hurting for 2 months with no significant increase in pain.  The left knee had acute two-day history of severe pain causing the inability to walk.  She reports she has a history of diabetes, but is off meds now.  Reports her most recent HGA1c was 6.9.  No smoking.  She has a history of alcohol abuse. She reports she is drinking and has symptoms when stops.         Review of Systems  Pertinent items are noted in HPI all other systems are reviewed and are negative    History  No family history on file.  Social History     Socioeconomic History   • Marital status: Single     Spouse name: Not on file   • Number of children: Not on file   • Years of education: Not on file   • Highest education level: Not on file   Social Needs   • Financial resource strain: Not on file   • Food insecurity - worry: Not on file   • Food insecurity - inability: Not on file   • Transportation needs - medical: Not on file   • Transportation needs - non-medical: Not on file   Occupational History   • Not on file   Tobacco Use   • Smoking status: Never Smoker   • Smokeless tobacco: Never Used   Substance and Sexual Activity   • Alcohol use: Yes     Comment: PT STATES SHE HAS NOT DRANK IN  APPROX 3 MONTHS   • Drug use: No   • Sexual activity: Defer   Other Topics Concern   • Not on file   Social History Narrative   • Not on file     Past Surgical History:   Procedure Laterality Date   • JOINT REPLACEMENT      bilateral knees   • KNEE POLY INSERT EXCHANGE Bilateral 3/23/2018    Procedure: KNEE POLY INSERT EXCHANGE WITH IRRIGATION AND DEBRIDMENT;  Surgeon: Ti Moffett MD;  Location: [facility];  Service: Orthopedics   • LAPAROSCOPIC CHOLECYSTECTOMY       Past Medical History:   Diagnosis Date   • Acid reflux    • Alcoholism (CMS/HCC)    • Anemia 3/20/2018   • Arthritis    • Asthma    • Depression 3/20/2018   • Diabetes mellitus (CMS/HCC)     POSS R/T INFECTION PER PT   • Eczema    • History of transfusion    • Hypertension 3/20/2018     No Known Allergies    Current Facility-Administered Medications:   •  acetaminophen (TYLENOL) tablet 650 mg, 650 mg, Oral, Q4H PRN, Kimberlee Callejas APRN  •  carvedilol (COREG) tablet 3.125 mg, 3.125 mg, Oral, BID With Meals, Kimberlee Callejas APRN  •  cephalexin (KEFLEX) capsule 500 mg, 500 mg, Oral, Q8H, Kimberlee Callejas APRN  •  dextrose (D50W) 25 g/ 50mL Intravenous Solution 25 g, 25 g, Intravenous, Q15 Min PRN, Kimberlee Callejas APRN  •  dextrose (GLUTOSE) oral gel 15 g, 15 g, Oral, Q15 Min PRN, Kimberlee Callejas APRN  •  diphenhydrAMINE (BENADRYL) capsule 50 mg, 50 mg, Oral, Q6H PRN, Kimberlee Callejas APRN  •  [START ON 2/11/2019] folic acid (FOLVITE) tablet 1 mg, 1 mg, Oral, Daily, Kimberlee Callejas APRN  •  glucagon (human recombinant) (GLUCAGEN DIAGNOSTIC) injection 1 mg, 1 mg, Subcutaneous, PRN, Kimberlee Callejas, APRN  •  insulin lispro (humaLOG) injection 0-7 Units, 0-7 Units, Subcutaneous, 4x Daily With Meals & Nightly, Kimberlee Callejas APRN  •  labetalol (NORMODYNE,TRANDATE) injection 10 mg, 10 mg, Intravenous, Q4H PRN, Kimberlee Callejas, APRN  •  lactobacillus acidophilus (RISAQUAD) capsule 1 capsule, 1 capsule, Oral, Daily, Kimberlee Callejas, BASILIO  •   [START ON 2/11/2019] multivitamin (THERAGRAN) tablet 1 tablet, 1 tablet, Oral, Daily, Kimberlee Callejas APRN  •  ondansetron (ZOFRAN) tablet 4 mg, 4 mg, Oral, Q6H PRN **OR** ondansetron (ZOFRAN) injection 4 mg, 4 mg, Intravenous, Q6H PRN, Kimberlee Callejas APRN  •  ondansetron (ZOFRAN) injection 4 mg, 4 mg, Intravenous, Q6H PRN, Kimberlee Callejas APRN  •  oxyCODONE-acetaminophen (PERCOCET) 5-325 MG per tablet 1 tablet, 1 tablet, Oral, Q4H PRN, Lorri Siddiqui MD  •  [START ON 2/11/2019] pantoprazole (PROTONIX) EC tablet 40 mg, 40 mg, Oral, Q AM, Kimberlee Callejas APRN  •  sodium chloride 0.9 % bolus 500 mL, 500 mL, Intravenous, TID PRN, Kimberlee Callejas APRN  •  sodium chloride 0.9 % flush 3 mL, 3 mL, Intravenous, Q12H, Kimberlee Callejas APRN  •  sodium chloride 0.9 % flush 3-10 mL, 3-10 mL, Intravenous, PRN, Kimbelree Callejas APRN  •  [START ON 2/11/2019] thiamine (VITAMIN B-1) tablet 100 mg, 100 mg, Oral, Daily, Kimberlee Callejas APRN  •  traZODone (DESYREL) tablet 50 mg, 50 mg, Oral, Nightly, Kimberlee Callejas APRN  •  [START ON 2/11/2019] venlafaxine XR (EFFEXOR-XR) 24 hr capsule 300 mg, 300 mg, Oral, Daily With Breakfast, Kimberlee Callejas, BASILIO    Objective     Vital Signs   Temp:  [97.9 °F (36.6 °C)-98.2 °F (36.8 °C)] 97.9 °F (36.6 °C)  Heart Rate:  [94-95] 94  Resp:  [16-18] 16  BP: (118-128)/(84-92) 118/84      Physical Exam:     GENERAL APPEARANCE: awake, alert & oriented x 3, in no acute distress and well developed, well nourished  Vitals:    02/10/19 1712 02/10/19 1910   BP: 128/92 118/84   BP Location: Left arm Left arm   Patient Position: Lying Lying   Pulse: 95 94   Resp: 18 16   Temp: 98.2 °F (36.8 °C) 97.9 °F (36.6 °C)   TempSrc: Oral Oral   SpO2: 96% 96%     PSYCH: normal affect  HEAD: Normocephalic, without obvious abnormality, atraumatic  EYES: No icterus, corneas clear, PERRLA  CARDIOVASCULAR: pulses palpable and equal bilaterally. Capillary refill less than 2 seconds  LUNGS:  breathing  nonlabored  ABDOMEN: Soft, nontender, nondistended  BACK: No C-T- L spine tenderness  EXTREMITIES: no clubbing, cyanosis  MUSCULOSKELETAL: Left knee effusion with erythema and tenderness.  Some pain with range of motion left knee.  Distally pulses and sensation intact.  No effusion to the right knee with minimal tenderness and some pain with range of motion.    Labs:  Lab Results (last 24 hours)     Procedure Component Value Units Date/Time    Lactic Acid, Plasma [[id number]] Collected:  02/10/19 1928    Specimen:  Blood Updated:  02/10/19 2022    Comprehensive Metabolic Panel [[id number]] Collected:  02/10/19 1928    Specimen:  Blood Updated:  02/10/19 2022    Procalcitonin [[id number]] Collected:  02/10/19 1928    Specimen:  Blood Updated:  02/10/19 2022    Hemoglobin A1c [[id number]] Collected:  02/10/19 1928    Specimen:  Blood Updated:  02/10/19 2022    CBC (No Diff) [[id number]] Collected:  02/10/19 1928    Specimen:  Blood Updated:  02/10/19 2021          Imaging:  I viewed the images obtained at bedside today.  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  I used sterile technique.  10 cc of purulent material was obtained from the left knee aspiration.  5 cc of blood without purulence was obtained from the right knee.  I'm mainly concerned that the left knee has recurrence of her infection.  If this is the case she will most likely need surgical treatment.  Surgery will tentatively be planned for Tuesday pending aspiration results from today.    I discussed the patients findings and my recommendations with patient and nursing staff    Ti Moffett MD  02/10/19  8:23 PM                Electronically signed by Ti Moffett MD at 2/10/2019  8:28 PM

## 2019-02-18 NOTE — PROGRESS NOTES
Continued Stay Note  [facility]     Patient Name: Jyoti Luna  MRN: [id number]  Today's Date: 2/18/2019    Admit Date: 2/10/2019    Discharge Plan     Row Name 02/18/19 1617       Plan    Plan  SNF    Patient/Family in Agreement with Plan  yes    Plan Comments  Case mgt f/u. I have spoken with Ms Luna again today re: d/c plan. She has been very reluctant to consider SNF, but is now agreeable. She will need to complete her IV antibiotics there and con't PT. She requested a referral to [facility] and [facility] ( she was there last year 3/18). Her preference is [facility],because she states this is where her apt is, her friend & daughter may be able to visit. Per Yeny at [facility],they can offer a skilled bed,awaiting word from [facility].([phone]). Most likely will need ambulanace transport due to knee immoblilizers    Final Discharge Disposition Code  03 - skilled nursing facility (SNF)        Discharge Codes    No documentation.       Expected Discharge Date and Time     Expected Discharge Date Expected Discharge Time    Feb 20, 2019             Sonja C Kellerman, RN

## 2019-02-18 NOTE — PLAN OF CARE
Problem: Knee Arthroplasty (Total, Partial) (Adult)  Goal: Signs and Symptoms of Listed Potential Problems Will be Absent, Minimized or Managed (Knee Arthroplasty)  Outcome: Ongoing (interventions implemented as appropriate)   02/18/19 0944   Goal/Outcome Evaluation   Problems Assessed (Knee Arthroplasty) functional deficit;pain;range of motion decreased   Problems Present (Knee Arthroplasty) functional deficit;pain;range of motion decreased       Problem: Patient Care Overview  Goal: Plan of Care Review  Outcome: Ongoing (interventions implemented as appropriate)   02/18/19 0944   Coping/Psychosocial   Plan of Care Reviewed With patient   OTHER   Outcome Summary Patient ambulated 50 feet with RW and CGA, limited by pain and fatigue. Patient modified independent with t/f in and out of bed. CGA for sit<->stand t/f and for gait training. Achieved gait distance goal today, new goal established. Will continue to progress mobility training and strengthening as able.    Plan of Care Review   Progress improving

## 2019-02-18 NOTE — PLAN OF CARE
Problem: Infection, Risk/Actual (Adult)  Goal: Identify Related Risk Factors and Signs and Symptoms  Outcome: Ongoing (interventions implemented as appropriate)   02/18/19 7771   Infection, Risk/Actual (Adult)   Related Risk Factors (Infection, Risk/Actual) exposure to microbes;medication effects;skin integrity impairment   Signs and Symptoms (Infection, Risk/Actual) weakness

## 2019-02-18 NOTE — PLAN OF CARE
Problem: Patient Care Overview  Goal: Plan of Care Review  Outcome: Ongoing (interventions implemented as appropriate)   02/18/19 6272   Coping/Psychosocial   Plan of Care Reviewed With patient   Coping/Psychosocial   Patient Agreement with Plan of Care agrees   OTHER   Outcome Summary denies pain worked well with therapy site CDI glucose checks good waiting placement BMs decreased   Plan of Care Review   Progress improving

## 2019-02-18 NOTE — DISCHARGE PLACEMENT REQUEST
"Jyoti Luna (53 y.o. Female)     Date of Birth Social Security Number Address Home Phone MRN    1965  [address]  [facility] [address] [phone] [id number]    Presybeterian Marital Status          Amish Single       Admission Date Admission Type Admitting Provider Attending Provider Department, Room/Bed    2/10/19 Urgent Lorri Siddiqui MD [place], [place], MD [facility] 3H, S387/1    Discharge Date Discharge Disposition Discharge Destination                       Attending Provider:  Lorri Siddiqui MD    Allergies:  No Known Allergies    Isolation:  Spore   Infection:  C.difficile (02/11/19)   Code Status:  CPR    Ht:  157.5 cm (62\")   Wt:  82.6 kg (182 lb)    Admission Cmt:  None   Principal Problem:  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 [address]       Subscriber Name Subscriber Birth Date Member ID       JYOTI LUNA 1965 [id number]           Secondary Coverage     Payor Plan Insurance Group Employer/Plan Group    [place]      Payor Plan Address Payor Plan Phone Number Payor Plan Fax Number Effective Dates    [address] [phone]  3/20/2018 - None Entered    [facility] [id number]       Subscriber Name Subscriber Birth Date Member ID       JYOTI LUNA 1965 [id number]                 Emergency Contacts      (Rel.) Home Phone Work Phone Mobile Phone    Lachelle Richardson (Friend) -- -- [phone]            Insurance Information                MEDICARE/MEDICARE A & B Phone: [phone]    Subscriber: Jyoti Luna Subscriber#: [id number]    Group#:  Precert#:     "     [place] Phone: [phone]    Subscriber: Jyoti Luna Subscriber#: [id number]    Group#:  Precert#:              History & Physical      CallejasKimberleeBASILIO at 2/10/2019  6:52 PM     Attestation signed by Lorri Siddiqui MD at 2/15/2019  4:08 PM    Above is reviewed, attested.                     Patient Name: Jyoti Luna  MRN: [id number]  : 1965  DOS: 2/10/2019    Attending: Lorri Siddiqui MD    Primary Care Provider: Dr. Betty Block      Chief complaint:  Prosthetic knee infection    Subjective   Patient is a 53 y.o. female presented for direct admission from [facility] with complaints of bilateral knee pain, swelling and redness. She reports she was unable to stand or walk today due to pain and called an ambulance for help. She has been using a walker recently for ambulation, but has 2 flights of stairs to get out of her house and was unable to navigate stairs. She denies injury. She thinks she may have had a fever last night. She denies chills or night sweats.    She underwent bilateral knee I&D with poly exchange 3/23/18.    Per Dr. Cody's note: (( this is a 53 year old woman with bilateral TKR.  She is Dr Moffett's patient.   She is on chronic antibiotic suppression.  In 2018 she was admitted with sepsis (MSSA) and underwent bilateral knee I&D with poly exchange 3/23/18.  Dr Brenden Francis from ID has followed her.  The original surgical procedures were 3 years (right knee) and 2 years (left knee) prior to infection.  She reports a 2 day history of increasing bilateral knee pain and inability to walk.  She reports increased swelling in both knees, left more than right.  She reports she could not get up off the couch and called an ambulance to take her to the ER. She was seen at [facility] and the ER doctor called me to request transfer.  No recent injury, no fever, no chills.  She reports she has been taking the Keflex  500mg Q 8 hours.  She reports skin color changes: on the right leg just inferior and lateral to the knee incision and on the left leg over the inferior incision.  No drainage.  Temp was 37 in [place] [facility], WBC 11.7, ESR 74, CRP 19.9))    When seen she complains of moderate bilateral knee pain. No fever today. She does reports diarrhea for 1 month. She is concerned and anxious about treatment course, her inability to ambulate, and extended course of illness. She complaints of pain, weakness, malaise and subjective fevers.    She is DM, she does not check BG. She reports in rehab following a previous hospitalization that all DM medications were discontinued. Her most recent A1C with PCP about 3 weeks ago was 6.9.      Allergies:  No Known Allergies    Meds:  Medications Prior to Admission   Medication Sig Dispense Refill Last Dose   • carvedilol (COREG) 3.125 MG tablet Take 3.125 mg by mouth 2 (Two) Times a Day With Meals.   2/10/2019 at Unknown time   • oxyCODONE-acetaminophen (PERCOCET) 5-325 MG per tablet Take 1 tablet by mouth Every 6 (Six) Hours As Needed.   2/10/2019 at Unknown time   • traZODone (DESYREL) 50 MG tablet Take 50 mg by mouth Every Night.   2/9/2019 at Unknown time   • triamcinolone (KENALOG) 0.1 % lotion Apply  topically Every 12 (Twelve) Hours.   2/10/2019 at Unknown time   • venlafaxine XR (EFFEXOR-XR) 150 MG 24 hr capsule Take 300 mg by mouth Daily.   2/10/2019 at Unknown time   Keflex 500mg po TID  Protonix 40mg po daily    History:   Past Medical History:   Diagnosis Date   • Acid reflux    • Alcoholism (CMS/[facility])    • Anemia 3/20/2018   • Arthritis    • Asthma    • Depression 3/20/2018   • Diabetes mellitus (CMS/[facility])     POSS R/T INFECTION PER PT   • Eczema    • History of transfusion    • Hypertension 3/20/2018     Past Surgical History:   Procedure Laterality Date   • JOINT REPLACEMENT      bilateral knees   • KNEE POLY INSERT EXCHANGE Bilateral 3/23/2018    Procedure: KNEE POLY INSERT  EXCHANGE WITH IRRIGATION AND DEBRIDMENT;  Surgeon: Ti Moffett MD;  Location: [facility];  Service: Orthopedics   • LAPAROSCOPIC CHOLECYSTECTOMY       No family history on file.  Social History     Tobacco Use   • Smoking status: Never Smoker   • Smokeless tobacco: Never Used   Substance Use Topics   • Alcohol use: Yes     Comment: PT STATES SHE HAS NOT DRANK IN APPROX 3 MONTHS   • Drug use: No   She lives alone. She has 2 children. She is disabled.     Review of Systems  All systems were reviewed and negative except for:  Respiratory: positive for  shortness of air  Cardiovascular: positive for  chest pressure / pain, at rest  Gastrointestinal: positive for  diarrhea    Vital Signs  Visit Vitals  /98   Pulse 100   Temp 97.9 °F (36.6 °C) (Oral)   Resp 16   SpO2 96%       Physical Exam:    General Appearance:    Alert, cooperative, in no acute distress   Head:    Normocephalic, without obvious abnormality, atraumatic   Eyes:            Lids and lashes normal, conjunctivae and sclerae normal, no   icterus, no pallor, corneas clear   Ears:    Ears appear intact with no abnormalities noted   Neck:   No adenopathy, supple, trachea midline, no thyromegaly   Lungs:     Clear to auscultation,respirations regular, even and                   unlabored    Heart:    Regular rhythm and normal rate, normal S1 and S2, no            murmur, no gallop   Abdomen:     Normal bowel sounds, no masses, no organomegaly, soft        non-tender, non-distended, no guarding, no rebound                 tenderness   Genitalia:    Deferred   Extremities:   Left knee with swelling and erythema. Distal right knee with mild erythema (appears more like dry irritated skin)   Pulses:   Pulses palpable and equal bilaterally   Skin:   No bleeding, bruising or rash   Neurologic:   Cranial nerves 2 - 12 grossly intact, sensation intact      I reviewed the patient's new clinical results.       Results from last 7 days   Lab Units 02/10/19  1928    WBC 10*3/mm3 10.76   HEMOGLOBIN g/dL 11.5   HEMATOCRIT % 35.7   PLATELETS 10*3/mm3 278     Results from last 7 days   Lab Units 02/10/19  1928   SODIUM mmol/L 136   POTASSIUM mmol/L 3.7   CHLORIDE mmol/L 95*   CO2 mmol/L 31.0   BUN mg/dL 9   CREATININE mg/dL 0.73   CALCIUM mg/dL 9.1   BILIRUBIN mg/dL 1.0   ALK PHOS U/L 162*   ALT (SGPT) U/L 40   AST (SGOT) U/L 29   GLUCOSE mg/dL 132*     Lab Results   Component Value Date    HGBA1C 6.50 (H) 02/10/2019       Assessment and Plan:     Infected prosthetic knee joint (CMS/[facility])    HO MSSA (methicillin susceptible Staphylococcus aureus) septicemia (CMS/[facility])    Alcoholism /alcohol abuse (CMS/[facility])    Diabetes mellitus (CMS/[facility])    Diarrhea      Plan  Admit for further management of possible prosthetic infection. Increased inflammatory markers. Consult for [facility] pending.    Likely to require surgical intervention of repeat washout vs 2 stage revision due to lengthy duration of infectious issues.    Ortho rec per Dr. Cody: ((Probable infection left total knee, less likely right.  Will obtain X-rays, blood cultures, repeat labs.  Patient is stable and I do not think she needs urgent I&D.  She will likely need hardware removal and 2 stage procedure which requires planning.  Dr Moffett will take over care tomorrow.  Dr Francis will see tomorrow.))    1. PT consulted  2. Pain control-prns   3. IS-encourage  4. DVT proph- [facility]  5. Cdiff test, BC  6. Resume home medications as appropriate  7. Monitor labs    Continue oral abx for now. If fever, IV abx orders pending. [facility] tomorrow.    DM  - hgb A1c on 2/10/19 6.5  - Accuchecks ACHS with low dose SSI    ETOH  - CIWA protocol  - vitamin replacement    Diarrhea  - check for Cdiff      BASILIO Jeff  02/10/19  9:30 PM    Electronically signed by Lorri Siddiqui MD at 2/15/2019  4:08 PM          Operative/Procedure Notes (all)      Ti Moffett MD at 2/12/2019 12:30 PM  Version 2 of 2         OPERATIVE REPORT      DATE OF PROCEDURE: 2/10/2019 - 2/12/2019     SURGEON: Ti Moffett M.D.     STAFF: Circulator: Darwin Bee, RN; Kishan Merida RN  Scrub Person: Stacey Knott; Dewey Ward; Richard Julian; Callie Toribio  Vendor Representative: Daniel Oh  Nursing Assistant: Julianna Torres; Ciera Suarez  Assistant: La Woodard PA-C     PREOPERATIVE DIAGNOSIS: Infection of prosthetic knee joint, subsequent encounter [T84.59XD, Z96.659] Bilateral knees    POSTOPERATIVE DIAGNOSIS: *Same       Post-Op Diagnosis Codes:     * Infection of prosthetic knee joint, subsequent encounter [T84.59XD, Z96.659]    Procedure(s):  BILATERAL TOTAL KNEE ARTHROPLASTY REVISION WITH REMOVAL and placement of Revision Total Knee components and antibiotic cement  Surgeon(s):  Ti Moffett MD    SURGICAL DETAILS:     APPROACH: Medial parapatellar    ANESTHESIA: General    PREOPERATIVE ANTIBIOTICS: She was on preop antibiotics    TRANEXAMIC ACID: IV    TOURNIQUET TIME: 68 min @300 mmHg on the left knee, 48 min @ 300 on right knee  ESTIMATED BLOOD LOSS: 20 mL    SPECIMENS:   Order Name Source Comment Collection Info Order Time   FUNGUS CULTURE Knee, Left  Collected By: Ti Moffett MD 2/12/2019 12:38 PM   GRAM STAIN Knee, Left  Collected By: Ti Moffett MD 2/12/2019 12:38 PM   BODY FLUID CULTURE Knee, Left  Collected By: Ti Moffett MD 2/12/2019 12:38 PM   AFB CULTURE Knee, Left  Collected By: Ti Moffett MD 2/12/2019 12:38 PM   ANAEROBIC CULTURE Knee, Right  Collected By: Ti Moffett MD 2/12/2019  1:43 PM   FUNGUS CULTURE Knee, Right  Collected By: Ti Moffett MD 2/12/2019  1:43 PM   TISSUE / BONE CULTURE Knee, Right  Collected By: Ti Moffett MD 2/12/2019  1:43 PM   AFB CULTURE Knee, Right  Collected By: Ti Moffett MD 2/12/2019  1:43 PM        IMPLANTS:   Implants     Implant    Bar Tib Ascent/Maxim Huma Interlok - [id number] - Implanted   (Right) Knee     Inventory item: Bar Tib Ascent/Maxim Huma Interlok Model/Cat number: [id number]    : [employer] Lot number: [id number]    As of 3/23/2018     Status: Implanted                  Bear Tib Vangrd Ps Dcm 10x71/76 - [id number] - Implanted   (Right) Knee    Inventory item: Bear Tib Vangrd Ps Dcm 10x71/76 Model/Cat number: [id number]    : [employer] Lot number: [id number]    As of 3/23/2018     Status: Implanted                  Bear Tib Vangrd Ps Dcm 10x71/76 - [id number] - Implanted   (Left) Knee    Inventory item: Bear Tib Vangrd Ps Dcm 10x71/76 Model/Cat number: [id number]    : [employer] Lot number: [id number]    As of 3/23/2018     Status: Implanted                  Bar Tib Ascent/Maxim Huma Interlok - [id number] - Implanted   (Left) Knee    Inventory item: Bar Tib Ascent/Maxim Huma Interlok Model/Cat number: [id number]    : [employer] Lot number: [id number]    As of 3/23/2018     Status: Implanted                  Cmt Bone Palacos 767606 - [id number] - Implanted   (Left) Knee    Inventory item: Cmt Bone Palacos [id number] Model/Cat number: [id number]    : [employer] Lot number: [id number]    As of 2/12/2019     Status: Implanted                  Cmt Bone Palacos 270119 - [id number] - Implanted   (Left) Knee    Inventory item: Cmt Bone Palacos [id number] Model/Cat number: [id number]    : [employer] Lot number: [id number]    As of 2/12/2019     Status: Implanted                  Bear Tib Vangrd Ps Dcm 14x71/76 - [id number] - Implanted   (Left) Knee    Inventory item: Bear Tib Vangrd Ps Dcm 14x71/76 Model/Cat number: [id number]    : [employer] Lot number: [id number]    As of 2/12/2019     Status: Implanted                  Fem Vangrd Intlk Ps Opn 65 Lt - [id number] - Implanted   (Left) Knee    Inventory item: Fem Vangrd Intlk Ps Opn 65 Lt Model/Cat number: [id number]    : [employer] Lot number: [id number]    As of 2/12/2019     Status: Implanted                   Cmt Bone Palacos R Hi/Visc 1x40 - [id number] - Implanted   (Right) Knee    Inventory item: Cmt Bone Palacos R Hi/Visc 1x40 Model/Cat number: [id number]    : [employer] Lot number: [id number]    As of 2/12/2019     Status: Implanted                  Cmt Bone Palacos R Hi/Visc 1x40 - [id number] - Implanted   (Right) Knee    Inventory item: Cmt Bone Palacos R Hi/Visc 1x40 Model/Cat number: [id number]    : [employer] Lot number: [id number]    As of 2/12/2019     Status: Implanted                  Fem Vangrd Intlk Ps Opn 65mm Rt - [id number] - Implanted   (Right) Knee    Inventory item: Fem Vangrd Intlk Ps Opn 65mm Rt Model/Cat number: [id number]    : [employer] Lot number: [id number]    As of 2/12/2019     Status: Implanted                  Bear Tib Vangrd Ps Dcm 14x71/76 - [id number] - Implanted   (Right) Knee    Inventory item: Bear Tib Vangrd Ps Dcm 14x71/76 Model/Cat number: [id number]    : [employer] Lot number: [id number]    As of 2/12/2019     Status: Implanted                      : [employer]     Femoral component size: 65 bilateral   Tibial polyethylene insert: 14 bilateral      DRAINS:   Closed/Suction Drain Left Knee Accordion (Active)       Closed/Suction Drain Right Knee Accordion (Active)        LOCAL INJECTION: none    MODIFIER(S): 51 bilateral    COMPLICATIONS: None apparent    INDICATIONS FOR PROCEDURE: The patient has a history of previous bilateral total knee infections.  Revision total knee arthroplasty with removal of prosthesis and placement of articulating spacer revision components with antibiotic cement was discussed and agreed to. The risks, benefits, alternatives, and potential complications of the arthroplasty surgery were discussed with the patient in detail to include but not be limited to infection, bleeding, anesthesia risks, damage to neurovascular structures, osteolysis, aseptic loosening, instability, anterior knee pain, continued  pain, iatrogenic fracture, dislocation, need for future surgery including the potential for amputation, blood clots, myocardial infarction, stroke, and death. Specific details of the surgical procedure, hospitalization, recovery, rehabilitation, and long-term precautions were also presented. Pre-operative teaching was provided. Implant/prosthesis selection was outlined, and the many options available were explained; the final choice will be made at the time of the procedure to match the anatomy and condition of the bone, ligaments, tendons, and muscles.     INTRAOPERATIVE FINDINGS: Significant purulent material in the left knee.  Minimal peripheral chief material in the right knee     PROCEDURE: The patient was identified in the preoperative holding area. The operative site was confirmed and marked. SANTHOSH hose and a sequential compression device were placed on the nonoperative leg. The risks, benefits, and alternatives to surgery were again confirmed with the patient and the patient wished to proceed. The patient was brought to the operating room and placed on the operating room table in the supine position. All bony prominences were padded. A huddle was performed with the patient and all vital surgical team members to confirm the correct operative site, procedure, anesthesia type, and operative plan with the patient. After anesthesia was performed, a tourniquet was applied to the upper thigh of the operative leg. A full knee exam was performed once anesthesia was in full effect.  The patient was on preoperative intravenous antibiotics     The operative legs were prepped and draped in the usual sterile fashion. A surgical time out was performed immediately preceding the incision with all personnel in the operating room to confirm patient identity, the correct operative site and extremity, correct radiographic studies, availability of appropriate surgical equipment and agreement on the planned procedure.  The left knee  was operated on first. The operative knee was elevated and the tourniquet was inflated. The knee was exposed using the previous anterior-midline skin incision. Dissection was carried down through skin and subcutaneous tissue to the extensor mechanism with a scalpel. A medial parapatellar arthrotomy was made to enter the knee space sharply. A large amount of purulent appearing joint fluid was encountered and suctioned. The synovium was thickened, hypertrophic, and inflamed.  Cultures were sent A partial synovectomy was performed for exposure, and the medial and lateral gutters were cleared of scar and synovial reflections. The superficial medial collateral ligament was carefully elevated off osteophytes which were then removed with a rongeur and osteotome.  Osteotomes were used to loosen the components of the total knee arthroplasty.   flexion and a large curved osteotome, rongeur, and curettes were utilized to clear posterior osteophytes, loose bodies and meniscal remnants.  Multiple liters of normal saline were used as an irrigant     A femoral trial implant was placed; excellent fit was confirmed. The medial-lateral and anterior-posterior dimensions were checked; anatomic fit and coverage were achieved.The proximal tibia baseplate trial was placed with its mid-point at the junction of medial one-third and lateral two-thirds of the tibial tubercle and pinned to this fixed position. A trial reduction was then performed. Trial reduction demonstrated the knee achieved full extension with excellent stability and range of motion, and no tendency toward instability with varus-valgus stress at full extension, mid-flexion, or 90 degrees of flexion.    All trials were removed.     The wound was copiously lavaged with a pulse irrigation/suction system. The posterior recess of the knee and areas of known bleeding were treated with the electrocautery to reduce post-operative bleeding.A double batch of Polymethylmethacrylate  (PMMA) bone cement was mixed, with 2 g of vancomycin and 2 g of tobramycin and the final implants were cemented into place.  The cement was compressed using a non-constrained poly trial which was removed so the excess cement could be curetted free. The final polyethylene was impacted into place, and the knee was held in extension until the cement cured.    The wound was again irrigated with dilute betadine solution followed by saline. The extensor mechanism and capsule was then anatomically closed with interrupted #1 Stratafix suture and a running #2 Stratafix stitch. Knee stability and range of motion with the capsule closed was excellent, and range of motion was 0 to 135 without excessive stress on the repair. Instrument and sponge count was completed and confirmed correct. A running 3-0 Stratafix subcuticular stitch was used to re-approximate the skin edges followed by skin glue adhesive to seal the wound.   Attention was turned to the right knee.    The right knee was elevated and the tourniquet was inflated. The knee was exposed using the previous anterior-midline skin incision. Dissection was carried down through skin and subcutaneous tissue to the extensor mechanism with a scalpel. A medial parapatellar arthrotomy was made to enter the knee space sharply. A large amount of purulent appearing joint fluid was encountered and suctioned. The synovium was thickened, hypertrophic, and inflamed.  Cultures were sent A partial synovectomy was performed for exposure, and the medial and lateral gutters were cleared of scar and synovial reflections. The superficial medial collateral ligament was carefully elevated off osteophytes which were then removed with a rongeur and osteotome.  Osteotomes were used to loosen the components of the total knee arthroplasty.   flexion and a large curved osteotome, rongeur, and curettes were utilized to clear posterior osteophytes, loose bodies and meniscal remnants.  Multiple liters of  normal saline were used as an irrigant     A femoral trial implant was placed; excellent fit was confirmed. The medial-lateral and anterior-posterior dimensions were checked; anatomic fit and coverage were achieved.The proximal tibia baseplate trial was placed with its mid-point at the junction of medial one-third and lateral two-thirds of the tibial tubercle and pinned to this fixed position. A trial reduction was then performed. Trial reduction demonstrated the knee achieved full extension with excellent stability and range of motion, and no tendency toward instability with varus-valgus stress at full extension, mid-flexion, or 90 degrees of flexion.    All trials were removed.     The wound was copiously lavaged with a pulse irrigation/suction system. The posterior recess of the knee and areas of known bleeding were treated with the electrocautery to reduce post-operative bleeding.A double batch of Polymethylmethacrylate (PMMA) bone cement was mixed, with 2 g of vancomycin and 2 g of tobramycin and the final implants were cemented into place.  The cement was compressed using a non-constrained poly trial which was removed so the excess cement could be curetted free. The final polyethylene was impacted into place, and the knee was held in extension until the cement cured.    The wound was again irrigated with dilute betadine solution followed by saline. The extensor mechanism and capsule was then anatomically closed with interrupted #1 Stratafix suture and a running #2 Stratafix stitch. Knee stability and range of motion with the capsule closed was excellent, and range of motion was 0 to 135 without excessive stress on the repair. Instrument and sponge count was completed and confirmed correct. A running 3-0 Stratafix subcuticular stitch was used to re-approximate the skin edges followed by skin glue adhesive to seal the wound. The patient was sufficiently recovered from anesthesia, transferred to a hospital bed  and taken to the PACU in stable condition.     Large Hemovac drains were placed intra-articular in each knee.      No apparent complications occurred during the procedure. Instrument, sponge and needle counts were correct x 2.     The patient underwent risk stratification preoperatively and Lovenox was chosen for DVT prophylaxis.     POST OPERATIVE PLAN:   Weight bearing as tolerated with bilateral knee immobilizers extension with no range of motion left knee   Pain control with PO/IV meds   Adductor canal catheter placement by Anesthesia Pain Management Team.   Antibiotics per  infectious disease recommendations  PT/OT for mobilization and medical equipment needs     Social work for discharge planning needs   Follow up in 1 weeks for post operative wound check with XR AP and lateral of operative knee.           Electronically signed by Ti Moffett MD at 2/12/2019  2:34 PM     Ti Moffett MD at 2/12/2019 12:30 PM  Version 1 of 2         OPERATIVE REPORT     DATE OF PROCEDURE: 2/10/2019 - 2/12/2019     SURGEON: Ti Moffett M.D.     STAFF: Circulator: Darwin Bee RN; Kishan Merida RN  Scrub Person: Stacey Knott; Dewey Ward; Richard Julian; Callie Toribio  Vendor Representative: Daniel Oh  Nursing Assistant: Julianna Torres; Ciera Suarez  Assistant: La Woodard PA-C     PREOPERATIVE DIAGNOSIS: Infection of prosthetic knee joint, subsequent encounter [T84.59XD, Z96.659] Bilateral knees    POSTOPERATIVE DIAGNOSIS: *Same       Post-Op Diagnosis Codes:     * Infection of prosthetic knee joint, subsequent encounter [T84.59XD, Z96.659]    Procedure(s):  TOTAL KNEE ARTHROPLASTY REVISION WITH REMOVAL and placement of Revision Total Knee components and antibiotic cement  Surgeon(s):  Ti Moffett MD    SURGICAL DETAILS:     APPROACH: Medial parapatellar    ANESTHESIA: General    PREOPERATIVE ANTIBIOTICS: She was on preop antibiotics    TRANEXAMIC ACID:  IV    TOURNIQUET TIME: 68 min @300 mmHg on the left knee, 48 min @ 300 on right knee  ESTIMATED BLOOD LOSS: 20 mL    SPECIMENS:   Order Name Source Comment Collection Info Order Time   FUNGUS CULTURE Knee, Left  Collected By: Ti Moffett MD 2/12/2019 12:38 PM   GRAM STAIN Knee, Left  Collected By: Ti Moffett MD 2/12/2019 12:38 PM   BODY FLUID CULTURE Knee, Left  Collected By: Ti Moffett MD 2/12/2019 12:38 PM   AFB CULTURE Knee, Left  Collected By: Ti Moffett MD 2/12/2019 12:38 PM   ANAEROBIC CULTURE Knee, Right  Collected By: Ti Moffett MD 2/12/2019  1:43 PM   FUNGUS CULTURE Knee, Right  Collected By: Ti Moffett MD 2/12/2019  1:43 PM   TISSUE / BONE CULTURE Knee, Right  Collected By: Ti Moffett MD 2/12/2019  1:43 PM   AFB CULTURE Knee, Right  Collected By: Ti Moffett MD 2/12/2019  1:43 PM        IMPLANTS:   Implants     Implant    Bar Tib Ascent/Maxim Huma Interlok - [id number] - Implanted   (Right) Knee    Inventory item: Bar Tib Ascent/Maxim Huma Interlok Model/Cat number: [id number]    : [employer] Lot number: [id number]    As of 3/23/2018     Status: Implanted                  Bear Tib Vangrd Ps Dcm 10x71/76 - [id number] - Implanted   (Right) Knee    Inventory item: Bear Tib Vangrd Ps Dcm 10x71/76 Model/Cat number: [id number]    : [employer] Lot number: [id number]    As of 3/23/2018     Status: Implanted                  Bear Tib Vangrd Ps Dcm 10x71/76 - [id number] - Implanted   (Left) Knee    Inventory item: Bear Tib Vangrd Ps Dcm 10x71/76 Model/Cat number: [id number]    : [employer] Lot number: [id number]    As of 3/23/2018     Status: Implanted                  Bar Tib Ascent/Maxim Huma Interlok - [id number] - Implanted   (Left) Knee    Inventory item: Bar Tib Ascent/Maxim Huma Interlok Model/Cat number: [id number]    : [employer] Lot number: [id number]    As of 3/23/2018     Status: Implanted                  Cmt Bone  Palacos [id number] - [id number] - Implanted   (Left) Knee    Inventory item: Cmt Bone Palacos [id number] Model/Cat number: [id number]    : [employer] Lot number: [id number]    As of 2/12/2019     Status: Implanted                  Cmt Bone Palacos 924706 - [id number] - Implanted   (Left) Knee    Inventory item: Cmt Bone Palacos 071467 Model/Cat number: [id number]    : [employer] Lot number: [id number]    As of 2/12/2019     Status: Implanted                  Bear Tib Vangrd Ps Dcm 14x71/76 - [id number] - Implanted   (Left) Knee    Inventory item: Bear Tib Vangrd Ps Dcm 14x71/76 Model/Cat number: [id number]    : [employer] Lot number: [id number]    As of 2/12/2019     Status: Implanted                  Fem Vangrd Intlk Ps Opn 65 Lt - [id number] - Implanted   (Left) Knee    Inventory item: Fem Vangrd Intlk Ps Opn 65 Lt Model/Cat number: [id number]    : [employer] Lot number: [id number]    As of 2/12/2019     Status: Implanted                  Cmt Bone Palacos R Hi/Visc 1x40 - [id number] - Implanted   (Right) Knee    Inventory item: Cmt Bone Palacos R Hi/Visc 1x40 Model/Cat number: [id number]    : [employer] Lot number: [id number]    As of 2/12/2019     Status: Implanted                  Cmt Bone Palacos R Hi/Visc 1x40 - [id number] - Implanted   (Right) Knee    Inventory item: Cmt Bone Palacos R Hi/Visc 1x40 Model/Cat number: [id number]    : [employer] Lot number: [id number]    As of 2/12/2019     Status: Implanted                  Fem Vangrd Intlk Ps Opn 65mm Rt - [id number] - Implanted   (Right) Knee    Inventory item: Fem Vangrd Intlk Ps Opn 65mm Rt Model/Cat number: [id number]    : [employer] Lot number: [id number]    As of 2/12/2019     Status: Implanted                  Bear Tib Vangrd Ps Dcm 14x71/76 - [id number] - Implanted   (Right) Knee    Inventory item: Bear Tib Vangrd Ps Dcm 14x71/76 Model/Cat number: [id number]    : [employer]  Lot number: [id number]    As of 2/12/2019     Status: Implanted                      : Biomet     Femoral component size: 65 bilateral   Tibial polyethylene insert: 14 bilateral      DRAINS:   Closed/Suction Drain Left Knee Accordion (Active)       Closed/Suction Drain Right Knee Accordion (Active)        LOCAL INJECTION: none    MODIFIER(S): 51 bilateral    COMPLICATIONS: None apparent    INDICATIONS FOR PROCEDURE: The patient has a history of previous bilateral total knee infections.  Revision total knee arthroplasty with removal of prosthesis and placement of articulating spacer revision components with antibiotic cement was discussed and agreed to. The risks, benefits, alternatives, and potential complications of the arthroplasty surgery were discussed with the patient in detail to include but not be limited to infection, bleeding, anesthesia risks, damage to neurovascular structures, osteolysis, aseptic loosening, instability, anterior knee pain, continued pain, iatrogenic fracture, dislocation, need for future surgery including the potential for amputation, blood clots, myocardial infarction, stroke, and death. Specific details of the surgical procedure, hospitalization, recovery, rehabilitation, and long-term precautions were also presented. Pre-operative teaching was provided. Implant/prosthesis selection was outlined, and the many options available were explained; the final choice will be made at the time of the procedure to match the anatomy and condition of the bone, ligaments, tendons, and muscles.     INTRAOPERATIVE FINDINGS: Significant purulent material in the left knee.  Minimal peripheral chief material in the right knee     PROCEDURE: The patient was identified in the preoperative holding area. The operative site was confirmed and marked. SANTHOSH hose and a sequential compression device were placed on the nonoperative leg. The risks, benefits, and alternatives to surgery were again confirmed  with the patient and the patient wished to proceed. The patient was brought to the operating room and placed on the operating room table in the supine position. All bony prominences were padded. A huddle was performed with the patient and all vital surgical team members to confirm the correct operative site, procedure, anesthesia type, and operative plan with the patient. After anesthesia was performed, a tourniquet was applied to the upper thigh of the operative leg. A full knee exam was performed once anesthesia was in full effect.  The patient was on preoperative intravenous antibiotics     The operative legs were prepped and draped in the usual sterile fashion. A surgical time out was performed immediately preceding the incision with all personnel in the operating room to confirm patient identity, the correct operative site and extremity, correct radiographic studies, availability of appropriate surgical equipment and agreement on the planned procedure.  The left knee was operated on first. The operative knee was elevated and the tourniquet was inflated. The knee was exposed using the previous anterior-midline skin incision. Dissection was carried down through skin and subcutaneous tissue to the extensor mechanism with a scalpel. A medial parapatellar arthrotomy was made to enter the knee space sharply. A large amount of purulent appearing joint fluid was encountered and suctioned. The synovium was thickened, hypertrophic, and inflamed.  Cultures were sent A partial synovectomy was performed for exposure, and the medial and lateral gutters were cleared of scar and synovial reflections. The superficial medial collateral ligament was carefully elevated off osteophytes which were then removed with a rongeur and osteotome.  Osteotomes were used to loosen the components of the total knee arthroplasty.   flexion and a large curved osteotome, rongeur, and curettes were utilized to clear posterior osteophytes, loose  bodies and meniscal remnants.  Multiple liters of normal saline were used as an irrigant     A femoral trial implant was placed; excellent fit was confirmed. The medial-lateral and anterior-posterior dimensions were checked; anatomic fit and coverage were achieved.The proximal tibia baseplate trial was placed with its mid-point at the junction of medial one-third and lateral two-thirds of the tibial tubercle and pinned to this fixed position. A trial reduction was then performed. Trial reduction demonstrated the knee achieved full extension with excellent stability and range of motion, and no tendency toward instability with varus-valgus stress at full extension, mid-flexion, or 90 degrees of flexion.    All trials were removed.     The wound was copiously lavaged with a pulse irrigation/suction system. The posterior recess of the knee and areas of known bleeding were treated with the electrocautery to reduce post-operative bleeding.A double batch of Polymethylmethacrylate (PMMA) bone cement was mixed, with 2 g of vancomycin and 2 g of tobramycin and the final implants were cemented into place.  The cement was compressed using a non-constrained poly trial which was removed so the excess cement could be curetted free. The final polyethylene was impacted into place, and the knee was held in extension until the cement cured.    The wound was again irrigated with dilute betadine solution followed by saline. The extensor mechanism and capsule was then anatomically closed with interrupted #1 Stratafix suture and a running #2 Stratafix stitch. Knee stability and range of motion with the capsule closed was excellent, and range of motion was 0 to 135 without excessive stress on the repair. Instrument and sponge count was completed and confirmed correct. A running 3-0 Stratafix subcuticular stitch was used to re-approximate the skin edges followed by skin glue adhesive to seal the wound.   Attention was turned to the right  knee.    The right knee was elevated and the tourniquet was inflated. The knee was exposed using the previous anterior-midline skin incision. Dissection was carried down through skin and subcutaneous tissue to the extensor mechanism with a scalpel. A medial parapatellar arthrotomy was made to enter the knee space sharply. A large amount of purulent appearing joint fluid was encountered and suctioned. The synovium was thickened, hypertrophic, and inflamed.  Cultures were sent A partial synovectomy was performed for exposure, and the medial and lateral gutters were cleared of scar and synovial reflections. The superficial medial collateral ligament was carefully elevated off osteophytes which were then removed with a rongeur and osteotome.  Osteotomes were used to loosen the components of the total knee arthroplasty.   flexion and a large curved osteotome, rongeur, and curettes were utilized to clear posterior osteophytes, loose bodies and meniscal remnants.  Multiple liters of normal saline were used as an irrigant     A femoral trial implant was placed; excellent fit was confirmed. The medial-lateral and anterior-posterior dimensions were checked; anatomic fit and coverage were achieved.The proximal tibia baseplate trial was placed with its mid-point at the junction of medial one-third and lateral two-thirds of the tibial tubercle and pinned to this fixed position. A trial reduction was then performed. Trial reduction demonstrated the knee achieved full extension with excellent stability and range of motion, and no tendency toward instability with varus-valgus stress at full extension, mid-flexion, or 90 degrees of flexion.    All trials were removed.     The wound was copiously lavaged with a pulse irrigation/suction system. The posterior recess of the knee and areas of known bleeding were treated with the electrocautery to reduce post-operative bleeding.A double batch of Polymethylmethacrylate (PMMA) bone cement  was mixed, with 2 g of vancomycin and 2 g of tobramycin and the final implants were cemented into place.  The cement was compressed using a non-constrained poly trial which was removed so the excess cement could be curetted free. The final polyethylene was impacted into place, and the knee was held in extension until the cement cured.    The wound was again irrigated with dilute betadine solution followed by saline. The extensor mechanism and capsule was then anatomically closed with interrupted #1 Stratafix suture and a running #2 Stratafix stitch. Knee stability and range of motion with the capsule closed was excellent, and range of motion was 0 to 135 without excessive stress on the repair. Instrument and sponge count was completed and confirmed correct. A running 3-0 Stratafix subcuticular stitch was used to re-approximate the skin edges followed by skin glue adhesive to seal the wound. The patient was sufficiently recovered from anesthesia, transferred to a hospital bed and taken to the PACU in stable condition.     Large Hemovac drains were placed intra-articular in each knee.      No apparent complications occurred during the procedure. Instrument, sponge and needle counts were correct x 2.     The patient underwent risk stratification preoperatively and Lovenox was chosen for DVT prophylaxis.     POST OPERATIVE PLAN:   Weight bearing as tolerated with bilateral knee immobilizers extension with no range of motion left knee   Pain control with PO/IV meds   Adductor canal catheter placement by Anesthesia Pain Management Team.   Antibiotics per  infectious disease recommendations  PT/OT for mobilization and medical equipment needs     Social work for discharge planning needs   Follow up in 1 weeks for post operative wound check with XR AP and lateral of operative knee.           Electronically signed by Ti Moffett MD at 2/12/2019  2:34 PM          Physician Progress Notes (last 24 hours) (Notes from  "2019 11:45 AM through 2019 11:45 AM)      Betty Basilio PA at 2019  8:03 AM          [facility] Progress Note    Date of Admission: 2/10/2019      Antibiotics:  Cefazolin,  Po vanco    CC: bilateral knee pain    S:  No f/c/s. No n/v. Tolerating antibiotics.     O:  /86 (BP Location: Left arm, Patient Position: Lying)   Pulse 66   Temp 96.4 °F (35.8 °C) (Oral)   Resp 16   Ht 157.5 cm (62\")   Wt 82.6 kg (182 lb)   SpO2 97%   BMI 33.29 kg/m²    Temp (24hrs), Av.3 °F (36.3 °C), Min:96.4 °F (35.8 °C), Max:97.9 °F (36.6 °C)      PE:     GENERAL: Awake and alert today, in no acute distress.   HEENT: Normocephalic, atraumatic.  PERRL. EOMI. No conjunctival injection. No icterus. Oropharynx clear without evidence of thrush or exudate. No evidence of peridontal disease.    NECK: Supple without nuchal rigidity  LYMPH: No cervical, axillary or inguinal lymphadenopathy. No neck masses  HEART: RRR; No murmur, rubs, gallops.   LUNGS: Clear to auscultation bilaterally without wheezing, rales, rhonchi. Normal respiratory effort.  ABDOMEN: Soft, mild distention with increased bowel sounds. No rebound or guarding.   EXT:  Trace lower extremity edema : Normal appearing genitalia without Taylor catheter.  MSK: Bilateral knee dressed with decreased range of motion   SKIN: Warm and dry skin with eczematous rash    NEURO: Oriented to Person and place. No focal deficits.   PSYCHIATRIC: confusion better  Laboratory Data    Results from last 7 days   Lab Units 19  0746 02/15/19  0651 19  0544   WBC 10*3/mm3 6.75 6.83 7.32   HEMOGLOBIN g/dL 8.8* 8.1* 7.9*   HEMATOCRIT % 28.3* 26.1* 25.9*   PLATELETS 10*3/mm3 306 249 234     Results from last 7 days   Lab Units 19  0746   SODIUM mmol/L 138   POTASSIUM mmol/L 4.4   CHLORIDE mmol/L 104   CO2 mmol/L 29.0   BUN mg/dL 6*   CREATININE mg/dL 0.59*   GLUCOSE mg/dL 131*   CALCIUM mg/dL 9.0                   Estimated Creatinine Clearance: 108.6 mL/min " (A) (by C-G formula based on SCr of 0.59 mg/dL (L)).      Microbiology:  R knee Synovial Fluid: MSSA   L knee Synovial Fluid MSSA    Surgical cultures: Pending    Radiology:  Imaging Results (last 24 hours)     ** No results found for the last 24 hours. **          PROBLEM LIST:   Bilateral prosthetic joint infections  Prior history of methicillin sensitive staph aureus bilateral knee infections    Leukocytosis  Chronically elevated sedimentation rate CRP  Alcoholism  Severe eczema  Bilateral knee pain and difficulty with ambulation  Low-grade fevers  C diff colitis with acute diarrhea  Acute blood loss anemia  confusion        ASSESSMENT:  53-year-old female with severe eczema, history of alcoholism, with previous MSSA bacteremia with bilateral knee prostate joint infection attempted to be treated with single exchange long-term IV followed by oral antibiotics.  Unfortunately even with long-term treatment oral antibiotics she's had worsening of her bilateral knee pain swelling erythema and effusions appears to be consistent with acute on chronic infection. Synovial fluid positive for MSSA and now s/p bilateral knee hardware removal with articulating spacer placement. Will require prolonged IV antibiotics and can continue cefazolin 2 g IV every 8 hrs.    Continue  oral vancomycin for C. difficile colitis     PLAN:  Cefazolin 2 g IV every 8 hrs  Follow-up blood cultures and knee fluid culture, neg so far  Po vanco  PICC placement again today     Dr. Brenden Francis saw the patient, performed the physical exam, reviewed the laboratory data and guided with the formulation of the above problem list, assessment and treatment plan.       Brenden Francis MD  2/18/2019        Electronically signed by Betty Basilio PA at 2/18/2019  8:07 AM       Consult Notes (last 24 hours) (Notes from 2/17/2019 11:45 AM through 2/18/2019 11:45 AM)     No notes of this type exist for this encounter.

## 2019-02-19 LAB
GLUCOSE BLDC GLUCOMTR-MCNC: 139 MG/DL (ref 70–130)
GLUCOSE BLDC GLUCOMTR-MCNC: 141 MG/DL (ref 70–130)
GLUCOSE BLDC GLUCOMTR-MCNC: 186 MG/DL (ref 70–130)
GLUCOSE BLDC GLUCOMTR-MCNC: 333 MG/DL (ref 70–130)

## 2019-02-19 PROCEDURE — 82962 GLUCOSE BLOOD TEST: CPT

## 2019-02-19 PROCEDURE — 25010000002 ENOXAPARIN PER 10 MG: Performed by: ORTHOPAEDIC SURGERY

## 2019-02-19 PROCEDURE — 25010000003 CEFAZOLIN IN DEXTROSE 2-4 GM/100ML-% SOLUTION: Performed by: INTERNAL MEDICINE

## 2019-02-19 PROCEDURE — 63710000001 DIPHENHYDRAMINE PER 50 MG: Performed by: INTERNAL MEDICINE

## 2019-02-19 PROCEDURE — 25010000003 CEFAZOLIN IN DEXTROSE 2-4 GM/100ML-% SOLUTION: Performed by: PHYSICIAN ASSISTANT

## 2019-02-19 PROCEDURE — 99024 POSTOP FOLLOW-UP VISIT: CPT | Performed by: ORTHOPAEDIC SURGERY

## 2019-02-19 PROCEDURE — 63710000001 PREDNISONE PER 1 MG: Performed by: INTERNAL MEDICINE

## 2019-02-19 RX ORDER — LANOLIN ALCOHOL/MO/W.PET/CERES
100 CREAM (GRAM) TOPICAL DAILY
Start: 2019-02-20

## 2019-02-19 RX ORDER — PREDNISONE 20 MG/1
20 TABLET ORAL DAILY
Status: DISCONTINUED | OUTPATIENT
Start: 2019-02-19 | End: 2019-02-20 | Stop reason: HOSPADM

## 2019-02-19 RX ORDER — HYDROXYZINE 50 MG/1
50 TABLET, FILM COATED ORAL 3 TIMES DAILY PRN
Status: DISCONTINUED | OUTPATIENT
Start: 2019-02-19 | End: 2019-02-19

## 2019-02-19 RX ORDER — CEFAZOLIN SODIUM 2 G/100ML
2 INJECTION, SOLUTION INTRAVENOUS EVERY 8 HOURS
Qty: 3200 ML | Refills: 0
Start: 2019-02-19 | End: 2019-03-02

## 2019-02-19 RX ORDER — DIPHENOXYLATE HYDROCHLORIDE AND ATROPINE SULFATE 2.5; .025 MG/1; MG/1
1 TABLET ORAL DAILY
Qty: 30 TABLET
Start: 2019-02-20

## 2019-02-19 RX ORDER — DIPHENHYDRAMINE HCL 25 MG
25 CAPSULE ORAL EVERY 6 HOURS PRN
Start: 2019-02-19

## 2019-02-19 RX ORDER — L.ACID,PARA/B.BIFIDUM/S.THERM 8B CELL
1 CAPSULE ORAL DAILY
Start: 2019-02-20

## 2019-02-19 RX ORDER — HYDROXYZINE HYDROCHLORIDE 25 MG/1
50 TABLET, FILM COATED ORAL EVERY 6 HOURS PRN
Start: 2019-02-19 | End: 2019-02-20 | Stop reason: HOSPADM

## 2019-02-19 RX ORDER — FOLIC ACID 1 MG/1
1 TABLET ORAL DAILY
Start: 2019-02-20

## 2019-02-19 RX ORDER — HYDROXYZINE HYDROCHLORIDE 25 MG/1
100 TABLET, FILM COATED ORAL EVERY 6 HOURS PRN
Status: DISCONTINUED | OUTPATIENT
Start: 2019-02-19 | End: 2019-02-20 | Stop reason: HOSPADM

## 2019-02-19 RX ORDER — HYDROXYZINE HYDROCHLORIDE 25 MG/1
25 TABLET, FILM COATED ORAL 3 TIMES DAILY PRN
Status: DISCONTINUED | OUTPATIENT
Start: 2019-02-19 | End: 2019-02-19

## 2019-02-19 RX ORDER — HYDROCODONE BITARTRATE AND ACETAMINOPHEN 5; 325 MG/1; MG/1
1 TABLET ORAL EVERY 4 HOURS PRN
Qty: 20 TABLET | Refills: 0 | Status: SHIPPED | OUTPATIENT
Start: 2019-02-19 | End: 2019-02-22

## 2019-02-19 RX ADMIN — Medication 10 ML: at 08:23

## 2019-02-19 RX ADMIN — INSULIN LISPRO 2 UNITS: 100 INJECTION, SOLUTION INTRAVENOUS; SUBCUTANEOUS at 18:00

## 2019-02-19 RX ADMIN — Medication 10 ML: at 12:54

## 2019-02-19 RX ADMIN — OXYCODONE AND ACETAMINOPHEN 1 TABLET: 5; 325 TABLET ORAL at 20:42

## 2019-02-19 RX ADMIN — OXYCODONE AND ACETAMINOPHEN 1 TABLET: 5; 325 TABLET ORAL at 15:08

## 2019-02-19 RX ADMIN — OXYCODONE AND ACETAMINOPHEN 1 TABLET: 5; 325 TABLET ORAL at 08:16

## 2019-02-19 RX ADMIN — Medication 100 MG: at 08:16

## 2019-02-19 RX ADMIN — TRAZODONE HYDROCHLORIDE 50 MG: 50 TABLET ORAL at 20:42

## 2019-02-19 RX ADMIN — HYDROXYZINE HYDROCHLORIDE 100 MG: 25 TABLET, FILM COATED ORAL at 15:08

## 2019-02-19 RX ADMIN — VANCOMYCIN HYDROCHLORIDE 125 MG: KIT at 18:00

## 2019-02-19 RX ADMIN — VANCOMYCIN HYDROCHLORIDE 125 MG: KIT at 13:51

## 2019-02-19 RX ADMIN — Medication 10 ML: at 20:43

## 2019-02-19 RX ADMIN — CEFAZOLIN SODIUM 2 G: 2 INJECTION, SOLUTION INTRAVENOUS at 12:43

## 2019-02-19 RX ADMIN — ENOXAPARIN SODIUM 40 MG: 40 INJECTION SUBCUTANEOUS at 08:15

## 2019-02-19 RX ADMIN — Medication 1 CAPSULE: at 08:15

## 2019-02-19 RX ADMIN — Medication 1 TABLET: at 08:16

## 2019-02-19 RX ADMIN — CEFAZOLIN SODIUM 2 G: 2 INJECTION, SOLUTION INTRAVENOUS at 18:00

## 2019-02-19 RX ADMIN — INSULIN LISPRO 5 UNITS: 100 INJECTION, SOLUTION INTRAVENOUS; SUBCUTANEOUS at 20:43

## 2019-02-19 RX ADMIN — DIPHENHYDRAMINE HYDROCHLORIDE 25 MG: 25 CAPSULE ORAL at 08:15

## 2019-02-19 RX ADMIN — CEFAZOLIN SODIUM 2 G: 2 INJECTION, SOLUTION INTRAVENOUS at 02:19

## 2019-02-19 RX ADMIN — CARVEDILOL 3.12 MG: 3.12 TABLET, FILM COATED ORAL at 18:01

## 2019-02-19 RX ADMIN — CARVEDILOL 3.12 MG: 3.12 TABLET, FILM COATED ORAL at 08:16

## 2019-02-19 RX ADMIN — MELOXICAM 15 MG: 7.5 TABLET ORAL at 08:15

## 2019-02-19 RX ADMIN — VANCOMYCIN HYDROCHLORIDE 125 MG: KIT at 06:09

## 2019-02-19 RX ADMIN — MICONAZOLE NITRATE 1 APPLICATION: 2 CREAM TOPICAL at 20:50

## 2019-02-19 RX ADMIN — MICONAZOLE NITRATE 1 APPLICATION: 2 CREAM TOPICAL at 08:27

## 2019-02-19 RX ADMIN — VENLAFAXINE HYDROCHLORIDE 300 MG: 75 CAPSULE, EXTENDED RELEASE ORAL at 08:15

## 2019-02-19 RX ADMIN — SODIUM CHLORIDE, PRESERVATIVE FREE 10 ML: 5 INJECTION INTRAVENOUS at 20:45

## 2019-02-19 RX ADMIN — PREDNISONE 20 MG: 20 TABLET ORAL at 13:52

## 2019-02-19 RX ADMIN — FOLIC ACID 1 MG: 1 TABLET ORAL at 08:16

## 2019-02-19 RX ADMIN — Medication 10 ML: at 18:00

## 2019-02-19 RX ADMIN — HYDROXYZINE HYDROCHLORIDE 100 MG: 25 TABLET, FILM COATED ORAL at 20:42

## 2019-02-19 RX ADMIN — HYDROCODONE BITARTRATE AND ACETAMINOPHEN 1 TABLET: 5; 325 TABLET ORAL at 02:19

## 2019-02-19 NOTE — PROGRESS NOTES
Case Management Discharge Note    Final Note: Per [facility], they are able to accept patient tomorrow if medically ready. EMS transport has been arranged for 1200, PCS in the chart. Nurse to call report to [phone], dc summary to be faxed to [phone]. APRN notified. Patient aware and agreeable. CM following.     Destination - Selection Complete      Service Provider Request Status Selected Services Address Phone Number Fax Number    [facility] Selected Skilled Nursing [address] [place] [address] [phone]           Ambulance: [facility]    Final Discharge Disposition Code: 03 - skilled nursing facility (SNF)

## 2019-02-19 NOTE — NURSING NOTE
Consulted to assess patient for rash and purities. At this time patient is taking Benadryl PRN. Keep skin moisturized and clean as possible. Nothing needed form [facility] at this time. Thanks

## 2019-02-19 NOTE — PROGRESS NOTES
"[facility] Progress Note    Date of Admission: 2/10/2019      Antibiotics:  Cefazolin,  Po vanco    CC: bilateral knee pain    S:  No f/c/s. No n/v. Tolerating antibiotics. Awaiting placement to complete antibiotics. Ortho allowing weightbearing as tolerated. Plans for braces to remain in place for 6 weeks total.     O:  /89   Pulse 85   Temp 97 °F (36.1 °C) (Oral)   Resp 16   Ht 157.5 cm (62\")   Wt 82.6 kg (182 lb)   SpO2 97%   BMI 33.29 kg/m²   Temp (24hrs), Av.6 °F (36.4 °C), Min:97 °F (36.1 °C), Max:98.3 °F (36.8 °C)      PE:     GENERAL: Awake and alert today, in no acute distress.   HEENT: Normocephalic, atraumatic.  PERRL. EOMI. No conjunctival injection. No icterus. Oropharynx clear without evidence of thrush or exudate. No evidence of peridontal disease.    NECK: Supple without nuchal rigidity  LYMPH: No cervical, axillary or inguinal lymphadenopathy. No neck masses  HEART: RRR; No murmur, rubs, gallops.   LUNGS: Clear to auscultation bilaterally without wheezing, rales, rhonchi. Normal respiratory effort.  ABDOMEN: Soft, mild distention with increased bowel sounds. No rebound or guarding.   EXT:  Trace lower extremity edema   : Normal appearing genitalia without Taylor catheter.  MSK: Bilateral knee dressed with decreased range of motion   SKIN: Warm and dry skin with eczematous rash  Bilateral knees with support in place.   NEURO: Oriented to Person and place. No focal deficits.   PSYCHIATRIC: confusion better  Laboratory Data    Results from last 7 days   Lab Units 19  0746 02/15/19  0651 19  0544   WBC 10*3/mm3 6.75 6.83 7.32   HEMOGLOBIN g/dL 8.8* 8.1* 7.9*   HEMATOCRIT % 28.3* 26.1* 25.9*   PLATELETS 10*3/mm3 306 249 234     Results from last 7 days   Lab Units 19  0746   SODIUM mmol/L 138   POTASSIUM mmol/L 4.4   CHLORIDE mmol/L 104   CO2 mmol/L 29.0   BUN mg/dL 6*   CREATININE mg/dL 0.59*   GLUCOSE mg/dL 131*   CALCIUM mg/dL 9.0                   Estimated Creatinine " Clearance: 108.6 mL/min (A) (by C-G formula based on SCr of 0.59 mg/dL (L)).      Microbiology:  R knee Synovial Fluid: MSSA   L knee Synovial Fluid MSSA    Surgical cultures: Pending    Radiology:  Imaging Results (last 24 hours)     ** No results found for the last 24 hours. **          PROBLEM LIST:   Bilateral prosthetic joint infections  Prior history of methicillin sensitive staph aureus bilateral knee infections    Leukocytosis  Chronically elevated sedimentation rate CRP  Alcoholism  Severe eczema  Bilateral knee pain and difficulty with ambulation  Low-grade fevers  C diff colitis with acute diarrhea  Acute blood loss anemia  Confusion  Dermatitis        ASSESSMENT:  53-year-old female with severe eczema, history of alcoholism, with previous MSSA bacteremia with bilateral knee prostate joint infection attempted to be treated with single exchange long-term IV followed by oral antibiotics.  Unfortunately even with long-term treatment oral antibiotics she's had worsening of her bilateral knee pain swelling erythema and effusions appears to be consistent with acute on chronic infection. Synovial fluid positive for MSSA and now s/p bilateral knee hardware removal with articulating spacer placement. Will require prolonged IV antibiotics and can continue cefazolin 2 g IV every 8 hrs.    Continue  oral vancomycin for C. difficile colitis. Recommend Miconazole Cream for tevin-rectal yeast. Continue Magic mouthwash for oral thrush. Anxiety medication changes per primary team.     Eczematous dermatitis will do short course prednisone and Atarax.     PLAN:  Cefazolin 2 g IV every 8 hrs through 3/26/19 with weekly labs of cbc/cmp/esr/crp and weekly picc care on Mondays    Awaiting placement and will need weekly labs of CBC, CMP, ESR, CRP and PICC care in facility.   F/u appt with Dr. Francis in 2-3 weeks on a Tuesday  for monitoring    Proceed with vancomycin taper at facility with vancomycin 250 mg orally 3 times a day  for 2 weeks then twice a day for 2 weeks then daily for 2 weeks then every other day for 2 weeks then every 3 day for 2 weeks.    UM:  D/c planning on iv abx, d/w med team, CM and planning above. F/u 3 weeks    I spent 40 minutes on case today with greater than 50% of time counseling/coordination of care with discharge planning on IV antibiotics for knee infections with follow-up in antibiotic instructions arranged and ongoing vancomycin taper for C. difficile colitis     Dr. Brenden Francis saw the patient, performed the physical exam, reviewed the laboratory data and guided with the formulation of the above problem list, assessment and treatment plan.       Brenden Francis MD  2/19/2019

## 2019-02-19 NOTE — PLAN OF CARE
Problem: Patient Care Overview  Goal: Plan of Care Review  Outcome: Ongoing (interventions implemented as appropriate)   02/19/19 9723   Coping/Psychosocial   Plan of Care Reviewed With patient   Coping/Psychosocial   Patient Agreement with Plan of Care agrees   OTHER   Outcome Summary VSS, Cardiac Monitoring continued; NSR. Ace wraps to bilat. knees removed per Dr. Moffett today; prineo under and CDI. Immobilizers when up. Rash remains to buttocks and skin remains very dry and itchy. MD notified; new orders were received. IV and PO antibiotic therapy continued. Afebrile. Plan to go to inpatient rehab tomorrow. Will continue to monitor and will notify MD of any changes PRN. Zan Beltre RN   Plan of Care Review   Progress no change

## 2019-02-19 NOTE — PROGRESS NOTES
" progress note      Jyoti Luna  [id number]  1965     LOS: 10 days     Attending: Lorri Siddiqui MD    Primary Care Provider: Provider, No Known      Chief Complaint/Reason for visit:  Bilateral infected knee prosthesis     Subjective   Doing well. Adequate pain control. Complains of itchy skin and anxiety. Denies f/c/n/v/sob/cp.    Objective     Vital Signs  Visit Vitals  /89   Pulse 85   Temp 97 °F (36.1 °C) (Oral)   Resp 16   Ht 157.5 cm (62\")   Wt 82.6 kg (182 lb)   SpO2 97%   BMI 33.29 kg/m²     Temp (24hrs), Av.6 °F (36.4 °C), Min:97 °F (36.1 °C), Max:98.3 °F (36.8 °C)    Physical Exam:     General Appearance:    Dorwsy, in no acute distress   Head:    Normocephalic, without obvious abnormality, atraumatic    Lungs:     Normal effort, symmetric chest rise, no crepitus, clear to      auscultation bilaterally               Heart:    Regular rhythm and normal rate, normal S1 and S2    Abdomen:     Normal bowel sounds, no masses, no organomegaly, soft        non-tender, non-distended, no guarding, no rebound                tenderness   Extremities:   Bilateral knees ACE wrap CDI.   Knee immobilizers in place.   Pulses:   Pulses palpable and equal bilaterally   Skin:   No bleeding, bruising  RUE PICC( Rash , diffuse red macular)    Neurologic:   Cranial nerves 2 - 12 grossly intact, sensation intact. Flexion and dorsiflexion intact bilateral feet.          Physical Therapy: () Patient ambulated 50 feet with RW and CGA, limited by pain and fatigue. Patient modified independent with t/f in and out of bed. CGA for sit<->stand t/f and for gait training. Achieved gait distance goal today, new goal established. Will continue to progress mobility training and strengthening as able.     Results Review:     I reviewed the patient's new clinical results.   Results from last 7 days   Lab Units 19  0746 02/15/19  0651 19  0544   WBC 10*3/mm3 6.75 6.83 7.32   HEMOGLOBIN g/dL 8.8* 8.1* " 7.9*   HEMATOCRIT % 28.3* 26.1* 25.9*   PLATELETS 10*3/mm3 306 249 234     Results from last 7 days   Lab Units 02/16/19  0746 02/14/19  0544   SODIUM mmol/L 138  --    POTASSIUM mmol/L 4.4 3.4*   CHLORIDE mmol/L 104  --    CO2 mmol/L 29.0  --    BUN mg/dL 6*  --    CREATININE mg/dL 0.59*  --    CALCIUM mg/dL 9.0  --    GLUCOSE mg/dL 131*  --      Results for NABIL MATSON (MRN [id number]) as of 2/19/2019 14:08   Ref. Range 2/18/2019 16:09 2/18/2019 20:27 2/19/2019 08:35 2/19/2019 11:52   Glucose Latest Ref Range: 70 - 130 mg/dL 154 (H) 168 (H) 139 (H) 141 (H)       ECG 12 Lead   Order: [id number]   Status:  Preliminary result   Visible to patient:  No (Not Released) Next appt:  None      Narrative     Test Reason : Surgery  Blood Pressure : **/** mmHG  Vent. Rate : 099 BPM     Atrial Rate : 099 BPM     P-R Int : 128 ms          QRS Dur : 096 ms      QT Int : 364 ms       P-R-T Axes : 046 034 047 degrees     QTc Int : 467 ms    Normal sinus rhythm  Normal ECG  When compared with ECG of 23-MAR-2018 11:01,  No significant change was found    Referred By:  LINDA           Confirmed By:           Results for NABIL MATSON (MRN [id number]) as of 2/11/2019 09:31   Ref. Range 2/11/2019 05:05   C-Reactive Protein Latest Ref Range: 0.00 - 1.00 mg/dL 27.72 (H)     Right :  Results for NABIL MATSON (MRN [id number]) as of 2/11/2019 09:31   Ref. Range 2/10/2019 20:27   Color, Fluid Unknown Red   Appearance, Fluid Latest Ref Range: Clear  Bloody (A)   WBC, Fluid Latest Units: /mm3 52,860   RBC, Fluid Latest Units: /mm3 575,000   Crystals, Fluid Unknown No crystals seen     Left:   Results for NABIL MATSON (MRN [id number]) as of 2/11/2019 09:31   Ref. Range 2/10/2019 20:27   Color, Fluid Unknown Yellow   Appearance, Fluid Latest Ref Range: Clear  Turbid (A)   WBC, Fluid Latest Units: /mm3 232,950   RBC, Fluid Latest Units: /mm3 21,000   Neutrophils, Fluid Latest Units: % 94   Lymphocytes, Fluid Latest Units: % 3   Monocytes,  Fluid Latest Units: % 3   Crystals, Fluid Unknown No crystals seen     Results for NABIL MATSON (MRN [id number]) as of 2/11/2019 09:31   Ref. Range 2/11/2019 05:04   Sed Rate Latest Ref Range: 0 - 30 mm/hr 100 (H)     Collected  Updated  Procedure  Result Status      02/12/2019 1342  02/13/2019 1328  Anaerobic Culture - Tissue, Knee, Right [[id number]]   Tissue from Knee, Right     Preliminary result  Culture No anaerobes isolated             02/12/2019 1342  02/12/2019 1438  Fungus Culture - Tissue, Knee, Right [[id number]]   Tissue from Knee, Right     In process  No result data             02/12/2019 1342  02/16/2019 0628  Tissue / Bone Culture - Tissue, Knee, Right [[id number]]   Tissue from Knee, Right     Final result  Tissue Culture No growth at 4 days   Gram Stain Moderate (3+) WBCs seen    No organisms seen             02/12/2019 1342  02/13/2019 1312  AFB Culture - Tissue, Knee, Right [[id number]]   Tissue from Knee, Right     Preliminary result  AFB Stain No acid fast bacilli seen on concentrated smear             02/12/2019 1236  02/12/2019 1438  Fungus Culture - Synovial Fluid, Knee, Left [[id number]]   Synovial Fluid from Knee, Left     In process  No result data             02/12/2019 1236  02/16/2019 0634  Body Fluid Culture - Synovial Fluid, Knee, Left [[id number]]   Synovial Fluid from Knee, Left     Preliminary result  BF Culture No growth at 4 days   Gram Stain Many (4+) WBCs seen    No organisms seen             02/12/2019 1236  02/13/2019 1311  AFB Culture - Synovial Fluid, Knee, Left [[id number]]   Synovial Fluid from Knee, Left     Preliminary result  AFB Stain No acid fast bacilli seen on concentrated smear             02/11/2019 0941  02/11/2019 1136  Clostridium Difficile Toxin - Stool, Per Rectum [[id number]]    Stool from Per Rectum     Final result  No result data             02/11/2019 0941  02/11/2019 1136  Clostridium Difficile Toxin, PCR - Stool, Per Rectum [[id number]]    (Abnormal)    Stool from Per Rectum     Final result  Clostridium difficile (toxin A/B) Detected Abnormal              02/10/2019 2028  02/14/2019 0711  Body Fluid Culture - Synovial Fluid, Knee, Right [[id number]]   Synovial Fluid from Knee, Right     Final result  BF Culture No growth at 4 days   Gram Stain Many (4+) WBCs seen    No organisms seen             02/10/2019 2028  02/16/2019 1005  Anaerobic Culture - Synovial Fluid, Knee, Right [[id number]]   (Abnormal)   Synovial Fluid from Knee, Right     Preliminary result  Culture No anaerobes isolated    Scant growth (1+) Staphylococcus aureus Abnormal              02/10/2019 2027  02/14/2019 0711  Body Fluid Culture - Synovial Fluid, Knee, Left [[id number]]   Synovial Fluid from Knee, Left     Final result  BF Culture No growth at 4 days   Gram Stain Many (4+) WBCs seen    No organisms seen             02/10/2019 2027  02/16/2019 1003  Anaerobic Culture - Synovial Fluid, Knee, Left [[id number]]   (Abnormal)   Synovial Fluid from Knee, Left     Preliminary result  Culture No anaerobes isolated    Staphylococcus aureus Abnormal     Isolated from broth culture              02/10/2019 1937  02/15/2019 2030  Blood Culture - Blood, Arm, Left [[id number]]   Blood from Arm, Left     Final result  Blood Culture No growth at 5 days             02/10/2019 1913  02/15/2019 2030  Blood Culture - Blood, Arm, Right [[id number]]   Blood from Arm, Right     Final result  Blood Culture No growth at 5 days                  I reviewed the patient's new imaging including images and reports.    All medications reviewed.     Pharmacy Consult  Does not apply BID   carvedilol 3.125 mg Oral BID With Meals   ceFAZolin 2 g Intravenous Q8H   enoxaparin 40 mg Subcutaneous Daily   folic acid 1 mg Oral Daily   insulin lispro 0-7 Units Subcutaneous 4x Daily With Meals & Nightly   lactobacillus acidophilus 1 capsule Oral Daily   magic mouthwash 10 mL Swish & Spit 4x Daily   meloxicam 15 mg Oral Daily   miconazole  1 application Topical Q12H   multivitamin 1 tablet Oral Daily   predniSONE 20 mg Oral Daily   sodium chloride 10 mL Intravenous Q12H   sodium chloride 3 mL Intravenous Q12H   thiamine 100 mg Oral Daily   traZODone 50 mg Oral Nightly   vancomycin 125 mg Oral Q6H   venlafaxine  mg Oral Daily With Breakfast       acetaminophen 650 mg Q4H PRN   Or     acetaminophen 650 mg Q4H PRN   Or     acetaminophen 650 mg Q4H PRN   bisacodyl 10 mg Daily PRN   bisacodyl 10 mg Daily PRN   dextrose 25 g Q15 Min PRN   dextrose 15 g Q15 Min PRN   docusate sodium 100 mg BID PRN   glucagon (human recombinant) 1 mg PRN   HYDROcodone-acetaminophen 1 tablet Q4H PRN   HYDROmorphone 0.5 mg Q2H PRN   And     naloxone 0.1 mg Q5 Min PRN   hydrOXYzine 100 mg Q6H PRN   labetalol 10 mg Q4H PRN   LORazepam 1 mg Q2H PRN   Or     LORazepam 1 mg Q2H PRN   Or     LORazepam 2 mg Q1H PRN   Or     LORazepam 2 mg Q1H PRN   Or     LORazepam 2 mg Q15 Min PRN   Or     LORazepam 2 mg Q15 Min PRN   magnesium hydroxide 10 mL Daily PRN   magnesium sulfate 2 g PRN   Or     magnesium sulfate 2 g PRN   Or     magnesium sulfate 4 g PRN   Morphine 4 mg Q2H PRN   And     naloxone 0.4 mg Q5 Min PRN   ondansetron 4 mg Q6H PRN   Or     ondansetron 4 mg Q6H PRN   oxyCODONE-acetaminophen 1 tablet Q4H PRN   potassium chloride 40 mEq PRN   Or     potassium chloride 40 mEq PRN   Or     potassium chloride 10 mEq Q1H PRN   sodium chloride 500 mL TID PRN   sodium chloride 1-10 mL PRN   sodium chloride 10 mL PRN   sodium chloride 10 mL PRN   sodium chloride 20 mL PRN       Assessment/Plan     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    Leukocytosis, resolved.    C. difficile diarrhea    Acute blood loss anemia, asymptomatic    Acute postoperative pain    Hyponatremia,  resolved.    Hypokalemia, replaced.    Eczematous dermatitis     Plan  ID following, Dr. Francis. Long-term IV abx- Cefazolin 2 g IV every 8 hrs.   Cdiff- po vanc. Taper per [facility] (((Proceed with vancomycin taper at facility with vancomycin 250 mg orally 3 times a day for 2 weeks then twice a day for 2 weeks then daily for 2 weeks then every other day for 2 weeks then every 3 day for 2 weeks.)))    ETOH WD.  mproved.      1. PT/OT- WBAT BLE/ with knee immobilizer for 6 weeks  2. Pain control-prns  3. IS-encouraged  4. DVT proph- mechs/Lovenox  5. Bowel regimen  6. Monitor post-op labs  7. DC planning for rehab. CM following. (((Awaiting placement and will need weekly labs of CBC, CMP, ESR, CRP and PICC care in facility. )))    Atarax and prednisone per [facility] for eczematous dermatitis      Discussed with patient. Possible discharge tomorrow to SNF.    Lorri Siddiqui MD  02/20/19  9:26 AM

## 2019-02-19 NOTE — PLAN OF CARE
Problem: Skin Injury Risk (Adult)  Goal: Identify Related Risk Factors and Signs and Symptoms  Outcome: Ongoing (interventions implemented as appropriate)    Goal: Skin Health and Integrity  Outcome: Ongoing (interventions implemented as appropriate)      Problem: Fall Risk (Adult)  Goal: Identify Related Risk Factors and Signs and Symptoms  Outcome: Ongoing (interventions implemented as appropriate)      Problem: Pain, Acute (Adult)  Goal: Identify Related Risk Factors and Signs and Symptoms  Outcome: Ongoing (interventions implemented as appropriate)      Problem: Patient Care Overview  Goal: Plan of Care Review  Outcome: Ongoing (interventions implemented as appropriate)  VSS, no agitation, CIWA 0 , Abx continue

## 2019-02-19 NOTE — PROGRESS NOTES
"Orthopaedic Surgery Progress Note     LOS: 9 days   Patient Care Team:  Provider, No Known as PCP - General    POD 7    Subjective     Interval History:   Patient Reports: legs itch but minimal pain    Objective     Vital Signs:  Temp (24hrs), Av.6 °F (36.4 °C), Min:97 °F (36.1 °C), Max:98.3 °F (36.8 °C)    /89   Pulse 85   Temp 97 °F (36.1 °C) (Oral)   Resp 16   Ht 157.5 cm (62\")   Wt 82.6 kg (182 lb)   SpO2 97%   BMI 33.29 kg/m²     Labs:  Lab Results (last 24 hours)     Procedure Component Value Units Date/Time    POC Glucose Once [[id number]]  (Abnormal) Collected:  19 0835    Specimen:  Blood Updated:  19 0855     Glucose 139 mg/dL     Body Fluid Culture - Synovial Fluid, Knee, Left [[id number]] Collected:  19 1236    Specimen:  Synovial Fluid from Knee, Left Updated:  19 0845     BF Culture No growth     Gram Stain Many (4+) WBCs seen      No organisms seen    POC Glucose Once [[id number]]  (Abnormal) Collected:  19    Specimen:  Blood Updated:  19 2030     Glucose 168 mg/dL     POC Glucose Once [[id number]]  (Abnormal) Collected:  19 1609    Specimen:  Blood Updated:  19 1619     Glucose 154 mg/dL     POC Glucose Once [[id number]]  (Abnormal) Collected:  19 1144    Specimen:  Blood Updated:  19 1146     Glucose 149 mg/dL           Physical Exam:  Minimal erythema left knee, no drainage, NVI    Assessment/Plan   POD 7  Status post bilateral total knee arthroplasty prosthesis removal with placement of articulating spacers  Antibiotic per infectious disease   weight-bear as tolerated with knee immobilizers on times 6 weeks  DC planning toSNF to allow continuation of her IV antibiotics  Okay to DC from my standpoint with follow-up in 2-4 weeks with me  Ti Moffett MD  19  9:31 AM        "

## 2019-02-20 VITALS
RESPIRATION RATE: 16 BRPM | DIASTOLIC BLOOD PRESSURE: 97 MMHG | HEIGHT: 62 IN | OXYGEN SATURATION: 97 % | SYSTOLIC BLOOD PRESSURE: 151 MMHG | WEIGHT: 182 LBS | HEART RATE: 83 BPM | BODY MASS INDEX: 33.49 KG/M2 | TEMPERATURE: 96.3 F

## 2019-02-20 LAB
GLUCOSE BLDC GLUCOMTR-MCNC: 106 MG/DL (ref 70–130)
GLUCOSE BLDC GLUCOMTR-MCNC: 189 MG/DL (ref 70–130)

## 2019-02-20 PROCEDURE — 63710000001 PREDNISONE PER 1 MG: Performed by: INTERNAL MEDICINE

## 2019-02-20 PROCEDURE — 25010000003 CEFAZOLIN IN DEXTROSE 2-4 GM/100ML-% SOLUTION: Performed by: PHYSICIAN ASSISTANT

## 2019-02-20 PROCEDURE — 82962 GLUCOSE BLOOD TEST: CPT

## 2019-02-20 PROCEDURE — 25010000002 ENOXAPARIN PER 10 MG: Performed by: ORTHOPAEDIC SURGERY

## 2019-02-20 RX ORDER — HYDROXYZINE 50 MG/1
100 TABLET, FILM COATED ORAL EVERY 6 HOURS PRN
Start: 2019-02-20

## 2019-02-20 RX ORDER — PREDNISONE 20 MG/1
20 TABLET ORAL DAILY
Start: 2019-02-21 | End: 2019-02-24

## 2019-02-20 RX ADMIN — MELOXICAM 15 MG: 7.5 TABLET ORAL at 09:33

## 2019-02-20 RX ADMIN — VANCOMYCIN HYDROCHLORIDE 125 MG: KIT at 11:08

## 2019-02-20 RX ADMIN — Medication 100 MG: at 09:34

## 2019-02-20 RX ADMIN — SODIUM CHLORIDE, PRESERVATIVE FREE 10 ML: 5 INJECTION INTRAVENOUS at 09:39

## 2019-02-20 RX ADMIN — ENOXAPARIN SODIUM 40 MG: 40 INJECTION SUBCUTANEOUS at 09:32

## 2019-02-20 RX ADMIN — OXYCODONE AND ACETAMINOPHEN 1 TABLET: 5; 325 TABLET ORAL at 09:34

## 2019-02-20 RX ADMIN — VANCOMYCIN HYDROCHLORIDE 125 MG: KIT at 00:49

## 2019-02-20 RX ADMIN — HYDROXYZINE HYDROCHLORIDE 100 MG: 25 TABLET, FILM COATED ORAL at 11:08

## 2019-02-20 RX ADMIN — MICONAZOLE NITRATE 1 APPLICATION: 2 CREAM TOPICAL at 09:38

## 2019-02-20 RX ADMIN — PREDNISONE 20 MG: 20 TABLET ORAL at 09:33

## 2019-02-20 RX ADMIN — CARVEDILOL 3.12 MG: 3.12 TABLET, FILM COATED ORAL at 09:34

## 2019-02-20 RX ADMIN — CEFAZOLIN SODIUM 2 G: 2 INJECTION, SOLUTION INTRAVENOUS at 03:52

## 2019-02-20 RX ADMIN — HYDROCODONE BITARTRATE AND ACETAMINOPHEN 1 TABLET: 5; 325 TABLET ORAL at 11:46

## 2019-02-20 RX ADMIN — Medication 10 ML: at 09:37

## 2019-02-20 RX ADMIN — Medication 10 ML: at 11:09

## 2019-02-20 RX ADMIN — CEFAZOLIN SODIUM 2 G: 2 INJECTION, SOLUTION INTRAVENOUS at 11:11

## 2019-02-20 RX ADMIN — Medication 1 CAPSULE: at 09:33

## 2019-02-20 RX ADMIN — OXYCODONE AND ACETAMINOPHEN 1 TABLET: 5; 325 TABLET ORAL at 05:11

## 2019-02-20 RX ADMIN — VANCOMYCIN HYDROCHLORIDE 125 MG: KIT at 05:11

## 2019-02-20 RX ADMIN — OXYCODONE AND ACETAMINOPHEN 1 TABLET: 5; 325 TABLET ORAL at 00:49

## 2019-02-20 RX ADMIN — VENLAFAXINE HYDROCHLORIDE 300 MG: 75 CAPSULE, EXTENDED RELEASE ORAL at 09:32

## 2019-02-20 RX ADMIN — FOLIC ACID 1 MG: 1 TABLET ORAL at 09:34

## 2019-02-20 RX ADMIN — Medication 1 TABLET: at 09:32

## 2019-02-20 NOTE — NURSING NOTE
The patient has narcotic medications being held in the pharmacy. Patient has been instructed that she can return to the hospital after her discharge from Rehab and we will assist her with picking up these medications.

## 2019-02-20 NOTE — DISCHARGE SUMMARY
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 difficile diarrhea    Acute blood loss anemia, asymptomatic    Acute postoperative pain    Hyponatremia    Eczematous dermatitis       Hospital Course  Patient is a 53 y.o. female presented for direct admission from [facility] with complaints of bilateral knee pain, swelling and redness. She reported she was unable to stand or walk today due to pain and called an ambulance for help. She was using a walker for ambulation, but has 2 flights of stairs to get out of her house and was unable to navigate stairs.      She underwent bilateral knee I&D with poly exchange 3/23/18.     Per Dr. Cody's note: (( this is a 53 year old woman with bilateral TKR.  She is Dr Moffett's patient.   She is on chronic antibiotic suppression.  In 2018 she was admitted with sepsis (MSSA) and underwent bilateral knee I&D with poly exchange 3/23/18.  Dr Brenden Francis from ID has followed her.  The original surgical procedures were 3 years (right knee) and 2 years (left knee) prior to infection.  She reports a 2 day history of increasing bilateral knee pain and inability to walk.  She reports increased swelling in both knees, left more than right.  She reports she could not get up off the couch and called an ambulance to take her to the ER. She was seen at [facility] ER and the ER doctor called me to request transfer.  No recent injury, no  fever, no chills.  She reports she has been taking the Keflex 500mg Q 8 hours.  She reports skin color changes: on the right leg just inferior and lateral to the knee incision and on the left leg over the inferior incision.  No drainage.  Temp was 37 in [place] [facility], WBC 11.7, ESR 74, CRP 19.9))     She did have diarrhea for 1 month prior to admission; she was found to have cdiff. She was started on oral vancomycin.    She was seen by Dr. Moffett, orthopedics and had fluid aspirated from her knees. The fluid was found to have infection. She was taken to OR for total knee arthroplasty revision with placement of antibiotic spacers. She tolerated surgery well.  She has knee immobilizers that must be in place for ambulation. She will require them for 6 weeks.    Postoperatively she developed alcohol withdraw symptoms and was placed on the alcohol withdraw protocol. By POD3 her symptoms resolved and her mental status returned to baseline.    [facility], Dr. Francis was consulted for antibiotic management. She received a PICC line and will be discharged with IV antibiotics. She will follow-up outpatient.     PER [facility] recs: (((Cefazolin 2 g IV every 8 hrs through 3/26/19 with weekly labs of cbc/cmp/esr/crp and weekly picc care on Mondays     Awaiting placement and will need weekly labs of CBC, CMP, ESR, CRP and PICC care in facility.   F/u appt with Dr. Francis in 2-3 weeks on a Tuesday  for monitoring     Proceed with vancomycin taper at facility with vancomycin 250 mg orally 3 times a day for 2 weeks then twice a day for 2 weeks then daily for 2 weeks then every other day for 2 weeks then every 3 day for 2 weeks.)))    She had exacerbation of eczematous dermatitis and was given a short steroid course and atarax for symptom management.     Patient was provided pain medications as needed for pain control, along with adductor canal nerve block infusion of Ropivacaine (out).    Adjustments were made to pain medications to optimize  postop pain management. Risks and benefits of opiate medications discussed with patient.    She was seen by PT and OT and has progressed well over her stay.  She used an IS for atelectasis prophylaxis and Lovenox along with mechanicals for DVT prophylaxis.  Home medications were resumed as appropriate, and labs were monitored and remained fairly stable.     With the progress she has made, she is ready for DC to [facility] today.    Discussed with patient regarding plan and she shows understanding and agreement.        Consultants:  Dr. Moffett, orthopedics  Dr. Francis, [facility]      Procedures Performed  Procedure(s):  TOTAL KNEE ARTHROPLASTY REVISION WITH REMOVAL AND PLACE OF  ANTIBIOTIC SPACERS BILATERAL    Surgeon(s):  Ti Moffett MD    Pertinent Test Results:    I reviewed the patient's new clinical results.   Results from last 7 days   Lab Units 02/16/19  0746 02/15/19  0651 02/14/19  0544   WBC 10*3/mm3 6.75 6.83 7.32   HEMOGLOBIN g/dL 8.8* 8.1* 7.9*   HEMATOCRIT % 28.3* 26.1* 25.9*   PLATELETS 10*3/mm3 306 249 234     Results from last 7 days   Lab Units 02/16/19  0746 02/14/19  0544   SODIUM mmol/L 138  --    POTASSIUM mmol/L 4.4 3.4*   CHLORIDE mmol/L 104  --    CO2 mmol/L 29.0  --    BUN mg/dL 6*  --    CREATININE mg/dL 0.59*  --    CALCIUM mg/dL 9.0  --    GLUCOSE mg/dL 131*  --      Results for NABIL MATSON (MRN [id number]) as of 2/20/2019 09:52   Ref. Range 2/19/2019 11:52 2/19/2019 16:37 2/19/2019 20:23 2/20/2019 08:25   Glucose Latest Ref Range: 70 - 130 mg/dL 141 (H) 186 (H) 333 (H) 106   Results for NABIL MATSON (MRN [id number]) as of 2/20/2019 10:03   Ref. Range 2/11/2019 09:41   Clostridium difficile (toxin A/B) Latest Ref Range: Not Detected  Detected (A)   Results for NABIL MATSON (MRN [id number]) as of 2/20/2019 10:03   Ref. Range 2/10/2019 20:27 2/10/2019 20:27   Color, Fluid Unknown Yellow Red   Appearance, Fluid Latest Ref Range: Clear  Turbid (A) Bloody (A)   WBC, Fluid Latest  Units: /mm3 232,950 52,860   RBC, Fluid Latest Units: /mm3 21,000 575,000   Neutrophils, Fluid Latest Units: % 94 94   Lymphocytes, Fluid Latest Units: % 3 5   Monocytes, Fluid Latest Units: % 3 1   Crystals, Fluid Unknown No crystals seen No crystals seen     Microbiology Results (last 21 days)     Collected  Updated  Procedure  Result Status      02/12/2019 1342  02/13/2019 1328  Anaerobic Culture - Tissue, Knee, Right [[id number]]   Tissue from Knee, Right     Preliminary result  Culture No anaerobes isolated             02/12/2019 1342  02/19/2019 1445  Fungus Culture - Tissue, Knee, Right [[id number]]   Tissue from Knee, Right     Preliminary result  Fungus Culture No fungus isolated at 1 week             02/12/2019 1342  02/16/2019 0628  Tissue / Bone Culture - Tissue, Knee, Right [[id number]]   Tissue from Knee, Right     Final result  Tissue Culture No growth at 4 days   Gram Stain Moderate (3+) WBCs seen    No organisms seen             02/12/2019 1342  02/19/2019 1445  AFB Culture - Tissue, Knee, Right [[id number]]   Tissue from Knee, Right     Preliminary result  AFB Culture No AFB isolated at 1 week   AFB Stain No acid fast bacilli seen on concentrated smear             02/12/2019 1236  02/19/2019 1445  Fungus Culture - Synovial Fluid, Knee, Left [[id number]]   Synovial Fluid from Knee, Left     Preliminary result  Fungus Culture No fungus isolated at 1 week             02/12/2019 1236  02/19/2019 0845  Body Fluid Culture - Synovial Fluid, Knee, Left [[id number]]   Synovial Fluid from Knee, Left     Preliminary result  BF Culture No growth   Gram Stain Many (4+) WBCs seen    No organisms seen             02/12/2019 1236  02/19/2019 1445  AFB Culture - Synovial Fluid, Knee, Left [[id number]]   Synovial Fluid from Knee, Left     Preliminary result  AFB Culture No AFB isolated at 1 week   AFB Stain No acid fast bacilli seen on concentrated smear             02/11/2019 0941  02/11/2019 1136  Clostridium Difficile  Toxin - Stool, Per Rectum [[id number]]    Stool from Per Rectum     Final result  No result data             02/11/2019 0941  02/11/2019 1136  Clostridium Difficile Toxin, PCR - Stool, Per Rectum [[id number]]    (Abnormal)   Stool from Per Rectum     Final result  Clostridium difficile (toxin A/B) Detected Abnormal              02/10/2019 2028  02/14/2019 0711  Body Fluid Culture - Synovial Fluid, Knee, Right [[id number]]   Synovial Fluid from Knee, Right     Final result  BF Culture No growth at 4 days   Gram Stain Many (4+) WBCs seen    No organisms seen             02/10/2019 2028  02/17/2019 1329  Anaerobic Culture - Synovial Fluid, Knee, Right [[id number]]    (Abnormal)   Synovial Fluid from Knee, Right     Preliminary result  Culture No anaerobes isolated    Scant growth (1+) Staphylococcus aureus Abnormal        Susceptibility      Staphylococcus aureus     MYKEL (Preliminary)     Ceftriaxone <=8  Susceptible     Clindamycin <=0.5  Susceptible     Daptomycin <=0.5  Susceptible     Erythromycin <=0.5  Susceptible     Gentamicin <=4  Susceptible     Levofloxacin <=1  Susceptible1     Linezolid <=1  Susceptible     Oxacillin 1  Susceptible     Penicillin G >8  Resistant     Quinupristin + Dalfopristin <=0.5  Susceptible     Rifampin <=1  Susceptible     Tetracycline <=4  Susceptible     Trimethoprim + Sulfamethoxazole <=0.5/9.5  Susceptible     Vancomycin 2  Susceptible           1 Staphylococcus species may develop resistance during prolonged therapy with quinolones.  Isolates that are initially susceptible may become resistant within three to four days after initiation of therapy. Testing of repeat isolates may be warranted.       Linear View         02/10/2019 2027  02/14/2019 0711  Body Fluid Culture - Synovial Fluid, Knee, Left [[id number]]   Synovial Fluid from Knee, Left     Final result  BF Culture No growth at 4 days   Gram Stain Many (4+) WBCs seen    No organisms seen             02/10/2019 2027  02/19/2019  1510  Anaerobic Culture - Synovial Fluid, Knee, Left [[id number]]    (Abnormal)   Synovial Fluid from Knee, Left     Preliminary result  Culture No anaerobes isolated    Staphylococcus aureus Abnormal     Isolated from broth culture        Susceptibility      Staphylococcus aureus     MYKEL     Ceftriaxone <=8  Susceptible     Clindamycin <=0.5  Susceptible     Daptomycin 1  Susceptible     Erythromycin <=0.5  Susceptible     Gentamicin <=4  Susceptible     Levofloxacin <=1  Susceptible1     Linezolid 4  Susceptible     Oxacillin 0.5  Susceptible     Penicillin G >8  Resistant     Quinupristin + Dalfopristin <=0.5  Susceptible     Rifampin <=1  Susceptible     Tetracycline <=4  Susceptible     Trimethoprim + Sulfamethoxazole <=0.5/9.5  Susceptible     Vancomycin 2  Susceptible           1 Staphylococcus species may develop resistance during prolonged therapy with quinolones.  Isolates that are initially susceptible may become resistant within three to four days after initiation of therapy. Testing of repeat isolates may be warranted.       Linear View         02/10/2019 1937  02/15/2019 2030  Blood Culture - Blood, Arm, Left [[id number]]   Blood from Arm, Left     Final result  Blood Culture No growth at 5 days             02/10/2019 1913  02/15/2019 2030  Blood Culture - Blood, Arm, Right [[id number]]   Blood from Arm, Right     Final result  Blood Culture No growth at 5 days                    I reviewed the patient's new imaging including images and reports.      Physical therapy: Patient ambulated 50 feet with RW and CGA, limited by pain and fatigue. Patient modified independent with t/f in and out of bed. CGA for sit<->stand t/f and for gait training. Achieved gait distance goal today, new goal established. Will continue to progress mobility training and strengthening as able.     Discharge Assessment:    Vital Signs  Visit Vitals  /97 (BP Location: Left arm, Patient Position: Lying)   Pulse 83   Temp 96.3 °F  "(35.7 °C) (Oral)   Resp 16   Ht 157.5 cm (62\")   Wt 82.6 kg (182 lb)   SpO2 97%   BMI 33.29 kg/m²     Temp (24hrs), Av °F (36.1 °C), Min:96.3 °F (35.7 °C), Max:97.3 °F (36.3 °C)      General Appearance:    Alert, cooperative, in no acute distress   Lungs:     Clear to auscultation,respirations regular, even and                   unlabored    Heart:    Regular rhythm and normal rate, normal S1 and S2   Abdomen:     Normal bowel sounds, no masses, no organomegaly, soft        non-tender, non-distended, no guarding, no rebound                 tenderness   Extremities:   Bilateral knees Prineo CDI. Mild swelling   Pulses:   Pulses palpable and equal bilaterally   Skin:   Dry, flakey r/t eczema    Neurologic:   Cranial nerves 2 - 12 grossly intact, sensation intact. Flexion and dorsiflexion intact bilateral feet.       Discharge Disposition: [facility]    Discharge Medications     Discharge Medications      New Medications      Instructions Start Date   ceFAZolin in dextrose 2-4 GM/100ML-% solution IVPB  Commonly known as:  ANCEF   2 g, Intravenous, Every 8 Hours, Per [facility]      diphenhydrAMINE 25 mg capsule  Commonly known as:  BENADRYL   25 mg, Oral, Every 6 Hours PRN      enoxaparin 40 MG/0.4ML solution syringe  Commonly known as:  LOVENOX   40 mg, Subcutaneous, Daily, For 2 weeks      folic acid 1 MG tablet  Commonly known as:  FOLVITE   1 mg, Oral, Daily      HYDROcodone-acetaminophen 5-325 MG per tablet  Commonly known as:  NORCO   1 tablet, Oral, Every 4 Hours PRN      hydrOXYzine 50 MG tablet  Commonly known as:  ATARAX   100 mg, Oral, Every 6 Hours PRN      lactobacillus acidophilus capsule capsule   1 capsule, Oral, Daily      magic mouthwash   10 mL, Swish & Spit, 4 Times Daily      miconazole 2 % cream  Commonly known as:  MICOTIN   1 application, Topical, Every 12 Hours Scheduled      multivitamin tablet tablet   1 tablet, Oral, Daily      predniSONE 20 MG tablet  Commonly known as:  " DELTASONE   20 mg, Oral, Daily      thiamine 100 MG tablet  Commonly known as:  VITAMIN B1   100 mg, Oral, Daily      vancomycin 50 MG/ML reconstituted solution oral solution reconstituted   125 mg, Oral, Every 6 Hours Scheduled, Per [facility], see taper instructions         Continue These Medications      Instructions Start Date   carvedilol 3.125 MG tablet  Commonly known as:  COREG   3.125 mg, Oral, 2 Times Daily With Meals      traZODone 50 MG tablet  Commonly known as:  DESYREL   50 mg, Oral, Nightly      venlafaxine  MG 24 hr capsule  Commonly known as:  EFFEXOR-XR   150 mg, Oral, Daily         Stop These Medications    docusate sodium 100 MG capsule     heparin (porcine) 5000 UNIT/ML injection     insulin detemir 100 UNIT/ML injection  Commonly known as:  LEVEMIR     insulin regular 100 UNIT/ML injection  Commonly known as:  humuLIN R,novoLIN R     oxyCODONE-acetaminophen 5-325 MG per tablet  Commonly known as:  PERCOCET     polyethylene glycol pack packet  Commonly known as:  MIRALAX     triamcinolone 0.1 % lotion  Commonly known as:  KENALOG            Discharge Diet: Consistent carb diet    Activity at Discharge: WBAT BLE/ with knee immobilizer for 6 weeks      Follow-up Appointments  Dr. Moffett per his orders  [facility] per orders    Discharge took over 30 min.    BASILIO Jeff  02/20/19  9:50 AM

## 2019-02-20 NOTE — PLAN OF CARE
Problem: Skin Injury Risk (Adult)  Goal: Identify Related Risk Factors and Signs and Symptoms  Outcome: Ongoing (interventions implemented as appropriate)    Goal: Skin Health and Integrity  Outcome: Ongoing (interventions implemented as appropriate)      Problem: Fall Risk (Adult)  Goal: Identify Related Risk Factors and Signs and Symptoms  Outcome: Ongoing (interventions implemented as appropriate)    Goal: Absence of Fall  Outcome: Ongoing (interventions implemented as appropriate)      Problem: Knee Arthroplasty (Total, Partial) (Adult)  Goal: Signs and Symptoms of Listed Potential Problems Will be Absent, Minimized or Managed (Knee Arthroplasty)  Outcome: Ongoing (interventions implemented as appropriate)      Problem: Patient Care Overview  Goal: Plan of Care Review  Outcome: Ongoing (interventions implemented as appropriate)   02/20/19 0421   Coping/Psychosocial   Plan of Care Reviewed With patient   Coping/Psychosocial   Patient Agreement with Plan of Care agrees   OTHER   Outcome Summary Pt remains A&Ox4. VSS throughout shift, and pt has remained in NSR. Prineo to bilat knees remains c/d/i. Pt skins remains the same. Rash to bottom, and skin is very dry. Pt has continues to get up to BSC w/ immobilizers on, and does fairly well. Pt has had minimal c/o's pain during shift, and has been well controlled w PO pain meds. Plan to d/c today to inpt rehab. Will continue to monitor.    Plan of Care Review   Progress improving

## 2019-02-20 NOTE — PROGRESS NOTES
"[facility] Progress Note    Date of Admission: 2/10/2019      Antibiotics:  Cefazolin,  Po vanco    CC: bilateral knee pain    S: Reports 1-2 BMs daily. No f/c/s. No n/v. Stools have some consistency to them. Plans to discharge to [facility] today with EMS scheduled transfer at noon. Tolerating antibiotics. Awaiting placement to complete antibiotics. Ortho allowing weightbearing as tolerated. Walked 50 feet already.     O:  /75 (BP Location: Left arm, Patient Position: Lying)   Pulse 59   Temp 97.3 °F (36.3 °C) (Oral)   Resp 16   Ht 157.5 cm (62\")   Wt 82.6 kg (182 lb)   SpO2 96%   BMI 33.29 kg/m²   Temp (24hrs), Av.2 °F (36.2 °C), Min:97 °F (36.1 °C), Max:97.3 °F (36.3 °C)      PE:     GENERAL: Awake and alert today, in no acute distress. Anxious at times  HEENT: Normocephalic, atraumatic.  PERRL. EOMI. No conjunctival injection. No icterus. Oropharynx clear without evidence of thrush or exudate. No evidence of peridontal disease.    NECK: Supple without nuchal rigidity  LYMPH: No cervical, axillary or inguinal lymphadenopathy. No neck masses  HEART: RRR; No murmur, rubs, gallops.   LUNGS: Clear to auscultation bilaterally without wheezing, rales, rhonchi. Normal respiratory effort.  ABDOMEN: Soft, mild distention with increased bowel sounds. No rebound or guarding.   EXT:  Trace lower extremity edema   : Normal appearing genitalia without Taylor catheter.  MSK: Bilateral knee dressed with decreased range of motion   SKIN: Warm and dry skin with eczematous rash  Bilateral knees with support in place.   NEURO: Oriented to Person and place. No focal deficits.   PSYCHIATRIC: confusion better  Laboratory Data    Results from last 7 days   Lab Units 19  0746 02/15/19  0651 19  0544   WBC 10*3/mm3 6.75 6.83 7.32   HEMOGLOBIN g/dL 8.8* 8.1* 7.9*   HEMATOCRIT % 28.3* 26.1* 25.9*   PLATELETS 10*3/mm3 306 249 234     Results from last 7 days   Lab Units 19  0746   SODIUM mmol/L 138 "   POTASSIUM mmol/L 4.4   CHLORIDE mmol/L 104   CO2 mmol/L 29.0   BUN mg/dL 6*   CREATININE mg/dL 0.59*   GLUCOSE mg/dL 131*   CALCIUM mg/dL 9.0                   Estimated Creatinine Clearance: 108.6 mL/min (A) (by C-G formula based on SCr of 0.59 mg/dL (L)).      Microbiology:  R knee Synovial Fluid: MSSA   L knee Synovial Fluid MSSA      Radiology:  Imaging Results (last 24 hours)     ** No results found for the last 24 hours. **          PROBLEM LIST:   Bilateral prosthetic joint infections  Prior history of methicillin sensitive staph aureus bilateral knee infections    Leukocytosis  Chronically elevated sedimentation rate CRP  Alcoholism  Severe eczema  Bilateral knee pain and difficulty with ambulation  Low-grade fevers  C diff colitis with acute diarrhea  Acute blood loss anemia  Confusion  Dermatitis        ASSESSMENT:  53-year-old female with severe eczema, history of alcoholism, with previous MSSA bacteremia with bilateral knee prostate joint infection attempted to be treated with single exchange long-term IV followed by oral antibiotics.  Unfortunately even with long-term treatment oral antibiotics she's had worsening of her bilateral knee pain swelling erythema and effusions appears to be consistent with acute on chronic infection. Synovial fluid positive for MSSA and now s/p bilateral knee hardware removal with articulating spacer placement. Will require prolonged IV antibiotics and can continue cefazolin 2 g IV every 8 hrs.    Continue  oral vancomycin for C. difficile colitis. Recommend Miconazole Cream for tevin-rectal yeast. Continue Magic mouthwash for oral thrush. Anxiety medication changes per primary team.     Patient has status post removal of bilateral knee prosthesis with methicillin sensitive staph aureus isolated remains on high-dose cefazolin with some adverse reaction minor of causing dry skin exacerbating her eczematous dermatitis so added low-dose prednisone and hydroxyzine  yesterday.     PLAN:  Cefazolin 2 g IV every 8 hrs through 3/26/19 with weekly labs of cbc/cmp/esr/crp and weekly picc care on Mondays    Awaiting placement and will need weekly labs of CBC, CMP, ESR, CRP and PICC care in facility.   F/u appt with Dr. Francis in 2-3 weeks on a Tuesday  for monitoring    Proceed with vancomycin taper at facility with vancomycin 250 mg orally 3 times a day for 2 weeks then twice a day for 2 weeks then daily for 2 weeks then every other day for 2 weeks then every 3 day for 2 weeks.        UM:  D/c planning on iv abx, d/w med team, CM and planning above. F/u 3 weeks     Dr. Brenden Francis saw the patient, performed the physical exam, reviewed the laboratory data and guided with the formulation of the above problem list, assessment and treatment plan.         Brenden Francis MD  2/20/2019

## 2019-02-20 NOTE — DISCHARGE INSTRUCTIONS
You have narcotic medications that are being stored in the inpatient pharmacy.  Upon your discharge from [facility] you can return to the hospital and  this medication.  Please call [phone] and  [facility] Director will assist you with getting these medications.

## 2019-02-24 LAB
BACTERIA SPEC ANAEROBE CULT: ABNORMAL

## 2019-02-26 LAB
BACTERIA FLD CULT: NORMAL
BACTERIA SPEC ANAEROBE CULT: NORMAL
GRAM STN SPEC: NORMAL
GRAM STN SPEC: NORMAL

## 2019-03-06 ENCOUNTER — LAB (OUTPATIENT)
Dept: LAB | Facility: HOSPITAL | Age: 54
End: 2019-03-06

## 2019-03-06 ENCOUNTER — TRANSCRIBE ORDERS (OUTPATIENT)
Dept: LAB | Facility: HOSPITAL | Age: 54
End: 2019-03-06

## 2019-03-06 DIAGNOSIS — B95.61 STAPHYLOCOCCUS AUREUS SUPERFICIAL FOLLICULITIS: ICD-10-CM

## 2019-03-06 DIAGNOSIS — M00.062 STAPHYLOCOCCAL ARTHRITIS OF LEFT KNEE (HCC): ICD-10-CM

## 2019-03-06 DIAGNOSIS — L73.9 STAPHYLOCOCCUS AUREUS SUPERFICIAL FOLLICULITIS: ICD-10-CM

## 2019-03-06 DIAGNOSIS — T84.53XD INFECTION OF TOTAL RIGHT KNEE REPLACEMENT, SUBSEQUENT ENCOUNTER: ICD-10-CM

## 2019-03-06 DIAGNOSIS — T84.54XD INFECTION OF TOTAL LEFT KNEE REPLACEMENT, SUBSEQUENT ENCOUNTER: ICD-10-CM

## 2019-03-06 DIAGNOSIS — M00.061 STAPHYLOCOCCAL ARTHRITIS OF RIGHT KNEE (HCC): ICD-10-CM

## 2019-03-06 DIAGNOSIS — A04.72 INTESTINAL INFECTION DUE TO CLOSTRIDIUM DIFFICILE: ICD-10-CM

## 2019-03-06 DIAGNOSIS — A04.72 INTESTINAL INFECTION DUE TO CLOSTRIDIUM DIFFICILE: Primary | ICD-10-CM

## 2019-03-06 LAB
ALBUMIN SERPL-MCNC: 4.68 G/DL (ref 3.2–4.8)
ALBUMIN/GLOB SERPL: 1.5 G/DL (ref 1.5–2.5)
ALP SERPL-CCNC: 123 U/L (ref 25–100)
ALT SERPL W P-5'-P-CCNC: 10 U/L (ref 7–40)
ANION GAP SERPL CALCULATED.3IONS-SCNC: 12 MMOL/L (ref 3–11)
AST SERPL-CCNC: 23 U/L (ref 0–33)
BASOPHILS # BLD AUTO: 0.03 10*3/MM3 (ref 0–0.2)
BASOPHILS NFR BLD AUTO: 0.3 % (ref 0–1)
BILIRUB SERPL-MCNC: 0.3 MG/DL (ref 0.3–1.2)
BUN BLD-MCNC: 22 MG/DL (ref 9–23)
BUN/CREAT SERPL: 25 (ref 7–25)
CALCIUM SPEC-SCNC: 9.7 MG/DL (ref 8.7–10.4)
CHLORIDE SERPL-SCNC: 104 MMOL/L (ref 99–109)
CO2 SERPL-SCNC: 24 MMOL/L (ref 20–31)
CREAT BLD-MCNC: 0.88 MG/DL (ref 0.6–1.3)
CRP SERPL-MCNC: 0.92 MG/DL (ref 0–1)
DEPRECATED RDW RBC AUTO: 45.6 FL (ref 37–54)
EOSINOPHIL # BLD AUTO: 0.28 10*3/MM3 (ref 0–0.3)
EOSINOPHIL NFR BLD AUTO: 2.6 % (ref 0–3)
ERYTHROCYTE [DISTWIDTH] IN BLOOD BY AUTOMATED COUNT: 15.5 % (ref 11.3–14.5)
ERYTHROCYTE [SEDIMENTATION RATE] IN BLOOD: 73 MM/HR (ref 0–30)
GFR SERPL CREATININE-BSD FRML MDRD: 67 ML/MIN/1.73
GLOBULIN UR ELPH-MCNC: 3 GM/DL
GLUCOSE BLD-MCNC: 180 MG/DL (ref 70–100)
HCT VFR BLD AUTO: 34.4 % (ref 34.5–44)
HGB BLD-MCNC: 10.7 G/DL (ref 11.5–15.5)
IMM GRANULOCYTES # BLD AUTO: 0.03 10*3/MM3 (ref 0–0.05)
IMM GRANULOCYTES NFR BLD AUTO: 0.3 % (ref 0–0.6)
LYMPHOCYTES # BLD AUTO: 2.46 10*3/MM3 (ref 0.6–4.8)
LYMPHOCYTES NFR BLD AUTO: 23.3 % (ref 24–44)
MCH RBC QN AUTO: 25.1 PG (ref 27–31)
MCHC RBC AUTO-ENTMCNC: 31.1 G/DL (ref 32–36)
MCV RBC AUTO: 80.8 FL (ref 80–99)
MONOCYTES # BLD AUTO: 0.43 10*3/MM3 (ref 0–1)
MONOCYTES NFR BLD AUTO: 4.1 % (ref 0–12)
NEUTROPHILS # BLD AUTO: 7.34 10*3/MM3 (ref 1.5–8.3)
NEUTROPHILS NFR BLD AUTO: 69.4 % (ref 41–71)
PLATELET # BLD AUTO: 340 10*3/MM3 (ref 150–450)
PMV BLD AUTO: 7.7 FL (ref 6–12)
POTASSIUM BLD-SCNC: 4.6 MMOL/L (ref 3.5–5.5)
PROT SERPL-MCNC: 7.7 G/DL (ref 5.7–8.2)
RBC # BLD AUTO: 4.26 10*6/MM3 (ref 3.89–5.14)
SODIUM BLD-SCNC: 140 MMOL/L (ref 132–146)
WBC NRBC COR # BLD: 10.57 10*3/MM3 (ref 3.5–10.8)

## 2019-03-06 PROCEDURE — 86140 C-REACTIVE PROTEIN: CPT

## 2019-03-06 PROCEDURE — 85025 COMPLETE CBC W/AUTO DIFF WBC: CPT

## 2019-03-06 PROCEDURE — 85652 RBC SED RATE AUTOMATED: CPT

## 2019-03-06 PROCEDURE — 80053 COMPREHEN METABOLIC PANEL: CPT

## 2019-03-06 PROCEDURE — 36415 COLL VENOUS BLD VENIPUNCTURE: CPT

## 2019-03-13 ENCOUNTER — OFFICE VISIT (OUTPATIENT)
Dept: ORTHOPEDIC SURGERY | Facility: CLINIC | Age: 54
End: 2019-03-13

## 2019-03-13 DIAGNOSIS — T84.59XD INFECTION OF PROSTHETIC KNEE JOINT, SUBSEQUENT ENCOUNTER: ICD-10-CM

## 2019-03-13 DIAGNOSIS — Z96.659 INFECTION OF PROSTHETIC KNEE JOINT, SUBSEQUENT ENCOUNTER: ICD-10-CM

## 2019-03-13 DIAGNOSIS — Z09 STATUS POST ORTHOPEDIC SURGERY, FOLLOW-UP EXAM: ICD-10-CM

## 2019-03-13 DIAGNOSIS — Z09 SURGERY FOLLOW-UP: Primary | ICD-10-CM

## 2019-03-13 PROCEDURE — 99024 POSTOP FOLLOW-UP VISIT: CPT | Performed by: ORTHOPAEDIC SURGERY

## 2019-03-13 NOTE — PROGRESS NOTES
[facility]opa[facility] Surgery Clinic Note    Subjective     Chief Complaint   Patient presents with   • Post-op     4 weeks - TOTAL KNEE ARTHROPLASTY REVISION WITH REMOVAL AND PLACE OF  ANTIBIOTIC SPACERS BILATERAL        HPI      Jyoti Luna is a 53 y.o. female.  She is follow-up bilateral total knee arthroplasty prosthesis removal with placement of articulating spacers 6 weeks ago.  She is doing better.  She is in a wheelchair.  She is getting IV antibiotics per PICC line.  Her pain is 7 out of 10.        Past Medical History:   Diagnosis Date   • Acid reflux    • Alcoholism (CMS/[facility])    • Anemia 3/20/2018   • Arthritis    • Asthma    • Depression 3/20/2018   • Diabetes mellitus (CMS/[facility])     POSS R/T INFECTION PER PT   • Eczema    • History of transfusion    • Hypertension 3/20/2018      Past Surgical History:   Procedure Laterality Date   • JOINT REPLACEMENT      bilateral knees   • KNEE POLY INSERT EXCHANGE Bilateral 3/23/2018    Procedure: KNEE POLY INSERT EXCHANGE WITH IRRIGATION AND DEBRIDMENT;  Surgeon: Ti Moffett MD;  Location:  [facility];  Service: Orthopedics   • LAPAROSCOPIC CHOLECYSTECTOMY     • TOTAL KNEE ARTHROPLASTY REVISION Bilateral 2/12/2019    Procedure: TOTAL KNEE ARTHROPLASTY REVISION WITH REMOVAL AND PLACE OF  ANTIBIOTIC SPACERS BILATERAL;  Surgeon: Ti Moffett MD;  Location: [facility] OR;  Service: Orthopedics      History reviewed. No pertinent family history.  Social History     Socioeconomic History   • Marital status: Single     Spouse name: Not on file   • Number of children: Not on file   • Years of education: Not on file   • Highest education level: Not on file   Social Needs   • Financial resource strain: Not on file   • Food insecurity - worry: Not on file   • Food insecurity - inability: Not on file   • Transportation needs - medical: Not on file   • Transportation needs - non-medical: Not on file   Occupational History   • Not on file   Tobacco Use   • Smoking status:  Never Smoker   • Smokeless tobacco: Never Used   Substance and Sexual Activity   • Alcohol use: Yes     Comment: PT STATES SHE HAS NOT DRANK IN APPROX 3 MONTHS   • Drug use: No   • Sexual activity: Defer   Other Topics Concern   • Not on file   Social History Narrative   • Not on file      Current Outpatient Medications on File Prior to Visit   Medication Sig Dispense Refill   • carvedilol (COREG) 3.125 MG tablet Take 3.125 mg by mouth 2 (Two) Times a Day With Meals.     • diphenhydrAMINE (BENADRYL) 25 mg capsule Take 1 capsule by mouth Every 6 (Six) Hours As Needed for Itching.     • folic acid (FOLVITE) 1 MG tablet Take 1 tablet by mouth Daily.     • hydrOXYzine (ATARAX) 50 MG tablet Take 2 tablets by mouth Every 6 (Six) Hours As Needed for Itching.     • lactobacillus acidophilus (RISAQUAD) capsule capsule Take 1 capsule by mouth Daily.     • multivitamin (THERAGRAN) tablet tablet Take 1 tablet by mouth Daily. 30 tablet    • Nystatin (MAGIC MOUTHWASH) Swish and spit 10 mL 4 (Four) Times a Day.     • thiamine (VITAMIN B1) 100 MG tablet Take 1 tablet by mouth Daily.     • traZODone (DESYREL) 50 MG tablet Take 50 mg by mouth Every Night.     • venlafaxine XR (EFFEXOR-XR) 150 MG 24 hr capsule Take 150 mg by mouth Daily.     • enoxaparin (LOVENOX) 40 MG/0.4ML solution syringe Inject 0.4 mL under the skin into the appropriate area as directed Daily. For 2 weeks 5.6 mL 0     No current facility-administered medications on file prior to visit.       No Known Allergies     The following portions of the patient's history were reviewed and updated as appropriate: allergies, current medications, past family history, past medical history, past social history, past surgical history and problem list.    Review of Systems   Constitutional: Negative.    HENT: Negative.    Eyes: Negative.    Respiratory: Negative.    Cardiovascular: Negative.    Gastrointestinal: Negative.    Endocrine: Negative.    Genitourinary: Negative.     Musculoskeletal: Positive for arthralgias, gait problem and joint swelling.   Skin: Negative.    Allergic/Immunologic: Negative.    Hematological: Negative.    Psychiatric/Behavioral: Negative.         Objective      Physical Exam  Temp is 97.5    There is no height or weight on file to calculate BMI.        GENERAL APPEARANCE: awake, alert & oriented x 3, in no acute distress and well developed, well nourished  PSYCH: normal mood and affect      Ortho Exam  Both knee incisions look good.  There is minimal swelling.  Negative Homans sign bilaterally.  No erythema.  Range of motion 15-90 degrees on the right and 20-90 degrees on the left    Imaging/Studies  Imaging Results (last 7 days)     Procedure Component Value Units Date/Time    XR Knee 1 or 2 View Bilateral [[id number]] Resulted:  03/13/19 1327     Updated:  03/13/19 1327    Narrative:       Bilateral Knee X-Rays  Indication: Pain    Upright AP, Lateral,views of bilateral knees     Findings:  Bilateral knee components and antibiotic cement unchanged  No bony lesion  Normal soft tissues   prior studies were available for comparison.              Assessment/Plan        ICD-10-CM ICD-9-CM   1. Surgery follow-up Z09 V67.00   2. Status post orthopedic surgery, follow-up exam Z09 V67.09   3. Infection of prosthetic knee joint, subsequent encounter T84.59XD V58.89    Z96.659        Orders Placed This Encounter   Procedures   • XR Knee 1 or 2 View Bilateral   • Ambulatory Referral to Physical Therapy      She will continue with physical therapy.  She is weight-bear as tolerated now.  I will put her in a hinged knee brace for the left.  She will follow-up in 1 month with x-rays.    Medical Decision Making  Management Options : physical/occupational therapy  Data/Risk: lab tests, radiology tests and independent visualization of imaging, lab tests, or EMG/NCV    Ti Moffett MD  03/13/19  1:40 PM         EMR Dragon/Transcription disclaimer:  Much of this encounter  note is an electronic transcription of spoken language to printed text. Electronic transcription of spoken language may permit erroneous, or at times, nonsensical words or phrases to be inadvertently transcribed. Although I have reviewed the note for such errors, some may still exist.

## 2019-03-14 ENCOUNTER — TELEPHONE (OUTPATIENT)
Dept: ORTHOPEDIC SURGERY | Facility: CLINIC | Age: 54
End: 2019-03-14

## 2019-03-14 NOTE — TELEPHONE ENCOUNTER
The patient's daughter stated that her mom had called her this morning very upset, that Dr. Moffett would not do knee replacements on her. The daughter Marina just wants to know why the patient (Jyoti Luna) is unable to get total knee replacements. She also wants to know where she is at with the infection as well.    Please advise.     Piper

## 2019-03-14 NOTE — TELEPHONE ENCOUNTER
She is weight-bear as tolerated without range of motion restrictions.  I recommend use of the hinged knee brace on the left knee.

## 2019-03-14 NOTE — TELEPHONE ENCOUNTER
PT'S DAUGHTER WAS CALLING TO TOUCH BASE WITH DR. CERRATO. SHE WAS MENTIONING THAT HER MOTHER HAS BEEN HOSPITALIZED FOR INFECTIONS THAT SHE HAS HAD IN THE PAST AND WANTED TO TALK TO DR. CERRATO ABOUT THIS. HER DAUGHTER AMBROSIO CAN BE REACHED [phone].

## 2019-03-15 NOTE — TELEPHONE ENCOUNTER
She has knee replacements in and may not need another surgery.  The infection treatment is going well and her knees are doing better

## 2019-03-15 NOTE — TELEPHONE ENCOUNTER
I called the patient's daughter back and advised her that per Dr. Moffett : She has knee replacements in and may not need another surgery.  The infection treatment is going well and her knees are doing better.    She asked about her mother being able to walk again (normally) and I let her know that per Dr. Moffett's last note, she is going to physical therapy to help with this and is able to do weightbearing as tolerated.

## 2019-03-20 ENCOUNTER — TELEPHONE (OUTPATIENT)
Dept: ORTHOPEDIC SURGERY | Facility: CLINIC | Age: 54
End: 2019-03-20

## 2019-03-20 NOTE — TELEPHONE ENCOUNTER
PT CALLED BACK REQUESTING TO TALK TO YOU. SHE SAID WE SHOULD BE RECEIVING A FAX FROM HER PT SOON. PLEASE CALL BACK [phone].

## 2019-03-20 NOTE — TELEPHONE ENCOUNTER
Ms. Luna called and is in [facility] and put the psychical therapist on the phone and she stated that Ms. Luna is unable to walk at all. They did a mobile X-ray and they are faxing the report to us for Dr. Moffett to see. I let her know she needed to come into see Dr. Moffett concerning this and she does not have a ride until Friday.

## 2019-03-20 NOTE — TELEPHONE ENCOUNTER
I gave the xray report to Dr. Moffett to review. I called her back and she has no drainage and is not red hot or swollen. I made her an appointment to see Dr. Moffett on Monday when she can get a ride to the office.

## 2019-03-25 ENCOUNTER — OFFICE VISIT (OUTPATIENT)
Dept: ORTHOPEDIC SURGERY | Facility: CLINIC | Age: 54
End: 2019-03-25

## 2019-03-25 DIAGNOSIS — T84.59XD INFECTION OF PROSTHETIC KNEE JOINT, SUBSEQUENT ENCOUNTER: ICD-10-CM

## 2019-03-25 DIAGNOSIS — Z09 SURGERY FOLLOW-UP: Primary | ICD-10-CM

## 2019-03-25 DIAGNOSIS — Z96.659 INFECTION OF PROSTHETIC KNEE JOINT, SUBSEQUENT ENCOUNTER: ICD-10-CM

## 2019-03-25 DIAGNOSIS — Z09 STATUS POST ORTHOPEDIC SURGERY, FOLLOW-UP EXAM: ICD-10-CM

## 2019-03-25 PROCEDURE — 99024 POSTOP FOLLOW-UP VISIT: CPT | Performed by: ORTHOPAEDIC SURGERY

## 2019-03-25 NOTE — PROGRESS NOTES
Chief Complaint   Patient presents with   • Follow-up     2 weeks follow up, 6 week status post TOTAL KNEE ARTHROPLASTY REVISION WITH REMOVAL AND PLACE OF  ANTIBIOTIC SPACERS BILATERAL           HPI  She is follow-up bilateral knee articulating spacers.  Last week she started to have increasing pain with walking.  Her PICC line was changed 2 days ago.  Her white blood cell count normal.  Labs are pending for today.  She sees Dr. Francis.      There were no vitals filed for this visit.      Physical Exam:  Her knees have 1+ effusions.  There is no erythema no warmth no drainage.  She has pain with weightbearing in both knees left worse than right.      X-RAY REPORT:  Imaging Results (last 7 days)     Procedure Component Value Units Date/Time    XR Knee 1 or 2 View Bilateral [[id number]] Resulted:  03/25/19 1014     Updated:  03/25/19 1015    Narrative:       Bilateral Knee X-Rays  Indication: Pain    Upright AP, Lateral, views of bilateral knees     Findings: Articulating spacers present in both knees with no apparent   change  No fracture    prior studies were available for comparison.                    ICD-10-CM ICD-9-CM   1. Surgery follow-up Z09 V67.00   2. Infection of prosthetic knee joint, subsequent encounter T84.59XD V58.89    Z96.659    3. Status post orthopedic surgery, follow-up exam Z09 V67.09       Orders Placed This Encounter   Procedures   • XR Knee 1 or 2 View Bilateral     Her prognosis is quite poor.  She sees infectious disease today.  She is due to get a new sed rate and C-reactive protein.  Depending on those they may change her to p.o. antibiotics.  She needs long-term antibiotic suppression.  She may weight-bear as tolerated with the left knee brace.  I will see her back in 4 weeks.  If her C-reactive protein and sed rate are elevated she would need her knees aspirated.

## 2019-03-26 LAB
FUNGUS WND CULT: NORMAL
FUNGUS WND CULT: NORMAL
MYCOBACTERIUM SPEC CULT: NORMAL
MYCOBACTERIUM SPEC CULT: NORMAL
NIGHT BLUE STAIN TISS: NORMAL
NIGHT BLUE STAIN TISS: NORMAL

## 2019-03-27 ENCOUNTER — TELEPHONE (OUTPATIENT)
Dept: ORTHOPEDIC SURGERY | Facility: CLINIC | Age: 54
End: 2019-03-27

## 2019-03-27 NOTE — TELEPHONE ENCOUNTER
Dr. Moffett asked me to call Ms. Luna and schedule her to come in on Monday 4/1 in the AM. I called patient and she is able to come in Monday at 10 am.

## 2019-04-01 ENCOUNTER — OFFICE VISIT (OUTPATIENT)
Dept: ORTHOPEDIC SURGERY | Facility: CLINIC | Age: 54
End: 2019-04-01

## 2019-04-01 ENCOUNTER — LAB (OUTPATIENT)
Dept: LAB | Facility: HOSPITAL | Age: 54
End: 2019-04-01

## 2019-04-01 VITALS — HEIGHT: 62 IN | BODY MASS INDEX: 33.51 KG/M2 | OXYGEN SATURATION: 98 % | WEIGHT: 182.1 LBS | HEART RATE: 113 BPM

## 2019-04-01 DIAGNOSIS — G89.29 CHRONIC PAIN OF LEFT KNEE: ICD-10-CM

## 2019-04-01 DIAGNOSIS — T84.59XD INFECTION OF PROSTHETIC KNEE JOINT, SUBSEQUENT ENCOUNTER: ICD-10-CM

## 2019-04-01 DIAGNOSIS — G89.29 CHRONIC PAIN OF RIGHT KNEE: ICD-10-CM

## 2019-04-01 DIAGNOSIS — M25.561 CHRONIC PAIN OF RIGHT KNEE: Primary | ICD-10-CM

## 2019-04-01 DIAGNOSIS — M25.561 CHRONIC PAIN OF RIGHT KNEE: ICD-10-CM

## 2019-04-01 DIAGNOSIS — Z96.659 INFECTION OF PROSTHETIC KNEE JOINT, SUBSEQUENT ENCOUNTER: ICD-10-CM

## 2019-04-01 DIAGNOSIS — G89.29 CHRONIC PAIN OF RIGHT KNEE: Primary | ICD-10-CM

## 2019-04-01 DIAGNOSIS — M25.562 CHRONIC PAIN OF LEFT KNEE: ICD-10-CM

## 2019-04-01 LAB
APPEARANCE FLD: ABNORMAL
COLOR FLD: ABNORMAL
EOSINOPHIL NFR FLD MANUAL: 2 %
GRAM STN SPEC: NORMAL
GRAM STN SPEC: NORMAL
LYMPHOCYTES NFR FLD MANUAL: 8 %
MACROPHAGE FLUID: 1 %
MONOCYTES NFR FLD: 6 %
NEUTROPHILS NFR FLD MANUAL: 83 %
RBC # FLD AUTO: ABNORMAL /MM3
WBC # FLD AUTO: 1560 /MM3

## 2019-04-01 PROCEDURE — 99024 POSTOP FOLLOW-UP VISIT: CPT | Performed by: ORTHOPAEDIC SURGERY

## 2019-04-01 PROCEDURE — 87205 SMEAR GRAM STAIN: CPT

## 2019-04-01 PROCEDURE — 87070 CULTURE OTHR SPECIMN AEROBIC: CPT

## 2019-04-01 PROCEDURE — 20610 DRAIN/INJ JOINT/BURSA W/O US: CPT | Performed by: ORTHOPAEDIC SURGERY

## 2019-04-01 PROCEDURE — 89051 BODY FLUID CELL COUNT: CPT | Performed by: ORTHOPAEDIC SURGERY

## 2019-04-01 RX ORDER — GABAPENTIN 300 MG/1
300 CAPSULE ORAL 3 TIMES DAILY
COMMUNITY

## 2019-04-01 NOTE — PROGRESS NOTES
Chief Complaint   Patient presents with   • Post-op     Infection of Prosthetic Knee Joint; 6 weeks status post Bilateral Total Knee Arthroplasty Revision with Removal & Place of Antibiotic Spacers 02/12/2019           HPI    She says that both knees are hurting worse.  She says her pain feels like when it was infected.  She is walking with a walker.  Her left knee feels better in the brace.    Vitals:    04/01/19 1000   Pulse: 113   SpO2: 98%         Physical Exam:  She is able to walk with a walker.  Bilateral knee range of motion 0-90 degrees.  There is minimal swelling.  She has a trace effusion on the left with no effusion on the right.  there is very minimal warmth.  Negative Homans sign.          ICD-10-CM ICD-9-CM   1. Chronic pain of right knee M25.561 719.46    G89.29 338.29   2. Infection of prosthetic knee joint, subsequent encounter T84.59XD V58.89    Z96.659        Orders Placed This Encounter   Procedures   • Body Fluid Culture In Blood Culture Bottles - Body Fluid, Knee, Right   • Body Fluid Culture In Blood Culture Bottles - Body Fluid, Knee, Right   • Gram Stain - , Knee, Right   • Body Fluid Culture In Blood Culture Bottles - Body Fluid, Knee, Left   • Body Fluid Culture In Blood Culture Bottles - Body Fluid, Knee, Left   • Gram Stain - , Knee, Left   • Body Fluid Cell Count With Differential - Body Fluid, Knee, Right   • Body Fluid Cell Count With Differential - Body Fluid, Knee, Left     The plan to be an aspiration of both knees.  We will seen in the specimens to the lab for stat cultures, Gram stain and stat cell count.  I am concerned about recurrence of infection.  A repeat C-reactive protein was normal however sed rate increased at 73.  She is on steroids currently for a skin rash and is gained weight.  Infectious disease Dr. Francis is following her.  I aspirated both knees.  Out of the left knee got 9 cc of bloody clear fluid with no purulence.  Multiple attempts were made to aspirate the  right knee without any fluid that could be aspirated.  She has no effusion on the right knee therefore I am not surprised I will give her a knee brace to help with her complaints of instability on the right knee.  She needs to wear bilateral knee braces at all times when walking

## 2019-04-01 NOTE — PROGRESS NOTES
Procedure   Large Joint Arthrocentesis: L knee  Date/Time: 4/1/2019 10:30 AM  Consent given by: patient  Site marked: site marked  Timeout: Immediately prior to procedure a time out was called to verify the correct patient, procedure, equipment, support staff and site/side marked as required   Supporting Documentation  Indications: pain   Procedure Details  Location: knee - L knee  Preparation: Patient was prepped and draped in the usual sterile fashion  Needle size: 18 G  Approach: anterolateral  Aspirate amount: 9 mL  Aspirate: bloody  Analysis: fluid sample sent for laboratory analysis  Patient tolerance: patient tolerated the procedure well with no immediate complications

## 2019-04-08 ENCOUNTER — TELEPHONE (OUTPATIENT)
Dept: ORTHOPEDIC SURGERY | Facility: CLINIC | Age: 54
End: 2019-04-08

## 2019-04-08 NOTE — TELEPHONE ENCOUNTER
Called patient and rescheduled her no show appointment. She stated that she mixed up her appointments for today. She has been rescheduled.

## 2019-04-10 ENCOUNTER — OFFICE VISIT (OUTPATIENT)
Dept: ORTHOPEDIC SURGERY | Facility: CLINIC | Age: 54
End: 2019-04-10

## 2019-04-10 VITALS — HEIGHT: 62 IN | HEART RATE: 110 BPM | BODY MASS INDEX: 35.13 KG/M2 | WEIGHT: 190.92 LBS | OXYGEN SATURATION: 96 %

## 2019-04-10 DIAGNOSIS — T84.59XD INFECTION OF PROSTHETIC KNEE JOINT, SUBSEQUENT ENCOUNTER: Primary | ICD-10-CM

## 2019-04-10 DIAGNOSIS — Z96.659 INFECTION OF PROSTHETIC KNEE JOINT, SUBSEQUENT ENCOUNTER: Primary | ICD-10-CM

## 2019-04-10 DIAGNOSIS — M79.661 RIGHT CALF PAIN: ICD-10-CM

## 2019-04-10 PROCEDURE — 99024 POSTOP FOLLOW-UP VISIT: CPT | Performed by: ORTHOPAEDIC SURGERY

## 2019-04-10 NOTE — PROGRESS NOTES
Chief Complaint   Patient presents with   • Post-op     Infection of Prosthetic Knee Joint;  status post Bilateral Total Knee Arthroplasty Revision with Removal & Place of Antibiotic Spacers 02/12/2019. Aspiration of both knees done 3/25/19           HPI  She is doing about the same.  No worsening pain.  Pain is 7 out of 10 dull burning shooting.  She does complain of some right calf pain and ankle swelling.  She is not on a blood thinner she says.      Vitals:    04/10/19 1034   Pulse: 110   SpO2: 96%         Physical Exam:  Knee exams are the same.  No erythema no warmth range of motion 0 to 90 degrees bilaterally with no swelling in the knees.  She has minimal right ankle swelling with pain on Homans sign      X-RAY REPORT:  Imaging Results (last 7 days)     ** No results found for the last 168 hours. **        Her left knee aspirate showed no organisms negative Gram stain with 1500 WBCs        ICD-10-CM ICD-9-CM   1. Infection of prosthetic knee joint, subsequent encounter T84.59XD V58.89    Z96.659    2. Right calf pain M79.661 729.5       Orders Placed This Encounter   Procedures   • US Venous Doppler Upper Extremity Right (duplex)     I will order an ultrasound to rule out DVT.  I will see her back in 4 weeks.  We will get the results of the ultrasound today and she will start a blood thinner if indicated

## 2019-04-16 LAB — BACTERIA FLD CULT: NORMAL

## 2019-04-26 DIAGNOSIS — M79.661 RIGHT CALF PAIN: ICD-10-CM

## 2019-04-30 ENCOUNTER — TELEPHONE (OUTPATIENT)
Dept: ORTHOPEDIC SURGERY | Facility: CLINIC | Age: 54
End: 2019-04-30

## 2019-05-03 ENCOUNTER — TELEPHONE (OUTPATIENT)
Dept: ORTHOPEDIC SURGERY | Facility: CLINIC | Age: 54
End: 2019-05-03

## 2019-05-03 NOTE — TELEPHONE ENCOUNTER
Attempted to contact patient after a no show for appointment today to see if she needed another time to be seen today.  Patient didn't answer, and her voicemail box was full. I was unable to leave a message.  If patient calls back, she can be seen by Mony on Monday morning if still having the calf issues she called about earlier in the week.

## 2019-05-08 ENCOUNTER — OFFICE VISIT (OUTPATIENT)
Dept: ORTHOPEDIC SURGERY | Facility: CLINIC | Age: 54
End: 2019-05-08

## 2019-05-08 VITALS
WEIGHT: 190.92 LBS | HEART RATE: 121 BPM | HEIGHT: 62 IN | OXYGEN SATURATION: 99 % | TEMPERATURE: 95.7 F | BODY MASS INDEX: 35.13 KG/M2

## 2019-05-08 DIAGNOSIS — M25.571 ACUTE BILATERAL ANKLE PAIN: ICD-10-CM

## 2019-05-08 DIAGNOSIS — Z96.659 INFECTION OF PROSTHETIC KNEE JOINT, SEQUELA: Primary | ICD-10-CM

## 2019-05-08 DIAGNOSIS — T84.59XS INFECTION OF PROSTHETIC KNEE JOINT, SEQUELA: Primary | ICD-10-CM

## 2019-05-08 DIAGNOSIS — M25.572 ACUTE BILATERAL ANKLE PAIN: ICD-10-CM

## 2019-05-08 PROCEDURE — 99212 OFFICE O/P EST SF 10 MIN: CPT | Performed by: ORTHOPAEDIC SURGERY

## 2019-05-08 PROCEDURE — 99024 POSTOP FOLLOW-UP VISIT: CPT | Performed by: ORTHOPAEDIC SURGERY

## 2019-05-08 NOTE — PROGRESS NOTES
Chief Complaint   Patient presents with   • Follow-up     1 month follow up,  status post Bilateral Total Knee Arthroplasty Revision with Removal & Place of Antibiotic Spacers 02/12/2019. Aspiration of both knees done 3/25/19           HPI    She complains of bilateral ankle pain.  Her ankle pain started a week or so ago.  She thinks may be from therapy exercises.  Her right knee feels about the same.  Her left knee has a little more swelling than she had had.    Vitals:    05/08/19 0956   Pulse: (!) 121   SpO2: 99%     Her temperature is 95.7.    Physical Exam:    Range of motion both knees 10 to 100 degrees.  Left knee has an effusion.  There is no erythema minimal to no warmth.  Left knee aspirate last visit had 1500 WBCs   her ankles have full motion with no swelling no tenderness no redness.  Negative Homans sign bilaterally.    X-RAY REPORT:  Imaging Results (last 7 days)     ** No results found for the last 168 hours. **                ICD-10-CM ICD-9-CM   1. Infection of prosthetic knee joint, sequela T84.59XS 909.3    Z96.659    2. Acute bilateral ankle pain M25.571 719.47    M25.572 338.19     I recommend continue physical therapy.  We will recheck her heart rate  today.  I will see her back in 6 weeks.  Her repeat heart rate was 112.  I have encouraged her to see her primary care physician.  She has no acute shortness of breath or chest pain.  She says she has been feeling slightly short of breath for the past few weeks since being on the steroids and the weight gain but nothing acute.  Her normal pulse she says is about 98.

## 2019-05-24 ENCOUNTER — TELEPHONE (OUTPATIENT)
Dept: ORTHOPEDIC SURGERY | Facility: CLINIC | Age: 54
End: 2019-05-24

## 2019-05-24 NOTE — TELEPHONE ENCOUNTER
PATIENT IS COMPLAIING OF LEFT HIP LOCKING FOR THE PAST 3 WEEKS. PATIENT DECLINED TO COME IN FOR AN APPOINTMENT.

## 2019-05-30 NOTE — TELEPHONE ENCOUNTER
I called again today. Patients vm is full. Cannot leave message. She has an appt to see us on 06/19/2019 already. .    Kimberlee

## 2019-06-03 ENCOUNTER — TRANSCRIBE ORDERS (OUTPATIENT)
Dept: LAB | Facility: HOSPITAL | Age: 54
End: 2019-06-03

## 2019-06-03 ENCOUNTER — LAB (OUTPATIENT)
Dept: LAB | Facility: HOSPITAL | Age: 54
End: 2019-06-03

## 2019-06-03 DIAGNOSIS — T84.54XD INFECTION OF TOTAL LEFT KNEE REPLACEMENT, SUBSEQUENT ENCOUNTER: ICD-10-CM

## 2019-06-03 DIAGNOSIS — A04.72 INTESTINAL INFECTION DUE TO CLOSTRIDIUM DIFFICILE: Primary | ICD-10-CM

## 2019-06-03 DIAGNOSIS — A04.72 INTESTINAL INFECTION DUE TO CLOSTRIDIUM DIFFICILE: ICD-10-CM

## 2019-06-03 LAB
ANION GAP SERPL CALCULATED.3IONS-SCNC: 19 MMOL/L
BUN BLD-MCNC: 11 MG/DL (ref 6–20)
BUN/CREAT SERPL: 14.3 (ref 7–25)
CALCIUM SPEC-SCNC: 9.4 MG/DL (ref 8.6–10.5)
CHLORIDE SERPL-SCNC: 97 MMOL/L (ref 98–107)
CO2 SERPL-SCNC: 23 MMOL/L (ref 22–29)
CREAT BLD-MCNC: 0.77 MG/DL (ref 0.57–1)
CRP SERPL-MCNC: 0.38 MG/DL (ref 0–0.5)
DEPRECATED RDW RBC AUTO: 43.4 FL (ref 37–54)
ERYTHROCYTE [DISTWIDTH] IN BLOOD BY AUTOMATED COUNT: 14.9 % (ref 12.3–15.4)
ERYTHROCYTE [SEDIMENTATION RATE] IN BLOOD: 22 MM/HR (ref 0–30)
GFR SERPL CREATININE-BSD FRML MDRD: 78 ML/MIN/1.73
GLUCOSE BLD-MCNC: 218 MG/DL (ref 65–99)
HCT VFR BLD AUTO: 38.1 % (ref 34–46.6)
HGB BLD-MCNC: 11.8 G/DL (ref 12–15.9)
MCH RBC QN AUTO: 24.8 PG (ref 26.6–33)
MCHC RBC AUTO-ENTMCNC: 31 G/DL (ref 31.5–35.7)
MCV RBC AUTO: 80 FL (ref 79–97)
PLATELET # BLD AUTO: 258 10*3/MM3 (ref 140–450)
PMV BLD AUTO: 7.9 FL (ref 6–12)
POTASSIUM BLD-SCNC: 3.9 MMOL/L (ref 3.5–5.2)
RBC # BLD AUTO: 4.76 10*6/MM3 (ref 3.77–5.28)
SODIUM BLD-SCNC: 139 MMOL/L (ref 136–145)
WBC NRBC COR # BLD: 7.7 10*3/MM3 (ref 3.4–10.8)

## 2019-06-03 PROCEDURE — 86140 C-REACTIVE PROTEIN: CPT

## 2019-06-03 PROCEDURE — 85652 RBC SED RATE AUTOMATED: CPT

## 2019-06-03 PROCEDURE — 36415 COLL VENOUS BLD VENIPUNCTURE: CPT

## 2019-06-03 PROCEDURE — 80048 BASIC METABOLIC PNL TOTAL CA: CPT

## 2019-06-03 PROCEDURE — 85027 COMPLETE CBC AUTOMATED: CPT

## 2019-06-19 ENCOUNTER — OFFICE VISIT (OUTPATIENT)
Dept: ORTHOPEDIC SURGERY | Facility: CLINIC | Age: 54
End: 2019-06-19

## 2019-06-19 VITALS — OXYGEN SATURATION: 98 % | WEIGHT: 184.3 LBS | HEART RATE: 123 BPM | BODY MASS INDEX: 33.92 KG/M2 | HEIGHT: 62 IN

## 2019-06-19 DIAGNOSIS — M25.572 ACUTE BILATERAL ANKLE PAIN: ICD-10-CM

## 2019-06-19 DIAGNOSIS — Z96.659 INFECTION OF PROSTHETIC KNEE JOINT, SEQUELA: ICD-10-CM

## 2019-06-19 DIAGNOSIS — M70.62 TROCHANTERIC BURSITIS OF BOTH HIPS: Primary | ICD-10-CM

## 2019-06-19 DIAGNOSIS — M70.61 TROCHANTERIC BURSITIS OF BOTH HIPS: Primary | ICD-10-CM

## 2019-06-19 DIAGNOSIS — R29.898 WEAKNESS OF BOTH LOWER EXTREMITIES: ICD-10-CM

## 2019-06-19 DIAGNOSIS — T84.59XS INFECTION OF PROSTHETIC KNEE JOINT, SEQUELA: ICD-10-CM

## 2019-06-19 DIAGNOSIS — M25.571 ACUTE BILATERAL ANKLE PAIN: ICD-10-CM

## 2019-06-19 PROCEDURE — 99213 OFFICE O/P EST LOW 20 MIN: CPT | Performed by: ORTHOPAEDIC SURGERY

## 2019-06-19 NOTE — PROGRESS NOTES
[facility] Orthopaedic Surgery Clinic Note    Subjective     Chief Complaint   Patient presents with   • Follow-up     6 week f/u;  status post Bilateral Total Knee Arthroplasty Revision with Removal & Place of Antibiotic Spacers 02/12/2019        LIONEL Luna is a 54 y.o. female.  She complains of bilateral leg weakness and hip pain.  She points to her greater trochanter bursitis area.  Pain is 5 out of 10.  She is very frustrated and that she has not been able to get any home physical therapy.  She is been trying for 2 months.  She lives alone.  She has general leg weakness.  She says her knees are doing fine.    Past Medical History:   Diagnosis Date   • Acid reflux    • Alcoholism (CMS/[facility])    • Anemia 3/20/2018   • Arthritis    • Asthma    • Depression 3/20/2018   • Diabetes mellitus (CMS/[facility])     POSS R/T INFECTION PER PT   • Eczema    • History of transfusion    • Hypertension 3/20/2018      Past Surgical History:   Procedure Laterality Date   • JOINT REPLACEMENT      bilateral knees   • KNEE POLY INSERT EXCHANGE Bilateral 3/23/2018    Procedure: KNEE POLY INSERT EXCHANGE WITH IRRIGATION AND DEBRIDMENT;  Surgeon: Ti Moffett MD;  Location:  [facility];  Service: Orthopedics   • LAPAROSCOPIC CHOLECYSTECTOMY     • TOTAL KNEE ARTHROPLASTY REVISION Bilateral 2/12/2019    Procedure: TOTAL KNEE ARTHROPLASTY REVISION WITH REMOVAL AND PLACE OF  ANTIBIOTIC SPACERS BILATERAL;  Surgeon: Ti Moffett MD;  Location:  [facility];  Service: Orthopedics      History reviewed. No pertinent family history.  Social History     Socioeconomic History   • Marital status: Single     Spouse name: Not on file   • Number of children: Not on file   • Years of education: Not on file   • Highest education level: Not on file   Tobacco Use   • Smoking status: Never Smoker   • Smokeless tobacco: Never Used   Substance and Sexual Activity   • Alcohol use: Yes     Comment: PT STATES SHE HAS NOT DRANK IN APPROX 3 MONTHS   • Drug  use: No   • Sexual activity: Defer      Current Outpatient Medications on File Prior to Visit   Medication Sig Dispense Refill   • venlafaxine XR (EFFEXOR-XR) 150 MG 24 hr capsule Take 150 mg by mouth Daily.     • carvedilol (COREG) 3.125 MG tablet Take 3.125 mg by mouth 2 (Two) Times a Day With Meals.     • dicloxacillin (DYNAPEN) 500 MG capsule Take 500 mg by mouth 3 (Three) Times a Day.  2   • diphenhydrAMINE (BENADRYL) 25 mg capsule Take 1 capsule by mouth Every 6 (Six) Hours As Needed for Itching.     • enoxaparin (LOVENOX) 40 MG/0.4ML solution syringe Inject 0.4 mL under the skin into the appropriate area as directed Daily. For 2 weeks 5.6 mL 0   • folic acid (FOLVITE) 1 MG tablet Take 1 tablet by mouth Daily.     • gabapentin (NEURONTIN) 300 MG capsule Take 300 mg by mouth 3 (Three) Times a Day.     • hydrOXYzine (ATARAX) 50 MG tablet Take 2 tablets by mouth Every 6 (Six) Hours As Needed for Itching.     • lactobacillus acidophilus (RISAQUAD) capsule capsule Take 1 capsule by mouth Daily.     • multivitamin (THERAGRAN) tablet tablet Take 1 tablet by mouth Daily. 30 tablet    • Nystatin (MAGIC MOUTHWASH) Swish and spit 10 mL 4 (Four) Times a Day.     • RaNITidine & Diet Manage Prod (GABITIDINE PO) Take  by mouth.     • thiamine (VITAMIN B1) 100 MG tablet Take 1 tablet by mouth Daily.     • traZODone (DESYREL) 50 MG tablet Take 50 mg by mouth Every Night.       No current facility-administered medications on file prior to visit.       No Known Allergies     The following portions of the patient's history were reviewed and updated as appropriate: allergies, current medications, past family history, past medical history, past social history, past surgical history and problem list.    Review of Systems   Constitutional: Negative.    HENT: Negative.    Eyes: Negative.    Respiratory: Negative.    Cardiovascular: Negative.    Gastrointestinal: Negative.    Endocrine: Negative.    Genitourinary: Negative.   "  Musculoskeletal: Positive for arthralgias.   Skin: Negative.    Allergic/Immunologic: Negative.    Neurological: Negative.    Hematological: Negative.    Psychiatric/Behavioral: Negative.         Objective      Physical Exam  Pulse (!) 123   Ht 157.5 cm (62.01\")   Wt 83.6 kg (184 lb 4.9 oz)   SpO2 98%   Breastfeeding? No   BMI 33.70 kg/m²     Body mass index is 33.7 kg/m².    GENERAL APPEARANCE: awake, alert & oriented x 3, in no acute distress and well developed, well nourished  PSYCH: normal mood and affect     Ortho Exam  Peripheral Vascular  Lower Extremity   Edema - None bilaterally    Musculoskeletal  Lower Extremity   Left Hip    Normal range of motion    No crepitus, instability, subluxation or laxity    No known fractures or dislocations   Right Hip    Normal range of motion    No crepitus, instability, subluxation or laxity    No known fractures or dislocations     Inspection and palpation    Tenderness -      Right -  over greater trochanter      Left -  over greater trochanter     Swelling - none bilaterally    Tissue tension/texture is pliable and soft bilaterally     Normal warmth bilaterally   Strength and Tone    Left hip flexors - 5/5     Right hip flexors - 5/5   Deformities/Postures/Misalignments/Discrepancies    No leg length discrepancy   Functional Testing    Stinchfield test positive bilaterally    90-90 straight leg raise negative bilaterally          Imaging/Studies  Imaging Results (last 7 days)     ** No results found for the last 168 hours. **          Assessment/Plan        ICD-10-CM ICD-9-CM   1. Trochanteric bursitis of both hips M70.61 726.5    M70.62    2. Infection of prosthetic knee joint, sequela T84.59XS 909.3    Z96.659    3. Acute bilateral ankle pain M25.571 719.47    M25.572 338.19   4. Weakness of both lower extremities R29.898 729.89       Orders Placed This Encounter   Procedures   • Ambulatory Referral to Home Health      I have ordered home physical therapy for " her leg weakness.  She is deconditioned after multiple leg surgeries and not getting sufficient rehab.  If she can get stronger this should help her ankles and hips.  Medical Decision Making  Management Options : over-the-counter medicine and physical/occupational therapy      Ti Moffett MD  06/19/19  11:07 AM         EMR Dragon/Transcription disclaimer:  Much of this encounter note is an electronic transcription of spoken language to printed text. Electronic transcription of spoken language may permit erroneous, or at times, nonsensical words or phrases to be inadvertently transcribed. Although I have reviewed the note for such errors, some may still exist.

## 2019-10-09 ENCOUNTER — TELEPHONE (OUTPATIENT)
Dept: ORTHOPEDIC SURGERY | Facility: CLINIC | Age: 54
End: 2019-10-09

## 2019-10-09 NOTE — TELEPHONE ENCOUNTER
BARBARA CALLING FROM [facility] CALLED REGARDING A PRESCRIPTION FOR THE PATIENT FOR THE RIGHT SHOULDER. SHE NEEDED NOTES REGARDING THE RIGHT SHOULDER FROM A DATE IN September. I DID NOT SEE ANY NOTES FOR THIS PATIENT FOR THE RIGHT SHOULDER IN September. BARBARA CAN BE REACHED [phone].

## 2019-10-09 NOTE — TELEPHONE ENCOUNTER
I spoke with Rachna and we have not seen this patient for her shoulder.  She will contact the patient and the patient will call us to make an appt for her shoulder.  Yulisa

## 2019-10-21 ENCOUNTER — TRANSCRIBE ORDERS (OUTPATIENT)
Dept: LAB | Facility: HOSPITAL | Age: 54
End: 2019-10-21

## 2019-10-21 ENCOUNTER — LAB (OUTPATIENT)
Dept: LAB | Facility: HOSPITAL | Age: 54
End: 2019-10-21

## 2019-10-21 DIAGNOSIS — A04.72 INTESTINAL INFECTION DUE TO CLOSTRIDIUM DIFFICILE: ICD-10-CM

## 2019-10-21 DIAGNOSIS — D47.3 THROMBOCYTHEMIA, ESSENTIAL (HCC): ICD-10-CM

## 2019-10-21 DIAGNOSIS — H10.9 BACTERIAL CONJUNCTIVITIS: ICD-10-CM

## 2019-10-21 DIAGNOSIS — M00.061 STAPHYLOCOCCAL ARTHRITIS OF RIGHT KNEE (HCC): ICD-10-CM

## 2019-10-21 DIAGNOSIS — T84.54XD INFECTION OF TOTAL LEFT KNEE REPLACEMENT, SUBSEQUENT ENCOUNTER: ICD-10-CM

## 2019-10-21 DIAGNOSIS — L73.9 STAPHYLOCOCCUS AUREUS SUPERFICIAL FOLLICULITIS: ICD-10-CM

## 2019-10-21 DIAGNOSIS — T84.53XD INFECTION OF TOTAL RIGHT KNEE REPLACEMENT, SUBSEQUENT ENCOUNTER: ICD-10-CM

## 2019-10-21 DIAGNOSIS — D72.823 NEUTROPHILIC LEUKEMOID REACTION: ICD-10-CM

## 2019-10-21 DIAGNOSIS — H10.9 BACTERIAL CONJUNCTIVITIS: Primary | ICD-10-CM

## 2019-10-21 DIAGNOSIS — M00.062 STAPHYLOCOCCAL ARTHRITIS OF LEFT KNEE (HCC): ICD-10-CM

## 2019-10-21 DIAGNOSIS — B95.61 STAPHYLOCOCCUS AUREUS SUPERFICIAL FOLLICULITIS: ICD-10-CM

## 2019-10-21 LAB
ANION GAP SERPL CALCULATED.3IONS-SCNC: 16 MMOL/L (ref 5–15)
BASOPHILS # BLD AUTO: 0.03 10*3/MM3 (ref 0–0.2)
BASOPHILS NFR BLD AUTO: 0.5 % (ref 0–1.5)
BUN BLD-MCNC: 11 MG/DL (ref 6–20)
BUN/CREAT SERPL: 14.7 (ref 7–25)
CALCIUM SPEC-SCNC: 9.6 MG/DL (ref 8.6–10.5)
CHLORIDE SERPL-SCNC: 93 MMOL/L (ref 98–107)
CO2 SERPL-SCNC: 22 MMOL/L (ref 22–29)
CREAT BLD-MCNC: 0.75 MG/DL (ref 0.57–1)
CRP SERPL-MCNC: 2.23 MG/DL (ref 0–0.5)
DEPRECATED RDW RBC AUTO: 40.7 FL (ref 37–54)
EOSINOPHIL # BLD AUTO: 0.12 10*3/MM3 (ref 0–0.4)
EOSINOPHIL NFR BLD AUTO: 1.9 % (ref 0.3–6.2)
ERYTHROCYTE [DISTWIDTH] IN BLOOD BY AUTOMATED COUNT: 14.1 % (ref 12.3–15.4)
ERYTHROCYTE [SEDIMENTATION RATE] IN BLOOD: 75 MM/HR (ref 0–30)
GFR SERPL CREATININE-BSD FRML MDRD: 80 ML/MIN/1.73
GLUCOSE BLD-MCNC: 653 MG/DL (ref 65–99)
HCT VFR BLD AUTO: 38.1 % (ref 34–46.6)
HGB BLD-MCNC: 11.7 G/DL (ref 12–15.9)
IMM GRANULOCYTES # BLD AUTO: 0.03 10*3/MM3 (ref 0–0.05)
IMM GRANULOCYTES NFR BLD AUTO: 0.5 % (ref 0–0.5)
LYMPHOCYTES # BLD AUTO: 2.01 10*3/MM3 (ref 0.7–3.1)
LYMPHOCYTES NFR BLD AUTO: 32.2 % (ref 19.6–45.3)
MCH RBC QN AUTO: 24.6 PG (ref 26.6–33)
MCHC RBC AUTO-ENTMCNC: 30.7 G/DL (ref 31.5–35.7)
MCV RBC AUTO: 80 FL (ref 79–97)
MONOCYTES # BLD AUTO: 0.57 10*3/MM3 (ref 0.1–0.9)
MONOCYTES NFR BLD AUTO: 9.1 % (ref 5–12)
NEUTROPHILS # BLD AUTO: 3.48 10*3/MM3 (ref 1.7–7)
NEUTROPHILS NFR BLD AUTO: 55.8 % (ref 42.7–76)
NRBC BLD AUTO-RTO: 0 /100 WBC (ref 0–0.2)
PLATELET # BLD AUTO: 239 10*3/MM3 (ref 140–450)
PMV BLD AUTO: 8.4 FL (ref 6–12)
POTASSIUM BLD-SCNC: 4.5 MMOL/L (ref 3.5–5.2)
RBC # BLD AUTO: 4.76 10*6/MM3 (ref 3.77–5.28)
SODIUM BLD-SCNC: 131 MMOL/L (ref 136–145)
WBC NRBC COR # BLD: 6.24 10*3/MM3 (ref 3.4–10.8)

## 2019-10-21 PROCEDURE — 80048 BASIC METABOLIC PNL TOTAL CA: CPT

## 2019-10-21 PROCEDURE — 36415 COLL VENOUS BLD VENIPUNCTURE: CPT

## 2019-10-21 PROCEDURE — 85652 RBC SED RATE AUTOMATED: CPT

## 2019-10-21 PROCEDURE — 85025 COMPLETE CBC W/AUTO DIFF WBC: CPT

## 2019-10-21 PROCEDURE — 86140 C-REACTIVE PROTEIN: CPT

## 2020-01-01 NOTE — PLAN OF CARE
Problem: Skin Injury Risk (Adult)  Goal: Identify Related Risk Factors and Signs and Symptoms  Outcome: Ongoing (interventions implemented as appropriate)    Goal: Skin Health and Integrity  Outcome: Ongoing (interventions implemented as appropriate)      Problem: Fall Risk (Adult)  Goal: Identify Related Risk Factors and Signs and Symptoms  Outcome: Ongoing (interventions implemented as appropriate)    Goal: Absence of Fall  Outcome: Ongoing (interventions implemented as appropriate)      Problem: Pain, Acute (Adult)  Goal: Identify Related Risk Factors and Signs and Symptoms  Outcome: Ongoing (interventions implemented as appropriate)    Goal: Acceptable Pain Control/Comfort Level  Outcome: Ongoing (interventions implemented as appropriate)      Problem: Knee Arthroplasty (Total, Partial) (Adult)  Goal: Signs and Symptoms of Listed Potential Problems Will be Absent, Minimized or Managed (Knee Arthroplasty)  Outcome: Ongoing (interventions implemented as appropriate)    Goal: Anesthesia/Sedation Recovery  Outcome: Ongoing (interventions implemented as appropriate)      Problem: Patient Care Overview  Goal: Plan of Care Review  Outcome: Ongoing (interventions implemented as appropriate)   02/15/19 0326   Coping/Psychosocial   Plan of Care Reviewed With patient   OTHER   Outcome Summary Pt remains confused and seeing people omn the couch but follows commands and oriented at times. Pt does not appear as anxious and restless this shift. Ativan PO given and pain medication. Pt up to BSC with 2-3 and gb, pt tolerated well but increased pain and fear of falling. Ciro. immobilizers in place. IV abx and PO abx given per order. Rested well.    Plan of Care Review   Progress no change       Problem: Alcohol Withdrawal Acute, Risk/Actual (Adult)  Goal: Signs and Symptoms of Listed Potential Problems Will be Absent, Minimized or Managed (Alcohol Withdrawal Acute, Risk/Actual)  Outcome: Ongoing (interventions implemented as  appropriate)         11148 Detailed 23999 Exp Problem Focused - Mod. Complex

## 2020-02-17 ENCOUNTER — TRANSCRIBE ORDERS (OUTPATIENT)
Dept: LAB | Facility: HOSPITAL | Age: 55
End: 2020-02-17

## 2020-02-17 ENCOUNTER — LAB (OUTPATIENT)
Dept: LAB | Facility: HOSPITAL | Age: 55
End: 2020-02-17

## 2020-02-17 DIAGNOSIS — T84.53XD INFECTION OF TOTAL RIGHT KNEE REPLACEMENT, SUBSEQUENT ENCOUNTER: ICD-10-CM

## 2020-02-17 DIAGNOSIS — H10.9 BACTERIAL CONJUNCTIVITIS: ICD-10-CM

## 2020-02-17 DIAGNOSIS — E11.65 UNCONTROLLED TYPE 2 DIABETES MELLITUS WITH HYPERGLYCEMIA (HCC): Primary | ICD-10-CM

## 2020-02-17 DIAGNOSIS — A04.72 INTESTINAL INFECTION DUE TO CLOSTRIDIUM DIFFICILE: ICD-10-CM

## 2020-02-17 DIAGNOSIS — E11.65 UNCONTROLLED TYPE 2 DIABETES MELLITUS WITH HYPERGLYCEMIA (HCC): ICD-10-CM

## 2020-02-17 DIAGNOSIS — T84.54XD INFECTION OF TOTAL LEFT KNEE REPLACEMENT, SUBSEQUENT ENCOUNTER: ICD-10-CM

## 2020-02-17 LAB
ANION GAP SERPL CALCULATED.3IONS-SCNC: 14 MMOL/L (ref 5–15)
BASOPHILS # BLD AUTO: 0.05 10*3/MM3 (ref 0–0.2)
BASOPHILS NFR BLD AUTO: 0.8 % (ref 0–1.5)
BUN BLD-MCNC: 9 MG/DL (ref 6–20)
BUN/CREAT SERPL: 11.4 (ref 7–25)
CALCIUM SPEC-SCNC: 9.4 MG/DL (ref 8.6–10.5)
CHLORIDE SERPL-SCNC: 100 MMOL/L (ref 98–107)
CO2 SERPL-SCNC: 24 MMOL/L (ref 22–29)
CREAT BLD-MCNC: 0.79 MG/DL (ref 0.57–1)
CRP SERPL-MCNC: 0.62 MG/DL (ref 0–0.5)
DEPRECATED RDW RBC AUTO: 42.5 FL (ref 37–54)
EOSINOPHIL # BLD AUTO: 0.44 10*3/MM3 (ref 0–0.4)
EOSINOPHIL NFR BLD AUTO: 7 % (ref 0.3–6.2)
ERYTHROCYTE [DISTWIDTH] IN BLOOD BY AUTOMATED COUNT: 14.3 % (ref 12.3–15.4)
ERYTHROCYTE [SEDIMENTATION RATE] IN BLOOD: 29 MM/HR (ref 0–30)
GFR SERPL CREATININE-BSD FRML MDRD: 76 ML/MIN/1.73
GLUCOSE BLD-MCNC: 168 MG/DL (ref 65–99)
HCT VFR BLD AUTO: 35.8 % (ref 34–46.6)
HGB BLD-MCNC: 11.5 G/DL (ref 12–15.9)
IMM GRANULOCYTES # BLD AUTO: 0.01 10*3/MM3 (ref 0–0.05)
IMM GRANULOCYTES NFR BLD AUTO: 0.2 % (ref 0–0.5)
LYMPHOCYTES # BLD AUTO: 1.45 10*3/MM3 (ref 0.7–3.1)
LYMPHOCYTES NFR BLD AUTO: 22.9 % (ref 19.6–45.3)
MCH RBC QN AUTO: 26.4 PG (ref 26.6–33)
MCHC RBC AUTO-ENTMCNC: 32.1 G/DL (ref 31.5–35.7)
MCV RBC AUTO: 82.1 FL (ref 79–97)
MONOCYTES # BLD AUTO: 0.4 10*3/MM3 (ref 0.1–0.9)
MONOCYTES NFR BLD AUTO: 6.3 % (ref 5–12)
NEUTROPHILS # BLD AUTO: 3.97 10*3/MM3 (ref 1.7–7)
NEUTROPHILS NFR BLD AUTO: 62.8 % (ref 42.7–76)
NRBC BLD AUTO-RTO: 0 /100 WBC (ref 0–0.2)
PLATELET # BLD AUTO: 174 10*3/MM3 (ref 140–450)
PMV BLD AUTO: 7.7 FL (ref 6–12)
POTASSIUM BLD-SCNC: 3.6 MMOL/L (ref 3.5–5.2)
RBC # BLD AUTO: 4.36 10*6/MM3 (ref 3.77–5.28)
SODIUM BLD-SCNC: 138 MMOL/L (ref 136–145)
WBC NRBC COR # BLD: 6.32 10*3/MM3 (ref 3.4–10.8)

## 2020-02-17 PROCEDURE — 85025 COMPLETE CBC W/AUTO DIFF WBC: CPT

## 2020-02-17 PROCEDURE — 36415 COLL VENOUS BLD VENIPUNCTURE: CPT

## 2020-02-17 PROCEDURE — 85652 RBC SED RATE AUTOMATED: CPT

## 2020-02-17 PROCEDURE — 86140 C-REACTIVE PROTEIN: CPT

## 2020-02-17 PROCEDURE — 80048 BASIC METABOLIC PNL TOTAL CA: CPT

## 2020-05-18 ENCOUNTER — TRANSCRIBE ORDERS (OUTPATIENT)
Dept: LAB | Facility: HOSPITAL | Age: 55
End: 2020-05-18

## 2020-05-18 ENCOUNTER — LAB (OUTPATIENT)
Dept: LAB | Facility: HOSPITAL | Age: 55
End: 2020-05-18

## 2020-05-18 DIAGNOSIS — L20.9 ATOPIC DERMATITIS, UNSPECIFIED TYPE: ICD-10-CM

## 2020-05-18 DIAGNOSIS — M00.061 STAPHYLOCOCCAL ARTHRITIS OF RIGHT KNEE (HCC): ICD-10-CM

## 2020-05-18 DIAGNOSIS — T84.54XD INFECTION OF TOTAL LEFT KNEE REPLACEMENT, SUBSEQUENT ENCOUNTER: ICD-10-CM

## 2020-05-18 DIAGNOSIS — T84.53XD INFECTION OF TOTAL RIGHT KNEE REPLACEMENT, SUBSEQUENT ENCOUNTER: ICD-10-CM

## 2020-05-18 DIAGNOSIS — M00.062 STAPHYLOCOCCAL ARTHRITIS OF LEFT KNEE (HCC): ICD-10-CM

## 2020-05-18 DIAGNOSIS — E11.65 UNCONTROLLED TYPE 2 DIABETES MELLITUS WITH HYPERGLYCEMIA (HCC): Primary | ICD-10-CM

## 2020-05-18 DIAGNOSIS — E11.65 UNCONTROLLED TYPE 2 DIABETES MELLITUS WITH HYPERGLYCEMIA (HCC): ICD-10-CM

## 2020-05-18 LAB
ALBUMIN SERPL-MCNC: 4.3 G/DL (ref 3.5–5.2)
ALBUMIN/GLOB SERPL: 1.2 G/DL
ALP SERPL-CCNC: 116 U/L (ref 39–117)
ALT SERPL W P-5'-P-CCNC: 54 U/L (ref 1–33)
ANION GAP SERPL CALCULATED.3IONS-SCNC: 17 MMOL/L (ref 5–15)
AST SERPL-CCNC: 80 U/L (ref 1–32)
BASOPHILS # BLD AUTO: 0.07 10*3/MM3 (ref 0–0.2)
BASOPHILS NFR BLD AUTO: 0.9 % (ref 0–1.5)
BILIRUB SERPL-MCNC: 0.5 MG/DL (ref 0.2–1.2)
BUN BLD-MCNC: 7 MG/DL (ref 6–20)
BUN/CREAT SERPL: 8.6 (ref 7–25)
CALCIUM SPEC-SCNC: 9.2 MG/DL (ref 8.6–10.5)
CHLORIDE SERPL-SCNC: 93 MMOL/L (ref 98–107)
CO2 SERPL-SCNC: 24 MMOL/L (ref 22–29)
CREAT BLD-MCNC: 0.81 MG/DL (ref 0.57–1)
CRP SERPL-MCNC: 0.8 MG/DL (ref 0–0.5)
DEPRECATED RDW RBC AUTO: 43.1 FL (ref 37–54)
EOSINOPHIL # BLD AUTO: 0.23 10*3/MM3 (ref 0–0.4)
EOSINOPHIL NFR BLD AUTO: 3.1 % (ref 0.3–6.2)
ERYTHROCYTE [DISTWIDTH] IN BLOOD BY AUTOMATED COUNT: 14.6 % (ref 12.3–15.4)
ERYTHROCYTE [SEDIMENTATION RATE] IN BLOOD: 52 MM/HR (ref 0–30)
GFR SERPL CREATININE-BSD FRML MDRD: 73 ML/MIN/1.73
GLOBULIN UR ELPH-MCNC: 3.7 GM/DL
GLUCOSE BLD-MCNC: 389 MG/DL (ref 65–99)
HCT VFR BLD AUTO: 38.3 % (ref 34–46.6)
HGB BLD-MCNC: 12.1 G/DL (ref 12–15.9)
IMM GRANULOCYTES # BLD AUTO: 0.02 10*3/MM3 (ref 0–0.05)
IMM GRANULOCYTES NFR BLD AUTO: 0.3 % (ref 0–0.5)
LYMPHOCYTES # BLD AUTO: 2.06 10*3/MM3 (ref 0.7–3.1)
LYMPHOCYTES NFR BLD AUTO: 27.5 % (ref 19.6–45.3)
MCH RBC QN AUTO: 25.4 PG (ref 26.6–33)
MCHC RBC AUTO-ENTMCNC: 31.6 G/DL (ref 31.5–35.7)
MCV RBC AUTO: 80.3 FL (ref 79–97)
MONOCYTES # BLD AUTO: 0.45 10*3/MM3 (ref 0.1–0.9)
MONOCYTES NFR BLD AUTO: 6 % (ref 5–12)
NEUTROPHILS # BLD AUTO: 4.66 10*3/MM3 (ref 1.7–7)
NEUTROPHILS NFR BLD AUTO: 62.2 % (ref 42.7–76)
NRBC BLD AUTO-RTO: 0 /100 WBC (ref 0–0.2)
PLATELET # BLD AUTO: 237 10*3/MM3 (ref 140–450)
PMV BLD AUTO: 8.3 FL (ref 6–12)
POTASSIUM BLD-SCNC: 4.1 MMOL/L (ref 3.5–5.2)
PROT SERPL-MCNC: 8 G/DL (ref 6–8.5)
RBC # BLD AUTO: 4.77 10*6/MM3 (ref 3.77–5.28)
SODIUM BLD-SCNC: 134 MMOL/L (ref 136–145)
WBC NRBC COR # BLD: 7.49 10*3/MM3 (ref 3.4–10.8)

## 2020-05-18 PROCEDURE — 36415 COLL VENOUS BLD VENIPUNCTURE: CPT

## 2020-05-18 PROCEDURE — 80053 COMPREHEN METABOLIC PANEL: CPT

## 2020-05-18 PROCEDURE — 86140 C-REACTIVE PROTEIN: CPT

## 2020-05-18 PROCEDURE — 85652 RBC SED RATE AUTOMATED: CPT

## 2020-05-18 PROCEDURE — 85025 COMPLETE CBC W/AUTO DIFF WBC: CPT

## 2020-10-19 ENCOUNTER — LAB REQUISITION (OUTPATIENT)
Dept: LAB | Facility: HOSPITAL | Age: 55
End: 2020-10-19

## 2020-10-19 DIAGNOSIS — Z00.00 ROUTINE GENERAL MEDICAL EXAMINATION AT A HEALTH CARE FACILITY: ICD-10-CM

## 2020-10-19 LAB
ALBUMIN SERPL-MCNC: 4.5 G/DL (ref 3.5–5.2)
ALBUMIN/GLOB SERPL: 1.3 G/DL
ALP SERPL-CCNC: 147 U/L (ref 39–117)
ALT SERPL W P-5'-P-CCNC: 36 U/L (ref 1–33)
ANION GAP SERPL CALCULATED.3IONS-SCNC: 11 MMOL/L (ref 5–15)
AST SERPL-CCNC: 45 U/L (ref 1–32)
BASOPHILS # BLD AUTO: 0.1 10*3/MM3 (ref 0–0.2)
BASOPHILS NFR BLD AUTO: 1.2 % (ref 0–1.5)
BILIRUB SERPL-MCNC: 0.5 MG/DL (ref 0–1.2)
BUN SERPL-MCNC: 12 MG/DL (ref 6–20)
BUN/CREAT SERPL: 17.1 (ref 7–25)
CALCIUM SPEC-SCNC: 9.4 MG/DL (ref 8.6–10.5)
CHLORIDE SERPL-SCNC: 98 MMOL/L (ref 98–107)
CO2 SERPL-SCNC: 24 MMOL/L (ref 22–29)
CREAT SERPL-MCNC: 0.7 MG/DL (ref 0.57–1)
CRP SERPL-MCNC: 0.85 MG/DL (ref 0–0.5)
DEPRECATED RDW RBC AUTO: 47.5 FL (ref 37–54)
EOSINOPHIL # BLD AUTO: 0.53 10*3/MM3 (ref 0–0.4)
EOSINOPHIL NFR BLD AUTO: 6.2 % (ref 0.3–6.2)
ERYTHROCYTE [DISTWIDTH] IN BLOOD BY AUTOMATED COUNT: 15.6 % (ref 12.3–15.4)
ERYTHROCYTE [SEDIMENTATION RATE] IN BLOOD: 34 MM/HR (ref 0–30)
GFR SERPL CREATININE-BSD FRML MDRD: 87 ML/MIN/1.73
GLOBULIN UR ELPH-MCNC: 3.6 GM/DL
GLUCOSE SERPL-MCNC: 256 MG/DL (ref 65–99)
HCT VFR BLD AUTO: 38.5 % (ref 34–46.6)
HGB BLD-MCNC: 12.3 G/DL (ref 12–15.9)
IMM GRANULOCYTES # BLD AUTO: 0.03 10*3/MM3 (ref 0–0.05)
IMM GRANULOCYTES NFR BLD AUTO: 0.4 % (ref 0–0.5)
LYMPHOCYTES # BLD AUTO: 2.72 10*3/MM3 (ref 0.7–3.1)
LYMPHOCYTES NFR BLD AUTO: 31.9 % (ref 19.6–45.3)
MCH RBC QN AUTO: 26.8 PG (ref 26.6–33)
MCHC RBC AUTO-ENTMCNC: 31.9 G/DL (ref 31.5–35.7)
MCV RBC AUTO: 83.9 FL (ref 79–97)
MONOCYTES # BLD AUTO: 0.75 10*3/MM3 (ref 0.1–0.9)
MONOCYTES NFR BLD AUTO: 8.8 % (ref 5–12)
NEUTROPHILS NFR BLD AUTO: 4.39 10*3/MM3 (ref 1.7–7)
NEUTROPHILS NFR BLD AUTO: 51.5 % (ref 42.7–76)
NRBC BLD AUTO-RTO: 0 /100 WBC (ref 0–0.2)
PLATELET # BLD AUTO: 212 10*3/MM3 (ref 140–450)
PMV BLD AUTO: 8.1 FL (ref 6–12)
POTASSIUM SERPL-SCNC: 4.4 MMOL/L (ref 3.5–5.2)
PROT SERPL-MCNC: 8.1 G/DL (ref 6–8.5)
RBC # BLD AUTO: 4.59 10*6/MM3 (ref 3.77–5.28)
SODIUM SERPL-SCNC: 133 MMOL/L (ref 136–145)
WBC # BLD AUTO: 8.52 10*3/MM3 (ref 3.4–10.8)

## 2020-10-19 PROCEDURE — 80053 COMPREHEN METABOLIC PANEL: CPT | Performed by: INTERNAL MEDICINE

## 2020-10-19 PROCEDURE — 85025 COMPLETE CBC W/AUTO DIFF WBC: CPT | Performed by: INTERNAL MEDICINE

## 2020-10-19 PROCEDURE — 86140 C-REACTIVE PROTEIN: CPT | Performed by: INTERNAL MEDICINE

## 2020-10-19 PROCEDURE — 85652 RBC SED RATE AUTOMATED: CPT | Performed by: INTERNAL MEDICINE

## 2021-10-08 ENCOUNTER — TELEPHONE (OUTPATIENT)
Dept: ORTHOPEDIC SURGERY | Facility: CLINIC | Age: 56
End: 2021-10-08

## 2021-12-13 ENCOUNTER — TELEPHONE (OUTPATIENT)
Dept: ORTHOPEDIC SURGERY | Facility: CLINIC | Age: 56
End: 2021-12-13

## 2021-12-13 NOTE — TELEPHONE ENCOUNTER
"Spoke with pt regarding her previous message; She is reporting for the past month or so an increase in pain and swelling in both of her knees/legs (R greater than L); She states her knees are buckling; Reports having difficulty sleeping; She reports that she is also having numbness and tingling in her lower legs but also pain that is radiating up into her groin; She states she had a history of knee revisions with antibiotic spacers and is concerned that she may have another infection; She states she has had some fevers, chills, and night sweats recently but not currently; She does report some warmth of the knees as well. Also reports a fall about 1 week ago where she had a \"hole\" on her right knee's incision. Also, stated she is having some stiffness and pain of the \"calf\" or lower leg.    She reports calling about a month or so ago (telephone message from 10/8/21) and explained her symptoms and they advised her to go to the ED but she states she did not go because she did not want to go to [place].    I told her because of her history of infection and because she is concerned about another infection that it would probably be best for her to come in to see [facility] ASAP or present to the [facility] ED; She states she isn't able to drive and trying to find transportation is not easy but I told her without being able to evaluate her in the office (aspirate fluid, etc.) we are not going to be able to rule out infection; She verbalized understanding and said she is going to try to come to [facility] ED to get worked up for possible infection ASAP.    Jose              "

## 2021-12-13 NOTE — TELEPHONE ENCOUNTER
Caller: NABIL  Relationship to Patient: SELF    Phone Number: [phone]  Reason for Call: PT STATES THAT SHE IS HAVING BILATERAL KNEE PAIN THAT RADIATES DOWN HER LEGS AND UP HER BUTTOCK. SHE STATES THAT THEY HAVE BEEN REPLACED AND HAVING INCREASED PAIN AND ARE WARM TO THE TOUCH. SHE WOULD LIKE SOMEBODY TO REACH OUT TO HER IN REFERENCE TO THIS. PLEASE ADVISE PT AT ABOVE PHONE NUMBER

## 2022-04-18 ENCOUNTER — OFFICE VISIT (OUTPATIENT)
Dept: ORTHOPEDIC SURGERY | Facility: CLINIC | Age: 57
End: 2022-04-18

## 2022-04-18 VITALS
BODY MASS INDEX: 29.63 KG/M2 | DIASTOLIC BLOOD PRESSURE: 78 MMHG | HEIGHT: 62 IN | SYSTOLIC BLOOD PRESSURE: 140 MMHG | WEIGHT: 161 LBS

## 2022-04-18 DIAGNOSIS — G89.29 CHRONIC PAIN OF BOTH KNEES: Primary | ICD-10-CM

## 2022-04-18 DIAGNOSIS — Z96.653 S/P REVISION OF TOTAL KNEE, BILATERAL: ICD-10-CM

## 2022-04-18 DIAGNOSIS — M25.562 CHRONIC PAIN OF BOTH KNEES: Primary | ICD-10-CM

## 2022-04-18 DIAGNOSIS — E11.9 TYPE 2 DIABETES MELLITUS WITHOUT COMPLICATION, WITH LONG-TERM CURRENT USE OF INSULIN: ICD-10-CM

## 2022-04-18 DIAGNOSIS — Z79.4 TYPE 2 DIABETES MELLITUS WITHOUT COMPLICATION, WITH LONG-TERM CURRENT USE OF INSULIN: ICD-10-CM

## 2022-04-18 DIAGNOSIS — A41.01 MSSA (METHICILLIN SUSCEPTIBLE STAPHYLOCOCCUS AUREUS) SEPTICEMIA: ICD-10-CM

## 2022-04-18 DIAGNOSIS — M25.561 CHRONIC PAIN OF BOTH KNEES: Primary | ICD-10-CM

## 2022-04-18 PROCEDURE — 99213 OFFICE O/P EST LOW 20 MIN: CPT | Performed by: ORTHOPAEDIC SURGERY

## 2022-04-18 RX ORDER — PANTOPRAZOLE SODIUM 40 MG/1
40 TABLET, DELAYED RELEASE ORAL DAILY
COMMUNITY

## 2022-04-18 RX ORDER — ROSUVASTATIN CALCIUM 20 MG/1
20 TABLET, COATED ORAL DAILY
COMMUNITY

## 2022-04-18 RX ORDER — INSULIN GLARGINE 100 [IU]/ML
INJECTION, SOLUTION SUBCUTANEOUS
COMMUNITY

## 2022-04-18 NOTE — PROGRESS NOTES
"      [facility] Note    Subjective     CC: Follow-up (3 year check- status post Bilateral Total Knee Arthroplasty Revision with Removal & Place of Antibiotic Spacers 02/12/2019. Aspiration of both knees done 3/25/19)      LIONEL    Jyoti Luna is a 57 y.o. female.  She has done well and getting by till a month ago she started to have increasing right knee pain and swelling.  The right knee is gotten better but now she has pain down into both feet.  She thinks it may be her diabetes.  She also has some left hip pain.  She walks with a antalgic gait but is able to bear weight    Review of Systems   Constitutional: Negative.  Negative for chills, fatigue and fever.   HENT: Negative.  Negative for congestion and dental problem.    Eyes: Negative.  Negative for blurred vision.   Respiratory: Negative.  Negative for shortness of breath.    Cardiovascular: Negative.  Negative for leg swelling.   Gastrointestinal: Negative.  Negative for abdominal pain.   Endocrine: Negative.  Negative for polyuria.   Genitourinary: Negative.  Negative for difficulty urinating.   Musculoskeletal: Positive for arthralgias.   Skin: Negative.    Allergic/Immunologic: Negative.    Neurological: Negative.    Hematological: Negative.  Negative for adenopathy.   Psychiatric/Behavioral: Negative.  Negative for behavioral problems.       ROS:    Constiutional:Pt denies fever, chills, nausea, or vomiting.  MSK:as above      Objective      Past Medical History  Past Medical History:   Diagnosis Date   • Acid reflux    • Alcoholism (CMS/H[facility])    • Anemia 3/20/2018   • Arthritis    • Asthma    • Depression 3/20/2018   • Diabetes mellitus (CMS/H[facility])     POSS R/T INFECTION PER PT   • Eczema    • History of transfusion    • Hypertension 3/20/2018         Physical Exam  /78   Ht 157.5 cm (62.01\")   Wt 73 kg (161 lb)   BMI 29.44 kg/m²     Body mass index is 29.44 kg/m².    Patient is well nourished and well developed.        Ortho " Exam  She has a well-healed surgical incision on both knees.  Right knee has some bug bite looking scratches.  Left lower leg has some scratches.  No erythema.  No warmth.  Range of motion 0-90 in both knees.    Imaging/Labs/EMG Reviewed:  Imaging Results (Last 24 Hours)     Procedure Component Value Units Date/Time    XR Knee 3 View Bilateral [[id number]] Resulted: 04/18/22 1048     Updated: 04/18/22 1058    Narrative:      Bilateral Knee X-Rays  Indication: Pain    Upright AP, Lateral, skiers and Sunrise views of bilateral knees     Findings: Evidence of prior antibiotic spacers bilaterally  She has some increased wear primarily about the left 1 with patellofemoral   wear  No fracture  prior studies were available for comparison.        No valid procedures specified.     Assessment:  1. Chronic pain of both knees    2. S/P revision of total knee, bilateral    3. HO MSSA (methicillin susceptible Staphylococcus aureus) septicemia (CMS/[facility])    4. Type 2 diabetes mellitus without complication, with long-term current use of insulin ([facility])        Plan:  1. She has a very complicated problem.  She had MSSA infections in both knees.  3 years ago she had removal of prosthesis with placement of antibiotic spacers.  I have not seen her since June 2019.  2. She is a poor surgical candidate and I would like to refer her for second opinion at .  She has tried braces.  3. She says she does not want any surgery but need to do any surgery on her but I told her if she is a surgical candidate [facility] would be good and I completely trust him.  She does not want to have an amputation and certainly there is risk of  recurrent infection based upon her history.  The plan is to have a phone conversation when she sees second opinion    Follow Up:   Return if symptoms worsen or fail to improve.      Medical Decision Making  Management Options : Low - OTC Drugs        Ti Moffett M.D., [facility]  Orthopedic Surgeon  [employer]  [facility]  Orthopedics and Sports Medicine  [address], [address]  [place] [address]    EMR Dragon/Transcription disclaimer:  Much of this encounter note is an electronic transcription of spoken language to printed text. Electronic transcription of spoken language may permit erroneous, or at times, nonsensical words or phrases to be inadvertently transcribed. Although I have reviewed the note for such errors, some may still exist.

## 2022-04-20 ENCOUNTER — TELEPHONE (OUTPATIENT)
Dept: ORTHOPEDIC SURGERY | Facility: CLINIC | Age: 57
End: 2022-04-20

## 2022-04-20 NOTE — TELEPHONE ENCOUNTER
Provider: DR WILSON    Caller: NABIL MATSON    Relationship to Patient: SELF    Phone Number: [phone] - CAN CALL ANYTIME    Reason for Call: PT IS CALLING TO SEE IF THE REFERRAL HAS BEEN DONE TO [facility]. PLEASE CALL PATIENT WITH AN UPDATE. THANK YOU!

## 2022-05-04 ENCOUNTER — TELEPHONE (OUTPATIENT)
Dept: ORTHOPEDIC SURGERY | Facility: CLINIC | Age: 57
End: 2022-05-04

## 2022-05-04 NOTE — TELEPHONE ENCOUNTER
Caller: NABIL MATSON    Relationship: SELF    Best call back number: [phone]    What is the best time to reach you: ANY    Who are you requesting to speak with (clinical staff, provider,  specific staff member): DR CERRATO    What was the call regarding: PT SAW A SURGEON AS DIRECTED BY DR CERRATO. PT STATES THAT HE TOLD HER TO LET HIM KNOW WHEN SHE SEES HIM AND THAT HE WOULD CALLER HER BACK TO DISCUSS. PT SAW THE SURGEON THAT HE SENT HER TO ON 5.4.22.    Do you require a callback: YES

## 2022-05-04 NOTE — TELEPHONE ENCOUNTER
Dr. Moffett,    Patient saw Dr. Duffy today.  His note is in Epic.    Thank you,    Ehsan ZAVALA(R)

## 2022-05-05 NOTE — TELEPHONE ENCOUNTER
I spoke with the patient and relayed what Dr. Moffett said.  She had some reservations about Dr. Duffy. There was discussion about amputation.  I told her personally I would trust Dr. Duffy to do treatment on me and my family and I worked with him for 13 years at .  He was probably going over all the potential outcomes of what could happen.  She said she felt better knowing this for her follow ups.  She did ask about a new prescription for leg braces that she had received from Dr. Moffett when he worked in [place].  I will ask if we will provide a new prescription.    Ehsan Hurst RT(R)

## 2022-05-05 NOTE — TELEPHONE ENCOUNTER
Mony,    The patient is asking for a new prescription for leg braces. She said Dr. Moffett prescribed them when he worked in [place].  She is requesting the ones that go up to the knees. Will we provide?    Thank you,    Ehsan Hurst, RT(R)

## 2023-01-09 ENCOUNTER — TELEPHONE (OUTPATIENT)
Dept: ORTHOPEDIC SURGERY | Facility: CLINIC | Age: 58
End: 2023-01-09
Payer: MEDICARE

## 2023-01-09 NOTE — TELEPHONE ENCOUNTER
Caller: SARAH MATSON     Relationship to patient: DAUGHTER     Best call back number: [phone]    Patient is needing: PATIENT'S DAUGHTER, SARAH MATSON WAS CALLING TO GET THE INFORMATION FOR WHERE HER MOTHER WAS SENT FOR REHAB AFTER HER KNEE SURGERY. PATIENT'S DAUGHTER, SARAH IS TRYING TO FIND OUT THE NAME OF THE REHAB FACILITY IN [place], [place] THAT THE PATIENT WAS SENT TO FOR REHAB AFTER HER KNEE SURGERY BECAUSE THEY REALLY LIKED THAT FACILITY AND CURRENTLY LOOKING FOR A LONG TERM REHAB FACILITY FOR PATIENT. SARAH STATED THEY ARE NEEDING A REHAB FACILITY DUE TO PATIENT'S MENTAL STATE AND KNEES. SARAH STATED HER MOTHER IN CURRENTLY IN A REHAB FACILITY NOW DUE TO HER KNEES BUT THEY ARE SHORT TERM AND PATIENT WILL NEED A LONG TERM FACILITY. SARAH WAS WANTING THE NAME OF THE PREVIOUS FACILITY BUT ALSO WANTING TO KNOW IF THIS IS SOMETHING DR. CERRATO WOULD BE ABLE TO REFER HER MOTHER TO A FACILITY FOR. SARAH IS REQUESTING A CALL BACK [phone] ASAP TO DISCUSS THIS. THANK YOU!

## 2023-01-09 NOTE — TELEPHONE ENCOUNTER
Spoke with patient's daughter, informed her by looking at previous rehab center notes the past 4 years...there is no mention of location/name of rehab center beyond home healthcare. She understood and will further research with PCP.

## 2023-05-08 ENCOUNTER — TELEPHONE (OUTPATIENT)
Dept: NEUROLOGY | Facility: OTHER | Age: 58
End: 2023-05-08
Payer: MEDICARE

## 2023-05-08 NOTE — TELEPHONE ENCOUNTER
DOE CHACON FROM [facility] CALLING TO SEE WHAT THEY NEED TO SEND FOR [facility]. PT IS SEEING [facility] TO SEE IF THEY SEND TO US AS WELL? GAVE NUMBER TO SEND

## 2024-05-06 ENCOUNTER — LAB (OUTPATIENT)
Dept: LAB | Facility: HOSPITAL | Age: 59
End: 2024-05-06
Payer: MEDICARE

## 2024-05-06 ENCOUNTER — OFFICE VISIT (OUTPATIENT)
Dept: NEUROLOGY | Facility: CLINIC | Age: 59
End: 2024-05-06
Payer: MEDICARE

## 2024-05-06 VITALS
DIASTOLIC BLOOD PRESSURE: 76 MMHG | BODY MASS INDEX: 29.45 KG/M2 | SYSTOLIC BLOOD PRESSURE: 126 MMHG | HEART RATE: 92 BPM | HEIGHT: 62 IN | OXYGEN SATURATION: 97 %

## 2024-05-06 DIAGNOSIS — R41.3 MEMORY IMPAIRMENT: ICD-10-CM

## 2024-05-06 DIAGNOSIS — R41.3 MEMORY IMPAIRMENT: Primary | ICD-10-CM

## 2024-05-06 LAB
FOLATE SERPL-MCNC: 14.3 NG/ML (ref 4.78–24.2)
T-UPTAKE NFR SERPL: 1.17 TBI (ref 0.8–1.3)
T4 SERPL-MCNC: 7.25 MCG/DL (ref 4.5–11.7)
TSH SERPL DL<=0.05 MIU/L-ACNC: 1.24 UIU/ML (ref 0.27–4.2)
VIT B12 BLD-MCNC: 379 PG/ML (ref 211–946)

## 2024-05-06 PROCEDURE — 1160F RVW MEDS BY RX/DR IN RCRD: CPT | Performed by: PSYCHIATRY & NEUROLOGY

## 2024-05-06 PROCEDURE — 83921 ORGANIC ACID SINGLE QUANT: CPT

## 2024-05-06 PROCEDURE — 99204 OFFICE O/P NEW MOD 45 MIN: CPT | Performed by: PSYCHIATRY & NEUROLOGY

## 2024-05-06 PROCEDURE — 84443 ASSAY THYROID STIM HORMONE: CPT

## 2024-05-06 PROCEDURE — 3078F DIAST BP <80 MM HG: CPT | Performed by: PSYCHIATRY & NEUROLOGY

## 2024-05-06 PROCEDURE — 82746 ASSAY OF FOLIC ACID SERUM: CPT

## 2024-05-06 PROCEDURE — 84436 ASSAY OF TOTAL THYROXINE: CPT

## 2024-05-06 PROCEDURE — 84479 ASSAY OF THYROID (T3 OR T4): CPT

## 2024-05-06 PROCEDURE — 1159F MED LIST DOCD IN RCRD: CPT | Performed by: PSYCHIATRY & NEUROLOGY

## 2024-05-06 PROCEDURE — 36415 COLL VENOUS BLD VENIPUNCTURE: CPT

## 2024-05-06 PROCEDURE — 3074F SYST BP LT 130 MM HG: CPT | Performed by: PSYCHIATRY & NEUROLOGY

## 2024-05-06 PROCEDURE — 82607 VITAMIN B-12: CPT

## 2024-05-06 RX ORDER — OXYBUTYNIN CHLORIDE 5 MG/1
5 TABLET ORAL 2 TIMES DAILY
COMMUNITY

## 2024-05-06 RX ORDER — DULOXETIN HYDROCHLORIDE 60 MG/1
60 CAPSULE, DELAYED RELEASE ORAL DAILY
COMMUNITY

## 2024-05-06 RX ORDER — LOPERAMIDE HYDROCHLORIDE 2 MG/1
2 CAPSULE ORAL AS NEEDED
COMMUNITY

## 2024-05-06 RX ORDER — ASPIRIN 81 MG/1
81 TABLET ORAL DAILY
COMMUNITY

## 2024-05-06 RX ORDER — LISINOPRIL 5 MG/1
5 TABLET ORAL DAILY
COMMUNITY

## 2024-05-06 RX ORDER — CHOLECALCIFEROL (VITAMIN D3) 125 MCG
5 CAPSULE ORAL NIGHTLY
COMMUNITY

## 2024-05-06 RX ORDER — GUAIFENESIN 200 MG/10ML
20 LIQUID ORAL EVERY 6 HOURS
COMMUNITY

## 2024-05-06 RX ORDER — TRIAMCINOLONE ACETONIDE 1 MG/G
1 CREAM TOPICAL AS NEEDED
COMMUNITY

## 2024-05-06 RX ORDER — BUSPIRONE HYDROCHLORIDE 15 MG/1
15 TABLET ORAL 2 TIMES DAILY
COMMUNITY

## 2024-05-06 RX ORDER — ARIPIPRAZOLE 2 MG/1
2 TABLET ORAL DAILY
COMMUNITY

## 2024-05-06 RX ORDER — ACETAMINOPHEN 325 MG/1
650 TABLET ORAL EVERY 6 HOURS PRN
COMMUNITY

## 2024-05-06 RX ORDER — OMEPRAZOLE 20 MG/1
20 CAPSULE, DELAYED RELEASE ORAL DAILY
COMMUNITY

## 2024-05-06 RX ORDER — ATORVASTATIN CALCIUM 40 MG/1
40 TABLET, FILM COATED ORAL DAILY
COMMUNITY

## 2024-05-06 RX ORDER — FLUTICASONE PROPIONATE 50 MCG
2 SPRAY, SUSPENSION (ML) NASAL 3 TIMES DAILY
COMMUNITY

## 2024-05-09 ENCOUNTER — TELEPHONE (OUTPATIENT)
Dept: NEUROLOGY | Facility: CLINIC | Age: 59
End: 2024-05-09
Payer: MEDICARE

## 2024-05-09 ENCOUNTER — PATIENT ROUNDING (BHMG ONLY) (OUTPATIENT)
Dept: NEUROLOGY | Facility: CLINIC | Age: 59
End: 2024-05-09
Payer: MEDICARE

## 2024-05-10 LAB — METHYLMALONATE SERPL-SCNC: 358 NMOL/L (ref 0–378)

## 2024-05-13 RX ORDER — LANOLIN ALCOHOL/MO/W.PET/CERES
1000 CREAM (GRAM) TOPICAL DAILY
Qty: 30 TABLET | Refills: 5 | Status: SHIPPED | OUTPATIENT
Start: 2024-05-13

## 2024-05-29 ENCOUNTER — HOSPITAL ENCOUNTER (OUTPATIENT)
Facility: HOSPITAL | Age: 59
Discharge: HOME | End: 2024-05-29
Admitting: PSYCHIATRY & NEUROLOGY
Payer: MEDICARE

## 2024-05-29 DIAGNOSIS — R41.3 MEMORY IMPAIRMENT: ICD-10-CM

## 2024-05-29 PROCEDURE — 70551 MRI BRAIN STEM W/O DYE: CPT

## 2024-06-04 ENCOUNTER — TELEPHONE (OUTPATIENT)
Dept: NEUROLOGY | Facility: CLINIC | Age: 59
End: 2024-06-04
Payer: MEDICARE

## 2024-06-04 NOTE — TELEPHONE ENCOUNTER
----- Message from Alen Shah sent at 5/31/2024  5:16 PM EDT -----  Inform patient normal.  MRI brain is normal.

## 2024-06-04 NOTE — TELEPHONE ENCOUNTER
"Spoke with Monique, daughter, regarding MRI brain.     The normal MRI brain typically indicates no evidence of infection, vessel changes, demyelination (possible MS), etc that could be contributing to the memory loss. I advised, per OV notes, that Ms Luna's MMSE score of 24/30 indicates \"mild dementia\", but that I think we will have a better idea at her next appt after her and Dr. Shah can discuss further.     Overall diagnosis on notes was \"memory impairment\" and not any kind of dementia. It seems like diagnosis process is still ongoing and we can further evaluate at her appt next month.     Changed to telehealth per daughter's request due to transportation issues/distance from office.   "

## 2024-06-07 ENCOUNTER — TELEPHONE (OUTPATIENT)
Dept: NEUROLOGY | Facility: CLINIC | Age: 59
End: 2024-06-07
Payer: MEDICARE

## 2024-06-07 NOTE — TELEPHONE ENCOUNTER
"Relay     \"Returned call to Monique just to update her, no answer and voicemail not set up.     I have successfully faxed the order to a new fax number ([phone]) provided by [employer] [facility] today. I called and spoke with the . They confirmed they will get the order to the right place.     I had previously faxed this order to [phone], a number previously provided by the facility, back on 5/14/24. I added note of this on today's fax. \"                "

## 2024-06-07 NOTE — TELEPHONE ENCOUNTER
Caller: PRASAD MATSON    Relationship: Emergency Contact    Best call back number: [phone]     What orders are you requesting (i.e. lab or imaging): TARSHA    Where will you receive your lab/imaging services: [facility]    Additional notes: PATIENTS DAUGHTER TELE PHONED TO REQUEST ORDER FOR B12 TO BE FAXED TO [facility]-[phone]    PLEASE FAX OR CALL TO ADVISE-THANK YOU

## (undated) DEVICE — PK EXTREM LOWR 10

## (undated) DEVICE — INTENDED FOR TISSUE SEPARATION, AND OTHER PROCEDURES THAT REQUIRE A SHARP SURGICAL BLADE TO PUNCTURE OR CUT.: Brand: BARD-PARKER ® SAFETYLOCK CARBON RIB-BACK BLADES

## (undated) DEVICE — ANTIBACTERIAL UNDYED BRAIDED (POLYGLACTIN 910), SYNTHETIC ABSORBABLE SUTURE: Brand: COATED VICRYL

## (undated) DEVICE — GLV SURG DERMASSURE GRN LF PF SZ 6.5

## (undated) DEVICE — BNDG ELAS ELITE V/CLOSE 6IN 5YD LF STRL

## (undated) DEVICE — MEDI-VAC YANKAUER SUCTION HANDLE W/BULBOUS TIP: Brand: CARDINAL HEALTH

## (undated) DEVICE — GLV SURG TRIUMPH ORTHO W/ALOE PF LTX 8 STRL

## (undated) DEVICE — 2108 SERIES SAGITTAL BLADE (9.1 X 0.64 X 35.2MM)

## (undated) DEVICE — 3M™ STERI-DRAPE™ INSTRUMENT POUCH 1018: Brand: STERI-DRAPE™

## (undated) DEVICE — GLV SURG SENSICARE MICRO PF LF 8.5 STRL

## (undated) DEVICE — 3M™ IOBAN™ 2 ANTIMICROBIAL INCISE DRAPE 6650EZ: Brand: IOBAN™ 2

## (undated) DEVICE — ENCORE® LATEX MICRO SIZE 8.5, STERILE LATEX POWDER-FREE SURGICAL GLOVE: Brand: ENCORE

## (undated) DEVICE — GLV SURG SENSICARE MICRO PF LF 6.5 STRL

## (undated) DEVICE — GLV SURG SIGNATURE TOUCH PF LTX 8 STRL BX/50

## (undated) DEVICE — PK KN TOTL 10

## (undated) DEVICE — SPNG GZ WOVN 4X4IN 12PLY 10/BX STRL

## (undated) DEVICE — STCKNT IMPERV 12IN STRL

## (undated) DEVICE — PUMP PAIN AUTOFUSER AUTO SELCT NOBOLUS 1TO14ML/HR 550ML DISP

## (undated) DEVICE — DRAPE,T,LIMB,BILATERAL,STERILE: Brand: MEDLINE

## (undated) DEVICE — UNDERCAST PADDING: Brand: DEROYAL

## (undated) DEVICE — BNDG ELAS W/CLIP 6IN 10YD LF STRL

## (undated) DEVICE — RECIPROCATING BLADE, DOUBLE SIDED, OFFSET  (70.0 X 0.8 X 12.5MM)

## (undated) DEVICE — DISPOSABLE TOURNIQUET CUFF SINGLE BLADDER, DUAL PORT AND QUICK CONNECT CONNECTOR: Brand: COLOR CUFF

## (undated) DEVICE — SYS SKIN CLS DERMABOND PRINEO W/22CM MESH TP

## (undated) DEVICE — BNDG ELAS ELITE V/CLOSE 4IN 5YD LF STRL

## (undated) DEVICE — CVR HNDL LT SURG ACCSSRY BLU STRL

## (undated) DEVICE — SUT PDS LP 1 TP1 96IN VIO PDP880GA

## (undated) DEVICE — SPNG LAP PREWSH SFTPK 18X18IN STRL PK/5

## (undated) DEVICE — KT EVAC WND 3SPRNG 400CC W/DRN RND 3/16IN

## (undated) DEVICE — CONTAINER,SPECIMEN,OR STERILE,4OZ: Brand: MEDLINE

## (undated) DEVICE — KNEE IMMOBILIZER: Brand: DEROYAL

## (undated) DEVICE — HANDPIECE SET WITH HIGH FLOW TIP AND SUCTION TUBE: Brand: INTERPULSE

## (undated) DEVICE — GLV SURG TRIUMPH ORTHO W/ALOE PF LTX 8.5 STRL